# Patient Record
Sex: FEMALE | Race: BLACK OR AFRICAN AMERICAN | Employment: UNEMPLOYED | URBAN - NONMETROPOLITAN AREA
[De-identification: names, ages, dates, MRNs, and addresses within clinical notes are randomized per-mention and may not be internally consistent; named-entity substitution may affect disease eponyms.]

---

## 2017-03-23 ENCOUNTER — NURSE TRIAGE (OUTPATIENT)
Dept: ADMINISTRATIVE | Age: 32
End: 2017-03-23

## 2017-06-20 ENCOUNTER — OFFICE VISIT (OUTPATIENT)
Dept: OTOLARYNGOLOGY | Age: 32
End: 2017-06-20

## 2017-06-20 VITALS
SYSTOLIC BLOOD PRESSURE: 140 MMHG | BODY MASS INDEX: 29.64 KG/M2 | WEIGHT: 195.6 LBS | HEART RATE: 84 BPM | HEIGHT: 68 IN | RESPIRATION RATE: 12 BRPM | TEMPERATURE: 98.4 F | DIASTOLIC BLOOD PRESSURE: 100 MMHG

## 2017-06-20 DIAGNOSIS — J02.0 STREP PHARYNGITIS: Primary | ICD-10-CM

## 2017-06-20 PROCEDURE — 99202 OFFICE O/P NEW SF 15 MIN: CPT | Performed by: OTOLARYNGOLOGY

## 2017-06-20 RX ORDER — AMOXICILLIN AND CLAVULANATE POTASSIUM 875; 125 MG/1; MG/1
1 TABLET, FILM COATED ORAL 2 TIMES DAILY
Qty: 28 TABLET | Refills: 0 | Status: SHIPPED | OUTPATIENT
Start: 2017-06-20 | End: 2017-07-03 | Stop reason: ALTCHOICE

## 2017-06-20 RX ORDER — FLUCONAZOLE 100 MG/1
100 TABLET ORAL EVERY OTHER DAY
Qty: 10 TABLET | Refills: 0 | Status: SHIPPED | OUTPATIENT
Start: 2017-06-20 | End: 2017-07-03 | Stop reason: ALTCHOICE

## 2017-06-20 ASSESSMENT — ENCOUNTER SYMPTOMS
SORE THROAT: 1
RHINORRHEA: 0
ABDOMINAL PAIN: 0
VOMITING: 0
TROUBLE SWALLOWING: 1
NAUSEA: 0
CHEST TIGHTNESS: 0
STRIDOR: 0
CHOKING: 1
COLOR CHANGE: 0
APNEA: 0
COUGH: 1
DIARRHEA: 0
VOICE CHANGE: 1
SINUS PRESSURE: 1
WHEEZING: 0
SHORTNESS OF BREATH: 0
FACIAL SWELLING: 0

## 2017-07-03 ENCOUNTER — OFFICE VISIT (OUTPATIENT)
Dept: FAMILY MEDICINE CLINIC | Age: 32
End: 2017-07-03

## 2017-07-03 VITALS
SYSTOLIC BLOOD PRESSURE: 136 MMHG | HEART RATE: 80 BPM | TEMPERATURE: 98.2 F | DIASTOLIC BLOOD PRESSURE: 78 MMHG | BODY MASS INDEX: 29.5 KG/M2 | RESPIRATION RATE: 12 BRPM | WEIGHT: 194 LBS

## 2017-07-03 DIAGNOSIS — F51.04 PSYCHOPHYSIOLOGICAL INSOMNIA: ICD-10-CM

## 2017-07-03 DIAGNOSIS — Z72.0 TOBACCO ABUSE: ICD-10-CM

## 2017-07-03 DIAGNOSIS — Z09 FOLLOW-UP FOR RESOLVED CONDITION: Primary | ICD-10-CM

## 2017-07-03 DIAGNOSIS — Z23 IMMUNIZATION DUE: ICD-10-CM

## 2017-07-03 PROCEDURE — 90732 PPSV23 VACC 2 YRS+ SUBQ/IM: CPT | Performed by: NURSE PRACTITIONER

## 2017-07-03 PROCEDURE — 90471 IMMUNIZATION ADMIN: CPT | Performed by: NURSE PRACTITIONER

## 2017-07-03 PROCEDURE — 96160 PT-FOCUSED HLTH RISK ASSMT: CPT | Performed by: NURSE PRACTITIONER

## 2017-07-03 PROCEDURE — 99213 OFFICE O/P EST LOW 20 MIN: CPT | Performed by: NURSE PRACTITIONER

## 2017-07-03 RX ORDER — AMITRIPTYLINE HYDROCHLORIDE 10 MG/1
10 TABLET, FILM COATED ORAL NIGHTLY PRN
Qty: 30 TABLET | Refills: 3 | Status: SHIPPED | OUTPATIENT
Start: 2017-07-03 | End: 2017-08-15

## 2017-07-03 ASSESSMENT — ENCOUNTER SYMPTOMS
SORE THROAT: 0
RESPIRATORY NEGATIVE: 1

## 2017-07-03 ASSESSMENT — PATIENT HEALTH QUESTIONNAIRE - PHQ9
6. FEELING BAD ABOUT YOURSELF - OR THAT YOU ARE A FAILURE OR HAVE LET YOURSELF OR YOUR FAMILY DOWN: 1
SUM OF ALL RESPONSES TO PHQ9 QUESTIONS 1 & 2: 2
7. TROUBLE CONCENTRATING ON THINGS, SUCH AS READING THE NEWSPAPER OR WATCHING TELEVISION: 0
3. TROUBLE FALLING OR STAYING ASLEEP: 3
9. THOUGHTS THAT YOU WOULD BE BETTER OFF DEAD, OR OF HURTING YOURSELF: 1
SUM OF ALL RESPONSES TO PHQ QUESTIONS 1-9: 11
10. IF YOU CHECKED OFF ANY PROBLEMS, HOW DIFFICULT HAVE THESE PROBLEMS MADE IT FOR YOU TO DO YOUR WORK, TAKE CARE OF THINGS AT HOME, OR GET ALONG WITH OTHER PEOPLE: 0
4. FEELING TIRED OR HAVING LITTLE ENERGY: 2
2. FEELING DOWN, DEPRESSED OR HOPELESS: 2
5. POOR APPETITE OR OVEREATING: 2
1. LITTLE INTEREST OR PLEASURE IN DOING THINGS: 0
8. MOVING OR SPEAKING SO SLOWLY THAT OTHER PEOPLE COULD HAVE NOTICED. OR THE OPPOSITE, BEING SO FIGETY OR RESTLESS THAT YOU HAVE BEEN MOVING AROUND A LOT MORE THAN USUAL: 0

## 2017-08-15 ENCOUNTER — OFFICE VISIT (OUTPATIENT)
Dept: FAMILY MEDICINE CLINIC | Age: 32
End: 2017-08-15
Payer: COMMERCIAL

## 2017-08-15 VITALS
RESPIRATION RATE: 12 BRPM | HEIGHT: 68 IN | TEMPERATURE: 98.1 F | SYSTOLIC BLOOD PRESSURE: 128 MMHG | DIASTOLIC BLOOD PRESSURE: 68 MMHG | BODY MASS INDEX: 29.4 KG/M2 | WEIGHT: 194 LBS | HEART RATE: 62 BPM

## 2017-08-15 DIAGNOSIS — R05.9 COUGH: ICD-10-CM

## 2017-08-15 DIAGNOSIS — F51.04 PSYCHOPHYSIOLOGICAL INSOMNIA: Primary | ICD-10-CM

## 2017-08-15 DIAGNOSIS — J01.01 ACUTE RECURRENT MAXILLARY SINUSITIS: ICD-10-CM

## 2017-08-15 DIAGNOSIS — H65.92 LEFT OTITIS MEDIA WITH EFFUSION: ICD-10-CM

## 2017-08-15 PROCEDURE — 99213 OFFICE O/P EST LOW 20 MIN: CPT | Performed by: NURSE PRACTITIONER

## 2017-08-15 RX ORDER — GUAIFENESIN 600 MG/1
600 TABLET, EXTENDED RELEASE ORAL 2 TIMES DAILY
Qty: 30 TABLET | Refills: 0 | Status: ON HOLD | OUTPATIENT
Start: 2017-08-15 | End: 2017-12-02

## 2017-08-15 RX ORDER — SULFAMETHOXAZOLE AND TRIMETHOPRIM 800; 160 MG/1; MG/1
1 TABLET ORAL 2 TIMES DAILY
Qty: 20 TABLET | Refills: 0 | Status: SHIPPED | OUTPATIENT
Start: 2017-08-15 | End: 2017-08-25

## 2017-08-15 RX ORDER — LORATADINE AND PSEUDOEPHEDRINE 10; 240 MG/1; MG/1
1 TABLET, EXTENDED RELEASE ORAL DAILY
Qty: 20 TABLET | Refills: 0 | Status: ON HOLD | OUTPATIENT
Start: 2017-08-15 | End: 2017-12-02

## 2017-08-15 RX ORDER — TRAZODONE HYDROCHLORIDE 50 MG/1
50 TABLET ORAL NIGHTLY
Qty: 30 TABLET | Refills: 6 | Status: SHIPPED | OUTPATIENT
Start: 2017-08-15 | End: 2018-02-15 | Stop reason: SDUPTHER

## 2017-08-15 ASSESSMENT — ENCOUNTER SYMPTOMS
SORE THROAT: 0
SINUS PRESSURE: 1
SINUS COMPLAINT: 1
SHORTNESS OF BREATH: 0
HOARSE VOICE: 0
COUGH: 1
SWOLLEN GLANDS: 0

## 2017-12-02 ENCOUNTER — APPOINTMENT (OUTPATIENT)
Dept: GENERAL RADIOLOGY | Age: 32
End: 2017-12-02
Payer: COMMERCIAL

## 2017-12-02 ENCOUNTER — APPOINTMENT (OUTPATIENT)
Dept: CT IMAGING | Age: 32
End: 2017-12-02
Payer: COMMERCIAL

## 2017-12-02 ENCOUNTER — HOSPITAL ENCOUNTER (OUTPATIENT)
Age: 32
Setting detail: OBSERVATION
Discharge: HOME | End: 2017-12-03
Attending: FAMILY MEDICINE | Admitting: SURGERY
Payer: COMMERCIAL

## 2017-12-02 DIAGNOSIS — S09.90XA CLOSED HEAD INJURY, INITIAL ENCOUNTER: ICD-10-CM
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
## 2017-12-02 DIAGNOSIS — S01.512A LACERATION OF TONGUE, INITIAL ENCOUNTER: ICD-10-CM

## 2017-12-02 DIAGNOSIS — S16.1XXA STRAIN OF NECK MUSCLE, INITIAL ENCOUNTER: ICD-10-CM

## 2017-12-02 DIAGNOSIS — I60.9 SUBARACHNOID HEMORRHAGE (HCC): ICD-10-CM

## 2017-12-02 PROBLEM — R68.84 PAIN IN LOWER JAW: Status: ACTIVE | Noted: 2017-12-02

## 2017-12-02 PROBLEM — R10.11 RIGHT UPPER QUADRANT ABDOMINAL PAIN: Status: ACTIVE | Noted: 2017-12-02

## 2017-12-02 LAB
ALBUMIN SERPL-MCNC: 4.4 G/DL (ref 3.5–5.1)
ALP BLD-CCNC: 67 U/L (ref 38–126)
ALT SERPL-CCNC: 19 U/L (ref 11–66)
ANION GAP SERPL CALCULATED.3IONS-SCNC: 12 MEQ/L (ref 8–16)
APTT: 34.1 SECONDS (ref 22–38)
AST SERPL-CCNC: 27 U/L (ref 5–40)
BASOPHILS # BLD: 0.6 %
BASOPHILS ABSOLUTE: 0 THOU/MM3 (ref 0–0.1)
BILIRUB SERPL-MCNC: 0.7 MG/DL (ref 0.3–1.2)
BUN BLDV-MCNC: 11 MG/DL (ref 7–22)
CALCIUM SERPL-MCNC: 9 MG/DL (ref 8.5–10.5)
CHLORIDE BLD-SCNC: 105 MEQ/L (ref 98–111)
CO2: 23 MEQ/L (ref 23–33)
CREAT SERPL-MCNC: 0.5 MG/DL (ref 0.4–1.2)
EKG ATRIAL RATE: 74 BPM
EKG P AXIS: 63 DEGREES
EKG P-R INTERVAL: 144 MS
EKG Q-T INTERVAL: 384 MS
EKG QRS DURATION: 84 MS
EKG QTC CALCULATION (BAZETT): 426 MS
EKG R AXIS: 44 DEGREES
EKG T AXIS: 22 DEGREES
EKG VENTRICULAR RATE: 74 BPM
EOSINOPHIL # BLD: 1.1 %
EOSINOPHILS ABSOLUTE: 0.1 THOU/MM3 (ref 0–0.4)
GFR SERPL CREATININE-BSD FRML MDRD: > 90 ML/MIN/1.73M2
GLUCOSE BLD-MCNC: 104 MG/DL (ref 70–108)
HCT VFR BLD CALC: 44 % (ref 37–47)
HEMOGLOBIN: 14.5 GM/DL (ref 12–16)
INR BLD: 0.97 (ref 0.85–1.13)
LYMPHOCYTES # BLD: 41.3 %
LYMPHOCYTES ABSOLUTE: 2.2 THOU/MM3 (ref 1–4.8)
MCH RBC QN AUTO: 31.4 PG (ref 27–31)
MCHC RBC AUTO-ENTMCNC: 32.8 GM/DL (ref 33–37)
MCV RBC AUTO: 95.7 FL (ref 81–99)
MONOCYTES # BLD: 10.4 %
MONOCYTES ABSOLUTE: 0.6 THOU/MM3 (ref 0.4–1.3)
NUCLEATED RED BLOOD CELLS: 0 /100 WBC
OSMOLALITY CALCULATION: 279.1 MOSMOL/KG (ref 275–300)
PDW BLD-RTO: 14.1 % (ref 11.5–14.5)
PLATELET # BLD: 202 THOU/MM3 (ref 130–400)
PMV BLD AUTO: 10 MCM (ref 7.4–10.4)
POTASSIUM SERPL-SCNC: 4 MEQ/L (ref 3.5–5.2)
RBC # BLD: 4.6 MILL/MM3 (ref 4.2–5.4)
SEG NEUTROPHILS: 46.6 %
SEGMENTED NEUTROPHILS ABSOLUTE COUNT: 2.5 THOU/MM3 (ref 1.8–7.7)
SODIUM BLD-SCNC: 140 MEQ/L (ref 135–145)
TOTAL PROTEIN: 7.4 G/DL (ref 6.1–8)
WBC # BLD: 5.4 THOU/MM3 (ref 4.8–10.8)

## 2017-12-02 PROCEDURE — 74177 CT ABD & PELVIS W/CONTRAST: CPT

## 2017-12-02 PROCEDURE — 99285 EMERGENCY DEPT VISIT HI MDM: CPT

## 2017-12-02 PROCEDURE — 96374 THER/PROPH/DIAG INJ IV PUSH: CPT

## 2017-12-02 PROCEDURE — 96376 TX/PRO/DX INJ SAME DRUG ADON: CPT

## 2017-12-02 PROCEDURE — 71111 X-RAY EXAM RIBS/CHEST4/> VWS: CPT

## 2017-12-02 PROCEDURE — G0378 HOSPITAL OBSERVATION PER HR: HCPCS

## 2017-12-02 PROCEDURE — 72125 CT NECK SPINE W/O DYE: CPT

## 2017-12-02 PROCEDURE — 70355 PANORAMIC X-RAY OF JAWS: CPT

## 2017-12-02 PROCEDURE — 6370000000 HC RX 637 (ALT 250 FOR IP): Performed by: SURGERY

## 2017-12-02 PROCEDURE — 6360000002 HC RX W HCPCS: Performed by: SURGERY

## 2017-12-02 PROCEDURE — 70450 CT HEAD/BRAIN W/O DYE: CPT

## 2017-12-02 PROCEDURE — 80053 COMPREHEN METABOLIC PANEL: CPT

## 2017-12-02 PROCEDURE — 36415 COLL VENOUS BLD VENIPUNCTURE: CPT

## 2017-12-02 PROCEDURE — 6820000001 HC L2 TRAUMA SURGERY EVALUATION

## 2017-12-02 PROCEDURE — 6360000002 HC RX W HCPCS: Performed by: FAMILY MEDICINE

## 2017-12-02 PROCEDURE — 6360000004 HC RX CONTRAST MEDICATION: Performed by: SURGERY

## 2017-12-02 PROCEDURE — 73630 X-RAY EXAM OF FOOT: CPT

## 2017-12-02 PROCEDURE — 85730 THROMBOPLASTIN TIME PARTIAL: CPT

## 2017-12-02 PROCEDURE — 85610 PROTHROMBIN TIME: CPT

## 2017-12-02 PROCEDURE — 2580000003 HC RX 258: Performed by: SURGERY

## 2017-12-02 PROCEDURE — 93005 ELECTROCARDIOGRAM TRACING: CPT

## 2017-12-02 PROCEDURE — 85025 COMPLETE CBC W/AUTO DIFF WBC: CPT

## 2017-12-02 PROCEDURE — 70486 CT MAXILLOFACIAL W/O DYE: CPT

## 2017-12-02 PROCEDURE — 99220 PR INITIAL OBSERVATION CARE/DAY 70 MINUTES: CPT | Performed by: SURGERY

## 2017-12-02 RX ORDER — DOCUSATE SODIUM 100 MG/1
100 CAPSULE, LIQUID FILLED ORAL 2 TIMES DAILY
Status: DISCONTINUED | OUTPATIENT
Start: 2017-12-02 | End: 2017-12-03 | Stop reason: HOSPADM

## 2017-12-02 RX ORDER — MORPHINE SULFATE 2 MG/ML
2 INJECTION, SOLUTION INTRAMUSCULAR; INTRAVENOUS
Status: DISCONTINUED | OUTPATIENT
Start: 2017-12-02 | End: 2017-12-03 | Stop reason: HOSPADM

## 2017-12-02 RX ORDER — FAMOTIDINE 20 MG/1
20 TABLET, FILM COATED ORAL 2 TIMES DAILY
Status: DISCONTINUED | OUTPATIENT
Start: 2017-12-02 | End: 2017-12-03 | Stop reason: HOSPADM

## 2017-12-02 RX ORDER — MORPHINE SULFATE 2 MG/ML
2 INJECTION, SOLUTION INTRAMUSCULAR; INTRAVENOUS ONCE
Status: COMPLETED | OUTPATIENT
Start: 2017-12-02 | End: 2017-12-02

## 2017-12-02 RX ORDER — ACETAMINOPHEN 325 MG/1
650 TABLET ORAL EVERY 4 HOURS PRN
Status: DISCONTINUED | OUTPATIENT
Start: 2017-12-02 | End: 2017-12-03 | Stop reason: HOSPADM

## 2017-12-02 RX ORDER — MORPHINE SULFATE 2 MG/ML
4 INJECTION, SOLUTION INTRAMUSCULAR; INTRAVENOUS
Status: DISCONTINUED | OUTPATIENT
Start: 2017-12-02 | End: 2017-12-03 | Stop reason: HOSPADM

## 2017-12-02 RX ORDER — SODIUM CHLORIDE 0.9 % (FLUSH) 0.9 %
10 SYRINGE (ML) INJECTION EVERY 12 HOURS SCHEDULED
Status: DISCONTINUED | OUTPATIENT
Start: 2017-12-02 | End: 2017-12-03 | Stop reason: HOSPADM

## 2017-12-02 RX ORDER — HYDROCODONE BITARTRATE AND ACETAMINOPHEN 5; 325 MG/1; MG/1
2 TABLET ORAL EVERY 4 HOURS PRN
Status: DISCONTINUED | OUTPATIENT
Start: 2017-12-02 | End: 2017-12-03 | Stop reason: HOSPADM

## 2017-12-02 RX ORDER — SODIUM CHLORIDE 0.9 % (FLUSH) 0.9 %
10 SYRINGE (ML) INJECTION PRN
Status: DISCONTINUED | OUTPATIENT
Start: 2017-12-02 | End: 2017-12-03 | Stop reason: HOSPADM

## 2017-12-02 RX ORDER — HYDROCODONE BITARTRATE AND ACETAMINOPHEN 5; 325 MG/1; MG/1
1 TABLET ORAL EVERY 4 HOURS PRN
Status: DISCONTINUED | OUTPATIENT
Start: 2017-12-02 | End: 2017-12-03 | Stop reason: HOSPADM

## 2017-12-02 RX ORDER — CHLORHEXIDINE GLUCONATE 0.12 MG/ML
15 RINSE ORAL 2 TIMES DAILY
Status: DISCONTINUED | OUTPATIENT
Start: 2017-12-02 | End: 2017-12-03 | Stop reason: HOSPADM

## 2017-12-02 RX ORDER — ONDANSETRON 2 MG/ML
4 INJECTION INTRAMUSCULAR; INTRAVENOUS EVERY 6 HOURS PRN
Status: DISCONTINUED | OUTPATIENT
Start: 2017-12-02 | End: 2017-12-03 | Stop reason: HOSPADM

## 2017-12-02 RX ADMIN — DOCUSATE SODIUM 100 MG: 100 CAPSULE ORAL at 14:27

## 2017-12-02 RX ADMIN — Medication 10 ML: at 21:15

## 2017-12-02 RX ADMIN — FAMOTIDINE 20 MG: 20 TABLET, FILM COATED ORAL at 14:27

## 2017-12-02 RX ADMIN — HYDROCODONE BITARTRATE AND ACETAMINOPHEN 2 TABLET: 5; 325 TABLET ORAL at 14:28

## 2017-12-02 RX ADMIN — HYDROCODONE BITARTRATE AND ACETAMINOPHEN 1 TABLET: 5; 325 TABLET ORAL at 18:28

## 2017-12-02 RX ADMIN — MORPHINE SULFATE 2 MG: 2 INJECTION, SOLUTION INTRAMUSCULAR; INTRAVENOUS at 14:28

## 2017-12-02 RX ADMIN — FAMOTIDINE 20 MG: 20 TABLET, FILM COATED ORAL at 21:15

## 2017-12-02 RX ADMIN — HYDROCODONE BITARTRATE AND ACETAMINOPHEN 2 TABLET: 5; 325 TABLET ORAL at 23:09

## 2017-12-02 RX ADMIN — CHLORHEXIDINE GLUCONATE 15 ML: 1.2 RINSE ORAL at 21:21

## 2017-12-02 RX ADMIN — MORPHINE SULFATE 2 MG: 2 INJECTION, SOLUTION INTRAMUSCULAR; INTRAVENOUS at 10:53

## 2017-12-02 RX ADMIN — IOPAMIDOL 85 ML: 755 INJECTION, SOLUTION INTRAVENOUS at 14:07

## 2017-12-02 RX ADMIN — DOCUSATE SODIUM 100 MG: 100 CAPSULE ORAL at 21:15

## 2017-12-02 RX ADMIN — MORPHINE SULFATE 2 MG: 2 INJECTION, SOLUTION INTRAMUSCULAR; INTRAVENOUS at 21:14

## 2017-12-02 RX ADMIN — CHLORHEXIDINE GLUCONATE 15 ML: 1.2 RINSE ORAL at 14:27

## 2017-12-02 ASSESSMENT — PAIN SCALES - GENERAL
PAINLEVEL_OUTOF10: 5
PAINLEVEL_OUTOF10: 6
PAINLEVEL_OUTOF10: 1
PAINLEVEL_OUTOF10: 7
PAINLEVEL_OUTOF10: 1
PAINLEVEL_OUTOF10: 4
PAINLEVEL_OUTOF10: 7
PAINLEVEL_OUTOF10: 3
PAINLEVEL_OUTOF10: 3
PAINLEVEL_OUTOF10: 1

## 2017-12-02 ASSESSMENT — ENCOUNTER SYMPTOMS
RHINORRHEA: 0
EYE PAIN: 0
BACK PAIN: 0
DIARRHEA: 0
ANAL BLEEDING: 0
ABDOMINAL DISTENTION: 0
WHEEZING: 0
SHORTNESS OF BREATH: 0
VOMITING: 0
CHEST TIGHTNESS: 0
ABDOMINAL PAIN: 1
SINUS PRESSURE: 0
EYE REDNESS: 0
SINUS PAIN: 0
ABDOMINAL PAIN: 0
STRIDOR: 0
CHOKING: 0
CONSTIPATION: 0
FACIAL SWELLING: 1
COUGH: 0
VOICE CHANGE: 0
APNEA: 0
SORE THROAT: 0
TROUBLE SWALLOWING: 1
NAUSEA: 0
TROUBLE SWALLOWING: 0
BLOOD IN STOOL: 0

## 2017-12-02 ASSESSMENT — PAIN DESCRIPTION - LOCATION
LOCATION: JAW;MOUTH
LOCATION: MOUTH

## 2017-12-02 ASSESSMENT — PAIN DESCRIPTION - PAIN TYPE
TYPE: ACUTE PAIN

## 2017-12-02 NOTE — ED NOTES
Swelling starting up on her left foot, xray of foot ordered. Family remains at the bedside.       Felecia Brewer RN  12/02/17 1100

## 2017-12-02 NOTE — ED NOTES
Bed: 006A  Expected date: 12/2/17  Expected time: 9:27 AM  Means of arrival: [facility] Dept  Comments:     Brianda Schuler  12/02/17 7904
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
## 2017-12-02 NOTE — ED PROVIDER NOTES
has never used smokeless tobacco. She reports that she drinks alcohol. She reports that she does not use drugs. PHYSICAL EXAM     INITIAL VITALS:  weight is 180 lb (81.6 kg). Her oral temperature is 98 °F (36.7 °C). Her blood pressure is 144/103 (abnormal) and her pulse is 74. Her respiration is 16 and oxygen saturation is 98%. Physical Exam   Constitutional: She is oriented to person, place, and time. She appears well-developed and well-nourished. No distress. HENT:   Head: Normocephalic and atraumatic. Nose: Nose normal.   Mouth/Throat: Uvula is midline, oropharynx is clear and moist and mucous membranes are normal. No oropharyngeal exudate. 1 cm laceration on the lateral aspect of left side of the tongue, no through and through. No evidence of foreign bodies. Minimal bleeding   Eyes: Conjunctivae and EOM are normal. Pupils are equal, round, and reactive to light. Right eye exhibits no discharge. Left eye exhibits no discharge. No scleral icterus. Neck: Normal range of motion. Neck supple. No JVD present. No tracheal deviation present. No thyromegaly present. Cardiovascular: Normal rate, regular rhythm, normal heart sounds and intact distal pulses. Exam reveals no gallop and no friction rub. No murmur heard. Pulmonary/Chest: Effort normal and breath sounds normal. No stridor. No respiratory distress. She has no wheezes. She has no rales. She exhibits no tenderness. Abdominal: Soft. Bowel sounds are normal. She exhibits no distension and no mass. There is no tenderness. There is no rebound and no guarding. Musculoskeletal:        Cervical back: She exhibits tenderness. She exhibits normal range of motion, no bony tenderness, no swelling, no edema, no deformity, no laceration, no pain, no spasm and normal pulse. Right foot: There is normal range of motion, no tenderness, no bony tenderness, no swelling, normal capillary refill, no crepitus, no deformity and no laceration. Left foot: There is tenderness. There is normal range of motion, no bony tenderness, no swelling, normal capillary refill, no crepitus, no deformity and no laceration. Lymphadenopathy:     She has no cervical adenopathy. Neurological: She is alert and oriented to person, place, and time. No cranial nerve deficit. Skin: Skin is warm and dry. She is not diaphoretic. Superficial abrasion over the chin, no active bleeding. Psychiatric: She has a normal mood and affect. Her behavior is normal.       DIFFERENTIAL DIAGNOSIS:   Including but not limited to [address], Subdural hematoma, SAH, jaw fracture and/or dislocation v contusion    DIAGNOSTIC RESULTS     EKG: All EKG's are interpreted by the Emergency Department Physician who either signs or Co-signs this chart in the absence of a cardiologist.  EKG interpreted by Archana Levin MD:    N/A    RADIOLOGY: non-plain film images(s) such as CT, Ultrasound and MRI are read by the radiologist.    XR FOOT LEFT (MIN 3 VIEWS)   Final Result    No fracture. **This report has been created using voice recognition software. It may contain minor errors which are inherent in voice recognition technology. **      Final report electronically signed by Dr. Hung Ortiz on 12/2/2017 11:30 AM      CT FACIAL BONES WO CONTRAST   Final Result   No facial bone fracture. Soft tissue swelling over the chin. **This report has been created using voice recognition software. It may contain minor errors which are inherent in voice recognition technology. **      Final report electronically signed by Dr. Hung Ortiz on 12/2/2017 11:21 AM      CT CERVICAL SPINE WO CONTRAST   Final Result       1. No fracture. 2. Mild reversal of the normal cervical lordosis. **This report has been created using voice recognition software. It may contain minor errors which are inherent in voice recognition technology. **      Final report electronically signed by
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
## 2017-12-02 NOTE — ED NOTES
Pt admitted to hospital. Transported to floor by cart in stable condition. Nurse called at extension [phone] prior to transport.        Tiffany Snowden, JOSH  07/87/55 8873

## 2017-12-03 ENCOUNTER — APPOINTMENT (OUTPATIENT)
Dept: CT IMAGING | Age: 32
End: 2017-12-03
Payer: COMMERCIAL

## 2017-12-03 VITALS
WEIGHT: 180 LBS | HEART RATE: 75 BPM | TEMPERATURE: 97.6 F | OXYGEN SATURATION: 94 % | RESPIRATION RATE: 18 BRPM | SYSTOLIC BLOOD PRESSURE: 135 MMHG | BODY MASS INDEX: 27.28 KG/M2 | DIASTOLIC BLOOD PRESSURE: 91 MMHG

## 2017-12-03 LAB
ANION GAP SERPL CALCULATED.3IONS-SCNC: 13 MEQ/L (ref 8–16)
BASOPHILS # BLD: 0.5 %
BASOPHILS ABSOLUTE: 0 THOU/MM3 (ref 0–0.1)
BUN BLDV-MCNC: 11 MG/DL (ref 7–22)
CALCIUM SERPL-MCNC: 8.8 MG/DL (ref 8.5–10.5)
CHLORIDE BLD-SCNC: 103 MEQ/L (ref 98–111)
CO2: 24 MEQ/L (ref 23–33)
CREAT SERPL-MCNC: 0.6 MG/DL (ref 0.4–1.2)
EOSINOPHIL # BLD: 1.5 %
EOSINOPHILS ABSOLUTE: 0.1 THOU/MM3 (ref 0–0.4)
GFR SERPL CREATININE-BSD FRML MDRD: > 90 ML/MIN/1.73M2
GLUCOSE BLD-MCNC: 97 MG/DL (ref 70–108)
HCT VFR BLD CALC: 39.4 % (ref 37–47)
HEMOGLOBIN: 12.7 GM/DL (ref 12–16)
LYMPHOCYTES # BLD: 41.4 %
LYMPHOCYTES ABSOLUTE: 2.7 THOU/MM3 (ref 1–4.8)
MCH RBC QN AUTO: 30.4 PG (ref 27–31)
MCHC RBC AUTO-ENTMCNC: 32.3 GM/DL (ref 33–37)
MCV RBC AUTO: 94.1 FL (ref 81–99)
MONOCYTES # BLD: 11.7 %
MONOCYTES ABSOLUTE: 0.8 THOU/MM3 (ref 0.4–1.3)
NUCLEATED RED BLOOD CELLS: 0 /100 WBC
PDW BLD-RTO: 14.2 % (ref 11.5–14.5)
PLATELET # BLD: 194 THOU/MM3 (ref 130–400)
PMV BLD AUTO: 9.4 MCM (ref 7.4–10.4)
POTASSIUM SERPL-SCNC: 3.9 MEQ/L (ref 3.5–5.2)
RBC # BLD: 4.18 MILL/MM3 (ref 4.2–5.4)
SEG NEUTROPHILS: 44.9 %
SEGMENTED NEUTROPHILS ABSOLUTE COUNT: 2.9 THOU/MM3 (ref 1.8–7.7)
SODIUM BLD-SCNC: 140 MEQ/L (ref 135–145)
WBC # BLD: 6.5 THOU/MM3 (ref 4.8–10.8)

## 2017-12-03 PROCEDURE — G0378 HOSPITAL OBSERVATION PER HR: HCPCS

## 2017-12-03 PROCEDURE — 6370000000 HC RX 637 (ALT 250 FOR IP): Performed by: SURGERY

## 2017-12-03 PROCEDURE — 80048 BASIC METABOLIC PNL TOTAL CA: CPT

## 2017-12-03 PROCEDURE — G8979 MOBILITY GOAL STATUS: HCPCS

## 2017-12-03 PROCEDURE — 97162 PT EVAL MOD COMPLEX 30 MIN: CPT

## 2017-12-03 PROCEDURE — 85025 COMPLETE CBC W/AUTO DIFF WBC: CPT

## 2017-12-03 PROCEDURE — 2580000003 HC RX 258: Performed by: SURGERY

## 2017-12-03 PROCEDURE — 36415 COLL VENOUS BLD VENIPUNCTURE: CPT

## 2017-12-03 PROCEDURE — 99226 PR SBSQ OBSERVATION CARE/DAY 35 MINUTES: CPT | Performed by: SURGERY

## 2017-12-03 PROCEDURE — G8978 MOBILITY CURRENT STATUS: HCPCS

## 2017-12-03 PROCEDURE — 70450 CT HEAD/BRAIN W/O DYE: CPT

## 2017-12-03 RX ORDER — IBUPROFEN 200 MG
200 TABLET ORAL EVERY 6 HOURS PRN
Qty: 120 TABLET | Refills: 3 | Status: SHIPPED | OUTPATIENT
Start: 2017-12-03 | End: 2017-12-06 | Stop reason: SDUPTHER

## 2017-12-03 RX ORDER — CHLORHEXIDINE GLUCONATE 0.12 MG/ML
15 RINSE ORAL 2 TIMES DAILY
Qty: 420 ML | Refills: 0 | Status: SHIPPED | OUTPATIENT
Start: 2017-12-03 | End: 2017-12-17

## 2017-12-03 RX ADMIN — Medication 10 ML: at 08:31

## 2017-12-03 RX ADMIN — DOCUSATE SODIUM 100 MG: 100 CAPSULE ORAL at 08:30

## 2017-12-03 RX ADMIN — FAMOTIDINE 20 MG: 20 TABLET, FILM COATED ORAL at 08:30

## 2017-12-03 RX ADMIN — CHLORHEXIDINE GLUCONATE 15 ML: 1.2 RINSE ORAL at 08:30

## 2017-12-03 RX ADMIN — HYDROCODONE BITARTRATE AND ACETAMINOPHEN 1 TABLET: 5; 325 TABLET ORAL at 03:38

## 2017-12-03 RX ADMIN — HYDROCODONE BITARTRATE AND ACETAMINOPHEN 1 TABLET: 5; 325 TABLET ORAL at 08:31

## 2017-12-03 ASSESSMENT — PAIN DESCRIPTION - LOCATION
LOCATION: JAW;MOUTH

## 2017-12-03 ASSESSMENT — PAIN DESCRIPTION - PAIN TYPE
TYPE: ACUTE PAIN

## 2017-12-03 ASSESSMENT — PAIN SCALES - GENERAL
PAINLEVEL_OUTOF10: 4
PAINLEVEL_OUTOF10: 3
PAINLEVEL_OUTOF10: 3
PAINLEVEL_OUTOF10: 2

## 2017-12-03 ASSESSMENT — PAIN DESCRIPTION - DESCRIPTORS: DESCRIPTORS: ACHING

## 2017-12-03 ASSESSMENT — PAIN DESCRIPTION - ORIENTATION: ORIENTATION: RIGHT
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
## 2017-12-03 NOTE — DISCHARGE INSTR - DIET
 Good nutrition is important when healing from an illness, injury, or surgery. Follow any nutrition recommendations given to you during your hospital stay.  If you were given an oral nutrition supplement while in the hospital, continue to take this supplement at home. You can take it with meals, in-between meals, and/or before bedtime. These supplements can be purchased at most local grocery stores, pharmacies, and chain super-stores.  If you have any questions about your diet or nutrition, call the hospital and ask for the dietitian. What foods are good to eat? You will be eating foods that are easy to chew and swallow on this diet. These foods are naturally soft. If not, you can cook, chop, or mash them to make them soft. These include:  Cream soups  Moist, tender meats (fish or poultry)  Milk products  Yogurt  Cottage cheese  Cooked or canned fruit  Fruit juice  Cooked or canned veggies  Mashed, baked, or boiled vegetables  Cooked cereals  Plain white rice  Soft breads  Pasta  Butter  Mayonnaise  Sour cream  Vegetable oil  Smooth ice cream  Sherbet  Custards  Puddings  Cakes   What foods should be limited or avoided?    You should stay away from foods that are hard to chew or swallow, such as:  Hard meat  Raw fruits and veggies  Chewy breads  Crispy crackers or chips  Nuts and seeds  Fried foods  Sausage  Cold cuts  Strong cheese  Peanut brittle  Candy with dried fruit

## 2017-12-03 NOTE — PLAN OF CARE
Problem: Pain:  Goal: Pain level will decrease  Pain level will decrease   Outcome: Ongoing  Mouth/ jaw pain 4/10. Pain goal 3. Goal: Control of acute pain  Control of acute pain   Outcome: Ongoing  Mouth/ jaw pain 4/10. Pain goal 3. Problem: Neurological  Goal: Maximum potential motor/sensory/cognitive function  Outcome: Ongoing  Negative neuro assessment. Problem: Cardiovascular  Goal: No DVT, peripheral vascular complications  Outcome: Ongoing  No signs of DVT  Goal: Hemodynamic stability  Outcome: Ongoing  Vitals:    12/03/17 0825   BP: (!) 135/91   Pulse: 75   Resp: 18   Temp: 97.6 °F (36.4 °C)   SpO2: 94%         Problem: Respiratory  Goal: No pulmonary complications  Outcome: Ongoing  Lungs diminished in bases. Problem: Skin Integrity/Risk  Goal: No skin breakdown during hospitalization  Outcome: Ongoing  No signs of skin breakdown. Comments: Care plan reviewed with patient and significant other. Patient and significant other verbalize understanding of the plan of care and contribute to goal setting.
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
## 2017-12-04 ENCOUNTER — TELEPHONE (OUTPATIENT)
Dept: FAMILY MEDICINE CLINIC | Age: 32
End: 2017-12-04

## 2017-12-04 NOTE — TELEPHONE ENCOUNTER
[facility] Transitions Initial Follow Up Call    Call within 2 business days of discharge: Yes    Patient: Osiel Caicedo Patient : 1985   MRN: [id number]  Reason for Admission: There are no discharge diagnoses documented for the most recent discharge. Discharge Date: 12/3/17 RARS: Javier RIVERA([phone])@     Spoke with: [address]: [unfilled]    Non-face-to-face services provided:  Obtained and reviewed discharge summary and/or continuity of care documents       Have the medications prescribed at discharge been filled? yes    Has the patient experienced any new symptoms or have previous symptoms worsened? Yes, pain right side is worse    Is the patient experiencing any pain? Yes   If yes, is the pain well controlled?  Yes, went to pain clinic today      Follow Up  Future Appointments  Date Time Provider Mili Jamison   2017 2:40 PM Miracle Escobedo  [facility] [place]   2/15/2018 8:40 AM Mario Madsen 84       Tanja Rock LPN

## 2017-12-06 ENCOUNTER — OFFICE VISIT (OUTPATIENT)
Dept: FAMILY MEDICINE CLINIC | Age: 32
End: 2017-12-06
Payer: COMMERCIAL

## 2017-12-06 VITALS
BODY MASS INDEX: 28.43 KG/M2 | DIASTOLIC BLOOD PRESSURE: 86 MMHG | HEART RATE: 92 BPM | WEIGHT: 187.6 LBS | TEMPERATURE: 99 F | HEIGHT: 68 IN | RESPIRATION RATE: 12 BRPM | SYSTOLIC BLOOD PRESSURE: 122 MMHG

## 2017-12-06 DIAGNOSIS — R68.84 JAW PAIN: Primary | ICD-10-CM

## 2017-12-06 DIAGNOSIS — S01.512A GUM LACERATION: ICD-10-CM

## 2017-12-06 DIAGNOSIS — G44.209 TENSION-TYPE HEADACHE, NOT INTRACTABLE, UNSPECIFIED CHRONICITY PATTERN: ICD-10-CM

## 2017-12-06 DIAGNOSIS — J30.89 CHRONIC NONSEASONAL ALLERGIC RHINITIS DUE TO OTHER ALLERGEN: ICD-10-CM

## 2017-12-06 DIAGNOSIS — M79.10 MYALGIA: ICD-10-CM

## 2017-12-06 DIAGNOSIS — T14.8XXA HEMATOMA: ICD-10-CM

## 2017-12-06 DIAGNOSIS — R10.9 RIGHT FLANK PAIN: ICD-10-CM

## 2017-12-06 PROCEDURE — G8484 FLU IMMUNIZE NO ADMIN: HCPCS | Performed by: NURSE PRACTITIONER

## 2017-12-06 PROCEDURE — G8417 CALC BMI ABV UP PARAM F/U: HCPCS | Performed by: NURSE PRACTITIONER

## 2017-12-06 PROCEDURE — 99214 OFFICE O/P EST MOD 30 MIN: CPT | Performed by: NURSE PRACTITIONER

## 2017-12-06 PROCEDURE — 4004F PT TOBACCO SCREEN RCVD TLK: CPT | Performed by: NURSE PRACTITIONER

## 2017-12-06 PROCEDURE — G8427 DOCREV CUR MEDS BY ELIG CLIN: HCPCS | Performed by: NURSE PRACTITIONER

## 2017-12-06 RX ORDER — CETIRIZINE HYDROCHLORIDE 10 MG/1
10 TABLET ORAL DAILY
Qty: 30 TABLET | Refills: 6 | Status: SHIPPED | OUTPATIENT
Start: 2017-12-06 | End: 2018-02-15 | Stop reason: ALTCHOICE

## 2017-12-06 RX ORDER — IBUPROFEN 800 MG/1
800 TABLET ORAL EVERY 8 HOURS PRN
Qty: 90 TABLET | Refills: 0 | Status: SHIPPED | OUTPATIENT
Start: 2017-12-06 | End: 2019-08-05

## 2017-12-06 RX ORDER — PREDNISONE 20 MG/1
TABLET ORAL
Qty: 18 TABLET | Refills: 0 | Status: SHIPPED | OUTPATIENT
Start: 2017-12-06 | End: 2018-01-29 | Stop reason: ALTCHOICE

## 2017-12-06 RX ORDER — CYCLOBENZAPRINE HCL 5 MG
5 TABLET ORAL 3 TIMES DAILY PRN
Qty: 40 TABLET | Refills: 0 | Status: SHIPPED | OUTPATIENT
Start: 2017-12-06 | End: 2017-12-16

## 2017-12-06 ASSESSMENT — ENCOUNTER SYMPTOMS
SINUS PRESSURE: 0
BACK PAIN: 1
RESPIRATORY NEGATIVE: 1
SINUS PAIN: 0
RHINORRHEA: 1
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
## 2017-12-06 NOTE — PROGRESS NOTES
Visit Information    Have you changed or started any medications since your last visit including any over-the-counter medicines, vitamins, or herbal medicines? no   Are you having any side effects from any of your medications? -  no  Have you stopped taking any of your medications? Is so, why? -  no    Have you seen any other physician or provider since your last visit? Yes - Records Obtained  Have you had any other diagnostic tests since your last visit? Yes - Records Obtained  Have you been seen in the emergency room and/or had an admission to a hospital since we last saw you? Yes - Records Obtained  Have you had your routine dental cleaning in the past 6 months? no    Have you activated your [employer] account? If not, what are your barriers?  No: pt declined      Patient Care Team:  Zahira Anthony CNP as PCP - General (Family Nurse Practitioner)    Medical History Review  Past Medical, Family, and Social History reviewed and does contribute to the patient presenting condition    Health Maintenance   Topic Date Due    HIV screen  12/30/1998    Flu vaccine (1) 09/01/2017    Cervical cancer screen  05/19/2018    DTaP/Tdap/Td vaccine (2 - Td) 06/03/2025    Pneumococcal med risk  Completed
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
## 2017-12-06 NOTE — LETTER
[address]  [address] [address]  Phone: [phone]  Fax: [phone]    Pio Rivera CNP        December 6, 2017     Patient: Yazmin Gray   YOB: 1985   Date of Visit: 12/6/2017       To Whom It May Concern: It is my medical opinion that Angeles Segal patient needs to remain out of work from December 2nd until December 13th until further notice. .    If you have any questions or concerns, please don't hesitate to call.     Sincerely,        Pio Rivera CNP

## 2017-12-13 ENCOUNTER — OFFICE VISIT (OUTPATIENT)
Dept: FAMILY MEDICINE CLINIC | Age: 32
End: 2017-12-13
Payer: COMMERCIAL

## 2017-12-13 ENCOUNTER — TELEPHONE (OUTPATIENT)
Dept: FAMILY MEDICINE CLINIC | Age: 32
End: 2017-12-13

## 2017-12-13 VITALS
HEIGHT: 68 IN | RESPIRATION RATE: 12 BRPM | SYSTOLIC BLOOD PRESSURE: 120 MMHG | TEMPERATURE: 98.4 F | WEIGHT: 188 LBS | DIASTOLIC BLOOD PRESSURE: 70 MMHG | BODY MASS INDEX: 28.49 KG/M2 | HEART RATE: 84 BPM

## 2017-12-13 DIAGNOSIS — R22.9 SOFT TISSUE SWELLING: ICD-10-CM

## 2017-12-13 DIAGNOSIS — R42 VERTIGO: Primary | ICD-10-CM

## 2017-12-13 PROCEDURE — G8484 FLU IMMUNIZE NO ADMIN: HCPCS | Performed by: NURSE PRACTITIONER

## 2017-12-13 PROCEDURE — 99213 OFFICE O/P EST LOW 20 MIN: CPT | Performed by: NURSE PRACTITIONER

## 2017-12-13 PROCEDURE — G8417 CALC BMI ABV UP PARAM F/U: HCPCS | Performed by: NURSE PRACTITIONER

## 2017-12-13 PROCEDURE — G8427 DOCREV CUR MEDS BY ELIG CLIN: HCPCS | Performed by: NURSE PRACTITIONER

## 2017-12-13 PROCEDURE — 4004F PT TOBACCO SCREEN RCVD TLK: CPT | Performed by: NURSE PRACTITIONER

## 2017-12-13 RX ORDER — MECLIZINE HCL 12.5 MG/1
12.5 TABLET ORAL 3 TIMES DAILY PRN
Qty: 60 TABLET | Refills: 0 | Status: SHIPPED | OUTPATIENT
Start: 2017-12-13 | End: 2018-01-12

## 2017-12-13 ASSESSMENT — ENCOUNTER SYMPTOMS
COLOR CHANGE: 1
RESPIRATORY NEGATIVE: 1
ABDOMINAL DISTENTION: 0
ABDOMINAL PAIN: 0

## 2017-12-13 NOTE — PROGRESS NOTES
Positive for color change. Neurological: Positive for light-headedness. Negative for dizziness, weakness, numbness and headaches. Psychiatric/Behavioral: Negative for self-injury, sleep disturbance and suicidal ideas. Objective:     /82   Pulse 84   Temp 98.4 °F (36.9 °C) (Oral)   Resp 12   Ht 5' 8.11\" (1.73 m)   Wt 188 lb (85.3 kg)   LMP 11/13/2017   BMI 28.49 kg/m²     Physical Exam   Constitutional: She is oriented to person, place, and time. She appears well-developed and well-nourished. No distress. HENT:   Right Ear: Hearing, tympanic membrane, external ear and ear canal normal.   Left Ear: Hearing, tympanic membrane, external ear and ear canal normal.   Nose: Nose normal.   Mouth/Throat: Oropharynx is clear and moist.   evaluated the inside of her lower lip and gums and the skin is intact and no erythema is noted. Cardiovascular: Normal rate, regular rhythm, normal heart sounds and intact distal pulses. No murmur heard. Pulmonary/Chest: Breath sounds normal. No respiratory distress. She has no wheezes. Neurological: She is alert and oriented to person, place, and time. She displays normal reflexes. No cranial nerve deficit or sensory deficit. She exhibits normal muscle tone. Coordination and gait normal.   Negative lay hallpike test.  Epley Maneuver performed   Skin: Skin is warm and dry. No erythema. 2 cm area of soft tissue edema, soft to touch and tender to palpation. NO warmth palpated. Nursing note and vitals reviewed. Assessment/Plan:          1. Vertigo  Pt did have a bp difference from lying down to standing with a systolic difference of 12. Will advise pt to drink a lot of water. Start antivert. 48 hour holter  Wear sherry hose  2. Soft tissue swelling  Warm compresses to chin      No Follow-up on file.     Reccommended tobacco cessation options including pharmacologic methods, counseled great than 3 minutes during this visit:  Yes  []  No  []

## 2017-12-13 NOTE — TELEPHONE ENCOUNTER
48 hour  Holter monitor  @ [facility]  12/21/17 @ 10:30    Arrive @ 10:00  2nd floor heart center

## 2017-12-14 ENCOUNTER — NURSE TRIAGE (OUTPATIENT)
Dept: ADMINISTRATIVE | Age: 32
End: 2017-12-14

## 2017-12-21 ENCOUNTER — HOSPITAL ENCOUNTER (OUTPATIENT)
Dept: NON INVASIVE DIAGNOSTICS | Age: 32
Discharge: HOME | End: 2017-12-21
Payer: COMMERCIAL

## 2017-12-21 PROCEDURE — 93225 XTRNL ECG REC<48 HRS REC: CPT

## 2017-12-21 PROCEDURE — 93226 XTRNL ECG REC<48 HR SCAN A/R: CPT

## 2017-12-21 NOTE — PROCEDURES
The skin was prepped and a holter monitor was applied. The patient was instructed on the documentation of symptoms and the purpose of the holter as well as the things to avoid while wearing the holter. The patient was instructed to remove and return the holter on 12/23/17.   The serial number of the holter that was applied is [id number]

## 2017-12-27 NOTE — PROCEDURES
[address] [place] [address]                                  HOLTER MONITOR    PATIENT NAME: David Silver                 :        1985  MED REC NO:   [id number]                           ROOM:  ACCOUNT NO:   [de-identified]                           ADMIT DATE: 2017  PROVIDER:     Roxann Boyce. Heather Lopez M.D.    Jayne Jagdeep STUDY:  2017    DURATION:  48 hours. QUALITY:  Reasonable. INDICATION:  Syncope/vertigo. FINDINGS:  The patient is in normal sinus rhythm with average heart rate of  86 beats per minute ranging from 55 to 128 beats per minute. Maximum R to  R interval is 1.4 seconds at 22 hours. No ventricular ectopic beat noted,  1 supraventricular ectopic beat noted. Diary presented and the patient  _____ dizziness; however, at that time, the patient was in normal sinus  rhythm with no ectopic beats or no correlations between the Holter and  symptoms of the patient. CONCLUSION:  This is a benign Holter monitor findings with normal sinus  rhythm. Average heart rate of 86 beats per minute ranging from 55 to 128  beats per minute. No significant pause of more than 1.4 seconds noted. No  ventricular ectopic beat noted. One supraventricular ectopic beat noted. No atrial fibrillation. No other form of arrhythmia. This Holter does not  explain the syncope of the patient. Derrell Pinzon.  Nba Patricio M.D.    D: 2017 20:39:27       T: 2017 23:39:55     [email or web address]  Job#: [id number]     Doc#: [id number]    CC:

## 2018-01-10 ENCOUNTER — OFFICE VISIT (OUTPATIENT)
Dept: FAMILY MEDICINE CLINIC | Age: 33
End: 2018-01-10
Payer: COMMERCIAL

## 2018-01-10 VITALS
DIASTOLIC BLOOD PRESSURE: 70 MMHG | WEIGHT: 186.2 LBS | RESPIRATION RATE: 14 BRPM | SYSTOLIC BLOOD PRESSURE: 124 MMHG | TEMPERATURE: 97.8 F | BODY MASS INDEX: 28.22 KG/M2 | HEIGHT: 68 IN | HEART RATE: 74 BPM

## 2018-01-10 DIAGNOSIS — Z09 FOLLOW-UP FOR RESOLVED CONDITION: ICD-10-CM

## 2018-01-10 DIAGNOSIS — R68.84 PAIN IN LOWER JAW: ICD-10-CM

## 2018-01-10 DIAGNOSIS — R22.9 SOFT TISSUE SWELLING: Primary | ICD-10-CM

## 2018-01-10 PROBLEM — S01.512A TONGUE LACERATION: Status: RESOLVED | Noted: 2017-12-02 | Resolved: 2018-01-10

## 2018-01-10 PROBLEM — R10.11 RIGHT UPPER QUADRANT ABDOMINAL PAIN: Status: RESOLVED | Noted: 2017-12-02 | Resolved: 2018-01-10

## 2018-01-10 PROBLEM — S09.90XA CHI (CLOSED HEAD INJURY), INITIAL ENCOUNTER: Status: RESOLVED | Noted: 2017-12-02 | Resolved: 2018-01-10

## 2018-01-10 PROCEDURE — 4004F PT TOBACCO SCREEN RCVD TLK: CPT | Performed by: NURSE PRACTITIONER

## 2018-01-10 PROCEDURE — 99213 OFFICE O/P EST LOW 20 MIN: CPT | Performed by: NURSE PRACTITIONER

## 2018-01-10 PROCEDURE — G8484 FLU IMMUNIZE NO ADMIN: HCPCS | Performed by: NURSE PRACTITIONER

## 2018-01-10 PROCEDURE — G8417 CALC BMI ABV UP PARAM F/U: HCPCS | Performed by: NURSE PRACTITIONER

## 2018-01-10 PROCEDURE — G8427 DOCREV CUR MEDS BY ELIG CLIN: HCPCS | Performed by: NURSE PRACTITIONER

## 2018-01-10 ASSESSMENT — ENCOUNTER SYMPTOMS: RESPIRATORY NEGATIVE: 1
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
## 2018-01-16 ENCOUNTER — HOSPITAL ENCOUNTER (OUTPATIENT)
Dept: ULTRASOUND IMAGING | Age: 33
Discharge: HOME | End: 2018-01-16
Payer: COMMERCIAL

## 2018-01-16 DIAGNOSIS — R22.9 SOFT TISSUE SWELLING: ICD-10-CM

## 2018-01-16 PROCEDURE — 76536 US EXAM OF HEAD AND NECK: CPT

## 2018-01-17 ENCOUNTER — TELEPHONE (OUTPATIENT)
Dept: FAMILY MEDICINE CLINIC | Age: 33
End: 2018-01-17

## 2018-01-17 DIAGNOSIS — T79.2XXA SEROMA DUE TO TRAUMA (HCC): Primary | ICD-10-CM

## 2018-01-18 NOTE — TELEPHONE ENCOUNTER
Please let pt know I have sent a referral to Dr. Richa Rodrigues for her to evaluate the patient seroma/hamatoma. Her office should be contacting her.

## 2018-01-29 ENCOUNTER — OFFICE VISIT (OUTPATIENT)
Dept: SURGERY | Age: 33
End: 2018-01-29
Payer: COMMERCIAL

## 2018-01-29 VITALS
SYSTOLIC BLOOD PRESSURE: 122 MMHG | DIASTOLIC BLOOD PRESSURE: 82 MMHG | BODY MASS INDEX: 28.49 KG/M2 | TEMPERATURE: 97.7 F | HEART RATE: 72 BPM | WEIGHT: 188 LBS | HEIGHT: 68 IN | OXYGEN SATURATION: 98 % | RESPIRATION RATE: 16 BRPM
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
## 2018-01-29 DIAGNOSIS — R22.0 MASS OF CHIN: ICD-10-CM

## 2018-01-29 DIAGNOSIS — R68.84 PAIN IN LOWER JAW: Primary | ICD-10-CM

## 2018-01-29 PROCEDURE — 4004F PT TOBACCO SCREEN RCVD TLK: CPT | Performed by: SURGERY

## 2018-01-29 PROCEDURE — G8484 FLU IMMUNIZE NO ADMIN: HCPCS | Performed by: SURGERY

## 2018-01-29 PROCEDURE — G8417 CALC BMI ABV UP PARAM F/U: HCPCS | Performed by: SURGERY

## 2018-01-29 PROCEDURE — 99214 OFFICE O/P EST MOD 30 MIN: CPT | Performed by: SURGERY

## 2018-01-29 PROCEDURE — G8427 DOCREV CUR MEDS BY ELIG CLIN: HCPCS | Performed by: SURGERY

## 2018-01-29 NOTE — PROGRESS NOTES
Allergen Reactions    Percocet [Oxycodone-Acetaminophen] Other (See Comments)     hallucinations       Family History  Family History   Problem Relation Age of Onset    Cancer Maternal Grandmother        Social History  Social History     Social History    Marital status: Single     Spouse name: N/A    Number of children: 2    Years of education: N/A     Occupational History    Not on file. Social History Main Topics    Smoking status: Current Every Day Smoker     Packs/day: 1.00     Years: 15.00     Types: Cigarettes    Smokeless tobacco: Never Used    Alcohol use 0.0 oz/week      Comment: twice a month    Drug use: No    Sexual activity: Yes     Partners: Male     Other Topics Concern    Not on file     Social History Narrative    No narrative on file           Review of Systems  Review of Systems   Constitutional: Negative for activity change, appetite change, chills, fever and unexpected weight change. HENT: Positive for mouth sores and trouble swallowing. Negative for nosebleeds, rhinorrhea, sinus pain and sinus pressure. Eyes: Negative for pain and redness. Respiratory: Negative for cough, choking and shortness of breath. Cardiovascular: Negative for chest pain and leg swelling. Gastrointestinal: Negative for abdominal distention, abdominal pain and nausea. Endocrine: Negative for polydipsia and polyuria. Genitourinary: Negative for dysuria and enuresis. Musculoskeletal: Negative for arthralgias, back pain and joint swelling. Skin: Negative for color change, pallor, rash and wound. Neurological: Negative for dizziness, light-headedness and headaches. Hematological: Negative for adenopathy. Does not bruise/bleed easily. Psychiatric/Behavioral: Negative for agitation and confusion.        OBJECTIVE     /82 (Site: Right Arm, Position: Sitting, Cuff Size: Medium Adult)   Pulse 72   Temp 97.7 °F (36.5 °C) (Tympanic)   Resp 16   Ht 5' 8\" (1.727 m)   Wt 188 lb

## 2018-01-30 ASSESSMENT — ENCOUNTER SYMPTOMS
RHINORRHEA: 0
NAUSEA: 0
SHORTNESS OF BREATH: 0
ABDOMINAL PAIN: 0
COUGH: 0
EYE REDNESS: 0
ABDOMINAL DISTENTION: 0
SINUS PRESSURE: 0
COLOR CHANGE: 0
SINUS PAIN: 0
EYE PAIN: 0
CHOKING: 0
TROUBLE SWALLOWING: 1
BACK PAIN: 0

## 2018-02-09 ENCOUNTER — HOSPITAL ENCOUNTER (OUTPATIENT)
Dept: CT IMAGING | Age: 33
Discharge: HOME | End: 2018-02-09
Payer: COMMERCIAL

## 2018-02-09 DIAGNOSIS — R68.84 PAIN IN LOWER JAW: ICD-10-CM
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
## 2018-02-09 PROCEDURE — 70491 CT SOFT TISSUE NECK W/DYE: CPT

## 2018-02-09 PROCEDURE — 6360000004 HC RX CONTRAST MEDICATION: Performed by: SURGERY

## 2018-02-09 RX ADMIN — IOPAMIDOL 65 ML: 755 INJECTION, SOLUTION INTRAVENOUS at 15:42

## 2018-02-15 ENCOUNTER — OFFICE VISIT (OUTPATIENT)
Dept: FAMILY MEDICINE CLINIC | Age: 33
End: 2018-02-15
Payer: COMMERCIAL

## 2018-02-15 VITALS
DIASTOLIC BLOOD PRESSURE: 78 MMHG | WEIGHT: 192.8 LBS | TEMPERATURE: 97.8 F | SYSTOLIC BLOOD PRESSURE: 116 MMHG | BODY MASS INDEX: 29.22 KG/M2 | RESPIRATION RATE: 10 BRPM | HEART RATE: 79 BPM | HEIGHT: 68 IN

## 2018-02-15 DIAGNOSIS — T14.8XXA HEMATOMA: ICD-10-CM

## 2018-02-15 DIAGNOSIS — F51.04 PSYCHOPHYSIOLOGICAL INSOMNIA: Primary | ICD-10-CM

## 2018-02-15 DIAGNOSIS — M79.10 MYALGIA: ICD-10-CM

## 2018-02-15 PROCEDURE — G8484 FLU IMMUNIZE NO ADMIN: HCPCS | Performed by: NURSE PRACTITIONER

## 2018-02-15 PROCEDURE — G8427 DOCREV CUR MEDS BY ELIG CLIN: HCPCS | Performed by: NURSE PRACTITIONER

## 2018-02-15 PROCEDURE — G8417 CALC BMI ABV UP PARAM F/U: HCPCS | Performed by: NURSE PRACTITIONER

## 2018-02-15 PROCEDURE — 4004F PT TOBACCO SCREEN RCVD TLK: CPT | Performed by: NURSE PRACTITIONER

## 2018-02-15 PROCEDURE — 99213 OFFICE O/P EST LOW 20 MIN: CPT | Performed by: NURSE PRACTITIONER

## 2018-02-15 RX ORDER — TRAZODONE HYDROCHLORIDE 100 MG/1
100 TABLET ORAL NIGHTLY
Qty: 30 TABLET | Refills: 6 | Status: SHIPPED | OUTPATIENT
Start: 2018-02-15 | End: 2018-08-23 | Stop reason: SDUPTHER

## 2018-02-15 ASSESSMENT — ENCOUNTER SYMPTOMS
RESPIRATORY NEGATIVE: 1
RHINORRHEA: 0
SINUS PAIN: 0

## 2018-02-26 ENCOUNTER — OFFICE VISIT (OUTPATIENT)
Dept: SURGERY | Age: 33
End: 2018-02-26
Payer: COMMERCIAL

## 2018-02-26 VITALS
BODY MASS INDEX: 29.1 KG/M2 | HEIGHT: 68 IN | WEIGHT: 192 LBS | OXYGEN SATURATION: 97 % | SYSTOLIC BLOOD PRESSURE: 130 MMHG | RESPIRATION RATE: 18 BRPM | HEART RATE: 96 BPM | TEMPERATURE: 98 F | DIASTOLIC BLOOD PRESSURE: 84 MMHG
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
## 2018-02-26 DIAGNOSIS — R68.84 PAIN IN LOWER JAW: Primary | ICD-10-CM

## 2018-02-26 DIAGNOSIS — R22.0 MASS OF CHIN: ICD-10-CM

## 2018-02-26 PROCEDURE — G8417 CALC BMI ABV UP PARAM F/U: HCPCS | Performed by: SURGERY

## 2018-02-26 PROCEDURE — 99213 OFFICE O/P EST LOW 20 MIN: CPT | Performed by: SURGERY

## 2018-02-26 PROCEDURE — 4004F PT TOBACCO SCREEN RCVD TLK: CPT | Performed by: SURGERY

## 2018-02-26 PROCEDURE — G8484 FLU IMMUNIZE NO ADMIN: HCPCS | Performed by: SURGERY

## 2018-02-26 PROCEDURE — G8427 DOCREV CUR MEDS BY ELIG CLIN: HCPCS | Performed by: SURGERY

## 2018-02-26 ASSESSMENT — ENCOUNTER SYMPTOMS
RHINORRHEA: 0
COUGH: 0
SINUS PAIN: 0
SINUS PRESSURE: 0
SHORTNESS OF BREATH: 0
NAUSEA: 0
CHOKING: 0
ABDOMINAL PAIN: 0
BACK PAIN: 0
EYE PAIN: 0
COLOR CHANGE: 0
ABDOMINAL DISTENTION: 0
EYE REDNESS: 0
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
## 2018-03-05 ENCOUNTER — HOSPITAL ENCOUNTER (EMERGENCY)
Age: 33
Discharge: HOME | End: 2018-03-05
Payer: COMMERCIAL

## 2018-03-05 VITALS
WEIGHT: 192 LBS | OXYGEN SATURATION: 98 % | BODY MASS INDEX: 29.1 KG/M2 | DIASTOLIC BLOOD PRESSURE: 100 MMHG | HEIGHT: 68 IN | HEART RATE: 86 BPM | TEMPERATURE: 98.2 F | SYSTOLIC BLOOD PRESSURE: 157 MMHG

## 2018-03-05 DIAGNOSIS — N39.0 URINARY TRACT INFECTION WITHOUT HEMATURIA, SITE UNSPECIFIED: Primary | ICD-10-CM

## 2018-03-05 LAB
BACTERIA: ABNORMAL
BILIRUBIN URINE: NEGATIVE
BLOOD, URINE: ABNORMAL
CASTS: ABNORMAL /LPF
CASTS: ABNORMAL /LPF
CHARACTER, URINE: ABNORMAL
COLOR: ABNORMAL
CRYSTALS: ABNORMAL
EPITHELIAL CELLS, UA: ABNORMAL /HPF
GLUCOSE, URINE: NEGATIVE MG/DL
KETONES, URINE: NEGATIVE
LEUKOCYTE EST, POC: ABNORMAL
MISCELLANEOUS LAB TEST RESULT: ABNORMAL
NITRITE, URINE: POSITIVE
PH UA: 6
PROTEIN UA: 30 MG/DL
RBC URINE: ABNORMAL /HPF
RENAL EPITHELIAL, UA: ABNORMAL
SPECIFIC GRAVITY UA: 1.02 (ref 1–1.03)
UROBILINOGEN, URINE: 1 EU/DL
WBC UA: > 200 /HPF
YEAST: ABNORMAL

## 2018-03-05 PROCEDURE — 81001 URINALYSIS AUTO W/SCOPE: CPT

## 2018-03-05 PROCEDURE — 99213 OFFICE O/P EST LOW 20 MIN: CPT

## 2018-03-05 PROCEDURE — 99212 OFFICE O/P EST SF 10 MIN: CPT | Performed by: NURSE PRACTITIONER

## 2018-03-05 RX ORDER — CIPROFLOXACIN 500 MG/1
500 TABLET, FILM COATED ORAL 2 TIMES DAILY
Qty: 20 TABLET | Refills: 0 | Status: SHIPPED | OUTPATIENT
Start: 2018-03-05 | End: 2018-03-15

## 2018-03-05 RX ORDER — PHENAZOPYRIDINE HYDROCHLORIDE 100 MG/1
100 TABLET, FILM COATED ORAL 3 TIMES DAILY PRN
Qty: 6 TABLET | Refills: 0 | Status: SHIPPED | OUTPATIENT
Start: 2018-03-05 | End: 2018-03-07

## 2018-03-05 RX ORDER — NITROFURANTOIN 25; 75 MG/1; MG/1
100 CAPSULE ORAL 2 TIMES DAILY
Qty: 14 CAPSULE | Refills: 0 | Status: SHIPPED | OUTPATIENT
Start: 2018-03-05 | End: 2018-03-05 | Stop reason: ALTCHOICE

## 2018-03-05 ASSESSMENT — PAIN DESCRIPTION - LOCATION: LOCATION: ABDOMEN

## 2018-03-05 ASSESSMENT — PAIN SCALES - GENERAL: PAINLEVEL_OUTOF10: 5

## 2018-03-05 ASSESSMENT — ENCOUNTER SYMPTOMS: ABDOMINAL PAIN: 0

## 2018-03-05 ASSESSMENT — PAIN DESCRIPTION - ORIENTATION: ORIENTATION: LOWER

## 2018-03-05 ASSESSMENT — PAIN DESCRIPTION - DESCRIPTORS: DESCRIPTORS: ACHING;CRAMPING

## 2018-03-05 NOTE — ED PROVIDER NOTES
pain or any other concerns, the patient is to call 911 or go to the emergency department for further evaluation. If the patient does not experience any of the above symptoms he is to follow-up with his primary care provider in 2-3 days for reevaluation. The patient was agreeable to the treatment plan at this time. The patient left the urgent care center in stable condition.      Decision To Discharge 03/05/2018 05:13:49 PM    PATIENT REFERRED TO:  Tang Menendez, CNP  [address]  [phone]    Schedule an appointment as soon as possible for a visit in 3 days  If symptoms worsen      DISCHARGE MEDICATIONS:  New Prescriptions    CIPROFLOXACIN (CIPRO) 500 MG TABLET    Take 1 tablet by mouth 2 times daily for 10 days    PHENAZOPYRIDINE (PYRIDIUM) 100 MG TABLET    Take 1 tablet by mouth 3 times daily as needed for Pain       Discontinued Medications    No medications on file       Current Discharge Medication List          Todd Nam NP    (Please note that portions of this note were completed with a voice recognition program.  Efforts were made to edit the dictations but occasionally words are mis-transcribed.)            Tdod Nam NP  03/05/18 7719

## 2018-03-05 NOTE — ED NOTES
Patient verbalized understanding of discharge instructions and medications prescribed. Denies questions or concerns at this time.        Danice Cooks, RN  03/05/18 6830

## 2018-03-07 ENCOUNTER — TELEPHONE (OUTPATIENT)
Dept: FAMILY MEDICINE CLINIC | Age: 33
End: 2018-03-07

## 2018-03-19 ENCOUNTER — TELEPHONE (OUTPATIENT)
Dept: FAMILY MEDICINE CLINIC | Age: 33
End: 2018-03-19

## 2018-03-19 ENCOUNTER — NURSE ONLY (OUTPATIENT)
Dept: FAMILY MEDICINE CLINIC | Age: 33
End: 2018-03-19
Payer: COMMERCIAL

## 2018-03-19 DIAGNOSIS — R39.89 URINE TROUBLES: Primary | ICD-10-CM

## 2018-03-19 LAB
BILIRUBIN, POC: NEGATIVE
BLOOD URINE, POC: NORMAL
CLARITY, POC: CLEAR
COLOR, POC: YELLOW
GLUCOSE URINE, POC: NEGATIVE
KETONES, POC: NORMAL
LEUKOCYTE EST, POC: NORMAL
NITRITE, POC: NEGATIVE
PH, POC: 6.5
PROTEIN, POC: NEGATIVE
SPECIFIC GRAVITY, POC: 1.02
UROBILINOGEN, POC: 1

## 2018-03-19 PROCEDURE — 81003 URINALYSIS AUTO W/O SCOPE: CPT | Performed by: NURSE PRACTITIONER

## 2018-03-19 NOTE — PROGRESS NOTES
Pt presented to office for a follow up U/A , denies any pain or problems with urination . U/A given , results given to [facility].

## 2018-03-19 NOTE — TELEPHONE ENCOUNTER
Pt informed and verbalized understanding. Pt states she was not on her period but she was spotting today. Pt states her UTI symptoms have cleared up.

## 2018-04-23 ENCOUNTER — TELEPHONE (OUTPATIENT)
Dept: FAMILY MEDICINE CLINIC | Age: 33
End: 2018-04-23

## 2018-04-24 ENCOUNTER — OFFICE VISIT (OUTPATIENT)
Dept: FAMILY MEDICINE CLINIC | Age: 33
End: 2018-04-24
Payer: COMMERCIAL

## 2018-04-24 VITALS
HEIGHT: 69 IN | WEIGHT: 191 LBS | TEMPERATURE: 98.1 F | RESPIRATION RATE: 14 BRPM | HEART RATE: 88 BPM | DIASTOLIC BLOOD PRESSURE: 82 MMHG | BODY MASS INDEX: 28.29 KG/M2 | SYSTOLIC BLOOD PRESSURE: 138 MMHG

## 2018-04-24 DIAGNOSIS — H66.93 ACUTE OTITIS MEDIA, BILATERAL: Primary | ICD-10-CM

## 2018-04-24 PROCEDURE — 4004F PT TOBACCO SCREEN RCVD TLK: CPT | Performed by: NURSE PRACTITIONER

## 2018-04-24 PROCEDURE — G8427 DOCREV CUR MEDS BY ELIG CLIN: HCPCS | Performed by: NURSE PRACTITIONER

## 2018-04-24 PROCEDURE — 99213 OFFICE O/P EST LOW 20 MIN: CPT | Performed by: NURSE PRACTITIONER

## 2018-04-24 PROCEDURE — G8417 CALC BMI ABV UP PARAM F/U: HCPCS | Performed by: NURSE PRACTITIONER

## 2018-04-24 RX ORDER — FLUTICASONE PROPIONATE 50 MCG
2 SPRAY, SUSPENSION (ML) NASAL DAILY
Qty: 1 BOTTLE | Refills: 3 | Status: SHIPPED | OUTPATIENT
Start: 2018-04-24 | End: 2019-08-05

## 2018-04-24 RX ORDER — AMOXICILLIN AND CLAVULANATE POTASSIUM 875; 125 MG/1; MG/1
1 TABLET, FILM COATED ORAL 2 TIMES DAILY
Qty: 20 TABLET | Refills: 0 | Status: SHIPPED | OUTPATIENT
Start: 2018-04-24 | End: 2018-05-04

## 2018-04-24 RX ORDER — LORATADINE AND PSEUDOEPHEDRINE 10; 240 MG/1; MG/1
1 TABLET, EXTENDED RELEASE ORAL DAILY
Qty: 30 TABLET | Refills: 1 | Status: SHIPPED | OUTPATIENT
Start: 2018-04-24 | End: 2018-08-23 | Stop reason: SDUPTHER

## 2018-08-23 ENCOUNTER — OFFICE VISIT (OUTPATIENT)
Dept: FAMILY MEDICINE CLINIC | Age: 33
End: 2018-08-23
Payer: COMMERCIAL

## 2018-08-23 VITALS
HEIGHT: 68 IN | HEART RATE: 89 BPM | BODY MASS INDEX: 29.37 KG/M2 | WEIGHT: 193.8 LBS | SYSTOLIC BLOOD PRESSURE: 150 MMHG | RESPIRATION RATE: 14 BRPM | DIASTOLIC BLOOD PRESSURE: 84 MMHG | TEMPERATURE: 98.3 F

## 2018-08-23 DIAGNOSIS — F51.04 PSYCHOPHYSIOLOGICAL INSOMNIA: Primary | ICD-10-CM

## 2018-08-23 DIAGNOSIS — N92.1 MENORRHAGIA WITH IRREGULAR CYCLE: ICD-10-CM

## 2018-08-23 DIAGNOSIS — R23.2 HOT FLASHES: ICD-10-CM

## 2018-08-23 DIAGNOSIS — R09.81 NASAL CONGESTION: ICD-10-CM

## 2018-08-23 DIAGNOSIS — Z72.0 TOBACCO ABUSE: ICD-10-CM

## 2018-08-23 PROBLEM — R68.84 PAIN IN LOWER JAW: Status: RESOLVED | Noted: 2017-12-02 | Resolved: 2018-08-23

## 2018-08-23 PROCEDURE — G8417 CALC BMI ABV UP PARAM F/U: HCPCS | Performed by: NURSE PRACTITIONER

## 2018-08-23 PROCEDURE — G8427 DOCREV CUR MEDS BY ELIG CLIN: HCPCS | Performed by: NURSE PRACTITIONER

## 2018-08-23 PROCEDURE — 4004F PT TOBACCO SCREEN RCVD TLK: CPT | Performed by: NURSE PRACTITIONER

## 2018-08-23 PROCEDURE — 99213 OFFICE O/P EST LOW 20 MIN: CPT | Performed by: NURSE PRACTITIONER

## 2018-08-23 RX ORDER — TRAZODONE HYDROCHLORIDE 100 MG/1
100 TABLET ORAL NIGHTLY
Qty: 30 TABLET | Refills: 6 | Status: SHIPPED | OUTPATIENT
Start: 2018-08-23 | End: 2019-02-25 | Stop reason: SDUPTHER

## 2018-08-23 RX ORDER — LORATADINE AND PSEUDOEPHEDRINE 10; 240 MG/1; MG/1
1 TABLET, EXTENDED RELEASE ORAL DAILY
Qty: 30 TABLET | Refills: 1 | Status: SHIPPED | OUTPATIENT
Start: 2018-08-23 | End: 2018-11-23 | Stop reason: HOSPADM

## 2018-08-23 ASSESSMENT — PATIENT HEALTH QUESTIONNAIRE - PHQ9
SUM OF ALL RESPONSES TO PHQ QUESTIONS 1-9: 0
1. LITTLE INTEREST OR PLEASURE IN DOING THINGS: 0
SUM OF ALL RESPONSES TO PHQ9 QUESTIONS 1 & 2: 0
2. FEELING DOWN, DEPRESSED OR HOPELESS: 0
SUM OF ALL RESPONSES TO PHQ QUESTIONS 1-9: 0

## 2018-08-23 ASSESSMENT — ENCOUNTER SYMPTOMS
WHEEZING: 0
GASTROINTESTINAL NEGATIVE: 1
CHOKING: 0
COUGH: 1
CHEST TIGHTNESS: 0
RHINORRHEA: 1

## 2018-08-23 NOTE — PROGRESS NOTES
[address]  [address] [address]  Dept: [phone]  Dept Fax: [phone]  Loc: [phone]    Rosendo Elaine is a 27 y.o. female who presents today for her medical conditions/complaints as noted below. Rosendo Elaine is c/o of 6 Month Follow-Up and Medication Refill      HPI:     Pt here for med refills. Pt states her nasal congestion started yesterday it usually clears with claritin d will reorder. She did try the flonase but it did not work. Denies fever chills or sweats or sinus pain or pressure. She continues to take the trazodone at night and it help her to sleep. She states she had an abnormal pap with HPV being managed by her gynecologist. She is to have  A repeat in one year she states. Will get those records. She is also having irregular periods, there are some months she will have two per month, she will bleed for two weeks sometimes. Denies clotting she also feels like she is having hot flashes. Will check her thyroid. She smokes one pack per day but does not want to stop. Current Outpatient Prescriptions   Medication Sig Dispense Refill    traZODone (DESYREL) 100 MG tablet Take 1 tablet by mouth nightly 30 tablet 6    loratadine-pseudoephedrine (CLARITIN-D 24 HOUR)  MG per extended release tablet Take 1 tablet by mouth daily 30 tablet 1    ibuprofen (ADVIL;MOTRIN) 800 MG tablet Take 1 tablet by mouth every 8 hours as needed for Pain 90 tablet 0    fluticasone (FLONASE) 50 MCG/ACT nasal spray 2 sprays by Nasal route daily 1 Bottle 3     No current facility-administered medications for this visit.         Past Medical History:   Diagnosis Date    Cancer of cervix ([facility] [address]     Hypertension       Past Surgical History:   Procedure Laterality Date    CHOLECYSTECTOMY  over 5 years ago    ? dr Seth Robbins     Family History   Problem Relation Age of Onset

## 2018-08-31 LAB
ABSOLUTE BASO #: 0 K/UL (ref 0–0.1)
ABSOLUTE EOS #: 0.1 K/UL (ref 0.1–0.4)
ABSOLUTE LYMPH #: 2.8 K/UL (ref 0.8–5.2)
ABSOLUTE MONO #: 0.6 K/UL (ref 0.1–0.9)
ABSOLUTE NEUT #: 2.7 K/UL (ref 1.3–9.1)
BASOPHILS RELATIVE PERCENT: 0.2 %
EOSINOPHILS RELATIVE PERCENT: 1.3 %
HCT VFR BLD CALC: 38.1 % (ref 36–48)
HEMOGLOBIN: 12.8 G/DL (ref 12–16)
LYMPHOCYTE %: 45.1 %
MCH RBC QN AUTO: 31.4 PG (ref 27–34)
MCHC RBC AUTO-ENTMCNC: 33.6 G/DL (ref 31–36)
MCV RBC AUTO: 93.6 FL (ref 80–100)
MONOCYTES # BLD: 9 %
NEUTROPHILS RELATIVE PERCENT: 44.1 %
PDW BLD-RTO: 13 % (ref 10.8–14.8)
PLATELETS: 225 K/UL (ref 150–450)
RBC: 4.07 M/UL (ref 4–5.5)
T4 FREE: 1.02 NG/DL (ref 0.8–1.9)
TSH SERPL DL<=0.05 MIU/L-ACNC: 0.63 UIU/ML (ref 0.4–4.1)
WBC: 6.1 K/UL (ref 3.7–10.8)

## 2018-09-04 ENCOUNTER — TELEPHONE (OUTPATIENT)
Dept: FAMILY MEDICINE CLINIC | Age: 33
End: 2018-09-04

## 2018-11-23 ENCOUNTER — HOSPITAL ENCOUNTER (EMERGENCY)
Age: 33
Discharge: HOME | End: 2018-11-23
Payer: COMMERCIAL

## 2018-11-23 VITALS
SYSTOLIC BLOOD PRESSURE: 146 MMHG | BODY MASS INDEX: 30.01 KG/M2 | TEMPERATURE: 97.8 F | WEIGHT: 198 LBS | HEIGHT: 68 IN | OXYGEN SATURATION: 99 % | RESPIRATION RATE: 16 BRPM | HEART RATE: 78 BPM | DIASTOLIC BLOOD PRESSURE: 100 MMHG

## 2018-11-23 DIAGNOSIS — H65.93 OME (OTITIS MEDIA WITH EFFUSION), BILATERAL: ICD-10-CM

## 2018-11-23 DIAGNOSIS — R42 VERTIGO: Primary | ICD-10-CM

## 2018-11-23 DIAGNOSIS — J01.10 ACUTE NON-RECURRENT FRONTAL SINUSITIS: ICD-10-CM

## 2018-11-23 DIAGNOSIS — Z72.0 TOBACCO ABUSE: ICD-10-CM

## 2018-11-23 DIAGNOSIS — Z86.79 HISTORY OF HYPERTENSION: ICD-10-CM

## 2018-11-23 LAB
EKG ATRIAL RATE: 67 BPM
EKG P AXIS: 25 DEGREES
EKG P-R INTERVAL: 136 MS
EKG Q-T INTERVAL: 404 MS
EKG QRS DURATION: 94 MS
EKG QTC CALCULATION (BAZETT): 426 MS
EKG R AXIS: 51 DEGREES
EKG T AXIS: 8 DEGREES
EKG VENTRICULAR RATE: 67 BPM

## 2018-11-23 PROCEDURE — 99214 OFFICE O/P EST MOD 30 MIN: CPT | Performed by: NURSE PRACTITIONER

## 2018-11-23 PROCEDURE — 99214 OFFICE O/P EST MOD 30 MIN: CPT

## 2018-11-23 PROCEDURE — 6360000002 HC RX W HCPCS: Performed by: NURSE PRACTITIONER

## 2018-11-23 PROCEDURE — 93005 ELECTROCARDIOGRAM TRACING: CPT | Performed by: NURSE PRACTITIONER

## 2018-11-23 PROCEDURE — 6370000000 HC RX 637 (ALT 250 FOR IP): Performed by: NURSE PRACTITIONER

## 2018-11-23 RX ORDER — ONDANSETRON 4 MG/1
4 TABLET, ORALLY DISINTEGRATING ORAL ONCE
Status: COMPLETED | OUTPATIENT
Start: 2018-11-23 | End: 2018-11-23

## 2018-11-23 RX ORDER — MECLIZINE HYDROCHLORIDE CHEWABLE TABLETS 25 MG/1
25 TABLET, CHEWABLE ORAL ONCE
Status: COMPLETED | OUTPATIENT
Start: 2018-11-23 | End: 2018-11-23

## 2018-11-23 RX ORDER — MECLIZINE HYDROCHLORIDE 25 MG/1
25 TABLET ORAL 3 TIMES DAILY PRN
Qty: 30 TABLET | Refills: 0 | Status: SHIPPED | OUTPATIENT
Start: 2018-11-23 | End: 2018-12-03

## 2018-11-23 RX ORDER — DOXYCYCLINE HYCLATE 100 MG/1
100 CAPSULE ORAL 2 TIMES DAILY
Qty: 14 CAPSULE | Refills: 0 | Status: SHIPPED | OUTPATIENT
Start: 2018-11-23 | End: 2018-11-30

## 2018-11-23 RX ADMIN — MECLIZINE HCL 25 MG: 25 TABLET, CHEWABLE ORAL at 10:00

## 2018-11-23 RX ADMIN — ONDANSETRON 4 MG: 4 TABLET, ORALLY DISINTEGRATING ORAL at 10:02

## 2018-11-23 ASSESSMENT — ENCOUNTER SYMPTOMS
STRIDOR: 0
SHORTNESS OF BREATH: 0
COUGH: 1
WHEEZING: 0

## 2018-11-23 NOTE — ED PROVIDER NOTES
nerve deficit (ll-XII intact) or sensory deficit. She displays a negative Romberg sign. Coordination and gait normal. GCS eye subscore is 4. GCS verbal subscore is 5. GCS motor subscore is 6. Positive [place] hallpike   Skin: Skin is warm, dry and intact. No rash noted. She is not diaphoretic. No cyanosis or erythema. No pallor. Nursing note and vitals reviewed. DIAGNOSTIC RESULTS   Labs:  Results for orders placed or performed during the hospital encounter of 11/23/18   EKG 12 Lead   Result Value Ref Range    Ventricular Rate 67 BPM    Atrial Rate 67 BPM    P-R Interval 136 ms    QRS Duration 94 ms    Q-T Interval 404 ms    QTc Calculation (Bazett) 426 ms    P Axis 25 degrees    R Axis 51 degrees    T Axis 8 degrees       IMAGING:    URGENT CARE COURSE:     Vitals:    11/23/18 0907 11/23/18 0914 11/23/18 0947 11/23/18 1025   BP: (!) 171/110 (!) 164/111 (!) 146/100    Pulse: 78      Resp: 16      Temp: 97.8 °F (36.6 °C)      TempSrc: Temporal      SpO2: 98%   99%   Weight: 198 lb (89.8 kg)      Height: 5' 8\" (1.727 m)        BP Readings from Last 3 Encounters:   11/23/18 (!) 146/100   08/23/18 (!) 150/84   04/24/18 138/82     Medications   meclizine (ANTIVERT) chewable tablet 25 mg (25 mg Oral Given 11/23/18 1000)   ondansetron (ZOFRAN-ODT) disintegrating tablet 4 mg (4 mg Oral Given 11/23/18 1002)     PROCEDURES:  EKG: normal sinus rhythm,voltage criteria for left ventricular hypertrophy. Unchanged from previous tracings from December 2 2017. FINAL IMPRESSION      1. Vertigo    2. Acute non-recurrent frontal sinusitis    3. OME (otitis media with effusion), bilateral    4. Tobacco abuse    5.  History of hypertension        DISPOSITION/PLAN   DISPOSITION    History of sinusitis, sinus tenderness frontal, pressure behind left eye , recent headaches this week  OME bilateral with effusion  Current smoker vertigo today at 0450  Start on Doxycyline as prescribed  EKG normal sinus rhythm no changes from

## 2018-11-24 PROCEDURE — 93010 ELECTROCARDIOGRAM REPORT: CPT | Performed by: INTERNAL MEDICINE

## 2018-11-27 ASSESSMENT — ENCOUNTER SYMPTOMS
FACIAL SWELLING: 0
RHINORRHEA: 0
EYE PAIN: 0
CHEST TIGHTNESS: 0
EYE DISCHARGE: 0
NAUSEA: 0
EYE REDNESS: 0
ABDOMINAL PAIN: 0
TROUBLE SWALLOWING: 0
PHOTOPHOBIA: 1
VOICE CHANGE: 0
VOMITING: 0
SINUS PRESSURE: 1
SINUS PAIN: 1
SORE THROAT: 0

## 2019-02-25 ENCOUNTER — OFFICE VISIT (OUTPATIENT)
Dept: FAMILY MEDICINE CLINIC | Age: 34
End: 2019-02-25
Payer: COMMERCIAL

## 2019-02-25 VITALS
BODY MASS INDEX: 30.46 KG/M2 | DIASTOLIC BLOOD PRESSURE: 78 MMHG | HEART RATE: 81 BPM | SYSTOLIC BLOOD PRESSURE: 132 MMHG | RESPIRATION RATE: 12 BRPM | TEMPERATURE: 98.3 F | HEIGHT: 68 IN | WEIGHT: 201 LBS

## 2019-02-25 DIAGNOSIS — H81.12 BENIGN PAROXYSMAL POSITIONAL VERTIGO OF LEFT EAR: Primary | ICD-10-CM

## 2019-02-25 DIAGNOSIS — F51.04 PSYCHOPHYSIOLOGICAL INSOMNIA: ICD-10-CM

## 2019-02-25 PROCEDURE — G8417 CALC BMI ABV UP PARAM F/U: HCPCS | Performed by: NURSE PRACTITIONER

## 2019-02-25 PROCEDURE — G8484 FLU IMMUNIZE NO ADMIN: HCPCS | Performed by: NURSE PRACTITIONER

## 2019-02-25 PROCEDURE — G8427 DOCREV CUR MEDS BY ELIG CLIN: HCPCS | Performed by: NURSE PRACTITIONER

## 2019-02-25 PROCEDURE — 4004F PT TOBACCO SCREEN RCVD TLK: CPT | Performed by: NURSE PRACTITIONER

## 2019-02-25 PROCEDURE — 99213 OFFICE O/P EST LOW 20 MIN: CPT | Performed by: NURSE PRACTITIONER

## 2019-02-25 RX ORDER — MECLIZINE HYDROCHLORIDE 25 MG/1
25 TABLET ORAL 3 TIMES DAILY PRN
COMMUNITY
End: 2019-02-25 | Stop reason: SDUPTHER

## 2019-02-25 RX ORDER — TRAZODONE HYDROCHLORIDE 100 MG/1
100 TABLET ORAL NIGHTLY
Qty: 30 TABLET | Refills: 6 | Status: SHIPPED | OUTPATIENT
Start: 2019-02-25 | End: 2019-08-21 | Stop reason: SDUPTHER

## 2019-02-25 RX ORDER — MECLIZINE HYDROCHLORIDE 25 MG/1
25 TABLET ORAL 3 TIMES DAILY PRN
Qty: 40 TABLET | Refills: 1 | Status: SHIPPED | OUTPATIENT
Start: 2019-02-25 | End: 2020-12-03

## 2019-02-25 ASSESSMENT — PATIENT HEALTH QUESTIONNAIRE - PHQ9
SUM OF ALL RESPONSES TO PHQ QUESTIONS 1-9: 0
2. FEELING DOWN, DEPRESSED OR HOPELESS: 0
SUM OF ALL RESPONSES TO PHQ9 QUESTIONS 1 & 2: 0
SUM OF ALL RESPONSES TO PHQ QUESTIONS 1-9: 0
1. LITTLE INTEREST OR PLEASURE IN DOING THINGS: 0

## 2019-02-26 ENCOUNTER — TELEPHONE (OUTPATIENT)
Dept: FAMILY MEDICINE CLINIC | Age: 34
End: 2019-02-26

## 2019-02-26 DIAGNOSIS — H81.10 BENIGN PAROXYSMAL POSITIONAL VERTIGO, UNSPECIFIED LATERALITY: Primary | ICD-10-CM

## 2019-03-05 ENCOUNTER — HOSPITAL ENCOUNTER (OUTPATIENT)
Dept: PHYSICAL THERAPY | Age: 34
Setting detail: THERAPIES SERIES
Discharge: HOME | End: 2019-03-05
Payer: COMMERCIAL

## 2019-03-05 PROCEDURE — 97530 THERAPEUTIC ACTIVITIES: CPT

## 2019-03-05 PROCEDURE — 97161 PT EVAL LOW COMPLEX 20 MIN: CPT

## 2019-03-13 ENCOUNTER — HOSPITAL ENCOUNTER (OUTPATIENT)
Dept: PHYSICAL THERAPY | Age: 34
Setting detail: THERAPIES SERIES
Discharge: HOME | End: 2019-03-13
Payer: COMMERCIAL

## 2019-03-19 ENCOUNTER — HOSPITAL ENCOUNTER (OUTPATIENT)
Dept: PHYSICAL THERAPY | Age: 34
Setting detail: THERAPIES SERIES
Discharge: HOME | End: 2019-03-19
Payer: COMMERCIAL

## 2019-03-19 PROCEDURE — 95992 CANALITH REPOSITIONING PROC: CPT

## 2019-03-19 PROCEDURE — 97530 THERAPEUTIC ACTIVITIES: CPT

## 2019-03-26 ENCOUNTER — HOSPITAL ENCOUNTER (OUTPATIENT)
Dept: PHYSICAL THERAPY | Age: 34
Setting detail: THERAPIES SERIES
Discharge: HOME | End: 2019-03-26
Payer: COMMERCIAL

## 2019-03-26 PROCEDURE — 95992 CANALITH REPOSITIONING PROC: CPT

## 2019-04-02 ENCOUNTER — APPOINTMENT (OUTPATIENT)
Dept: PHYSICAL THERAPY | Age: 34
End: 2019-04-02
Payer: COMMERCIAL

## 2019-04-16 ENCOUNTER — HOSPITAL ENCOUNTER (OUTPATIENT)
Dept: PHYSICAL THERAPY | Age: 34
Setting detail: THERAPIES SERIES
Discharge: HOME | End: 2019-04-16
Payer: COMMERCIAL

## 2019-04-16 PROCEDURE — 97112 NEUROMUSCULAR REEDUCATION: CPT

## 2019-04-16 NOTE — PROGRESS NOTES
[place]     Time In: 4131  Time Out: 229 [facility]  Minutes: 10  Timed Code Treatment Minutes: 10 Minutes             Date: 2019  Patient Name: Yecenia Mcelroy,  Gender:  female        CSN: [id number]   : 1985  (26 y.o.)  Referral Date : 19    Referring Practitioner: SATHISH Saldivar      Diagnosis: Benign paroxysmal positional vertigo  Treatment Diagnosis: vestibular hypofunction; balance dysfunction, vertigo                      General:  PT Visit Information  Onset Date: 19  PT Insurance Information: [employer] [place] Medicaid; PT/OT/ST 30 visits visits per year; aquatic therapy covered; Modalities covered; NOT ionto or hot/cold packs  Total # of Visits Approved: 30  Total # of Visits to Date: 4  Plan of Care/Certification Expiration Date: 19  Progress Note Counter: 4               Subjective:  Chart Reviewed: Yes  Patient assessed for rehabilitation services?: Yes  Response To Previous Treatment: Not applicable  Family / Caregiver Present: No     Subjective: Missed her appt 2 weeks ago due to a court appointment; needs to leave a little before 2 today to get to her gynecologist appt on time; reports having had only one spinning episode since she was last here 3 weeks ago. Pain:            Objective       Balance  Posture: Good  Sitting - Static: Good, +  Sitting - Dynamic: Good, +  Standing - Static: Good, +  Standing - Dynamic: Good                    Exercises  Exercise 2: Oculomotor exam; Palmer-Hallpike: (-) bilaterally; Roll test: (-) bilaterally   Exercise 5: Nose to knee: (+) symptom provocation to the left; (-) to the right  Exercise 6: Habituation exercises: review of nose to knee repeating it 5x 4 times daily to habituate her vestibular system.           Activity Tolerance:  Activity Tolerance: Patient Tolerated treatment well  Activity Tolerance: Initiated habituation program;

## 2019-04-30 ENCOUNTER — HOSPITAL ENCOUNTER (OUTPATIENT)
Dept: PHYSICAL THERAPY | Age: 34
Setting detail: THERAPIES SERIES
Discharge: HOME | End: 2019-04-30
Payer: COMMERCIAL

## 2019-04-30 PROCEDURE — 95992 CANALITH REPOSITIONING PROC: CPT

## 2019-04-30 PROCEDURE — 97530 THERAPEUTIC ACTIVITIES: CPT

## 2019-04-30 NOTE — PROGRESS NOTES
[place]   ** PLEASE SIGN, DATE AND TIME CERTIFICATION BELOW AND RETURN TO [facility] OUTPATIENT REHABILITATION (FAX #: [phone]). ATTEST/CO-SIGN IF ACCESSING VIA [employer]. THANK YOU.**    I certify that I have examined the patient below and determined that Physical Medicine and Rehabilitation service is necessary and that I approve the established plan of care for up to 90 days or as specifically noted. Attestation, signature or co-signature of physician indicates approval of certification requirements.    ________________________ ____________ __________  Physician Signature   Date   Time         Time In: 1130  Time Out: 8478  Minutes: 35  Timed Code Treatment Minutes: 20 Minutes                Date: 2019  Patient Name: Angela Phelps,  Gender:  female        CSN: [id number]   : 1985  (26 y.o.)  Referral Date : 19    Referring Practitioner: SATHISH Christie      Diagnosis: Benign paroxysmal positional vertigo  Treatment Diagnosis: vestibular hypofunction; balance dysfunction, vertigo                      General:  PT Visit Information  Onset Date: 19  PT Insurance Information: [address] Medicaid; PT/OT/ST 30 visits visits per year; aquatic therapy covered; Modalities covered; NOT ionto or hot/cold packs  Total # of Visits Approved: 30  Total # of Visits to Date: 5  Plan of Care/Certification Expiration Date: 19  Progress Note Counter: 4               Subjective:  Chart Reviewed: Yes  Patient assessed for rehabilitation services?: Yes  Response To Previous Treatment: Not applicable  Family / Caregiver Present: No     Subjective: Have not had any episodes during the day since I last saw you. I've had 2 nights last week when I rolled over onto my right side to watch TV and the room was spinning. No incidents at work.       Pain: not an issue         Objective Drew Sneed will improve her DHI score of 10/100 indicating little to no handicap from dizziness   Long term goal 2: Discharge with independent [facility] ONGOING   Long term goal 3: Sherry Nichols will be able to ride up/down elevator without triggering a vertigo episode MET: first 2 rides on elevator trigger a light-headed sensation, however with repeated rides her symptoms subside    Nikita Gusman, PT

## 2019-05-07 ENCOUNTER — HOSPITAL ENCOUNTER (OUTPATIENT)
Dept: PHYSICAL THERAPY | Age: 34
Setting detail: THERAPIES SERIES
Discharge: HOME | End: 2019-05-07
Payer: COMMERCIAL

## 2019-05-07 NOTE — PROGRESS NOTES
[facility] [address]  PHYSICAL THERAPY MISSED TREATMENT NOTE  OUTPATIENT REHABILITATION    Date: 2019  Patient Name: Atiya Olson        MRN: [id number]   : 1985  (26 y.o.)  Gender: female           PT Visit Information  No Show: 2    REASON FOR MISSED TREATMENT:  Patient did not show for PT appointment today. Voice message left regarding next scheduled appointment.  [place], [address]

## 2019-05-14 ENCOUNTER — HOSPITAL ENCOUNTER (OUTPATIENT)
Dept: PHYSICAL THERAPY | Age: 34
Setting detail: THERAPIES SERIES
Discharge: HOME | End: 2019-05-14
Payer: COMMERCIAL

## 2019-05-14 PROCEDURE — 95992 CANALITH REPOSITIONING PROC: CPT

## 2019-05-14 PROCEDURE — 97530 THERAPEUTIC ACTIVITIES: CPT

## 2019-05-14 NOTE — PROGRESS NOTES
[place]     Time In: 930  Time Out: 8541  Minutes: 20  Timed Code Treatment Minutes: 20 Minutes                Date: 2019  Patient Name: Christianne Muñoz,  Gender:  female        CSN: [id number]   : 1985  (26 y.o.)              Treatment Diagnosis: vestibular hypofunction; balance dysfunction, vertigo                      General:  PT Visit Information  Total # of Visits to Date: 6               Subjective:        Subjective: Been fine until last Tuesday when I had to take my stepdad into the hospital; the elevator got me pretty good. Pain:            Objective      Exercises  Exercise 2: Oculomotor exam; See-Hallpike: (+) bilaterally with greater symptom provocation to the left. Roll test: (-) bilaterally   Exercise 3: Epley maneuver to the left-repeated 3x  Exercise 4: Review of post maneuver precautions          Activity Tolerance:  Activity Tolerance: Patient Tolerated treatment well  Activity Tolerance: Some vertigo/spinning sensation reproduced-resolved after 3rd maneuver     Assessment: Body structures, Functions, Activity limitations: Vestibular Impairment, Decreased vision/visual deficit, Decreased balance  Assessment: Aliyah's underlying \"dizziness\" is related to ongoing BPPV. After repeating maneuver 3 times her dizziness stopped and she felt more stable.    Prognosis: Good  Discharge Recommendations: Continue to assess pending progress    Patient Education:  Patient Education: Epley maneuver precautions                       Plan:  Times per week: 1x per week   Plan weeks: 8 weeks   Specific instructions for Next Treatment: Re-evaluate in 2 weeks   Current Treatment Recommendations: Vestibular Rehab, Home Exercise Program, Positioning, Patient/Caregiver Education & Training, Balance Training, Neuromuscular Re-education    Goals:  Patient goals : Resovle vertigo/spinning     Short term

## 2019-05-21 ENCOUNTER — APPOINTMENT (OUTPATIENT)
Dept: PHYSICAL THERAPY | Age: 34
End: 2019-05-21
Payer: COMMERCIAL

## 2019-05-30 ENCOUNTER — HOSPITAL ENCOUNTER (OUTPATIENT)
Age: 34
Discharge: HOME | End: 2019-05-30
Payer: COMMERCIAL

## 2019-05-30 LAB
BASOPHILS # BLD: 0.6 %
BASOPHILS ABSOLUTE: 0 THOU/MM3 (ref 0–0.1)
EKG ATRIAL RATE: 67 BPM
EKG P AXIS: 59 DEGREES
EKG P-R INTERVAL: 140 MS
EKG Q-T INTERVAL: 406 MS
EKG QRS DURATION: 96 MS
EKG QTC CALCULATION (BAZETT): 429 MS
EKG R AXIS: 72 DEGREES
EKG T AXIS: 47 DEGREES
EKG VENTRICULAR RATE: 67 BPM
EOSINOPHIL # BLD: 1.6 %
EOSINOPHILS ABSOLUTE: 0.1 THOU/MM3 (ref 0–0.4)
ERYTHROCYTE [DISTWIDTH] IN BLOOD BY AUTOMATED COUNT: 12.9 % (ref 11.5–14.5)
ERYTHROCYTE [DISTWIDTH] IN BLOOD BY AUTOMATED COUNT: 46.3 FL (ref 35–45)
HCT VFR BLD CALC: 38.9 % (ref 37–47)
HEMOGLOBIN: 12.9 GM/DL (ref 12–16)
IMMATURE GRANS (ABS): 0.02 THOU/MM3 (ref 0–0.07)
IMMATURE GRANULOCYTES: 0.3 %
LYMPHOCYTES # BLD: 50.3 %
LYMPHOCYTES ABSOLUTE: 3.4 THOU/MM3 (ref 1–4.8)
MCH RBC QN AUTO: 32.4 PG (ref 26–33)
MCHC RBC AUTO-ENTMCNC: 33.2 GM/DL (ref 32.2–35.5)
MCV RBC AUTO: 97.7 FL (ref 81–99)
MONOCYTES # BLD: 7.9 %
MONOCYTES ABSOLUTE: 0.5 THOU/MM3 (ref 0.4–1.3)
NUCLEATED RED BLOOD CELLS: 0 /100 WBC
PLATELET # BLD: 233 THOU/MM3 (ref 130–400)
PMV BLD AUTO: 10.8 FL (ref 9.4–12.4)
RBC # BLD: 3.98 MILL/MM3 (ref 4.2–5.4)
SEG NEUTROPHILS: 39.3 %
SEGMENTED NEUTROPHILS ABSOLUTE COUNT: 2.7 THOU/MM3 (ref 1.8–7.7)
WBC # BLD: 6.8 THOU/MM3 (ref 4.8–10.8)

## 2019-05-30 PROCEDURE — 85025 COMPLETE CBC W/AUTO DIFF WBC: CPT

## 2019-05-30 PROCEDURE — 36415 COLL VENOUS BLD VENIPUNCTURE: CPT

## 2019-05-30 PROCEDURE — 93005 ELECTROCARDIOGRAM TRACING: CPT | Performed by: OBSTETRICS & GYNECOLOGY

## 2019-05-31 NOTE — PROGRESS NOTES
LEFT MSG WITH INSTRUCTIONS    NPO after midnight  [employer] and drivers license  Wear comfortable clean clothing  Do not bring jewelry  Shower night before and morning of surgery with a liquid antibacterial soap  Bring  medications   Follow all instructions given by your physician   needed at discharge  Call [phone] for any questions

## 2019-06-01 PROCEDURE — 93010 ELECTROCARDIOGRAM REPORT: CPT | Performed by: INTERNAL MEDICINE

## 2019-06-07 ENCOUNTER — ANESTHESIA (OUTPATIENT)
Dept: OPERATING ROOM | Age: 34
End: 2019-06-07
Payer: COMMERCIAL

## 2019-06-07 ENCOUNTER — ANESTHESIA EVENT (OUTPATIENT)
Dept: OPERATING ROOM | Age: 34
End: 2019-06-07
Payer: COMMERCIAL

## 2019-06-07 ENCOUNTER — HOSPITAL ENCOUNTER (OUTPATIENT)
Age: 34
Setting detail: OUTPATIENT SURGERY
Discharge: HOME | End: 2019-06-07
Attending: OBSTETRICS & GYNECOLOGY | Admitting: OBSTETRICS & GYNECOLOGY
Payer: COMMERCIAL

## 2019-06-07 VITALS
BODY MASS INDEX: 30.34 KG/M2 | TEMPERATURE: 97.3 F | DIASTOLIC BLOOD PRESSURE: 79 MMHG | RESPIRATION RATE: 18 BRPM | HEIGHT: 68 IN | HEART RATE: 89 BPM | SYSTOLIC BLOOD PRESSURE: 166 MMHG | WEIGHT: 200.2 LBS | OXYGEN SATURATION: 98 %

## 2019-06-07 VITALS
RESPIRATION RATE: 12 BRPM | OXYGEN SATURATION: 88 % | DIASTOLIC BLOOD PRESSURE: 69 MMHG | SYSTOLIC BLOOD PRESSURE: 121 MMHG

## 2019-06-07 PROCEDURE — 3600000002 HC SURGERY LEVEL 2 BASE: Performed by: OBSTETRICS & GYNECOLOGY

## 2019-06-07 PROCEDURE — 3700000001 HC ADD 15 MINUTES (ANESTHESIA): Performed by: OBSTETRICS & GYNECOLOGY

## 2019-06-07 PROCEDURE — 2580000003 HC RX 258: Performed by: OBSTETRICS & GYNECOLOGY

## 2019-06-07 PROCEDURE — 3700000000 HC ANESTHESIA ATTENDED CARE: Performed by: OBSTETRICS & GYNECOLOGY

## 2019-06-07 PROCEDURE — 6370000000 HC RX 637 (ALT 250 FOR IP): Performed by: OBSTETRICS & GYNECOLOGY

## 2019-06-07 PROCEDURE — 7100000001 HC PACU RECOVERY - ADDTL 15 MIN: Performed by: OBSTETRICS & GYNECOLOGY

## 2019-06-07 PROCEDURE — 2580000003 HC RX 258

## 2019-06-07 PROCEDURE — 88305 TISSUE EXAM BY PATHOLOGIST: CPT

## 2019-06-07 PROCEDURE — 6360000002 HC RX W HCPCS

## 2019-06-07 PROCEDURE — 6360000002 HC RX W HCPCS: Performed by: ANESTHESIOLOGY

## 2019-06-07 PROCEDURE — 7100000010 HC PHASE II RECOVERY - FIRST 15 MIN: Performed by: OBSTETRICS & GYNECOLOGY

## 2019-06-07 PROCEDURE — 3600000012 HC SURGERY LEVEL 2 ADDTL 15MIN: Performed by: OBSTETRICS & GYNECOLOGY

## 2019-06-07 PROCEDURE — 7100000000 HC PACU RECOVERY - FIRST 15 MIN: Performed by: OBSTETRICS & GYNECOLOGY

## 2019-06-07 PROCEDURE — 7100000011 HC PHASE II RECOVERY - ADDTL 15 MIN: Performed by: OBSTETRICS & GYNECOLOGY

## 2019-06-07 PROCEDURE — 2709999900 HC NON-CHARGEABLE SUPPLY: Performed by: OBSTETRICS & GYNECOLOGY

## 2019-06-07 RX ORDER — MEPERIDINE HYDROCHLORIDE 25 MG/ML
25 INJECTION INTRAMUSCULAR; INTRAVENOUS; SUBCUTANEOUS EVERY 5 MIN PRN
Status: DISCONTINUED | OUTPATIENT
Start: 2019-06-07 | End: 2019-06-07 | Stop reason: HOSPADM

## 2019-06-07 RX ORDER — SODIUM CHLORIDE 9 MG/ML
INJECTION, SOLUTION INTRAVENOUS CONTINUOUS PRN
Status: DISCONTINUED | OUTPATIENT
Start: 2019-06-07 | End: 2019-06-07 | Stop reason: SDUPTHER

## 2019-06-07 RX ORDER — PROPOFOL 10 MG/ML
INJECTION, EMULSION INTRAVENOUS PRN
Status: DISCONTINUED | OUTPATIENT
Start: 2019-06-07 | End: 2019-06-07 | Stop reason: SDUPTHER

## 2019-06-07 RX ORDER — OXYCODONE HYDROCHLORIDE AND ACETAMINOPHEN 5; 325 MG/1; MG/1
2 TABLET ORAL EVERY 4 HOURS PRN
Status: DISCONTINUED | OUTPATIENT
Start: 2019-06-07 | End: 2019-06-07 | Stop reason: HOSPADM

## 2019-06-07 RX ORDER — SODIUM CHLORIDE 0.9 % (FLUSH) 0.9 %
10 SYRINGE (ML) INJECTION EVERY 12 HOURS SCHEDULED
Status: DISCONTINUED | OUTPATIENT
Start: 2019-06-07 | End: 2019-06-07 | Stop reason: HOSPADM

## 2019-06-07 RX ORDER — KETOROLAC TROMETHAMINE 30 MG/ML
INJECTION, SOLUTION INTRAMUSCULAR; INTRAVENOUS
Status: COMPLETED
Start: 2019-06-07 | End: 2019-06-07

## 2019-06-07 RX ORDER — ONDANSETRON 2 MG/ML
4 INJECTION INTRAMUSCULAR; INTRAVENOUS EVERY 6 HOURS PRN
Status: DISCONTINUED | OUTPATIENT
Start: 2019-06-07 | End: 2019-06-07 | Stop reason: HOSPADM

## 2019-06-07 RX ORDER — FENTANYL CITRATE 50 UG/ML
50 INJECTION, SOLUTION INTRAMUSCULAR; INTRAVENOUS EVERY 5 MIN PRN
Status: DISCONTINUED | OUTPATIENT
Start: 2019-06-07 | End: 2019-06-07 | Stop reason: HOSPADM

## 2019-06-07 RX ORDER — IBUPROFEN 400 MG/1
800 TABLET ORAL EVERY 8 HOURS PRN
Status: DISCONTINUED | OUTPATIENT
Start: 2019-06-07 | End: 2019-06-07 | Stop reason: HOSPADM

## 2019-06-07 RX ORDER — HYDRALAZINE HYDROCHLORIDE 20 MG/ML
10 INJECTION INTRAMUSCULAR; INTRAVENOUS EVERY 10 MIN PRN
Status: DISCONTINUED | OUTPATIENT
Start: 2019-06-07 | End: 2019-06-07 | Stop reason: HOSPADM

## 2019-06-07 RX ORDER — FERRIC SUBSULFATE 20-22G/100
SOLUTION, NON-ORAL MISCELLANEOUS PRN
Status: DISCONTINUED | OUTPATIENT
Start: 2019-06-07 | End: 2019-06-07 | Stop reason: ALTCHOICE

## 2019-06-07 RX ORDER — MIDAZOLAM HYDROCHLORIDE 1 MG/ML
INJECTION INTRAMUSCULAR; INTRAVENOUS PRN
Status: DISCONTINUED | OUTPATIENT
Start: 2019-06-07 | End: 2019-06-07 | Stop reason: SDUPTHER

## 2019-06-07 RX ORDER — ONDANSETRON 2 MG/ML
4 INJECTION INTRAMUSCULAR; INTRAVENOUS
Status: DISCONTINUED | OUTPATIENT
Start: 2019-06-07 | End: 2019-06-07 | Stop reason: HOSPADM

## 2019-06-07 RX ORDER — SODIUM CHLORIDE 0.9 % (FLUSH) 0.9 %
10 SYRINGE (ML) INJECTION PRN
Status: DISCONTINUED | OUTPATIENT
Start: 2019-06-07 | End: 2019-06-07 | Stop reason: HOSPADM

## 2019-06-07 RX ORDER — DEXAMETHASONE SODIUM PHOSPHATE 4 MG/ML
INJECTION, SOLUTION INTRA-ARTICULAR; INTRALESIONAL; INTRAMUSCULAR; INTRAVENOUS; SOFT TISSUE PRN
Status: DISCONTINUED | OUTPATIENT
Start: 2019-06-07 | End: 2019-06-07 | Stop reason: SDUPTHER

## 2019-06-07 RX ORDER — MORPHINE SULFATE 2 MG/ML
2 INJECTION, SOLUTION INTRAMUSCULAR; INTRAVENOUS
Status: DISCONTINUED | OUTPATIENT
Start: 2019-06-07 | End: 2019-06-07 | Stop reason: HOSPADM

## 2019-06-07 RX ORDER — OXYCODONE HYDROCHLORIDE AND ACETAMINOPHEN 5; 325 MG/1; MG/1
1 TABLET ORAL EVERY 4 HOURS PRN
Status: DISCONTINUED | OUTPATIENT
Start: 2019-06-07 | End: 2019-06-07 | Stop reason: HOSPADM

## 2019-06-07 RX ORDER — DIPHENHYDRAMINE HYDROCHLORIDE 50 MG/ML
12.5 INJECTION INTRAMUSCULAR; INTRAVENOUS
Status: DISCONTINUED | OUTPATIENT
Start: 2019-06-07 | End: 2019-06-07 | Stop reason: HOSPADM

## 2019-06-07 RX ORDER — LABETALOL HYDROCHLORIDE 5 MG/ML
5 INJECTION, SOLUTION INTRAVENOUS EVERY 5 MIN PRN
Status: DISCONTINUED | OUTPATIENT
Start: 2019-06-07 | End: 2019-06-07 | Stop reason: HOSPADM

## 2019-06-07 RX ORDER — MORPHINE SULFATE 2 MG/ML
4 INJECTION, SOLUTION INTRAMUSCULAR; INTRAVENOUS
Status: DISCONTINUED | OUTPATIENT
Start: 2019-06-07 | End: 2019-06-07 | Stop reason: HOSPADM

## 2019-06-07 RX ORDER — KETOROLAC TROMETHAMINE 30 MG/ML
INJECTION, SOLUTION INTRAMUSCULAR; INTRAVENOUS PRN
Status: DISCONTINUED | OUTPATIENT
Start: 2019-06-07 | End: 2019-06-07 | Stop reason: SDUPTHER

## 2019-06-07 RX ORDER — KETOROLAC TROMETHAMINE 30 MG/ML
30 INJECTION, SOLUTION INTRAMUSCULAR; INTRAVENOUS EVERY 6 HOURS
Status: DISCONTINUED | OUTPATIENT
Start: 2019-06-07 | End: 2019-06-07 | Stop reason: HOSPADM

## 2019-06-07 RX ORDER — ONDANSETRON 2 MG/ML
INJECTION INTRAMUSCULAR; INTRAVENOUS PRN
Status: DISCONTINUED | OUTPATIENT
Start: 2019-06-07 | End: 2019-06-07 | Stop reason: SDUPTHER

## 2019-06-07 RX ORDER — MEPERIDINE HYDROCHLORIDE 25 MG/ML
12.5 INJECTION INTRAMUSCULAR; INTRAVENOUS; SUBCUTANEOUS EVERY 5 MIN PRN
Status: DISCONTINUED | OUTPATIENT
Start: 2019-06-07 | End: 2019-06-07

## 2019-06-07 RX ORDER — SODIUM CHLORIDE 9 MG/ML
INJECTION, SOLUTION INTRAVENOUS CONTINUOUS
Status: DISCONTINUED | OUTPATIENT
Start: 2019-06-07 | End: 2019-06-07 | Stop reason: HOSPADM

## 2019-06-07 RX ORDER — FENTANYL CITRATE 50 UG/ML
INJECTION, SOLUTION INTRAMUSCULAR; INTRAVENOUS PRN
Status: DISCONTINUED | OUTPATIENT
Start: 2019-06-07 | End: 2019-06-07 | Stop reason: SDUPTHER

## 2019-06-07 RX ORDER — ONDANSETRON 2 MG/ML
8 INJECTION INTRAMUSCULAR; INTRAVENOUS EVERY 8 HOURS PRN
Status: DISCONTINUED | OUTPATIENT
Start: 2019-06-07 | End: 2019-06-07 | Stop reason: HOSPADM

## 2019-06-07 RX ORDER — HYDRALAZINE HYDROCHLORIDE 20 MG/ML
5 INJECTION INTRAMUSCULAR; INTRAVENOUS EVERY 10 MIN PRN
Status: DISCONTINUED | OUTPATIENT
Start: 2019-06-07 | End: 2019-06-07

## 2019-06-07 RX ORDER — OXYCODONE HYDROCHLORIDE AND ACETAMINOPHEN 5; 325 MG/1; MG/1
TABLET ORAL
Status: DISCONTINUED
Start: 2019-06-07 | End: 2019-06-07 | Stop reason: HOSPADM

## 2019-06-07 RX ORDER — DOCUSATE SODIUM 100 MG/1
100 CAPSULE, LIQUID FILLED ORAL 2 TIMES DAILY
Status: DISCONTINUED | OUTPATIENT
Start: 2019-06-07 | End: 2019-06-07 | Stop reason: HOSPADM

## 2019-06-07 RX ORDER — KETOROLAC TROMETHAMINE 30 MG/ML
30 INJECTION, SOLUTION INTRAMUSCULAR; INTRAVENOUS ONCE
Status: COMPLETED | OUTPATIENT
Start: 2019-06-07 | End: 2019-06-07

## 2019-06-07 RX ORDER — LIDOCAINE HYDROCHLORIDE 20 MG/ML
INJECTION, SOLUTION INTRAVENOUS PRN
Status: DISCONTINUED | OUTPATIENT
Start: 2019-06-07 | End: 2019-06-07 | Stop reason: SDUPTHER

## 2019-06-07 RX ADMIN — SODIUM CHLORIDE: 9 INJECTION, SOLUTION INTRAVENOUS at 09:36

## 2019-06-07 RX ADMIN — KETOROLAC TROMETHAMINE 30 MG: 30 INJECTION, SOLUTION INTRAMUSCULAR; INTRAVENOUS at 11:35

## 2019-06-07 RX ADMIN — HYDRALAZINE HYDROCHLORIDE 10 MG: 20 INJECTION INTRAMUSCULAR; INTRAVENOUS at 11:50

## 2019-06-07 RX ADMIN — ONDANSETRON HYDROCHLORIDE 4 MG: 4 INJECTION, SOLUTION INTRAMUSCULAR; INTRAVENOUS at 11:12

## 2019-06-07 RX ADMIN — FENTANYL CITRATE 50 MCG: 50 INJECTION, SOLUTION INTRAMUSCULAR; INTRAVENOUS at 11:50

## 2019-06-07 RX ADMIN — MEPERIDINE HYDROCHLORIDE 25 MG: 25 INJECTION INTRAMUSCULAR; INTRAVENOUS; SUBCUTANEOUS at 12:00

## 2019-06-07 RX ADMIN — SODIUM CHLORIDE: 9 INJECTION, SOLUTION INTRAVENOUS at 10:58

## 2019-06-07 RX ADMIN — DEXAMETHASONE SODIUM PHOSPHATE 8 MG: 4 INJECTION, SOLUTION INTRAMUSCULAR; INTRAVENOUS at 11:01

## 2019-06-07 RX ADMIN — MIDAZOLAM HYDROCHLORIDE 2 MG: 1 INJECTION, SOLUTION INTRAMUSCULAR; INTRAVENOUS at 10:58

## 2019-06-07 RX ADMIN — FENTANYL CITRATE 50 MCG: 50 INJECTION INTRAMUSCULAR; INTRAVENOUS at 11:11

## 2019-06-07 RX ADMIN — LIDOCAINE HYDROCHLORIDE 50 MG: 20 INJECTION, SOLUTION INTRAVENOUS at 11:01

## 2019-06-07 RX ADMIN — FENTANYL CITRATE 50 MCG: 50 INJECTION INTRAMUSCULAR; INTRAVENOUS at 10:59

## 2019-06-07 RX ADMIN — KETOROLAC TROMETHAMINE 30 MG: 30 INJECTION, SOLUTION INTRAMUSCULAR; INTRAVENOUS at 11:12

## 2019-06-07 RX ADMIN — FENTANYL CITRATE 50 MCG: 50 INJECTION, SOLUTION INTRAMUSCULAR; INTRAVENOUS at 11:55

## 2019-06-07 RX ADMIN — KETOROLAC TROMETHAMINE 30 MG: 30 INJECTION, SOLUTION INTRAMUSCULAR at 11:35

## 2019-06-07 RX ADMIN — PROPOFOL 200 MG: 10 INJECTION, EMULSION INTRAVENOUS at 11:02

## 2019-06-07 ASSESSMENT — PULMONARY FUNCTION TESTS
PIF_VALUE: 0
PIF_VALUE: 18
PIF_VALUE: 16
PIF_VALUE: 3
PIF_VALUE: 16
PIF_VALUE: 16
PIF_VALUE: 18
PIF_VALUE: 1
PIF_VALUE: 1
PIF_VALUE: 5
PIF_VALUE: 0
PIF_VALUE: 11
PIF_VALUE: 16
PIF_VALUE: 18
PIF_VALUE: 20
PIF_VALUE: 0
PIF_VALUE: 16
PIF_VALUE: 16
PIF_VALUE: 18
PIF_VALUE: 16
PIF_VALUE: 22
PIF_VALUE: 24
PIF_VALUE: 10
PIF_VALUE: 18
PIF_VALUE: 32
PIF_VALUE: 18
PIF_VALUE: 16
PIF_VALUE: 16
PIF_VALUE: 3
PIF_VALUE: 17

## 2019-06-07 ASSESSMENT — PAIN SCALES - GENERAL
PAINLEVEL_OUTOF10: 3
PAINLEVEL_OUTOF10: 8
PAINLEVEL_OUTOF10: 2
PAINLEVEL_OUTOF10: 0
PAINLEVEL_OUTOF10: 0
PAINLEVEL_OUTOF10: 7
PAINLEVEL_OUTOF10: 3
PAINLEVEL_OUTOF10: 8

## 2019-06-07 ASSESSMENT — PAIN - FUNCTIONAL ASSESSMENT: PAIN_FUNCTIONAL_ASSESSMENT: 0-10

## 2019-06-07 NOTE — ANESTHESIA POSTPROCEDURE EVALUATION
Department of Anesthesiology  Postprocedure Note    Patient: Winnie Valdovinos  MRN: [id number]  YOB: 1985  Date of evaluation: 6/7/2019  Time:  12:50 PM     Procedure Summary     Date:  06/07/19 Room / Location:  [address]    Anesthesia Start:  8426 Anesthesia Stop:  1132    Procedure:  COLD KNIFE CONE BX CERVIX (N/A ) Diagnosis:  (ascus-h)    Surgeon:  Alfred Euceda MD Responsible Provider:  Crystal Acevedo MD    Anesthesia Type:  general ASA Status:  2          Anesthesia Type: general    Gregorio Phase I: Gregorio Score: 10    Gregorio Phase II:      Last vitals: Reviewed and per EMR flowsheets.        Anesthesia Post Evaluation    Patient location during evaluation: PACU  Patient participation: complete - patient participated  Level of consciousness: awake and alert  Airway patency: patent  Nausea & Vomiting: no nausea  Complications: no  Cardiovascular status: blood pressure returned to baseline and hemodynamically stable  Respiratory status: acceptable and spontaneous ventilation  Hydration status: euvolemic

## 2019-06-07 NOTE — H&P
H&P Update    Patient's History and Physical from my office was reviewed. Patient examined. There has been no change.     Prieto Aviles MD

## 2019-06-07 NOTE — ANESTHESIA PRE PROCEDURE
Department of Anesthesiology  Preprocedure Note       Name:  Christiano Anne   Age:  26 y.o.  :  1985                                          MRN:  [id number]         Date:  2019      Surgeon: Placido Cunha):  Atiya Hines MD    Procedure: COLD KNIFE CONE BX CERVIX (N/A )    Medications prior to admission:   Prior to Admission medications    Medication Sig Start Date End Date Taking? Authorizing Provider   meclizine (ANTIVERT) 25 MG tablet Take 1 tablet by mouth 3 times daily as needed for Dizziness 19  Yes CHAPO Elliott CNP   traZODone (DESYREL) 100 MG tablet Take 1 tablet by mouth nightly 19  Yes CHAPO Elliott CNP   ibuprofen (ADVIL;MOTRIN) 800 MG tablet Take 1 tablet by mouth every 8 hours as needed for Pain 17  Yes CHAPO Elliott CNP   Loratadine-Pseudoephedrine (CLARITIN-D 24 HOUR PO) Take by mouth    Historical Provider, MD   fluticasone [facility]) 50 MCG/ACT nasal spray 2 sprays by Nasal route daily 18   CHAPO Wilkinson CNP       Current medications:    Current Facility-Administered Medications   Medication Dose Route Frequency Provider Last Rate Last Dose    0.9 % sodium chloride infusion   Intravenous Continuous Atiya Hines  mL/hr at 19 0936      sodium chloride flush 0.9 % injection 10 mL  10 mL Intravenous 2 times per day Atiya Hines MD        sodium chloride flush 0.9 % injection 10 mL  10 mL Intravenous PRN Atiya Hines MD        ondansetron (ZOFRAN) injection 4 mg  4 mg Intravenous Q6H PRN Atiya Hines MD           Allergies:     Allergies   Allergen Reactions    Percocet [Oxycodone-Acetaminophen] Other (See Comments)     hallucinations       Problem List:    Patient Active Problem List   Diagnosis Code    Psychophysiological insomnia F51.04       Past Medical History:        Diagnosis Date    Cancer of cervix ([facility] [address]     Hypertension        Past Surgical History:        Procedure Laterality

## 2019-06-08 NOTE — OP NOTE
[address] [place] [address]                                OPERATIVE REPORT    PATIENT NAME: Liza Mcdonald                 :        1985  MED REC NO:   [id number]                           ROOM:  ACCOUNT NO:   [de-identified]                           ADMIT DATE: 2019  PROVIDER:     LIZ [place]:  2019    PREOPERATIVE DIAGNOSIS:  High-grade KACIE, on cervical biopsy, on  colposcopy in the office, but pathology could not rule out microinvasive  or invasive squamous cell cancer. POSTOPERATIVE DIAGNOSIS:  High-grade KACIE, on cervical biopsy, on  colposcopy in the office, but pathology could not rule out microinvasive  or invasive squamous cell cancer. PROCEDURE:  LEEP conization of the cervix with ECC after cone. SURGEON:  Alton Floyd MD    ANESTHESIA:  General.    ESTIMATED BLOOD LOSS:  Approximately 30 mL. OPERATIVE PROCEDURE:  The patient was taken to the operating room at  which time she underwent general anesthetic. She was placed in the  dorsal lithotomy position, sterilely prepped and draped in the usual  manner. A weighted speculum was placed in the vagina. The anterior lip  of the cervix was grasped with a single-tooth tenaculum. Due to the  narrowness of the vaginal apex and position of the surgery, it was felt  that a cold knife cone biopsy would not be prudent and a large loop was  used to make three passes, one on the anterior lip, one on the posterior  lip, and then a top hat. These were then sent for pathologic diagnosis. The area of the cervix appeared to be warty in nature. There were no  cristin blood vessels or necrotic areas. A Herman curette was then placed  into the endocervix and the endocervix was curetted, and this was then  sent for post cone evaluation. This was then all sent for pathologic  diagnosis.   The base of the LEEP was then

## 2019-06-10 ENCOUNTER — TELEPHONE (OUTPATIENT)
Dept: FAMILY MEDICINE CLINIC | Age: 34
End: 2019-06-10

## 2019-06-10 NOTE — TELEPHONE ENCOUNTER
[facility] Trans[facility] Initial Follow Up Call    Outreach made within 2 business days of discharge: Yes    Patient: Sinan Palmer Patient : 1985   MRN: [id number]  Reason for Admission: There are no discharge diagnoses documented for the most recent discharge. Discharge Date: 19       Spoke with: Pt states he is driving and will call me back. Discharge department/facility: [address] [place] [address] Interactive Patient Contact:  }    Scheduled appointment with PCP within 7-14 days    Follow Up  No future appointments.     Mortimer Maul, LPN

## 2019-06-10 NOTE — TELEPHONE ENCOUNTER
[facility] Trans[facility] Initial Follow Up Call    Outreach made within 2 business days of discharge: Yes    Patient: Lavella Collet Patient : 1985   MRN: [id number]  Reason for Admission: There are no discharge diagnoses documented for the most recent discharge. Discharge Date: 19       Spoke with: Genoveva Magallanes    Discharge department/facility: [facility]    TCM Interactive Patient Contact:  Was patient able to fill all prescriptions: No  Was patient instructed to bring all medications to the follow-up visit: Yes  Is patient taking all medications as directed in the discharge summary? Yes  Does patient understand their discharge instructions: Yes  Does patient have questions or concerns that need addressed prior to 7-14 day follow up office visit: no    Scheduled appointment with PCP within 7-14 days  appt scheduled.   Follow Up  Future Appointments   Date Time Provider Mili Jamison   2019  3:00 PM Kim Quinones 19     Angelica Valerio LPN

## 2019-06-13 ENCOUNTER — OFFICE VISIT (OUTPATIENT)
Dept: FAMILY MEDICINE CLINIC | Age: 34
End: 2019-06-13
Payer: COMMERCIAL

## 2019-06-13 VITALS
DIASTOLIC BLOOD PRESSURE: 92 MMHG | BODY MASS INDEX: 30.62 KG/M2 | HEIGHT: 68 IN | WEIGHT: 202 LBS | RESPIRATION RATE: 10 BRPM | TEMPERATURE: 98.3 F | HEART RATE: 74 BPM | SYSTOLIC BLOOD PRESSURE: 150 MMHG

## 2019-06-13 DIAGNOSIS — Z98.890 S/P CONE BIOPSY OF CERVIX: ICD-10-CM

## 2019-06-13 DIAGNOSIS — R03.0 ELEVATED BLOOD PRESSURE READING: Primary | ICD-10-CM

## 2019-06-13 DIAGNOSIS — M25.562 POSTERIOR LEFT KNEE PAIN: ICD-10-CM

## 2019-06-13 PROCEDURE — G8417 CALC BMI ABV UP PARAM F/U: HCPCS | Performed by: NURSE PRACTITIONER

## 2019-06-13 PROCEDURE — 99213 OFFICE O/P EST LOW 20 MIN: CPT | Performed by: NURSE PRACTITIONER

## 2019-06-13 PROCEDURE — G8427 DOCREV CUR MEDS BY ELIG CLIN: HCPCS | Performed by: NURSE PRACTITIONER

## 2019-06-13 PROCEDURE — 4004F PT TOBACCO SCREEN RCVD TLK: CPT | Performed by: NURSE PRACTITIONER

## 2019-06-13 RX ORDER — PREDNISONE 20 MG/1
TABLET ORAL
Qty: 10 TABLET | Refills: 0 | Status: SHIPPED | OUTPATIENT
Start: 2019-06-13 | End: 2019-06-23

## 2019-06-13 ASSESSMENT — ENCOUNTER SYMPTOMS
ABDOMINAL DISTENTION: 0
ABDOMINAL PAIN: 0
RESPIRATORY NEGATIVE: 1

## 2019-06-13 NOTE — PROGRESS NOTES
[address]  [address] [address]  Dept: [phone]  Dept Fax: [phone]  Loc: [phone]    Winnie Valdovinos is a 26 y.o. Jaden Bassettich presents today for her medical conditions/complaints as noted below. Severo Lanza Navneetis c/o of Cervical Cancer (follow up colposcopy showing cervical cancer ) and Leg Pain (left leg pain for past 2 months, no mech of injury )      HPI:      Pt here to follow up on a cone biopsy procedure she had June7th, she had a pap smear with ASCUS high grade with a positive HPV. She is awaiting for the path report. She states she is feeling good, she did have some bleeding and cramping yesterday but the bleeding is less today. She denies a fever. 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 This also makes her left thigh hurt too. She denies any erythema or edema of her left thigh or knee. The pain is sharp and shooting and occurs randomly and can be 5/10. She has not taken anything for the pain. States whenever she has surgery her bp goes up. Denies chest pain or sob or pedal edema. Current Outpatient Medications   Medication Sig Dispense Refill    predniSONE (DELTASONE) 20 MG tablet 1 tablet po bod for 5 days 10 tablet 0    Loratadine-Pseudoephedrine (CLARITIN-D 24 HOUR PO) Take by mouth      meclizine (ANTIVERT) 25 MG tablet Take 1 tablet by mouth 3 times daily as needed for Dizziness 40 tablet 1    traZODone (DESYREL) 100 MG tablet Take 1 tablet by mouth nightly 30 tablet 6    fluticasone (FLONASE) 50 MCG/ACT nasal spray 2 sprays by Nasal route daily 1 Bottle 3    ibuprofen (ADVIL;MOTRIN) 800 MG tablet Take 1 tablet by mouth every 8 hours as needed for Pain 90 tablet 0     No current facility-administered medications for this visit. normal heart sounds and intact distal pulses. No murmur heard. Pulmonary/Chest: Effort normal and breath sounds normal. No respiratory distress. Musculoskeletal:   Left Knee Exam    5/5 strength in the LEs bilaterally  No gross deformity, edema or discoloration of the knees bilaterally  There is not medial joint line tenderness  There is not lateral joint line tenderness  Valgus/Varus testing is negative  Lachman's test is negative  Jeny test is negative    Pt odes exhibit tenderness with palpation of leftknee cap, no pain with palpation or deep stimulation of left thigh. NO erythema or edema of the left thigh, left quad strength 5/5     Psychiatric: She has a normal mood and affect. Her behavior is normal. Judgment and thought content normal.   Nursing note and vitals reviewed. Assessment/Plan:           1. Elevated blood pressure reading  Return in 2 weeks for  nurse visit for bp recheck. 2. Posterior left knee pain    - predniSONE (DELTASONE) 20 MG tablet; 1 tablet po bod for 5 days  Dispense: 10 tablet; Refill: 0    3. S/P cone biopsy of cervix  F/u with Dr. Tamia Donovan on 6/18/19      Return in about 2 weeks (around 6/27/2019) for nurse visit for bp check. Reccommended tobaccocessation options including pharmacologic methods, counseled great than 3 minutesduring this visit:  Yes[]  No  []       Patient given educational materials -see patient instructions. Discussed use, benefit, and side effects of prescribedmedications. All patient questions answered. Pt voiced understanding. Reviewedhealth maintenance. Instructed to continue current medications, diet and exercise. Patient agreed with treatment plan. Follow up as directed.        Electronicallysigned by CHAPO Hernandez CNP on 6/13/2019 at 3:25 PM

## 2019-07-01 ENCOUNTER — TELEPHONE (OUTPATIENT)
Dept: FAMILY MEDICINE CLINIC | Age: 34
End: 2019-07-01

## 2019-07-01 ENCOUNTER — NURSE ONLY (OUTPATIENT)
Dept: FAMILY MEDICINE CLINIC | Age: 34
End: 2019-07-01

## 2019-07-01 VITALS — SYSTOLIC BLOOD PRESSURE: 138 MMHG | DIASTOLIC BLOOD PRESSURE: 72 MMHG

## 2019-07-01 RX ORDER — LISINOPRIL 10 MG/1
10 TABLET ORAL DAILY
Qty: 90 TABLET | Refills: 1 | Status: SHIPPED | OUTPATIENT
Start: 2019-07-01 | End: 2019-08-21 | Stop reason: SDUPTHER

## 2019-07-16 ENCOUNTER — NURSE ONLY (OUTPATIENT)
Dept: FAMILY MEDICINE CLINIC | Age: 34
End: 2019-07-16

## 2019-07-16 ENCOUNTER — TELEPHONE (OUTPATIENT)
Dept: FAMILY MEDICINE CLINIC | Age: 34
End: 2019-07-16

## 2019-07-16 ENCOUNTER — HOSPITAL ENCOUNTER (OUTPATIENT)
Dept: MRI IMAGING | Age: 34
Discharge: HOME | End: 2019-07-16
Payer: COMMERCIAL

## 2019-07-16 ENCOUNTER — HOSPITAL ENCOUNTER (OUTPATIENT)
Dept: RADIATION ONCOLOGY | Age: 34
Discharge: HOME | End: 2019-07-16
Payer: COMMERCIAL

## 2019-07-16 VITALS
TEMPERATURE: 98.4 F | HEIGHT: 68 IN | DIASTOLIC BLOOD PRESSURE: 95 MMHG | SYSTOLIC BLOOD PRESSURE: 145 MMHG | OXYGEN SATURATION: 99 % | HEART RATE: 74 BPM | RESPIRATION RATE: 18 BRPM | WEIGHT: 194.89 LBS | BODY MASS INDEX: 29.54 KG/M2

## 2019-07-16 VITALS
HEART RATE: 84 BPM | BODY MASS INDEX: 30.76 KG/M2 | HEIGHT: 67 IN | DIASTOLIC BLOOD PRESSURE: 82 MMHG | WEIGHT: 196 LBS | SYSTOLIC BLOOD PRESSURE: 144 MMHG

## 2019-07-16 DIAGNOSIS — Z00.6 EXAMINATION FOR NORMAL COMPARISON FOR CLINICAL RESEARCH: ICD-10-CM

## 2019-07-16 DIAGNOSIS — Z01.30 BP CHECK: Primary | ICD-10-CM

## 2019-07-16 PROCEDURE — 3209999900 MRI COMPARISON OF OUTSIDE FILMS

## 2019-07-16 PROCEDURE — 99202 OFFICE O/P NEW SF 15 MIN: CPT | Performed by: RADIOLOGY

## 2019-07-16 RX ORDER — TRAZODONE HYDROCHLORIDE 100 MG/1
100 TABLET ORAL
COMMUNITY
Start: 2019-02-25 | End: 2019-07-16

## 2019-07-16 RX ORDER — MECLIZINE HYDROCHLORIDE 25 MG/1
25 TABLET ORAL
COMMUNITY
Start: 2019-02-25 | End: 2019-07-16

## 2019-07-16 RX ORDER — LISINOPRIL 10 MG/1
10 TABLET ORAL
COMMUNITY
Start: 2019-07-01 | End: 2019-07-16

## 2019-07-16 RX ORDER — IBUPROFEN 800 MG/1
800 TABLET ORAL
COMMUNITY
Start: 2017-12-06 | End: 2019-07-16

## 2019-07-16 RX ORDER — IBUPROFEN 200 MG
200 TABLET ORAL EVERY 8 HOURS PRN
COMMUNITY
End: 2021-06-11 | Stop reason: ALTCHOICE

## 2019-07-16 NOTE — PROGRESS NOTES
palpitations or syncope. GI: No complaints. Denies hematemesis or rectal bleeding. Denies nausea or vomiting. Denies change in bowel habits. : Occasional stress urinary urgency. Denies hematuria or dysuria. Musculoskeletal: No specific complaints. Metabolic/endocrine: Occasional night sweats (not drenching). Denies other complaints. Integument: Skin with some dryness and occasional pruritus. Occasional rash. Neurological: Denies seizures, fainting or tremors. GYN: [id number]. Previous tubal ligation (does not plan on additional childbearing). Some increased vaginal discharge since LEEP/biopsy. Dyspareunia. Some occasional spotting after intercourse. PHYSICAL EXAMINATION:   VITAL SIGNS: Height: 1.726 m. Weight: 88.4 kg. Temperature: 36.9 Celsius. Pulse: 74.  Respirations: 18.  Blood pressure: 145/95. Pulse oximetry: O2 saturation 99% on room air. ECOG PERFORMANCE STATUS: 0  PAIN RATING:  3-5/10 (lower abdominal cramping, since LEEP/biopsy). GENERAL: Pleasant well-developed adult female. In no acute distress. Alert and oriented ×3; clear mentation with appropriate affect. SKIN: Warm and dry, without jaundice, ecchymoses, or petechiae. HEENT: Normocephalic, atraumatic. PERRL, EOMI, ears, nose and lips unremarkable on external examination; oral cavity and oropharynx within normal limits without visible lesions or exudates. NECK:  No JVD; no cervical lymphadenopathy. THORAX: No palpable supraclavicular or axillary adenopathy;  LUNGS: Clear to auscultation. CARDIAC: Non-tachycardic, regular. ABDOMEN: Soft,  bowel sounds present; no palpable abdominal masses. PELVIS: Normal appearing external genitalia. on manual examination, the cervix expanded and rounded, and is eccentric,  more full on the left with blunting of the vaginal sulcus on the left. There is a suggestion of tethering to the parametrial tissue on the left, but I do not appreciate any defined parametrial masses.   [facility]      This document was created using a voice recognition program.  Computer-generated transcription errors may be present.

## 2019-07-16 NOTE — TELEPHONE ENCOUNTER
I guess I need a bp when she has taken her meds, would she like to schedule another nurse visit and take her bp meds 2 hours before?  thanks

## 2019-07-16 NOTE — TELEPHONE ENCOUNTER
Pt presented to office  for B/P check . Pt denies taking any medication today prior to visit . after allowing to sit for  5 minutes . B/p  144/82,84 right arm medium adult cuff sitting .     Pharmacy  [place]

## 2019-07-17 NOTE — TELEPHONE ENCOUNTER
Pt informed and verbalized understanding.   Pt states she will call tomorrow to schedule a nurse visit

## 2019-07-23 ENCOUNTER — HOSPITAL ENCOUNTER (OUTPATIENT)
Dept: CT IMAGING | Age: 34
Discharge: HOME | End: 2019-07-23
Payer: COMMERCIAL

## 2019-07-23 ENCOUNTER — TELEPHONE (OUTPATIENT)
Dept: ONCOLOGY | Age: 34
End: 2019-07-23

## 2019-07-23 ENCOUNTER — HOSPITAL ENCOUNTER (OUTPATIENT)
Dept: RADIATION ONCOLOGY | Age: 34
Discharge: HOME | End: 2019-07-23
Attending: RADIOLOGY
Payer: COMMERCIAL

## 2019-07-23 DIAGNOSIS — C53.8 MALIGNANT NEOPLASM OF OVERLAPPING SITES OF CERVIX UTERI (HCC): ICD-10-CM

## 2019-07-23 PROCEDURE — 3209999900 CT GUIDE RADIATION THERAPY NO CHARGE

## 2019-07-23 PROCEDURE — 77290 THER RAD SIMULAJ FIELD CPLX: CPT | Performed by: RADIOLOGY

## 2019-07-23 PROCEDURE — 77334 RADIATION TREATMENT AID(S): CPT | Performed by: RADIOLOGY

## 2019-07-31 ENCOUNTER — OFFICE VISIT (OUTPATIENT)
Dept: ONCOLOGY | Age: 34
End: 2019-07-31
Payer: COMMERCIAL

## 2019-07-31 ENCOUNTER — HOSPITAL ENCOUNTER (OUTPATIENT)
Dept: INFUSION THERAPY | Age: 34
Discharge: HOME | End: 2019-07-31
Payer: COMMERCIAL

## 2019-07-31 VITALS
HEART RATE: 72 BPM | OXYGEN SATURATION: 98 % | RESPIRATION RATE: 16 BRPM | WEIGHT: 200.6 LBS | BODY MASS INDEX: 30.4 KG/M2 | SYSTOLIC BLOOD PRESSURE: 149 MMHG | HEIGHT: 68 IN | TEMPERATURE: 97.3 F | DIASTOLIC BLOOD PRESSURE: 96 MMHG

## 2019-07-31 DIAGNOSIS — C53.8 MALIGNANT NEOPLASM OF OVERLAPPING SITES OF CERVIX (HCC): ICD-10-CM

## 2019-07-31 PROCEDURE — G8427 DOCREV CUR MEDS BY ELIG CLIN: HCPCS | Performed by: INTERNAL MEDICINE

## 2019-07-31 PROCEDURE — G8417 CALC BMI ABV UP PARAM F/U: HCPCS | Performed by: INTERNAL MEDICINE

## 2019-07-31 PROCEDURE — 99205 OFFICE O/P NEW HI 60 MIN: CPT | Performed by: INTERNAL MEDICINE

## 2019-07-31 PROCEDURE — 99211 OFF/OP EST MAY X REQ PHY/QHP: CPT

## 2019-07-31 PROCEDURE — 4004F PT TOBACCO SCREEN RCVD TLK: CPT | Performed by: INTERNAL MEDICINE

## 2019-07-31 RX ORDER — PROCHLORPERAZINE MALEATE 5 MG/1
5 TABLET ORAL EVERY 6 HOURS PRN
Qty: 30 TABLET | Refills: 3 | Status: SHIPPED | OUTPATIENT
Start: 2019-07-31 | End: 2021-07-01 | Stop reason: ALTCHOICE

## 2019-07-31 RX ORDER — DEXAMETHASONE 4 MG/1
8 TABLET ORAL
Qty: 4 TABLET | Refills: 3 | Status: SHIPPED | OUTPATIENT
Start: 2019-08-07 | End: 2019-09-04 | Stop reason: SDUPTHER

## 2019-08-01 ENCOUNTER — HOSPITAL ENCOUNTER (OUTPATIENT)
Dept: PET IMAGING | Age: 34
Discharge: HOME | End: 2019-08-01
Payer: COMMERCIAL

## 2019-08-01 DIAGNOSIS — C53.8 MALIGNANT NEOPLASM OF OVERLAPPING SITES OF CERVIX (HCC): Primary | ICD-10-CM

## 2019-08-01 DIAGNOSIS — C53.9 MALIGNANT NEOPLASM OF CERVIX, UNSPECIFIED SITE (HCC): ICD-10-CM

## 2019-08-01 PROCEDURE — 78815 PET IMAGE W/CT SKULL-THIGH: CPT

## 2019-08-01 PROCEDURE — A9552 F18 FDG: HCPCS | Performed by: RADIOLOGY

## 2019-08-01 PROCEDURE — 3430000000 HC RX DIAGNOSTIC RADIOPHARMACEUTICAL: Performed by: RADIOLOGY

## 2019-08-01 RX ORDER — SODIUM CHLORIDE 0.9 % (FLUSH) 0.9 %
20 SYRINGE (ML) INJECTION PRN
Status: CANCELLED | OUTPATIENT
Start: 2019-08-01

## 2019-08-01 RX ORDER — SODIUM CHLORIDE 0.9 % (FLUSH) 0.9 %
10 SYRINGE (ML) INJECTION PRN
Status: CANCELLED | OUTPATIENT
Start: 2019-08-01

## 2019-08-01 RX ORDER — LIDOCAINE AND PRILOCAINE 25; 25 MG/G; MG/G
CREAM TOPICAL
Qty: 30 G | Refills: 2 | Status: SHIPPED | OUTPATIENT
Start: 2019-08-01 | End: 2021-07-01

## 2019-08-01 RX ORDER — HEPARIN SODIUM (PORCINE) LOCK FLUSH IV SOLN 100 UNIT/ML 100 UNIT/ML
500 SOLUTION INTRAVENOUS PRN
Status: CANCELLED | OUTPATIENT
Start: 2019-08-01

## 2019-08-01 RX ORDER — FLUDEOXYGLUCOSE F 18 200 MCI/ML
11.1 INJECTION, SOLUTION INTRAVENOUS
Status: COMPLETED | OUTPATIENT
Start: 2019-08-01 | End: 2019-08-01

## 2019-08-01 RX ADMIN — FLUDEOXYGLUCOSE F 18 11.1 MILLICURIE: 200 INJECTION, SOLUTION INTRAVENOUS at 09:02

## 2019-08-05 ENCOUNTER — HOSPITAL ENCOUNTER (OUTPATIENT)
Dept: RADIATION ONCOLOGY | Age: 34
End: 2019-08-05
Attending: RADIOLOGY
Payer: COMMERCIAL

## 2019-08-05 ENCOUNTER — OFFICE VISIT (OUTPATIENT)
Dept: SURGERY | Age: 34
End: 2019-08-05
Payer: COMMERCIAL

## 2019-08-05 ENCOUNTER — HOSPITAL ENCOUNTER (OUTPATIENT)
Dept: INFUSION THERAPY | Age: 34
Discharge: HOME | End: 2019-08-05
Payer: COMMERCIAL

## 2019-08-05 VITALS
HEART RATE: 88 BPM | RESPIRATION RATE: 18 BRPM | DIASTOLIC BLOOD PRESSURE: 80 MMHG | TEMPERATURE: 98.7 F | SYSTOLIC BLOOD PRESSURE: 148 MMHG | OXYGEN SATURATION: 98 % | HEIGHT: 68 IN | WEIGHT: 201 LBS | BODY MASS INDEX: 30.46 KG/M2

## 2019-08-05 VITALS
WEIGHT: 200.6 LBS | BODY MASS INDEX: 30.4 KG/M2 | TEMPERATURE: 98.9 F | SYSTOLIC BLOOD PRESSURE: 149 MMHG | OXYGEN SATURATION: 97 % | HEIGHT: 68 IN | RESPIRATION RATE: 18 BRPM | HEART RATE: 81 BPM | DIASTOLIC BLOOD PRESSURE: 83 MMHG

## 2019-08-05 DIAGNOSIS — I10 ESSENTIAL HYPERTENSION: ICD-10-CM

## 2019-08-05 DIAGNOSIS — C53.8 MALIGNANT NEOPLASM OF OVERLAPPING SITES OF CERVIX (HCC): Primary | ICD-10-CM

## 2019-08-05 PROCEDURE — 99242 OFF/OP CONSLTJ NEW/EST SF 20: CPT | Performed by: SURGERY

## 2019-08-05 PROCEDURE — 77300 RADIATION THERAPY DOSE PLAN: CPT | Performed by: RADIOLOGY

## 2019-08-05 PROCEDURE — 77295 3-D RADIOTHERAPY PLAN: CPT | Performed by: RADIOLOGY

## 2019-08-05 PROCEDURE — 77470 SPECIAL RADIATION TREATMENT: CPT | Performed by: RADIOLOGY

## 2019-08-05 PROCEDURE — G8417 CALC BMI ABV UP PARAM F/U: HCPCS | Performed by: SURGERY

## 2019-08-05 PROCEDURE — 99212 OFFICE O/P EST SF 10 MIN: CPT

## 2019-08-05 PROCEDURE — G8427 DOCREV CUR MEDS BY ELIG CLIN: HCPCS | Performed by: SURGERY

## 2019-08-05 NOTE — PLAN OF CARE
Problem: Intellectual/Education/Knowledge Deficit  Goal: Teaching initiated upon admission  Outcome: Met This Shift  Note:   Patient verbalizes understanding to verbal information given on chemotherapy education regarding ,action and possible side effects. Aware to call MD if develop complications. Intervention: Verbal/written education provided  Note:   Patient and family instructed on chemotherapy specifically the drugs Cisplatin and  chemotherapy regimen. The [employer] folder along with the following - chemotherapy drug information sheets,chemotherapy side effects handouts,Understanding your blood counts, [place] diet,Importance to hydration,when to call physician sheet,reduce risk infection sheet,bleeding precaution,neutropenia precaution,Chemotherapy and You and Eating Hints books was included and reviewed. All questions answered satisfactory and support given. Distress Screening  Survey completed. Patient navigator explained to patient and informed that she may be calling him/her if needs assistance. Patient given a tour of facility. Approximately 6 minutes spent with patient and family. Problem: Discharge Planning:  Goal: Discharged to appropriate level of care  Description  Discharged to appropriate level of care  Outcome: Met This Shift  Note:   Verbalized understanding of discharge instructions, follow-up appointments, and when to call the physician. Intervention: Assess readiness for discharge  Description  Assess readiness for discharge  Note:   Discuss understanding of discharge instructions,follow-up appointments, and when to call the physician. Care plan reviewed with patient. Patient verbalize understanding of the plan of care and contribute to goal setting.

## 2019-08-06 ENCOUNTER — HOSPITAL ENCOUNTER (OUTPATIENT)
Dept: RADIATION ONCOLOGY | Age: 34
Discharge: HOME | End: 2019-08-06
Attending: RADIOLOGY
Payer: COMMERCIAL

## 2019-08-06 ENCOUNTER — HOSPITAL ENCOUNTER (OUTPATIENT)
Dept: INFUSION THERAPY | Age: 34
Discharge: HOME | End: 2019-08-06
Payer: COMMERCIAL

## 2019-08-06 VITALS
TEMPERATURE: 98.7 F | SYSTOLIC BLOOD PRESSURE: 136 MMHG | HEART RATE: 86 BPM | HEIGHT: 68 IN | BODY MASS INDEX: 30.46 KG/M2 | DIASTOLIC BLOOD PRESSURE: 85 MMHG | WEIGHT: 201 LBS | OXYGEN SATURATION: 98 % | RESPIRATION RATE: 18 BRPM

## 2019-08-06 DIAGNOSIS — C53.8 MALIGNANT NEOPLASM OF OVERLAPPING SITES OF CERVIX (HCC): Primary | ICD-10-CM

## 2019-08-06 LAB
ALBUMIN SERPL-MCNC: 4.1 G/DL (ref 3.5–5.1)
ALP BLD-CCNC: 70 U/L (ref 38–126)
ALT SERPL-CCNC: 19 U/L (ref 11–66)
AST SERPL-CCNC: 24 U/L (ref 5–40)
BILIRUB SERPL-MCNC: 0.3 MG/DL (ref 0.3–1.2)
BILIRUBIN DIRECT: < 0.2 MG/DL (ref 0–0.3)
BUN, WHOLE BLOOD: 17 MG/DL (ref 8–26)
CHLORIDE, WHOLE BLOOD: 107 MEQ/L (ref 98–109)
CREATININE, WHOLE BLOOD: 0.8 MG/DL (ref 0.5–1.2)
GLUCOSE, WHOLE BLOOD: 96 MG/DL (ref 70–108)
HCT VFR BLD CALC: 39.8 % (ref 37–47)
HEMOGLOBIN: 13.6 GM/DL (ref 12–16)
IONIZED CALCIUM, WHOLE BLOOD: 1.13 MMOL/L (ref 1.12–1.32)
MCH RBC QN AUTO: 33.1 PG (ref 27–31)
MCHC RBC AUTO-ENTMCNC: 34.3 GM/DL (ref 33–37)
MCV RBC AUTO: 96 FL (ref 81–99)
PDW BLD-RTO: 11 % (ref 11.5–14.5)
PLATELET # BLD: 230 THOU/MM3 (ref 130–400)
PMV BLD AUTO: 7.9 FL (ref 7.4–10.4)
POTASSIUM, WHOLE BLOOD: 4 MEQ/L (ref 3.5–4.9)
RBC # BLD: 4.12 MILL/MM3 (ref 4.2–5.4)
SEG NEUTROPHILS: 50 % (ref 43–75)
SEGMENTED NEUTROPHILS ABSOLUTE COUNT: 3.2 THOU/MM3 (ref 1.8–7.7)
SODIUM, WHOLE BLOOD: 140 MEQ/L (ref 138–146)
TOTAL CO2, WHOLE BLOOD: 23 MEQ/L (ref 23–33)
TOTAL PROTEIN: 7.1 G/DL (ref 6.1–8)
WBC # BLD: 6.4 THOU/MM3 (ref 4.8–10.8)

## 2019-08-06 PROCEDURE — 77412 RADIATION TX DELIVERY LVL 3: CPT | Performed by: RADIOLOGY

## 2019-08-06 PROCEDURE — 96375 TX/PRO/DX INJ NEW DRUG ADDON: CPT

## 2019-08-06 PROCEDURE — 80076 HEPATIC FUNCTION PANEL: CPT

## 2019-08-06 PROCEDURE — 96366 THER/PROPH/DIAG IV INF ADDON: CPT

## 2019-08-06 PROCEDURE — 6360000002 HC RX W HCPCS: Performed by: INTERNAL MEDICINE

## 2019-08-06 PROCEDURE — 96413 CHEMO IV INFUSION 1 HR: CPT

## 2019-08-06 PROCEDURE — 77334 RADIATION TREATMENT AID(S): CPT | Performed by: RADIOLOGY

## 2019-08-06 PROCEDURE — 96415 CHEMO IV INFUSION ADDL HR: CPT

## 2019-08-06 PROCEDURE — 96367 TX/PROPH/DG ADDL SEQ IV INF: CPT

## 2019-08-06 PROCEDURE — 80047 BASIC METABLC PNL IONIZED CA: CPT

## 2019-08-06 PROCEDURE — 36415 COLL VENOUS BLD VENIPUNCTURE: CPT

## 2019-08-06 PROCEDURE — 85027 COMPLETE CBC AUTOMATED: CPT

## 2019-08-06 PROCEDURE — 2580000003 HC RX 258: Performed by: INTERNAL MEDICINE

## 2019-08-06 PROCEDURE — 77280 THER RAD SIMULAJ FIELD SMPL: CPT | Performed by: RADIOLOGY

## 2019-08-06 RX ORDER — SODIUM CHLORIDE 9 MG/ML
20 INJECTION, SOLUTION INTRAVENOUS ONCE
Status: COMPLETED | OUTPATIENT
Start: 2019-08-06 | End: 2019-08-06

## 2019-08-06 RX ORDER — SODIUM CHLORIDE 0.9 % (FLUSH) 0.9 %
10 SYRINGE (ML) INJECTION PRN
Status: CANCELLED | OUTPATIENT
Start: 2019-08-06

## 2019-08-06 RX ORDER — METHYLPREDNISOLONE SODIUM SUCCINATE 125 MG/2ML
125 INJECTION, POWDER, LYOPHILIZED, FOR SOLUTION INTRAMUSCULAR; INTRAVENOUS ONCE
Status: CANCELLED | OUTPATIENT
Start: 2019-08-06

## 2019-08-06 RX ORDER — PALONOSETRON 0.05 MG/ML
0.25 INJECTION, SOLUTION INTRAVENOUS ONCE
Status: COMPLETED | OUTPATIENT
Start: 2019-08-06 | End: 2019-08-06

## 2019-08-06 RX ORDER — SODIUM CHLORIDE 0.9 % (FLUSH) 0.9 %
5 SYRINGE (ML) INJECTION PRN
Status: CANCELLED | OUTPATIENT
Start: 2019-08-06

## 2019-08-06 RX ORDER — HEPARIN SODIUM (PORCINE) LOCK FLUSH IV SOLN 100 UNIT/ML 100 UNIT/ML
500 SOLUTION INTRAVENOUS PRN
Status: CANCELLED | OUTPATIENT
Start: 2019-08-06

## 2019-08-06 RX ORDER — SODIUM CHLORIDE 9 MG/ML
INJECTION, SOLUTION INTRAVENOUS CONTINUOUS
Status: CANCELLED | OUTPATIENT
Start: 2019-08-06

## 2019-08-06 RX ORDER — DIPHENHYDRAMINE HYDROCHLORIDE 50 MG/ML
50 INJECTION INTRAMUSCULAR; INTRAVENOUS ONCE
Status: CANCELLED | OUTPATIENT
Start: 2019-08-06

## 2019-08-06 RX ADMIN — DEXAMETHASONE SODIUM PHOSPHATE 12 MG: 4 INJECTION, SOLUTION INTRAMUSCULAR; INTRAVENOUS at 13:10

## 2019-08-06 RX ADMIN — PALONOSETRON 0.25 MG: 0.05 INJECTION, SOLUTION INTRAVENOUS at 13:08

## 2019-08-06 RX ADMIN — CISPLATIN: 1 INJECTION, SOLUTION INTRAVENOUS at 14:15

## 2019-08-06 RX ADMIN — POTASSIUM CHLORIDE: 2 INJECTION, SOLUTION, CONCENTRATE INTRAVENOUS at 16:27

## 2019-08-06 RX ADMIN — SODIUM CHLORIDE 150 MG: 9 INJECTION, SOLUTION INTRAVENOUS at 13:37

## 2019-08-06 RX ADMIN — POTASSIUM CHLORIDE: 2 INJECTION, SOLUTION, CONCENTRATE INTRAVENOUS at 11:59

## 2019-08-06 RX ADMIN — SODIUM CHLORIDE 20 ML/HR: 9 INJECTION, SOLUTION INTRAVENOUS at 11:58

## 2019-08-06 ASSESSMENT — ENCOUNTER SYMPTOMS
TROUBLE SWALLOWING: 0
VOICE CHANGE: 0
SORE THROAT: 0
WHEEZING: 0
COUGH: 0
NAUSEA: 0
VOMITING: 0
SHORTNESS OF BREATH: 0
ABDOMINAL PAIN: 0
COLOR CHANGE: 0
BLOOD IN STOOL: 0

## 2019-08-06 NOTE — ONCOLOGY
Chemotherapy Administration    Pre-assessment Data: Antineoplastic Agents  Other:   See toxicity flow sheet for assessment [x]     Physician Notification of Concerns Related to Chemotherapy Administration:   Physician Notified Yanet [place] / Time of Notification      Interventions:   Lab work assessed  [x]   Height / Weight verified for dose [x]   Current MAR reviewed [x]   Emergency drugs available as appropriate [x]   Anaphylaxis assessment completed [x]   Pre-medications administered as ordered [x]   Blood return noted upon initiation of chemotherapy [x]   Blood return noted each 1-2ml of a vesicant medication if given IV push []   Blood return noted each 2-3ml of a non-vesicant medication if given IV push []   Monitor for signs / symptoms of hypersensitivity reaction [x]   Chemotherapy orders (drug/dose/rate) verified by 2 Chemo certified RNs [x]   Monitor IV site and blood return throughout the infusion of the medication [x]   Document IV site checks on the IV assessment form [x]   Document chemotherapy teaching on the Patient Education tab [x]   Document patient verbalizes understanding of medications being administered [x]   If IV infiltration, see ONS Guidelines []   Other:     Cisplatin []

## 2019-08-06 NOTE — PROGRESS NOTES
extension into the upper left vaginal wall and some parametrial fat stranding. She was given a recommendation for concurrent chemo radiotherapy. She wishes to receive her therapy here locally. She has seen Dr. Arline Grady and request was made for port placement for chemotherapy. She has very small peripheral veins. He denies having had a history of DVT. She has had no previous upper central venous access procedures. Past Medical History  Past Medical History:   Diagnosis Date    Anxiety     Cancer of cervix ([address]     Depression     Hypertension        Past Surgical History  Past Surgical History:   Procedure Laterality Date    CERVIX BIOPSY N/A 6/7/2019    COLD KNIFE CONE BX CERVIX performed by Willian Dickey MD at [address]  over 5 years ago    ? dr Casandra Laughlin       Medications  Current Outpatient Medications   Medication Sig Dispense Refill    lidocaine-prilocaine (EMLA) 2.5-2.5 % cream Apply topically as needed. 30 g 2    [START ON 8/7/2019] dexamethasone (DECADRON) 4 MG tablet Take 2 tablets by mouth daily (with breakfast) for 2 doses 4 tablet 3    prochlorperazine (COMPAZINE) 5 MG tablet Take 1 tablet by mouth every 6 hours as needed for Nausea 30 tablet 3    ibuprofen (ADVIL;MOTRIN) 200 MG tablet Take 200 mg by mouth every 8 hours as needed for Pain      lisinopril (PRINIVIL;ZESTRIL) 10 MG tablet Take 1 tablet by mouth daily 90 tablet 1    Loratadine-Pseudoephedrine (CLARITIN-D 24 HOUR PO) Take by mouth      meclizine (ANTIVERT) 25 MG tablet Take 1 tablet by mouth 3 times daily as needed for Dizziness 40 tablet 1    traZODone (DESYREL) 100 MG tablet Take 1 tablet by mouth nightly 30 tablet 6     No current facility-administered medications for this visit.       Allergies     Allergies   Allergen Reactions    Percocet [Oxycodone-Acetaminophen] Other (See Comments)     hallucinations       Family History  Family History   Problem Relation is alert and oriented to person, place, and time. No cranial nerve deficit. Skin: Skin is warm and dry. No rash noted. She is not diaphoretic. Psychiatric: She has a normal mood and affect. Her behavior is normal. Thought content normal.   Vitals reviewed. Lab Results   Component Value Date    WBC 6.8 05/30/2019    HGB 12.9 05/30/2019    HCT 38.9 05/30/2019     05/30/2019    ALT 19 12/02/2017    AST 27 12/02/2017     12/03/2017    K 3.9 12/03/2017     12/03/2017    CREATININE 0.6 12/03/2017    BUN 11 12/03/2017    CO2 24 12/03/2017    TSH 0.63 08/30/2018    INR 0.97 12/02/2017            PROCEDURE: PET CT SKULL BASE TO MID THIGH       CLINICAL INFORMATION: 68-year-old woman with recent diagnosis of squamous cell carcinoma of the cervix, status post LEEP; initial treatment strategy.       Radiopharmaceutical: 11.0 mCi F-18 FDG, intravenously.       TECHNIQUE: PET/CT imaging was performed skull base to the midthigh levels using routine PET acquisition.       Injection site: Right hand.       Time of FDG injection: 9:02 AM.       Serum glucose: 102 mg/dL at 8:54 AM.       Time of imaging: 10:02 AM.       COMPARISON: None       FINDINGS:        Neck: No FDG avid cervical lymphadenopathy.       Chest: Cardiac size is normal. There is no pleural or pericardial effusion. No FDG avid pulmonary nodules are identified. There is no hypermetabolic mediastinal, hilar or axillary lymphadenopathy. Mild degenerative changes are present in the thoracic    spine without evidence of hypermetabolic osseous metastatic disease.       Abdomen/pelvis: Physiologic activity is present in the liver, spleen, urinary collecting system and gastrointestinal tract. The gallbladder is surgically absent. Phleboliths are present in the pelvis. Activity in the lower uterus without a distinct CT    correlate has a maximum SUV of 9.2 (image 239).  Indistinct indistinct exophytic structure at the posterior uterus has a maximum

## 2019-08-06 NOTE — PLAN OF CARE
Problem: Intellectual/Education/Knowledge Deficit  Goal: Teaching initiated upon admission  Outcome: Met This Shift  Note:   Patient verbalizes understanding to verbal information given on Cisplatin ,action and possible side effects. Aware to call MD if develop complications. Intervention: Verbal/written education provided  Note:   Chemotherapy Teaching     What is Chemotherapy   Drug action ? Method of Administration ? Handouts given ? Side Effects  Nausea/vomiting ? Diarrhea ? Fatigue ? Signs / Symptoms of infection ? Neutropenia ? Thrombocytopenia ? Alopecia ? neuropathy ? [place] diet &  the importance of fluids ? Micellaneous  Importance of nutrition ? Importance of oral hygiene ? When to call the MD ?   Monitoring labs ? Use of supportive services ? Explanation of Drug Regimen / Frequency  Cisplatin:  D1C1     Comments  Verbalized understanding to drug,action,side effects and when to call MD         Problem: Discharge Planning:  Goal: Discharged to appropriate level of care  Description  Discharged to appropriate level of care  Outcome: Met This Shift  Note:   Verbalized understanding of discharge instructions, follow-up appointments, and when to call the physician. Intervention: Assess readiness for discharge  Description  Assess readiness for discharge  Note:   Discuss understanding of discharge instructions,follow-up appointments, and when to call the physician. Problem: Falls - Risk of:  Goal: Will remain free from falls  Description  Will remain free from falls  Outcome: Met This Shift  Note:   Free from falls while in O.P. Oncology. Intervention: Assess risk factors for falls  Description  Assess risk factors for falls  Note:   Discussed the need to use the call light for assistance when getting up to ambulate. Care plan reviewed with patient. Patient verbalize understanding of the plan of care and contribute to goal setting.

## 2019-08-06 NOTE — PROGRESS NOTES
Patient assessed for the following post chemotherapy:    Dizziness   No  Lightheadedness  No      Acute nausea/vomiting No  Headache   No  Chest pain/pressure  No  Rash/itching   No  Shortness of breath  No    Patient kept for 20 minutes observation post infusion chemotherapy. Patient tolerated chemotherapy treatment Cisplatin without any complications. Last vital signs:   BP (!) 157/97   Pulse 85   Temp 97.4 °F (36.3 °C) (Oral)   Resp 18   Ht 5' 8\" (1.727 m)   Wt 201 lb (91.2 kg)   LMP 07/29/2019   SpO2 98%   BMI 30.56 kg/m²     Patient instructed if experience any of the above symptoms following today's infusion, she is to notify MD immediately or go to the emergency department. Discharge instructions given to patient. Verbalizes understanding. Ambulated off unit per self, accompanied by family, with belongings.

## 2019-08-07 ENCOUNTER — CLINICAL DOCUMENTATION (OUTPATIENT)
Dept: ONCOLOGY | Age: 34
End: 2019-08-07

## 2019-08-07 ENCOUNTER — HOSPITAL ENCOUNTER (OUTPATIENT)
Dept: RADIATION ONCOLOGY | Age: 34
Discharge: HOME | End: 2019-08-07
Attending: RADIOLOGY
Payer: COMMERCIAL

## 2019-08-07 ENCOUNTER — CLINICAL DOCUMENTATION (OUTPATIENT)
Dept: NUTRITION | Age: 34
End: 2019-08-07

## 2019-08-07 DIAGNOSIS — C53.8 MALIGNANT NEOPLASM OF OVERLAPPING SITES OF CERVIX (HCC): Primary | ICD-10-CM

## 2019-08-07 PROCEDURE — 77412 RADIATION TX DELIVERY LVL 3: CPT | Performed by: RADIOLOGY

## 2019-08-07 PROCEDURE — 77387 GUIDANCE FOR RADJ TX DLVR: CPT | Performed by: RADIOLOGY

## 2019-08-07 NOTE — PROGRESS NOTES
Name: Nathalie Hoffman  : 1985  MRN: [id number]    Oncology Navigation- Initial Note:    Intake-  Contact Type: Radiation Oncology    Diagnosis: GYN    Home Disposition: Lives with other who is able to assist    Patient needs and barriers to care: Symptom Management     Referral Source: Outpatient    Receptive to Advanced Care Planning/ Palliative Care:  deferred    Interventions-   General Interventions: Navigation Welcome packet given and program explained. Referrals: Nutrition       Continuum of Care: Diagnosis/Active Treatment    Notes: Met with Shilpa Gamble after Radiation teaching and before first radiation treatment. Yesterday she received her first chemotherapy treatment-Cisplatin for her Stage II cervical cancer. She lives with sig other and lives within [facility]. She plans of having external beam and her HDR treatments done here. At this time no needs had to be addressed. She knows she can call with any questions or concerns.     Electronically signed by Tawanna Mccabe RN on 2019 at 3:21 PM

## 2019-08-08 ENCOUNTER — HOSPITAL ENCOUNTER (OUTPATIENT)
Dept: RADIATION ONCOLOGY | Age: 34
Discharge: HOME | End: 2019-08-08
Attending: RADIOLOGY
Payer: COMMERCIAL

## 2019-08-08 PROCEDURE — 77387 GUIDANCE FOR RADJ TX DLVR: CPT | Performed by: RADIOLOGY

## 2019-08-08 PROCEDURE — 77412 RADIATION TX DELIVERY LVL 3: CPT | Performed by: RADIOLOGY

## 2019-08-09 ENCOUNTER — HOSPITAL ENCOUNTER (OUTPATIENT)
Dept: GENERAL RADIOLOGY | Age: 34
Setting detail: OUTPATIENT SURGERY
Discharge: HOME | End: 2019-08-09
Attending: SURGERY
Payer: COMMERCIAL

## 2019-08-09 ENCOUNTER — APPOINTMENT (OUTPATIENT)
Dept: GENERAL RADIOLOGY | Age: 34
End: 2019-08-09
Attending: SURGERY
Payer: COMMERCIAL

## 2019-08-09 ENCOUNTER — HOSPITAL ENCOUNTER (OUTPATIENT)
Dept: RADIATION ONCOLOGY | Age: 34
Discharge: HOME | End: 2019-08-09
Attending: RADIOLOGY
Payer: COMMERCIAL

## 2019-08-09 ENCOUNTER — ANESTHESIA (OUTPATIENT)
Dept: OPERATING ROOM | Age: 34
End: 2019-08-09
Payer: COMMERCIAL

## 2019-08-09 ENCOUNTER — ANESTHESIA EVENT (OUTPATIENT)
Dept: OPERATING ROOM | Age: 34
End: 2019-08-09
Payer: COMMERCIAL

## 2019-08-09 ENCOUNTER — HOSPITAL ENCOUNTER (OUTPATIENT)
Age: 34
Setting detail: OUTPATIENT SURGERY
Discharge: HOME | End: 2019-08-09
Attending: SURGERY | Admitting: SURGERY
Payer: COMMERCIAL

## 2019-08-09 VITALS
HEIGHT: 68 IN | OXYGEN SATURATION: 99 % | HEART RATE: 74 BPM | DIASTOLIC BLOOD PRESSURE: 89 MMHG | SYSTOLIC BLOOD PRESSURE: 140 MMHG | WEIGHT: 201.2 LBS | TEMPERATURE: 97.9 F | RESPIRATION RATE: 18 BRPM | BODY MASS INDEX: 30.49 KG/M2

## 2019-08-09 VITALS — OXYGEN SATURATION: 99 % | SYSTOLIC BLOOD PRESSURE: 128 MMHG | DIASTOLIC BLOOD PRESSURE: 81 MMHG

## 2019-08-09 DIAGNOSIS — C53.9 MALIGNANT NEOPLASM OF CERVIX, UNSPECIFIED SITE (HCC): Primary | ICD-10-CM

## 2019-08-09 DIAGNOSIS — C53.9 MALIGNANT NEOPLASM OF CERVIX, UNSPECIFIED SITE (HCC): ICD-10-CM

## 2019-08-09 PROCEDURE — 2580000003 HC RX 258: Performed by: SURGERY

## 2019-08-09 PROCEDURE — 2500000003 HC RX 250 WO HCPCS: Performed by: SURGERY

## 2019-08-09 PROCEDURE — 77001 FLUOROGUIDE FOR VEIN DEVICE: CPT | Performed by: SURGERY

## 2019-08-09 PROCEDURE — 6360000002 HC RX W HCPCS: Performed by: SURGERY

## 2019-08-09 PROCEDURE — 6360000002 HC RX W HCPCS: Performed by: NURSE ANESTHETIST, CERTIFIED REGISTERED

## 2019-08-09 PROCEDURE — 77387 GUIDANCE FOR RADJ TX DLVR: CPT | Performed by: RADIOLOGY

## 2019-08-09 PROCEDURE — 2709999900 HC NON-CHARGEABLE SUPPLY: Performed by: SURGERY

## 2019-08-09 PROCEDURE — 71045 X-RAY EXAM CHEST 1 VIEW: CPT

## 2019-08-09 PROCEDURE — 2500000003 HC RX 250 WO HCPCS: Performed by: NURSE ANESTHETIST, CERTIFIED REGISTERED

## 2019-08-09 PROCEDURE — 3600000002 HC SURGERY LEVEL 2 BASE: Performed by: SURGERY

## 2019-08-09 PROCEDURE — 7100000010 HC PHASE II RECOVERY - FIRST 15 MIN: Performed by: SURGERY

## 2019-08-09 PROCEDURE — 3700000001 HC ADD 15 MINUTES (ANESTHESIA): Performed by: SURGERY

## 2019-08-09 PROCEDURE — 7100000011 HC PHASE II RECOVERY - ADDTL 15 MIN: Performed by: SURGERY

## 2019-08-09 PROCEDURE — 3600000012 HC SURGERY LEVEL 2 ADDTL 15MIN: Performed by: SURGERY

## 2019-08-09 PROCEDURE — 6370000000 HC RX 637 (ALT 250 FOR IP): Performed by: SURGERY

## 2019-08-09 PROCEDURE — 77412 RADIATION TX DELIVERY LVL 3: CPT | Performed by: RADIOLOGY

## 2019-08-09 PROCEDURE — 77001 FLUOROGUIDE FOR VEIN DEVICE: CPT

## 2019-08-09 PROCEDURE — 3700000000 HC ANESTHESIA ATTENDED CARE: Performed by: SURGERY

## 2019-08-09 PROCEDURE — C1788 PORT, INDWELLING, IMP: HCPCS | Performed by: SURGERY

## 2019-08-09 PROCEDURE — 36561 INSERT TUNNELED CV CATH: CPT | Performed by: SURGERY

## 2019-08-09 DEVICE — PORT INFUS OD2.7MM ID1.5MM INTRO 8FR TI POLYUR CATH DETACH CT80STPD] ANGIODYNAMICS INC]: Type: IMPLANTABLE DEVICE | Site: CHEST | Status: FUNCTIONAL

## 2019-08-09 RX ORDER — MIDAZOLAM HYDROCHLORIDE 1 MG/ML
INJECTION INTRAMUSCULAR; INTRAVENOUS PRN
Status: DISCONTINUED | OUTPATIENT
Start: 2019-08-09 | End: 2019-08-09 | Stop reason: SDUPTHER

## 2019-08-09 RX ORDER — TRAMADOL HYDROCHLORIDE 50 MG/1
50 TABLET ORAL EVERY 6 HOURS PRN
Qty: 12 TABLET | Refills: 0 | Status: ON HOLD | OUTPATIENT
Start: 2019-08-09 | End: 2019-08-13 | Stop reason: HOSPADM

## 2019-08-09 RX ORDER — SODIUM CHLORIDE 0.9 % (FLUSH) 0.9 %
10 SYRINGE (ML) INJECTION PRN
Status: DISCONTINUED | OUTPATIENT
Start: 2019-08-09 | End: 2019-08-09 | Stop reason: HOSPADM

## 2019-08-09 RX ORDER — GLYCOPYRROLATE 1 MG/5 ML
SYRINGE (ML) INTRAVENOUS PRN
Status: DISCONTINUED | OUTPATIENT
Start: 2019-08-09 | End: 2019-08-09 | Stop reason: SDUPTHER

## 2019-08-09 RX ORDER — SODIUM CHLORIDE 0.9 % (FLUSH) 0.9 %
10 SYRINGE (ML) INJECTION EVERY 12 HOURS SCHEDULED
Status: DISCONTINUED | OUTPATIENT
Start: 2019-08-09 | End: 2019-08-09 | Stop reason: HOSPADM

## 2019-08-09 RX ORDER — HEPARIN SODIUM (PORCINE) LOCK FLUSH IV SOLN 100 UNIT/ML 100 UNIT/ML
SOLUTION INTRAVENOUS PRN
Status: DISCONTINUED | OUTPATIENT
Start: 2019-08-09 | End: 2019-08-09 | Stop reason: ALTCHOICE

## 2019-08-09 RX ORDER — FENTANYL CITRATE 50 UG/ML
INJECTION, SOLUTION INTRAMUSCULAR; INTRAVENOUS PRN
Status: DISCONTINUED | OUTPATIENT
Start: 2019-08-09 | End: 2019-08-09 | Stop reason: SDUPTHER

## 2019-08-09 RX ORDER — TRAMADOL HYDROCHLORIDE 50 MG/1
100 TABLET ORAL EVERY 6 HOURS PRN
Status: DISCONTINUED | OUTPATIENT
Start: 2019-08-09 | End: 2019-08-09 | Stop reason: HOSPADM

## 2019-08-09 RX ORDER — SODIUM CHLORIDE 9 MG/ML
INJECTION, SOLUTION INTRAVENOUS CONTINUOUS
Status: DISCONTINUED | OUTPATIENT
Start: 2019-08-09 | End: 2019-08-09 | Stop reason: HOSPADM

## 2019-08-09 RX ORDER — PROPOFOL 10 MG/ML
INJECTION, EMULSION INTRAVENOUS PRN
Status: DISCONTINUED | OUTPATIENT
Start: 2019-08-09 | End: 2019-08-09 | Stop reason: SDUPTHER

## 2019-08-09 RX ORDER — TRAMADOL HYDROCHLORIDE 50 MG/1
50 TABLET ORAL EVERY 6 HOURS PRN
Status: DISCONTINUED | OUTPATIENT
Start: 2019-08-09 | End: 2019-08-09 | Stop reason: HOSPADM

## 2019-08-09 RX ORDER — LIDOCAINE HYDROCHLORIDE 10 MG/ML
INJECTION, SOLUTION EPIDURAL; INFILTRATION; INTRACAUDAL; PERINEURAL PRN
Status: DISCONTINUED | OUTPATIENT
Start: 2019-08-09 | End: 2019-08-09 | Stop reason: ALTCHOICE

## 2019-08-09 RX ORDER — ONDANSETRON 2 MG/ML
4 INJECTION INTRAMUSCULAR; INTRAVENOUS EVERY 6 HOURS PRN
Status: DISCONTINUED | OUTPATIENT
Start: 2019-08-09 | End: 2019-08-09 | Stop reason: HOSPADM

## 2019-08-09 RX ORDER — LIDOCAINE HYDROCHLORIDE 10 MG/ML
INJECTION, SOLUTION INFILTRATION; PERINEURAL PRN
Status: DISCONTINUED | OUTPATIENT
Start: 2019-08-09 | End: 2019-08-09 | Stop reason: SDUPTHER

## 2019-08-09 RX ADMIN — PROPOFOL 50 MG: 10 INJECTION, EMULSION INTRAVENOUS at 08:20

## 2019-08-09 RX ADMIN — FENTANYL CITRATE 50 MCG: 50 INJECTION INTRAMUSCULAR; INTRAVENOUS at 07:52

## 2019-08-09 RX ADMIN — SODIUM CHLORIDE: 9 INJECTION, SOLUTION INTRAVENOUS at 07:40

## 2019-08-09 RX ADMIN — PROPOFOL 50 MG: 10 INJECTION, EMULSION INTRAVENOUS at 07:58

## 2019-08-09 RX ADMIN — TRAMADOL HYDROCHLORIDE 100 MG: 50 TABLET, FILM COATED ORAL at 08:45

## 2019-08-09 RX ADMIN — Medication 0.2 MG: at 07:52

## 2019-08-09 RX ADMIN — PROPOFOL 50 MG: 10 INJECTION, EMULSION INTRAVENOUS at 07:54

## 2019-08-09 RX ADMIN — MIDAZOLAM HYDROCHLORIDE 2 MG: 1 INJECTION, SOLUTION INTRAMUSCULAR; INTRAVENOUS at 07:47

## 2019-08-09 RX ADMIN — PROPOFOL 50 MG: 10 INJECTION, EMULSION INTRAVENOUS at 07:52

## 2019-08-09 RX ADMIN — FENTANYL CITRATE 50 MCG: 50 INJECTION INTRAMUSCULAR; INTRAVENOUS at 08:04

## 2019-08-09 RX ADMIN — PROPOFOL 50 MG: 10 INJECTION, EMULSION INTRAVENOUS at 08:08

## 2019-08-09 RX ADMIN — PROPOFOL 50 MG: 10 INJECTION, EMULSION INTRAVENOUS at 08:03

## 2019-08-09 RX ADMIN — LIDOCAINE HYDROCHLORIDE 20 MG: 10 INJECTION, SOLUTION INFILTRATION; PERINEURAL at 07:52

## 2019-08-09 RX ADMIN — PROPOFOL 50 MG: 10 INJECTION, EMULSION INTRAVENOUS at 08:13

## 2019-08-09 ASSESSMENT — PULMONARY FUNCTION TESTS
PIF_VALUE: 0
PIF_VALUE: 1
PIF_VALUE: 0
PIF_VALUE: 1
PIF_VALUE: 0

## 2019-08-09 ASSESSMENT — PAIN DESCRIPTION - ORIENTATION
ORIENTATION: LEFT

## 2019-08-09 ASSESSMENT — PAIN DESCRIPTION - LOCATION
LOCATION: CHEST

## 2019-08-09 ASSESSMENT — PAIN SCALES - GENERAL
PAINLEVEL_OUTOF10: 3
PAINLEVEL_OUTOF10: 8
PAINLEVEL_OUTOF10: 4

## 2019-08-09 ASSESSMENT — PAIN - FUNCTIONAL ASSESSMENT: PAIN_FUNCTIONAL_ASSESSMENT: 0-10

## 2019-08-09 ASSESSMENT — PAIN DESCRIPTION - DESCRIPTORS: DESCRIPTORS: ACHING

## 2019-08-09 ASSESSMENT — PAIN DESCRIPTION - PAIN TYPE
TYPE: SURGICAL PAIN

## 2019-08-09 NOTE — OP NOTE
[address]  RECORD OF OPERATION     PATIENT NAME: Cheko Mendoza RECORD NO. [id number]                DATE OF PROCEDURE: 8/9/2019  SURGEON: Dayanna Moralez MD  PRIMARY CARE PHYSICIAN: Cherelle Ruth, APRN - [facility]        PREOPERATIVE DIAGNOSIS:  Need for IV access for chemotherapy      POSTOPERATIVE DIAGNOSIS:  Same    PROCEDURE PERFORMED:  Left subclavian purple smart port with  fluoroscopic assistance. SURGEON:  Dr. Driscoll Gant:  IV sedation with local.     BLOOD LOSS:  10   ml. SPECIMENS:  None. COMPLICATIONS:  None immediately appreciated. DISCUSSION:  Celine Godoy is a 26y.o.-year-old female who has a diagnosis of cervical cancer and is to undergo chemotherapy and required semipermanent IV  access for therapy. After a history and physical examination was  performed, potential diagnostic and therapeutic modalities were discussed  with the patient. Variations of IV access techniques were discussed. The  risks, complications and benefits were reviewed. she was given the  opportunity to ask questions. Once answered, informed consent was obtained. The patient was brought to the operating on 8/9/2019  procedure. PROCEDURE:  The patient was brought to the operating room, placed in the  supine position, placed under continuous cardiac telemetry, blood pressure,  pulse oximetry monitoring. Placed under IV sedation with the Anesthesia  department. The left subclavian region was prepped and draped in the  sterile fashion. The infraclavicular region was infiltrated with local  anesthetic and through the anesthetized region, the needle introduced and the  needle inserted into the subclavian vein returning dark red, non-pulsatile  blood. The guidewire was placed with use of the needle and directed into the  superior vena cava via fluoroscopic guidance.   A small incision was made in  the skin using a number 11 scalpel blade and tissue dilator and

## 2019-08-09 NOTE — H&P
[address] [place] [address]  History and Physical Update    Pt Name: Sona Sneed  MRN: [id number]  YOB: 1985  Date of evaluation: 8/9/2019    [x] I have examined the patient and reviewed the H&P/Consult and there are no changes to the patient or plans. [] I have examined the patient and reviewed the H&P/Consult and have noted the following changes:        Demarcus Noe MD  Electronically signed 8/9/2019 at 7:41 Myrna Ward MD   General Surgery  New Patient Evaluation in Office  Pt Name: Sona Sneed  Date of Birth 1985   Today's Date: 8/5/2019  Medical Record Number: [id number]  Referring Provider: Marlis Hammans, MD  Primary Care Provider: CHAPO Smith - CNP  Chief Complaint:       Chief Complaint   Patient presents with   Bo Surgical Consult       established pt--ref by Dr. Jimmy Guzman for port placement-cervical cancer         ASSESSMENT   1. Malignant neoplasm of overlapping sites of cervix ([facility] [address]    2. Essential hypertension       PLANS   1. Schedule patient for smart port insertion for IV access for chemotherapy. 2.Techniques for long term IV access were discussed. Risks, complications and benefits of each were reviewed including bleeding, infection, thrombosis and lung injury were reviewed. The patient was given the opportunity to ask questions. Once answered, informed consent was obtained and the patient scheduled for the procedure. 3.  MAC anesthesia  4. Preoperative testing per anesthesia guidelines. 5.  All patient questions answered.         ADILIA Fernandez is a 26y.o. year old female who is presenting today in the office for evaluation for port placement for planned combined chemoradiation therapy for squamous cell carcinoma of the cervix with extension local.  In April 2019, Warren Fernandez had a Pap smear performed with testing for high risk HPV. She was positive for HPV and there was concern for a high-grade squamous intraepithelial lesion.   She underwent a organization: Not on file       Attends meetings of clubs or organizations: Not on file       Relationship status: Not on file    Intimate partner violence:       Fear of current or ex partner: Not on file       Emotionally abused: Not on file       Physically abused: Not on file       Forced sexual activity: Not on file   Other Topics Concern    Not on file   Social History Narrative    Not on file              Review of Systems  Review of Systems   Constitutional: Negative for chills, fatigue, fever and unexpected weight change. HENT: Positive for congestion. Negative for sore throat, trouble swallowing and voice change. Eyes: Negative for visual disturbance. Respiratory: Negative for cough, shortness of breath and wheezing. Cardiovascular: Negative for chest pain and palpitations. Gastrointestinal: Negative for abdominal pain, blood in stool, nausea and vomiting. Endocrine: Negative for cold intolerance, heat intolerance and polydipsia. Genitourinary: Positive for vaginal discharge. Negative for dysuria, flank pain and hematuria. Musculoskeletal: Negative for gait problem, joint swelling and myalgias. Skin: Negative for color change and rash. Allergic/Immunologic: Negative for immunocompromised state. Neurological: Positive for dizziness. Negative for tremors, seizures and speech difficulty. Hematological: Does not bruise/bleed easily. Psychiatric/Behavioral: Negative for behavioral problems, confusion and suicidal ideas.         OBJECTIVE      BP (!) 148/80 (Site: Right Upper Arm, Position: Sitting, Cuff Size: Medium Adult)   Pulse 88   Temp 98.7 °F (37.1 °C) (Tympanic)   Resp 18   Ht 5' 8\" (1.727 m)   Wt 201 lb (91.2 kg)   LMP 07/29/2019   SpO2 98%   Breastfeeding? No   BMI 30.56 kg/m²       Physical Exam   Constitutional: She is oriented to person, place, and time. She appears well-developed and well-nourished. No distress. HENT:   Head: Normocephalic and atraumatic. Mouth/Throat: Oropharynx is clear and moist. No oropharyngeal exudate. Eyes: Pupils are equal, round, and reactive to light. EOM are normal. No scleral icterus. Neck: Normal range of motion. Neck supple. No JVD present. No tracheal deviation present. Cardiovascular: Normal rate, regular rhythm and normal heart sounds. Pulmonary/Chest: Effort normal and breath sounds normal. No respiratory distress. She has no wheezes. Abdominal: Soft. Bowel sounds are normal. She exhibits no distension. There is no tenderness. Musculoskeletal: Normal range of motion. She exhibits no edema or deformity. Lymphadenopathy:     She has no cervical adenopathy. Neurological: She is alert and oriented to person, place, and time. No cranial nerve deficit. Skin: Skin is warm and dry. No rash noted. She is not diaphoretic. Psychiatric: She has a normal mood and affect.  Her behavior is normal. Thought content normal.   Vitals reviewed.              Lab Results   Component Value Date     WBC 6.8 05/30/2019     HGB 12.9 05/30/2019     HCT 38.9 05/30/2019      05/30/2019     ALT 19 12/02/2017     AST 27 12/02/2017      12/03/2017     K 3.9 12/03/2017      12/03/2017     CREATININE 0.6 12/03/2017     BUN 11 12/03/2017     CO2 24 12/03/2017     TSH 0.63 08/30/2018     INR 0.97 12/02/2017                PROCEDURE: PET CT SKULL BASE TO MID THIGH       CLINICAL INFORMATION: 78-year-old woman with recent diagnosis of squamous cell carcinoma of the cervix, status post LEEP; initial treatment strategy.       Radiopharmaceutical: 11.0 mCi F-18 FDG, intravenously.       TECHNIQUE: PET/CT imaging was performed skull base to the midthigh levels using routine PET acquisition.       Injection site: Right hand.       Time of FDG injection: 9:02 AM.       Serum glucose: 102 mg/dL at 8:54 AM.       Time of imaging: 10:02 AM.       COMPARISON: None       FINDINGS:        Neck: No FDG avid cervical lymphadenopathy.     Chest: Cardiac size is normal. There is no pleural or pericardial effusion. No FDG avid pulmonary nodules are identified. There is no hypermetabolic mediastinal, hilar or axillary lymphadenopathy. Mild degenerative changes are present in the thoracic    spine without evidence of hypermetabolic osseous metastatic disease.       Abdomen/pelvis: Physiologic activity is present in the liver, spleen, urinary collecting system and gastrointestinal tract. The gallbladder is surgically absent. Phleboliths are present in the pelvis. Activity in the lower uterus without a distinct CT    correlate has a maximum SUV of 9.2 (image 239). Indistinct indistinct exophytic structure at the posterior uterus has a maximum SUV of 4.2 (image 230). There is no FDG avid mesenteric, retroperitoneal, pelvic or inguinal lymphadenopathy. There is no    hypermetabolic osseous metastatic disease in the lumbar spine or pelvis.           Impression       Foci of elevated metabolic activity in the uterus highly suspicious for malignancy.  Pelvic MRI may be helpful for further evaluation.       Final report electronically signed by Dr. Gaby Foote on 8/1/2019 11:16 AM

## 2019-08-12 ENCOUNTER — APPOINTMENT (OUTPATIENT)
Dept: CT IMAGING | Age: 34
End: 2019-08-12
Payer: COMMERCIAL

## 2019-08-12 ENCOUNTER — HOSPITAL ENCOUNTER (OUTPATIENT)
Dept: RADIATION ONCOLOGY | Age: 34
End: 2019-08-12
Attending: RADIOLOGY
Payer: COMMERCIAL

## 2019-08-12 ENCOUNTER — HOSPITAL ENCOUNTER (OUTPATIENT)
Age: 34
Setting detail: OBSERVATION
Discharge: HOME | End: 2019-08-13
Attending: FAMILY MEDICINE | Admitting: INTERNAL MEDICINE
Payer: COMMERCIAL

## 2019-08-12 ENCOUNTER — APPOINTMENT (OUTPATIENT)
Dept: GENERAL RADIOLOGY | Age: 34
End: 2019-08-12
Payer: COMMERCIAL

## 2019-08-12 VITALS
RESPIRATION RATE: 24 BRPM | HEART RATE: 89 BPM | SYSTOLIC BLOOD PRESSURE: 147 MMHG | DIASTOLIC BLOOD PRESSURE: 99 MMHG | OXYGEN SATURATION: 99 % | TEMPERATURE: 97.3 F

## 2019-08-12 DIAGNOSIS — R07.89 OTHER CHEST PAIN: Primary | ICD-10-CM

## 2019-08-12 DIAGNOSIS — I16.0 HYPERTENSIVE URGENCY: ICD-10-CM

## 2019-08-12 DIAGNOSIS — C53.9 MALIGNANT NEOPLASM OF CERVIX, UNSPECIFIED SITE (HCC): ICD-10-CM

## 2019-08-12 DIAGNOSIS — C53.8 MALIGNANT NEOPLASM OF OVERLAPPING SITES OF CERVIX (HCC): Primary | ICD-10-CM

## 2019-08-12 PROBLEM — R07.9 CHEST PAIN: Status: ACTIVE | Noted: 2019-08-12

## 2019-08-12 LAB
ANION GAP SERPL CALCULATED.3IONS-SCNC: 16 MEQ/L (ref 8–16)
APTT: 35.4 SECONDS (ref 22–38)
BASOPHILS # BLD: 0.2 %
BASOPHILS ABSOLUTE: 0 THOU/MM3 (ref 0–0.1)
BUN BLDV-MCNC: 14 MG/DL (ref 7–22)
CALCIUM SERPL-MCNC: 9.5 MG/DL (ref 8.5–10.5)
CHLORIDE BLD-SCNC: 102 MEQ/L (ref 98–111)
CO2: 21 MEQ/L (ref 23–33)
CREAT SERPL-MCNC: 0.7 MG/DL (ref 0.4–1.2)
D-DIMER QUANTITATIVE: 747 NG/ML FEU (ref 0–500)
EKG ATRIAL RATE: 88 BPM
EKG P AXIS: 70 DEGREES
EKG P-R INTERVAL: 132 MS
EKG Q-T INTERVAL: 360 MS
EKG QRS DURATION: 88 MS
EKG QTC CALCULATION (BAZETT): 435 MS
EKG R AXIS: 38 DEGREES
EKG T AXIS: -5 DEGREES
EKG VENTRICULAR RATE: 88 BPM
EOSINOPHIL # BLD: 2.6 %
EOSINOPHILS ABSOLUTE: 0.1 THOU/MM3 (ref 0–0.4)
ERYTHROCYTE [DISTWIDTH] IN BLOOD BY AUTOMATED COUNT: 12.9 % (ref 11.5–14.5)
ERYTHROCYTE [DISTWIDTH] IN BLOOD BY AUTOMATED COUNT: 45.9 FL (ref 35–45)
GFR SERPL CREATININE-BSD FRML MDRD: > 90 ML/MIN/1.73M2
GLUCOSE BLD-MCNC: 105 MG/DL (ref 70–108)
HCT VFR BLD CALC: 39.6 % (ref 37–47)
HEMOGLOBIN: 13 GM/DL (ref 12–16)
IMMATURE GRANS (ABS): 0.05 THOU/MM3 (ref 0–0.07)
IMMATURE GRANULOCYTES: 1 %
LYMPHOCYTES # BLD: 21.7 %
LYMPHOCYTES ABSOLUTE: 1.2 THOU/MM3 (ref 1–4.8)
MAGNESIUM: 2 MG/DL (ref 1.6–2.4)
MCH RBC QN AUTO: 32.3 PG (ref 26–33)
MCHC RBC AUTO-ENTMCNC: 32.8 GM/DL (ref 32.2–35.5)
MCV RBC AUTO: 98.3 FL (ref 81–99)
MONOCYTES # BLD: 10.2 %
MONOCYTES ABSOLUTE: 0.6 THOU/MM3 (ref 0.4–1.3)
NUCLEATED RED BLOOD CELLS: 0 /100 WBC
OSMOLALITY CALCULATION: 278.4 MOSMOL/KG (ref 275–300)
PLATELET # BLD: 222 THOU/MM3 (ref 130–400)
PMV BLD AUTO: 11.3 FL (ref 9.4–12.4)
POTASSIUM SERPL-SCNC: 4.1 MEQ/L (ref 3.5–5.2)
PRO-BNP: 36.9 PG/ML (ref 0–450)
RBC # BLD: 4.03 MILL/MM3 (ref 4.2–5.4)
REASON FOR REJECTION: NORMAL
REJECTED TEST: NORMAL
SEG NEUTROPHILS: 64.4 %
SEGMENTED NEUTROPHILS ABSOLUTE COUNT: 3.7 THOU/MM3 (ref 1.8–7.7)
SODIUM BLD-SCNC: 139 MEQ/L (ref 135–145)
TROPONIN T: < 0.01 NG/ML
TROPONIN T: < 0.01 NG/ML
WBC # BLD: 5.7 THOU/MM3 (ref 4.8–10.8)

## 2019-08-12 PROCEDURE — 77336 RADIATION PHYSICS CONSULT: CPT | Performed by: RADIOLOGY

## 2019-08-12 PROCEDURE — 85025 COMPLETE CBC W/AUTO DIFF WBC: CPT

## 2019-08-12 PROCEDURE — 2709999900 HC NON-CHARGEABLE SUPPLY

## 2019-08-12 PROCEDURE — 85730 THROMBOPLASTIN TIME PARTIAL: CPT

## 2019-08-12 PROCEDURE — 6360000004 HC RX CONTRAST MEDICATION: Performed by: FAMILY MEDICINE

## 2019-08-12 PROCEDURE — 6360000002 HC RX W HCPCS: Performed by: INTERNAL MEDICINE

## 2019-08-12 PROCEDURE — 6370000000 HC RX 637 (ALT 250 FOR IP): Performed by: INTERNAL MEDICINE

## 2019-08-12 PROCEDURE — 96372 THER/PROPH/DIAG INJ SC/IM: CPT

## 2019-08-12 PROCEDURE — 93010 ELECTROCARDIOGRAM REPORT: CPT | Performed by: INTERNAL MEDICINE

## 2019-08-12 PROCEDURE — 80048 BASIC METABOLIC PNL TOTAL CA: CPT

## 2019-08-12 PROCEDURE — 71275 CT ANGIOGRAPHY CHEST: CPT

## 2019-08-12 PROCEDURE — 99220 PR INITIAL OBSERVATION CARE/DAY 70 MINUTES: CPT | Performed by: INTERNAL MEDICINE

## 2019-08-12 PROCEDURE — 84484 ASSAY OF TROPONIN QUANT: CPT

## 2019-08-12 PROCEDURE — 85379 FIBRIN DEGRADATION QUANT: CPT

## 2019-08-12 PROCEDURE — 93005 ELECTROCARDIOGRAM TRACING: CPT | Performed by: FAMILY MEDICINE

## 2019-08-12 PROCEDURE — 6370000000 HC RX 637 (ALT 250 FOR IP): Performed by: FAMILY MEDICINE

## 2019-08-12 PROCEDURE — 71046 X-RAY EXAM CHEST 2 VIEWS: CPT

## 2019-08-12 PROCEDURE — 99285 EMERGENCY DEPT VISIT HI MDM: CPT

## 2019-08-12 PROCEDURE — 2580000003 HC RX 258: Performed by: INTERNAL MEDICINE

## 2019-08-12 PROCEDURE — 83880 ASSAY OF NATRIURETIC PEPTIDE: CPT

## 2019-08-12 PROCEDURE — G0378 HOSPITAL OBSERVATION PER HR: HCPCS

## 2019-08-12 PROCEDURE — 36415 COLL VENOUS BLD VENIPUNCTURE: CPT

## 2019-08-12 PROCEDURE — 83735 ASSAY OF MAGNESIUM: CPT

## 2019-08-12 RX ORDER — NITROGLYCERIN 0.4 MG/1
0.4 TABLET SUBLINGUAL ONCE
Status: DISCONTINUED | OUTPATIENT
Start: 2019-08-12 | End: 2019-08-12

## 2019-08-12 RX ORDER — ONDANSETRON 2 MG/ML
4 INJECTION INTRAMUSCULAR; INTRAVENOUS EVERY 6 HOURS PRN
Status: DISCONTINUED | OUTPATIENT
Start: 2019-08-12 | End: 2019-08-13 | Stop reason: HOSPADM

## 2019-08-12 RX ORDER — SODIUM CHLORIDE 9 MG/ML
20 INJECTION, SOLUTION INTRAVENOUS ONCE
Status: CANCELLED | OUTPATIENT
Start: 2019-08-19

## 2019-08-12 RX ORDER — SODIUM CHLORIDE 0.9 % (FLUSH) 0.9 %
10 SYRINGE (ML) INJECTION EVERY 12 HOURS SCHEDULED
Status: DISCONTINUED | OUTPATIENT
Start: 2019-08-12 | End: 2019-08-13 | Stop reason: HOSPADM

## 2019-08-12 RX ORDER — METHYLPREDNISOLONE SODIUM SUCCINATE 125 MG/2ML
125 INJECTION, POWDER, LYOPHILIZED, FOR SOLUTION INTRAMUSCULAR; INTRAVENOUS ONCE
Status: CANCELLED | OUTPATIENT
Start: 2019-08-19

## 2019-08-12 RX ORDER — SODIUM CHLORIDE 0.9 % (FLUSH) 0.9 %
10 SYRINGE (ML) INJECTION PRN
Status: CANCELLED | OUTPATIENT
Start: 2019-08-19

## 2019-08-12 RX ORDER — ASPIRIN 81 MG/1
81 TABLET, CHEWABLE ORAL DAILY
Status: DISCONTINUED | OUTPATIENT
Start: 2019-08-13 | End: 2019-08-13 | Stop reason: HOSPADM

## 2019-08-12 RX ORDER — HEPARIN SODIUM (PORCINE) LOCK FLUSH IV SOLN 100 UNIT/ML 100 UNIT/ML
500 SOLUTION INTRAVENOUS PRN
Status: CANCELLED | OUTPATIENT
Start: 2019-08-19

## 2019-08-12 RX ORDER — PALONOSETRON 0.05 MG/ML
0.25 INJECTION, SOLUTION INTRAVENOUS ONCE
Status: CANCELLED | OUTPATIENT
Start: 2019-08-19

## 2019-08-12 RX ORDER — ASPIRIN 81 MG/1
324 TABLET, CHEWABLE ORAL ONCE
Status: COMPLETED | OUTPATIENT
Start: 2019-08-12 | End: 2019-08-12

## 2019-08-12 RX ORDER — NICOTINE 21 MG/24HR
1 PATCH, TRANSDERMAL 24 HOURS TRANSDERMAL DAILY
Status: DISCONTINUED | OUTPATIENT
Start: 2019-08-12 | End: 2019-08-13 | Stop reason: HOSPADM

## 2019-08-12 RX ORDER — LISINOPRIL 10 MG/1
10 TABLET ORAL DAILY
Status: DISCONTINUED | OUTPATIENT
Start: 2019-08-12 | End: 2019-08-13 | Stop reason: HOSPADM

## 2019-08-12 RX ORDER — SODIUM CHLORIDE 9 MG/ML
INJECTION, SOLUTION INTRAVENOUS CONTINUOUS
Status: CANCELLED | OUTPATIENT
Start: 2019-08-19

## 2019-08-12 RX ORDER — TRAZODONE HYDROCHLORIDE 100 MG/1
100 TABLET ORAL NIGHTLY
Status: DISCONTINUED | OUTPATIENT
Start: 2019-08-12 | End: 2019-08-13 | Stop reason: HOSPADM

## 2019-08-12 RX ORDER — SODIUM CHLORIDE 0.9 % (FLUSH) 0.9 %
5 SYRINGE (ML) INJECTION PRN
Status: CANCELLED | OUTPATIENT
Start: 2019-08-19

## 2019-08-12 RX ORDER — TRAMADOL HYDROCHLORIDE 50 MG/1
50 TABLET ORAL EVERY 6 HOURS PRN
Status: DISCONTINUED | OUTPATIENT
Start: 2019-08-12 | End: 2019-08-13 | Stop reason: HOSPADM

## 2019-08-12 RX ORDER — SODIUM CHLORIDE 0.9 % (FLUSH) 0.9 %
10 SYRINGE (ML) INJECTION PRN
Status: DISCONTINUED | OUTPATIENT
Start: 2019-08-12 | End: 2019-08-13 | Stop reason: HOSPADM

## 2019-08-12 RX ORDER — DIPHENHYDRAMINE HYDROCHLORIDE 50 MG/ML
50 INJECTION INTRAMUSCULAR; INTRAVENOUS ONCE
Status: CANCELLED | OUTPATIENT
Start: 2019-08-19

## 2019-08-12 RX ORDER — ATORVASTATIN CALCIUM 40 MG/1
40 TABLET, FILM COATED ORAL NIGHTLY
Status: DISCONTINUED | OUTPATIENT
Start: 2019-08-12 | End: 2019-08-13 | Stop reason: HOSPADM

## 2019-08-12 RX ADMIN — Medication 10 ML: at 20:49

## 2019-08-12 RX ADMIN — IOPAMIDOL 80 ML: 755 INJECTION, SOLUTION INTRAVENOUS at 12:58

## 2019-08-12 RX ADMIN — ATORVASTATIN CALCIUM 40 MG: 40 TABLET, FILM COATED ORAL at 20:46

## 2019-08-12 RX ADMIN — TRAMADOL HYDROCHLORIDE 50 MG: 50 TABLET, FILM COATED ORAL at 20:43

## 2019-08-12 RX ADMIN — LISINOPRIL 10 MG: 10 TABLET ORAL at 18:33

## 2019-08-12 RX ADMIN — ENOXAPARIN SODIUM 40 MG: 40 INJECTION SUBCUTANEOUS at 20:44

## 2019-08-12 RX ADMIN — ASPIRIN 81 MG 324 MG: 81 TABLET ORAL at 10:38

## 2019-08-12 ASSESSMENT — PAIN DESCRIPTION - PAIN TYPE
TYPE: ACUTE PAIN

## 2019-08-12 ASSESSMENT — ENCOUNTER SYMPTOMS
EYE PAIN: 0
DIARRHEA: 0
RHINORRHEA: 0
COUGH: 0
WHEEZING: 0
BACK PAIN: 0
EYE DISCHARGE: 0
SHORTNESS OF BREATH: 0
SORE THROAT: 0
ABDOMINAL PAIN: 0
VOMITING: 0
NAUSEA: 0

## 2019-08-12 ASSESSMENT — PAIN SCALES - GENERAL
PAINLEVEL_OUTOF10: 2
PAINLEVEL_OUTOF10: 0
PAINLEVEL_OUTOF10: 0
PAINLEVEL_OUTOF10: 4
PAINLEVEL_OUTOF10: 5

## 2019-08-12 ASSESSMENT — PAIN DESCRIPTION - ORIENTATION
ORIENTATION: MID
ORIENTATION: MID

## 2019-08-12 ASSESSMENT — PAIN - FUNCTIONAL ASSESSMENT
PAIN_FUNCTIONAL_ASSESSMENT: PREVENTS OR INTERFERES SOME ACTIVE ACTIVITIES AND ADLS
PAIN_FUNCTIONAL_ASSESSMENT: PREVENTS OR INTERFERES SOME ACTIVE ACTIVITIES AND ADLS

## 2019-08-12 ASSESSMENT — PAIN DESCRIPTION - FREQUENCY
FREQUENCY: CONTINUOUS
FREQUENCY: CONTINUOUS

## 2019-08-12 ASSESSMENT — PAIN DESCRIPTION - LOCATION
LOCATION: CHEST;SHOULDER
LOCATION: CHEST
LOCATION: CHEST

## 2019-08-12 ASSESSMENT — PAIN DESCRIPTION - DESCRIPTORS: DESCRIPTORS: HEAVINESS

## 2019-08-12 ASSESSMENT — PAIN DESCRIPTION - ONSET
ONSET: ON-GOING
ONSET: ON-GOING

## 2019-08-12 NOTE — ED NOTES
Patient to [id number], spoke to Amadeo Rodgers and let her know that the patient is on the way to the unit.       Juan Josue LPN  12/92/99 [id number]

## 2019-08-12 NOTE — H&P
for Nausea 7/31/19  Yes Javid Mann MD   ibuprofen (ADVIL;MOTRIN) 200 MG tablet Take 200 mg by mouth every 8 hours as needed for Pain   Yes Historical Provider, MD   Loratadine-Pseudoephedrine (CLARITIN-D 24 HOUR PO) Take by mouth   Yes Historical Provider, MD   meclizine (ANTIVERT) 25 MG tablet Take 1 tablet by mouth 3 times daily as needed for Dizziness 2/25/19  Yes CHAPO Johnson CNP   traZODone (DESYREL) 100 MG tablet Take 1 tablet by mouth nightly 2/25/19  Yes CHAPO Johnson CNP   lisinopril (PRINIVIL;ZESTRIL) 10 MG tablet Take 1 tablet by mouth daily 7/1/19   CHAPO Johnson CNP       Allergies:  Percocet [oxycodone-acetaminophen]    Social History:      The patient currently lives at home    TOBACCO:   reports that she has been smoking cigarettes. She has a 20.00 pack-year smoking history. She has never used smokeless tobacco.  ETOH:   reports that she drinks about 16.0 standard drinks of alcohol per week. Family History:      Reviewed in detail and negative for DM, CAD, Cancer, CVA. Positive as follows:        Problem Relation Age of Onset    Cancer Maternal Grandmother         ? kind    Cancer Paternal Grandmother         ? kind       Diet:  No diet orders on file    REVIEW OF SYSTEMS:   Pertinent positives as noted in the HPI. All other systems reviewed and negative. PHYSICAL EXAM:    BP (!) 130/90   Pulse 70   Temp 98.6 °F (37 °C) (Oral)   Resp 16   Ht 5' 8\" (1.727 m)   Wt 200 lb (90.7 kg)   LMP 07/29/2019   SpO2 100%   BMI 30.41 kg/m²     General appearance:  No apparent distress, appears stated age and cooperative. HEENT:  Normal cephalic, atraumatic without obvious deformity. Pupils equal, round, and reactive to light. Extra ocular muscles intact. Conjunctivae/corneas clear. Neck: Supple, with full range of motion. No jugular venous distention. Trachea midline. Respiratory:  Normal respiratory effort.  Clear to auscultation, bilaterally without

## 2019-08-12 NOTE — ED PROVIDER NOTES
has a past medical history of Anxiety, Cancer of cervix ([facility] [address] Depression, and Hypertension. SURGICAL HISTORY      has a past surgical history that includes Cholecystectomy (over 5 years ago); Tubal ligation (2006); Cervix biopsy (N/A, 6/7/2019); and INSERTION / REMOVAL / REPLACEMENT VENOUS ACCESS CATHETER (N/A, 8/9/2019). CURRENT MEDICATIONS       Current Discharge Medication List      CONTINUE these medications which have NOT CHANGED    Details   traMADol (ULTRAM) 50 MG tablet Take 1 tablet by mouth every 6 hours as needed for Pain for up to 3 days. Intended supply: 3 days. Take lowest dose possible to manage pain  Qty: 12 tablet, Refills: 0    Comments: Reduce doses taken as pain becomes manageable  Associated Diagnoses: Malignant neoplasm of cervix, unspecified site (HCC)      ibuprofen (ADVIL;MOTRIN) 200 MG tablet Take 200 mg by mouth every 8 hours as needed for Pain      meclizine (ANTIVERT) 25 MG tablet Take 1 tablet by mouth 3 times daily as needed for Dizziness  Qty: 40 tablet, Refills: 1    Associated Diagnoses: Benign paroxysmal positional vertigo of left ear      traZODone (DESYREL) 100 MG tablet Take 1 tablet by mouth nightly  Qty: 30 tablet, Refills: 6    Associated Diagnoses: Psychophysiological insomnia      lidocaine-prilocaine (EMLA) 2.5-2.5 % cream Apply topically as needed. Qty: 30 g, Refills: 2    Comments: Apply small amount to mediport area prior to any thirty minutes prior to any treatment or lab draw      prochlorperazine (COMPAZINE) 5 MG tablet Take 1 tablet by mouth every 6 hours as needed for Nausea  Qty: 30 tablet, Refills: 3      lisinopril (PRINIVIL;ZESTRIL) 10 MG tablet Take 1 tablet by mouth daily  Qty: 90 tablet, Refills: 1      Loratadine-Pseudoephedrine (CLARITIN-D 24 HOUR PO) Take by mouth                  is allergic to percocet [oxycodone-acetaminophen]. FAMILY HISTORY     She indicated that her mother is alive. She indicated that her father is alive.  She indicated

## 2019-08-13 ENCOUNTER — TELEPHONE (OUTPATIENT)
Dept: ONCOLOGY | Age: 34
End: 2019-08-13

## 2019-08-13 ENCOUNTER — HOSPITAL ENCOUNTER (OUTPATIENT)
Dept: INFUSION THERAPY | Age: 34
Discharge: HOME | End: 2019-08-13
Payer: COMMERCIAL

## 2019-08-13 ENCOUNTER — HOSPITAL ENCOUNTER (OUTPATIENT)
Dept: RADIATION ONCOLOGY | Age: 34
Discharge: HOME | End: 2019-08-13
Attending: RADIOLOGY
Payer: COMMERCIAL

## 2019-08-13 ENCOUNTER — HOSPITAL ENCOUNTER (OUTPATIENT)
Dept: INFUSION THERAPY | Age: 34
End: 2019-08-13

## 2019-08-13 VITALS
OXYGEN SATURATION: 98 % | SYSTOLIC BLOOD PRESSURE: 121 MMHG | WEIGHT: 199.25 LBS | BODY MASS INDEX: 30.2 KG/M2 | HEART RATE: 84 BPM | TEMPERATURE: 98.2 F | HEIGHT: 68 IN | RESPIRATION RATE: 16 BRPM | DIASTOLIC BLOOD PRESSURE: 72 MMHG

## 2019-08-13 PROBLEM — R07.9 CHEST PAIN: Status: RESOLVED | Noted: 2019-08-12 | Resolved: 2019-08-13

## 2019-08-13 PROBLEM — R07.89 OTHER CHEST PAIN: Status: ACTIVE | Noted: 2019-08-12

## 2019-08-13 LAB
ANION GAP SERPL CALCULATED.3IONS-SCNC: 13 MEQ/L (ref 8–16)
BUN BLDV-MCNC: 15 MG/DL (ref 7–22)
CALCIUM SERPL-MCNC: 9.3 MG/DL (ref 8.5–10.5)
CHLORIDE BLD-SCNC: 99 MEQ/L (ref 98–111)
CO2: 23 MEQ/L (ref 23–33)
CREAT SERPL-MCNC: 0.7 MG/DL (ref 0.4–1.2)
ERYTHROCYTE [DISTWIDTH] IN BLOOD BY AUTOMATED COUNT: 12.9 % (ref 11.5–14.5)
ERYTHROCYTE [DISTWIDTH] IN BLOOD BY AUTOMATED COUNT: 43.8 FL (ref 35–45)
GFR SERPL CREATININE-BSD FRML MDRD: > 90 ML/MIN/1.73M2
GLUCOSE BLD-MCNC: 104 MG/DL (ref 70–108)
HCT VFR BLD CALC: 36.4 % (ref 37–47)
HEMOGLOBIN: 12.5 GM/DL (ref 12–16)
MCH RBC QN AUTO: 32 PG (ref 26–33)
MCHC RBC AUTO-ENTMCNC: 34.3 GM/DL (ref 32.2–35.5)
MCV RBC AUTO: 93.1 FL (ref 81–99)
PLATELET # BLD: 196 THOU/MM3 (ref 130–400)
PMV BLD AUTO: 10.5 FL (ref 9.4–12.4)
POTASSIUM REFLEX MAGNESIUM: 3.7 MEQ/L (ref 3.5–5.2)
RBC # BLD: 3.91 MILL/MM3 (ref 4.2–5.4)
SODIUM BLD-SCNC: 135 MEQ/L (ref 135–145)
TROPONIN T: < 0.01 NG/ML
WBC # BLD: 5.1 THOU/MM3 (ref 4.8–10.8)

## 2019-08-13 PROCEDURE — 6370000000 HC RX 637 (ALT 250 FOR IP): Performed by: INTERNAL MEDICINE

## 2019-08-13 PROCEDURE — 2580000003 HC RX 258: Performed by: INTERNAL MEDICINE

## 2019-08-13 PROCEDURE — 85027 COMPLETE CBC AUTOMATED: CPT

## 2019-08-13 PROCEDURE — 99024 POSTOP FOLLOW-UP VISIT: CPT | Performed by: SURGERY

## 2019-08-13 PROCEDURE — 80048 BASIC METABOLIC PNL TOTAL CA: CPT

## 2019-08-13 PROCEDURE — 77412 RADIATION TX DELIVERY LVL 3: CPT | Performed by: RADIOLOGY

## 2019-08-13 PROCEDURE — 77387 GUIDANCE FOR RADJ TX DLVR: CPT | Performed by: RADIOLOGY

## 2019-08-13 PROCEDURE — 99217 PR OBSERVATION CARE DISCHARGE MANAGEMENT: CPT | Performed by: INTERNAL MEDICINE

## 2019-08-13 PROCEDURE — 36415 COLL VENOUS BLD VENIPUNCTURE: CPT

## 2019-08-13 PROCEDURE — G0378 HOSPITAL OBSERVATION PER HR: HCPCS

## 2019-08-13 RX ORDER — ASPIRIN 81 MG/1
81 TABLET, CHEWABLE ORAL DAILY
Qty: 30 TABLET | Refills: 3 | Status: SHIPPED | OUTPATIENT
Start: 2019-08-14 | End: 2021-07-01 | Stop reason: ALTCHOICE

## 2019-08-13 RX ORDER — NICOTINE 21 MG/24HR
1 PATCH, TRANSDERMAL 24 HOURS TRANSDERMAL DAILY
Qty: 30 PATCH | Refills: 3 | Status: SHIPPED | OUTPATIENT
Start: 2019-08-14 | End: 2019-08-21 | Stop reason: ALTCHOICE

## 2019-08-13 RX ORDER — ATORVASTATIN CALCIUM 40 MG/1
40 TABLET, FILM COATED ORAL NIGHTLY
Qty: 30 TABLET | Refills: 3 | Status: SHIPPED | OUTPATIENT
Start: 2019-08-13 | End: 2019-09-17 | Stop reason: ALTCHOICE

## 2019-08-13 RX ORDER — TRAMADOL HYDROCHLORIDE 50 MG/1
50 TABLET ORAL EVERY 6 HOURS PRN
Qty: 20 TABLET | Refills: 0 | Status: SHIPPED | OUTPATIENT
Start: 2019-08-13 | End: 2019-08-18

## 2019-08-13 RX ADMIN — LISINOPRIL 10 MG: 10 TABLET ORAL at 10:02

## 2019-08-13 RX ADMIN — ASPIRIN 81 MG 81 MG: 81 TABLET ORAL at 10:02

## 2019-08-13 RX ADMIN — Medication 10 ML: at 10:03

## 2019-08-13 RX ADMIN — TRAZODONE HYDROCHLORIDE 100 MG: 100 TABLET ORAL at 01:13

## 2019-08-13 ASSESSMENT — ENCOUNTER SYMPTOMS
DIARRHEA: 0
ABDOMINAL DISTENTION: 0
SHORTNESS OF BREATH: 0
CHEST TIGHTNESS: 0
CHOKING: 0
WHEEZING: 0
BACK PAIN: 1
NAUSEA: 0
SINUS PRESSURE: 0

## 2019-08-13 ASSESSMENT — PAIN SCALES - GENERAL: PAINLEVEL_OUTOF10: 0

## 2019-08-13 NOTE — TELEPHONE ENCOUNTER
Patient missed her chemotherapy and office visit today due to being in the hospital.  The only day you had openings this week was tomorrow and she can't come in then. When do you want her to come in for chemo and to see you? Please advise.

## 2019-08-13 NOTE — CONSULTS
Laterality Date    CERVIX BIOPSY N/A 6/7/2019    COLD KNIFE CONE BX CERVIX performed by Elsa Mcdonald MD at [address] [place] [address]  over 5 years ago    ? dr Brannon Pall / REMOVAL / [address] Said N/A 8/9/2019    SINGLE LUMEN SMARTPORT INSERTION performed by Cl Torres MD at [facility]  2006    [facility]     Medications:  Prior to Admission medications    Medication Sig Start Date End Date Taking? Authorizing Provider   ibuprofen (ADVIL;MOTRIN) 200 MG tablet Take 200 mg by mouth every 8 hours as needed for Pain   Yes Historical Provider, MD   meclizine (ANTIVERT) 25 MG tablet Take 1 tablet by mouth 3 times daily as needed for Dizziness 2/25/19  Yes Sunday CHAPO Do CNP   traZODone (DESYREL) 100 MG tablet Take 1 tablet by mouth nightly 2/25/19  Yes Sunday CHAPO Do CNP   lidocaine-prilocaine (EMLA) 2.5-2.5 % cream Apply topically as needed. 8/1/19   Selwyn Campbell MD   prochlorperazine (COMPAZINE) 5 MG tablet Take 1 tablet by mouth every 6 hours as needed for Nausea 7/31/19   Selwyn Campbell MD   lisinopril (PRINIVIL;ZESTRIL) 10 MG tablet Take 1 tablet by mouth daily 7/1/19 Sunday CHAPO Do CNP   Loratadine-Pseudoephedrine (CLARITIN-D 24 HOUR PO) Take by mouth    Historical Provider, MD    Scheduled Meds:   lisinopril  10 mg Oral Daily    traZODone  100 mg Oral Nightly    sodium chloride flush  10 mL Intravenous 2 times per day    atorvastatin  40 mg Oral Nightly    aspirin  81 mg Oral Daily    nicotine  1 patch Transdermal Daily    enoxaparin  40 mg Subcutaneous Daily     Continuous Infusions:  PRN Meds:.traMADol, sodium chloride flush, magnesium hydroxide, ondansetron  Allergies:  is allergic to percocet [oxycodone-acetaminophen]. Family History:  family history includes Cancer in her maternal grandmother and paternal grandmother. Social History:   reports that she has been smoking cigarettes. She has a 20.00 pack-year smoking history.  She has never used smokeless tobacco. She reports that she drinks about 16.0 standard drinks of alcohol per week. She reports that she does not use drugs. Review of Systems:  Review of Systems   Constitutional:        Intermittent hot flashes which is usual   HENT: Negative for sinus pressure and sneezing. Respiratory: Negative for choking, chest tightness, shortness of breath and wheezing. Cardiovascular: Positive for chest pain. Negative for leg swelling. Gastrointestinal: Negative for abdominal distention, diarrhea and nausea. Musculoskeletal: Positive for back pain. Negative for gait problem, joint swelling, neck pain and neck stiffness. Neurological: Negative for dizziness and light-headedness. Hematological: Negative for adenopathy. Does not bruise/bleed easily. Psychiatric/Behavioral: Negative for agitation and confusion. OBJECTIVE:   CURRENT VITALS:  height is 5' 8\" (1.727 m) and weight is 199 lb 4 oz (90.4 kg). Her oral temperature is 98.4 °F (36.9 °C). Her blood pressure is 136/86 and her pulse is 77. Her respiration is 16 and oxygen saturation is 96%. Temperature Range (24h):Temp: 98.4 °F (36.9 °C) Temp  Av.2 °F (36.8 °C)  Min: 97.3 °F (36.3 °C)  Max: 98.6 °F (37 °C)  BP TQVXX(53C): Systolic (98LQQ), BBQ:433 , Min:125 , SPR:984     Diastolic (95ZYF), PFW:22, Min:86, Max:121    Pulse Range (24h):Pulse  Av.4  Min: 70  Max: 96  Respiration Range (24h): Resp  Av.1  Min: 12  Max: 24  Current Pulse Ox (24h):  SpO2: 96 %  Pulse Ox Range (24h):  SpO2  Av.5 %  Min: 96 %  Max: 100 %  Oxygen Amount andDelivery: O2 Flow Rate (L/min): 2 L/min  Physical Exam   Constitutional: She is oriented to person, place, and time. She appears well-developed and well-nourished. HENT:   Head: Normocephalic and atraumatic. Eyes: EOM are normal. No scleral icterus. Neck: No JVD present. Cardiovascular: Normal rate, regular rhythm and normal heart sounds.    Pulmonary/Chest: Effort technology. **       Final report electronically signed by Dr. Sneha Fowler on 8/12/2019 11:49 AM     Images reviewed      Electronically signed by Natalia Palma MD on 8/13/2019 at 7:14 AM

## 2019-08-13 NOTE — PROGRESS NOTES
Hospitalist Progress Note  [facility]       Patient: Pardeep Purcell  Unit/Bed: 6N-65/157-S  YOB: 1985  MRN: [id number]  Acct: [de-identified]   AdmittingDiagnosis: Chest pain [R07.9]  Chest pain [R07.9]  Admit Date: 8/12/2019  Hospital Day: 0    Subjective:    Chest pain has improved only mild discomfort worse with activity and deep breathing denies any palpitations    Objective:   /72   Pulse 84   Temp 98.2 °F (36.8 °C) (Oral)   Resp 16   Ht 5' 8\" (1.727 m)   Wt 199 lb 4 oz (90.4 kg)   LMP 07/29/2019   SpO2 98%   BMI 30.30 kg/m²       Intake/Output Summary (Last 24 hours) at 8/13/2019 1105  Last data filed at 8/13/2019 0357  Gross per 24 hour   Intake 760 ml   Output --   Net 760 ml     Physical Exam  General appearance:  No apparent distress, appears stated age and cooperative. HEENT:  Normal cephalic, atraumatic without obvious deformity. Pupils equal, round, and reactive to light. Extra ocular muscles intact. Conjunctivae/corneas clear. Neck: Supple, with full range of motion. No jugular venous distention. Trachea midline. Respiratory:  Normal respiratory effort. Clear to auscultation, bilaterally without Rales/Wheezes/Rhonchi. Cardiovascular:  Regular rate and rhythm with normal S1/S2 without murmurs, rubs or gallops. Abdomen: Soft, non-tender, non-distended with normal bowel sounds. Musculoskeletal:  No clubbing, cyanosis or edema bilaterally. Full range of motion without deformity. Skin: Skin color, texture, turgor normal.  No rashes or lesions. Neurologic:  Neurovascularly intact without any focal sensory/motor deficits.  Cranial nerves: II-XII intact, grossly non-focal.  Psychiatric:  Alert and oriented, thought content appropriate, normal insight  Capillary Refill: Brisk,< 3 seconds   Peripheral Pulses: +2 palpable, equal bilaterally     Data:  CBC:   Lab Results   Component Value Date    WBC 5.1 08/13/2019    HGB 12.5 08/13/2019    HCT 36.4 08/13/2019 FINDINGS: No lobar consolidation. Atelectasis or scarring in the lung bases left greater than right. No pleural or pericardial effusion. No cardiomegaly. No lymphadenopathy. Thyroid gland is unremarkable. The thoracic aorta is normal caliber. Pulmonary arterial vessels are adequately opacified. No acute pulmonary arterial embolism No acute findings in the upper abdomen. No acute osseous findings. No acute pulmonary embolism. No discrete parenchymal infiltrate. **This report has been created using voice recognition software. It may contain minor errors which are inherent in voice recognition technology. ** Final report electronically signed by Dr. Stan Acosta on 8/12/2019 1:16 PM    Xr Chest Portable    Result Date: 8/9/2019  PROCEDURE: XR CHEST PORTABLE CLINICAL INFORMATION: 64-year-old female who underwent Mediport insertion. COMPARISON: Chest x-ray 12/02/2017 TECHNIQUE: AP semiupright view of the chest was obtained. FINDINGS: There has been interval placement of a left subclavian vein access chest wall infusion port. The tip of the catheter is in the right atrium. The lungs are clear. The cardiac silhouette and pulmonary vasculature are within normal limits. There is no significant pleural effusion or pneumothorax. Visualized portions of the upper abdomen are within normal limits. The osseous structures are intact. No acute fractures or suspicious osseous lesions. Placement of a left-sided chest wall infusion port which appears appropriately positioned. There is no evidence of pneumothorax. **This report has been created using voice recognition software. It may contain minor errors which are inherent in voice recognition technology. ** Final report electronically signed by Dr Martha Valenzuela on 8/9/2019 9:09 AM    Pet Ct Skull Base To Mid Thigh    Result Date: 8/1/2019  PROCEDURE: PET CT SKULL BASE TO MID THIGH CLINICAL INFORMATION: 64-year-old woman with recent diagnosis of squamous cell carcinoma of the

## 2019-08-14 ENCOUNTER — HOSPITAL ENCOUNTER (OUTPATIENT)
Dept: RADIATION ONCOLOGY | Age: 34
Discharge: HOME | End: 2019-08-14
Attending: RADIOLOGY
Payer: COMMERCIAL

## 2019-08-14 ENCOUNTER — CLINICAL DOCUMENTATION (OUTPATIENT)
Dept: NUTRITION | Age: 34
End: 2019-08-14

## 2019-08-14 ENCOUNTER — TELEPHONE (OUTPATIENT)
Dept: FAMILY MEDICINE CLINIC | Age: 34
End: 2019-08-14

## 2019-08-14 PROCEDURE — 77412 RADIATION TX DELIVERY LVL 3: CPT | Performed by: RADIOLOGY

## 2019-08-14 PROCEDURE — 77387 GUIDANCE FOR RADJ TX DLVR: CPT | Performed by: RADIOLOGY

## 2019-08-14 NOTE — TELEPHONE ENCOUNTER
[facility] Transitions Initial Follow Up Call    Outreach made within 2 business days of discharge: Yes    Patient: Netta Morales Patient : 1985   MRN: [id number]  Reason for Admission: There are no discharge diagnoses documented for the most recent discharge. Discharge Date: 19       Spoke with: No answer.  LMOM to return call    Discharge department/facility: [facility]    TCM Interactive Patient Contact:      Scheduled appointment with PCP within 7-14 days    Follow Up  Future Appointments   Date Time Provider Mili Jamison   2019  9:15 AM SCHEDULE, STR[facility] LISSY LINAC 2 STR[facility] LISSY None   8/15/2019  9:15 AM SCHEDULE, STR[facility] LISSY LINAC 2 STRZ LISSY None   2019  9:15 AM SCHEDULE, STRZ LISSY LINAC 2 STRZ LISSY None   2019  9:15 AM SCHEDULE, STRZ LISSY LINAC 2 STRZ LISSY None   2019 10:15 AM STR OUT PT ONC INJ RM 05 STRZ OP ONC None   2019 10:45 AM Malka Nelson MD Oncology [facility]   2019  9:15 AM SCHEDULE, STRZ LISSY LINAC 2 STRZ LISSY None   2019  9:15 AM SCHEDULE, STRZ LISSY LINAC 2 STRZ LISSY None   2019  9:15 AM SCHEDULE, STRZ LISSY LINAC 2 STRZ LISSY None   2019  9:15 AM SCHEDULE, STRZ LISSY LINAC 2 STRZ LISSY None   2019  9:15 AM SCHEDULE, STRZ LISSY LINAC 2 STRZ LISSY None   2019  9:15 AM SCHEDULE, STRZ LISSY LINAC 2 STRZ LISSY None   2019  9:15 AM SCHEDULE, STRZ LISSY LINAC 2 STRZ LISSY None   2019  9:15 AM SCHEDULE, STRZ LISSY LINAC 2 STRZ LISSY None   2019  9:15 AM SCHEDULE, STRZ LISSY LINAC 2 STRZ LISSY None   2019 10:00 AM STR NUC MED HC  INJECT  RM2 STRZ STRESS None   2019 10:30 AM STR NUCLEAR MEDICINE [facility]ART C[facility] ROOM 1 STRZ STRESS None   2019 11:00 AM STR NM STRESS RM1 STRZ STRESS None   2019 11:30 AM STR [facility] ROOM 1 STR[facility] STRESS None   9/3/2019  9:15 AM SCHEDULE, EHSAN YUAN LINAC 2 STRASHANTI YUAN None   2019

## 2019-08-15 ENCOUNTER — HOSPITAL ENCOUNTER (OUTPATIENT)
Dept: RADIATION ONCOLOGY | Age: 34
Discharge: HOME | End: 2019-08-15
Attending: RADIOLOGY
Payer: COMMERCIAL

## 2019-08-15 PROCEDURE — 77412 RADIATION TX DELIVERY LVL 3: CPT | Performed by: RADIOLOGY

## 2019-08-15 PROCEDURE — 77387 GUIDANCE FOR RADJ TX DLVR: CPT | Performed by: RADIOLOGY

## 2019-08-15 NOTE — TELEPHONE ENCOUNTER
[facility] 45 Transitions Initial Follow Up Call    Outreach made within 2 business days of discharge: Yes    Patient: Lois Roldan Patient : 1985   MRN: [id number]  Reason for Admission: There are no discharge diagnoses documented for the most recent discharge. Discharge Date: 19       Spoke with: Unknown [place]    Discharge department/facility: [facility] Interactive Patient Contact:  Was patient able to fill all prescriptions: Yes  Was patient instructed to bring all medications to the follow-up visit: Yes  Is patient taking all medications as directed in the discharge summary?  Yes  Does patient understand their discharge instructions: Yes  Does patient have questions or concerns that need addressed prior to 7-14 day follow up office visit: no    Scheduled appointment with PCP within 7-14 days    Follow Up  Future Appointments   Date Time Provider Mili Jamison   8/15/2019  9:15 AM SCHEDULE, STRZ LISSY LINAC 2 STRZ LISSY None   2019  9:15 AM SCHEDULE, STRZ LISSY LINAC 2 STRZ LISSY None   2019  9:15 AM SCHEDULE, STRZ LISSY LINAC 2 STRZ LISSY None   2019 10:15 AM STR OUT PT ONC INJ RM 05 STRZ OP ONC None   2019 10:45 AM Stan Salvador MD Oncology [facility]   2019  9:15 AM SCHEDULE, STRZ LISSY LINAC 2 STRZ LISSY None   2019  9:15 AM SCHEDULE, STRZ LISSY LINAC 2 STRZ LISSY None   2019  9:15 AM SCHEDULE, STRZ LISSY LINAC 2 STRZ LISSY None   2019  9:15 AM SCHEDULE, STRZ LISSY LINAC 2 STRZ LISSY None   2019  9:15 AM SCHEDULE, STRZ LISSY LINAC 2 STRZ LISSY None   2019  9:15 AM SCHEDULE, STRZ LISSY LINAC 2 STRZ LISSY None   2019  9:15 AM SCHEDULE, STRZ LISSY LINAC 2 STRZ LISSY None   2019  9:15 AM SCHEDULE, STRZ LISSY LINAC 2 STRZ LISSY None   2019  9:15 AM SCHEDULE, STRZ LISSY LINAC 2 STRZ LISSY None   2019 10:00 AM STR NUC MED HC  INJECT  RM2 STRZ STRESS None   2019 10:30 AM STR [facility] ROOM 1 STRZ STRESS None   8/30/2019 11:00 AM STR NM STRESS RM1 STRZ STRESS None   8/30/2019 11:30 AM STR [facility] ROOM 1 STRZ STRESS None   9/3/2019  9:15 AM SCHEDULE, STRZ LISSY LINAC 2 STRZ LISSY None   9/4/2019  9:15 AM SCHEDULE, STRZ LISSY LINAC 2 STRZ LISSY None   9/5/2019  9:15 AM SCHEDULE, STRZ LISSY LINAC 2 STRZ LISSY None   9/6/2019  9:15 AM SCHEDULE, STRZ LISSY LINAC 2 STRZ LISSY None   9/9/2019  9:15 AM SCHEDULE, STRZ LISSY LINAC 2 STRZ LISSY None   9/10/2019  9:15 AM SCHEDULE, STRZ LISSY LINAC 2 STRZ LISSY None   9/11/2019  9:15 AM SCHEDULE, STRZ LISSY LINAC 2 STRZ LISSY None       Adalgisa Jackson CMA (AAMA)

## 2019-08-16 ENCOUNTER — HOSPITAL ENCOUNTER (OUTPATIENT)
Dept: RADIATION ONCOLOGY | Age: 34
Discharge: HOME | End: 2019-08-16
Attending: RADIOLOGY
Payer: COMMERCIAL

## 2019-08-16 DIAGNOSIS — C53.8 MALIGNANT NEOPLASM OF OVERLAPPING SITES OF CERVIX (HCC): Primary | ICD-10-CM

## 2019-08-16 PROCEDURE — 77387 GUIDANCE FOR RADJ TX DLVR: CPT | Performed by: RADIOLOGY

## 2019-08-16 PROCEDURE — 77412 RADIATION TX DELIVERY LVL 3: CPT | Performed by: RADIOLOGY

## 2019-08-19 ENCOUNTER — HOSPITAL ENCOUNTER (OUTPATIENT)
Dept: INFUSION THERAPY | Age: 34
Discharge: HOME | End: 2019-08-19
Payer: COMMERCIAL

## 2019-08-19 ENCOUNTER — HOSPITAL ENCOUNTER (OUTPATIENT)
Dept: RADIATION ONCOLOGY | Age: 34
Discharge: HOME | End: 2019-08-19
Attending: RADIOLOGY
Payer: COMMERCIAL

## 2019-08-19 ENCOUNTER — OFFICE VISIT (OUTPATIENT)
Dept: ONCOLOGY | Age: 34
End: 2019-08-19
Payer: COMMERCIAL

## 2019-08-19 VITALS
RESPIRATION RATE: 18 BRPM | TEMPERATURE: 97.5 F | HEART RATE: 18 BPM | BODY MASS INDEX: 30.4 KG/M2 | OXYGEN SATURATION: 98 % | HEIGHT: 68 IN | SYSTOLIC BLOOD PRESSURE: 138 MMHG | DIASTOLIC BLOOD PRESSURE: 84 MMHG | WEIGHT: 200.6 LBS

## 2019-08-19 VITALS
BODY MASS INDEX: 31.48 KG/M2 | OXYGEN SATURATION: 98 % | TEMPERATURE: 98.3 F | HEART RATE: 76 BPM | SYSTOLIC BLOOD PRESSURE: 140 MMHG | RESPIRATION RATE: 18 BRPM | HEIGHT: 67 IN | WEIGHT: 200.6 LBS | DIASTOLIC BLOOD PRESSURE: 94 MMHG

## 2019-08-19 DIAGNOSIS — C53.8 MALIGNANT NEOPLASM OF OVERLAPPING SITES OF CERVIX (HCC): Primary | ICD-10-CM

## 2019-08-19 DIAGNOSIS — F51.04 PSYCHOPHYSIOLOGICAL INSOMNIA: ICD-10-CM

## 2019-08-19 DIAGNOSIS — R30.0 DYSURIA: ICD-10-CM

## 2019-08-19 DIAGNOSIS — Z51.11 ENCOUNTER FOR CHEMOTHERAPY MANAGEMENT: ICD-10-CM

## 2019-08-19 LAB
ALBUMIN SERPL-MCNC: 3.9 G/DL (ref 3.5–5.1)
ALP BLD-CCNC: 72 U/L (ref 38–126)
ALT SERPL-CCNC: 9 U/L (ref 11–66)
AST SERPL-CCNC: 10 U/L (ref 5–40)
BACTERIA: ABNORMAL
BILIRUB SERPL-MCNC: 0.3 MG/DL (ref 0.3–1.2)
BILIRUBIN DIRECT: < 0.2 MG/DL (ref 0–0.3)
BILIRUBIN URINE: NEGATIVE
BLOOD, URINE: NEGATIVE
BUN, WHOLE BLOOD: 11 MG/DL (ref 8–26)
CASTS: ABNORMAL /LPF
CASTS: ABNORMAL /LPF
CHARACTER, URINE: CLEAR
CHLORIDE, WHOLE BLOOD: 106 MEQ/L (ref 98–109)
COLOR: YELLOW
CREATININE, WHOLE BLOOD: 0.7 MG/DL (ref 0.5–1.2)
CRYSTALS: ABNORMAL
EPITHELIAL CELLS, UA: ABNORMAL /HPF
GFR, ESTIMATED: > 90 ML/MIN/1.73M2
GLUCOSE, URINE: NEGATIVE MG/DL
GLUCOSE, WHOLE BLOOD: 116 MG/DL (ref 70–108)
HCT VFR BLD CALC: 33.4 % (ref 37–47)
HEMOGLOBIN: 11.7 GM/DL (ref 12–16)
IONIZED CALCIUM, WHOLE BLOOD: 1.19 MMOL/L (ref 1.12–1.32)
KETONES, URINE: NEGATIVE
LEUKOCYTE EST, POC: ABNORMAL
MCH RBC QN AUTO: 33.5 PG (ref 27–31)
MCHC RBC AUTO-ENTMCNC: 35 GM/DL (ref 33–37)
MCV RBC AUTO: 96 FL (ref 81–99)
MISCELLANEOUS LAB TEST RESULT: ABNORMAL
NITRITE, URINE: NEGATIVE
PDW BLD-RTO: 11.1 % (ref 11.5–14.5)
PH UA: 6 (ref 5–9)
PLATELET # BLD: 192 THOU/MM3 (ref 130–400)
PMV BLD AUTO: 8.1 FL (ref 7.4–10.4)
POTASSIUM, WHOLE BLOOD: 3.7 MEQ/L (ref 3.5–4.9)
PROTEIN UA: NEGATIVE MG/DL
RBC # BLD: 3.49 MILL/MM3 (ref 4.2–5.4)
RBC URINE: ABNORMAL /HPF
RENAL EPITHELIAL, UA: ABNORMAL
SEG NEUTROPHILS: 68 % (ref 43–75)
SEGMENTED NEUTROPHILS ABSOLUTE COUNT: 2.9 THOU/MM3 (ref 1.8–7.7)
SODIUM, WHOLE BLOOD: 142 MEQ/L (ref 138–146)
SPECIFIC GRAVITY UA: 1.02 (ref 1–1.03)
TOTAL CO2, WHOLE BLOOD: 26 MEQ/L (ref 23–33)
TOTAL PROTEIN: 6.6 G/DL (ref 6.1–8)
UROBILINOGEN, URINE: 0.2 EU/DL (ref 0–1)
WBC # BLD: 4.2 THOU/MM3 (ref 4.8–10.8)
WBC UA: ABNORMAL /HPF
YEAST: ABNORMAL

## 2019-08-19 PROCEDURE — 77412 RADIATION TX DELIVERY LVL 3: CPT | Performed by: RADIOLOGY

## 2019-08-19 PROCEDURE — 96415 CHEMO IV INFUSION ADDL HR: CPT

## 2019-08-19 PROCEDURE — 2580000003 HC RX 258: Performed by: INTERNAL MEDICINE

## 2019-08-19 PROCEDURE — 96367 TX/PROPH/DG ADDL SEQ IV INF: CPT

## 2019-08-19 PROCEDURE — 96413 CHEMO IV INFUSION 1 HR: CPT

## 2019-08-19 PROCEDURE — 80047 BASIC METABLC PNL IONIZED CA: CPT

## 2019-08-19 PROCEDURE — 96375 TX/PRO/DX INJ NEW DRUG ADDON: CPT

## 2019-08-19 PROCEDURE — 99214 OFFICE O/P EST MOD 30 MIN: CPT | Performed by: INTERNAL MEDICINE

## 2019-08-19 PROCEDURE — 6360000002 HC RX W HCPCS: Performed by: INTERNAL MEDICINE

## 2019-08-19 PROCEDURE — G8417 CALC BMI ABV UP PARAM F/U: HCPCS | Performed by: INTERNAL MEDICINE

## 2019-08-19 PROCEDURE — 80076 HEPATIC FUNCTION PANEL: CPT

## 2019-08-19 PROCEDURE — G8427 DOCREV CUR MEDS BY ELIG CLIN: HCPCS | Performed by: INTERNAL MEDICINE

## 2019-08-19 PROCEDURE — 36591 DRAW BLOOD OFF VENOUS DEVICE: CPT

## 2019-08-19 PROCEDURE — 96366 THER/PROPH/DIAG IV INF ADDON: CPT

## 2019-08-19 PROCEDURE — 99212 OFFICE O/P EST SF 10 MIN: CPT

## 2019-08-19 PROCEDURE — 4004F PT TOBACCO SCREEN RCVD TLK: CPT | Performed by: INTERNAL MEDICINE

## 2019-08-19 PROCEDURE — 77336 RADIATION PHYSICS CONSULT: CPT | Performed by: RADIOLOGY

## 2019-08-19 PROCEDURE — 77387 GUIDANCE FOR RADJ TX DLVR: CPT | Performed by: RADIOLOGY

## 2019-08-19 PROCEDURE — 81001 URINALYSIS AUTO W/SCOPE: CPT

## 2019-08-19 PROCEDURE — 85027 COMPLETE CBC AUTOMATED: CPT

## 2019-08-19 RX ORDER — SODIUM CHLORIDE 0.9 % (FLUSH) 0.9 %
10 SYRINGE (ML) INJECTION PRN
Status: DISCONTINUED | OUTPATIENT
Start: 2019-08-19 | End: 2019-08-20 | Stop reason: HOSPADM

## 2019-08-19 RX ORDER — HEPARIN SODIUM (PORCINE) LOCK FLUSH IV SOLN 100 UNIT/ML 100 UNIT/ML
500 SOLUTION INTRAVENOUS PRN
Status: CANCELLED | OUTPATIENT
Start: 2019-08-19

## 2019-08-19 RX ORDER — SODIUM CHLORIDE 9 MG/ML
20 INJECTION, SOLUTION INTRAVENOUS ONCE
Status: COMPLETED | OUTPATIENT
Start: 2019-08-19 | End: 2019-08-19

## 2019-08-19 RX ORDER — SODIUM CHLORIDE 0.9 % (FLUSH) 0.9 %
10 SYRINGE (ML) INJECTION PRN
Status: CANCELLED | OUTPATIENT
Start: 2019-08-19

## 2019-08-19 RX ORDER — HEPARIN SODIUM (PORCINE) LOCK FLUSH IV SOLN 100 UNIT/ML 100 UNIT/ML
500 SOLUTION INTRAVENOUS PRN
Status: DISCONTINUED | OUTPATIENT
Start: 2019-08-19 | End: 2019-08-20 | Stop reason: HOSPADM

## 2019-08-19 RX ORDER — PALONOSETRON 0.05 MG/ML
0.25 INJECTION, SOLUTION INTRAVENOUS ONCE
Status: COMPLETED | OUTPATIENT
Start: 2019-08-19 | End: 2019-08-19

## 2019-08-19 RX ORDER — SODIUM CHLORIDE 0.9 % (FLUSH) 0.9 %
20 SYRINGE (ML) INJECTION PRN
Status: DISCONTINUED | OUTPATIENT
Start: 2019-08-19 | End: 2019-08-20 | Stop reason: HOSPADM

## 2019-08-19 RX ORDER — SODIUM CHLORIDE 0.9 % (FLUSH) 0.9 %
20 SYRINGE (ML) INJECTION PRN
Status: CANCELLED | OUTPATIENT
Start: 2019-08-19

## 2019-08-19 RX ADMIN — CISPLATIN: 100 INJECTION, SOLUTION INTRAVENOUS at 14:30

## 2019-08-19 RX ADMIN — PALONOSETRON 0.25 MG: 0.05 INJECTION, SOLUTION INTRAVENOUS at 12:17

## 2019-08-19 RX ADMIN — DEXAMETHASONE SODIUM PHOSPHATE 12 MG: 4 INJECTION, SOLUTION INTRAMUSCULAR; INTRAVENOUS at 14:05

## 2019-08-19 RX ADMIN — Medication 10 ML: at 11:00

## 2019-08-19 RX ADMIN — POTASSIUM CHLORIDE: 2 INJECTION, SOLUTION, CONCENTRATE INTRAVENOUS at 16:35

## 2019-08-19 RX ADMIN — Medication 10 ML: at 17:37

## 2019-08-19 RX ADMIN — Medication 10 ML: at 12:14

## 2019-08-19 RX ADMIN — POTASSIUM CHLORIDE: 2 INJECTION, SOLUTION, CONCENTRATE INTRAVENOUS at 12:25

## 2019-08-19 RX ADMIN — SODIUM CHLORIDE 20 ML/HR: 9 INJECTION, SOLUTION INTRAVENOUS at 12:10

## 2019-08-19 RX ADMIN — Medication 500 UNITS: at 17:37

## 2019-08-19 RX ADMIN — Medication 20 ML: at 11:01

## 2019-08-19 RX ADMIN — SODIUM CHLORIDE 150 MG: 9 INJECTION, SOLUTION INTRAVENOUS at 13:30

## 2019-08-19 ASSESSMENT — ENCOUNTER SYMPTOMS
COLOR CHANGE: 0
NAUSEA: 0
VOMITING: 0
EYE DISCHARGE: 0
WHEEZING: 0
CONSTIPATION: 0
COUGH: 0
ABDOMINAL DISTENTION: 0
RECTAL PAIN: 0
SHORTNESS OF BREATH: 0
ABDOMINAL PAIN: 0
FACIAL SWELLING: 0
BLOOD IN STOOL: 0
TROUBLE SWALLOWING: 0
BACK PAIN: 0
DIARRHEA: 0
SORE THROAT: 0
CHEST TIGHTNESS: 0

## 2019-08-19 NOTE — PROGRESS NOTES
[facility] She started concurrent chemoradiation with cisplatin on August 6, 2019. Interim history on August 19, 2019; She tolerated first infusion of cisplatin well, minimal nausea. She was admitted for 1 day to the hospital on August 12, 2019 with acute chest pain most likely musculoskeletal in origin, troponin was negative x3. The patient denies having any chest pain. No nausea no vomiting she denies having any hematuria no dysuria. She denies having any fever       HPI   Past Medical History:   Diagnosis Date    Anxiety     Cancer of cervix ([address]     Depression     Hypertension       Past Surgical History:   Procedure Laterality Date    CERVIX BIOPSY N/A 6/7/2019    COLD KNIFE CONE BX CERVIX performed by Ricci Chavez MD at [address] [place] [address]  over 5 years ago    ? dr Ordoñez [place] / REMOVAL / [address] Gutierrez Said N/A 8/9/2019    SINGLE LUMEN SMARTPORT INSERTION performed by Chaz Domingo MD at [address] [place] [address]      Family History   Problem Relation Age of Onset    Cancer Maternal Grandmother         ? kind    Cancer Paternal Grandmother         ? kind      Social History     Tobacco Use    Smoking status: Current Every Day Smoker     Packs/day: 1.00     Years: 20.00     Pack years: 20.00     Types: Cigarettes    Smokeless tobacco: Never Used   Substance Use Topics    Alcohol use: Yes     Alcohol/week: 16.0 standard drinks     Types: 16 Shots of liquor per week     Comment: 4 shots daily 4 times a week      Current Outpatient Medications   Medication Sig Dispense Refill    aspirin 81 MG chewable tablet Take 1 tablet by mouth daily 30 tablet 3    atorvastatin (LIPITOR) 40 MG tablet Take 1 tablet by mouth nightly 30 tablet 3    lidocaine-prilocaine (EMLA) 2.5-2.5 % cream Apply topically as needed.  30 g 2    prochlorperazine (COMPAZINE) 5 MG tablet Take 1 tablet by mouth every 6 hours as needed for

## 2019-08-20 ENCOUNTER — HOSPITAL ENCOUNTER (OUTPATIENT)
Dept: RADIATION ONCOLOGY | Age: 34
Discharge: HOME | End: 2019-08-20
Attending: RADIOLOGY
Payer: COMMERCIAL

## 2019-08-20 PROCEDURE — 77412 RADIATION TX DELIVERY LVL 3: CPT | Performed by: RADIOLOGY

## 2019-08-20 PROCEDURE — 77387 GUIDANCE FOR RADJ TX DLVR: CPT | Performed by: RADIOLOGY

## 2019-08-20 ASSESSMENT — ENCOUNTER SYMPTOMS
VOMITING: 0
CONSTIPATION: 0
RECTAL PAIN: 0
COUGH: 0
SHORTNESS OF BREATH: 0
DIARRHEA: 0
ABDOMINAL DISTENTION: 0
EYE DISCHARGE: 0
TROUBLE SWALLOWING: 0
ABDOMINAL PAIN: 0
COLOR CHANGE: 0
CHEST TIGHTNESS: 0
FACIAL SWELLING: 0
WHEEZING: 0
SORE THROAT: 0
NAUSEA: 0
BLOOD IN STOOL: 0
BACK PAIN: 0

## 2019-08-21 ENCOUNTER — OFFICE VISIT (OUTPATIENT)
Dept: FAMILY MEDICINE CLINIC | Age: 34
End: 2019-08-21
Payer: COMMERCIAL

## 2019-08-21 ENCOUNTER — CLINICAL DOCUMENTATION (OUTPATIENT)
Dept: NUTRITION | Age: 34
End: 2019-08-21

## 2019-08-21 ENCOUNTER — HOSPITAL ENCOUNTER (OUTPATIENT)
Dept: RADIATION ONCOLOGY | Age: 34
Discharge: HOME | End: 2019-08-21
Attending: RADIOLOGY
Payer: COMMERCIAL

## 2019-08-21 VITALS
HEIGHT: 68 IN | TEMPERATURE: 98.6 F | RESPIRATION RATE: 14 BRPM | HEART RATE: 76 BPM | SYSTOLIC BLOOD PRESSURE: 143 MMHG | BODY MASS INDEX: 31.07 KG/M2 | WEIGHT: 205 LBS | DIASTOLIC BLOOD PRESSURE: 83 MMHG

## 2019-08-21 DIAGNOSIS — C53.9 MALIGNANT NEOPLASM OF CERVIX, UNSPECIFIED SITE (HCC): ICD-10-CM

## 2019-08-21 DIAGNOSIS — Z11.4 SCREENING FOR HIV (HUMAN IMMUNODEFICIENCY VIRUS): ICD-10-CM

## 2019-08-21 DIAGNOSIS — I10 ESSENTIAL HYPERTENSION: Primary | ICD-10-CM

## 2019-08-21 DIAGNOSIS — F51.04 PSYCHOPHYSIOLOGICAL INSOMNIA: ICD-10-CM

## 2019-08-21 PROCEDURE — 99213 OFFICE O/P EST LOW 20 MIN: CPT | Performed by: NURSE PRACTITIONER

## 2019-08-21 PROCEDURE — 4004F PT TOBACCO SCREEN RCVD TLK: CPT | Performed by: NURSE PRACTITIONER

## 2019-08-21 PROCEDURE — 77412 RADIATION TX DELIVERY LVL 3: CPT | Performed by: RADIOLOGY

## 2019-08-21 PROCEDURE — G8417 CALC BMI ABV UP PARAM F/U: HCPCS | Performed by: NURSE PRACTITIONER

## 2019-08-21 PROCEDURE — 77387 GUIDANCE FOR RADJ TX DLVR: CPT | Performed by: RADIOLOGY

## 2019-08-21 PROCEDURE — G8427 DOCREV CUR MEDS BY ELIG CLIN: HCPCS | Performed by: NURSE PRACTITIONER

## 2019-08-21 RX ORDER — TRAZODONE HYDROCHLORIDE 100 MG/1
100 TABLET ORAL NIGHTLY
Qty: 30 TABLET | Refills: 6 | Status: SHIPPED | OUTPATIENT
Start: 2019-08-21 | End: 2020-04-27 | Stop reason: SDUPTHER

## 2019-08-21 RX ORDER — LISINOPRIL 10 MG/1
10 TABLET ORAL DAILY
Qty: 90 TABLET | Refills: 1 | Status: SHIPPED | OUTPATIENT
Start: 2019-08-21 | End: 2020-12-14

## 2019-08-21 ASSESSMENT — ENCOUNTER SYMPTOMS
GASTROINTESTINAL NEGATIVE: 1
RESPIRATORY NEGATIVE: 1

## 2019-08-21 NOTE — PROGRESS NOTES
times daily as needed for Dizziness 40 tablet 1    aspirin 81 MG chewable tablet Take 1 tablet by mouth daily (Patient not taking: Reported on 8/21/2019) 30 tablet 3    atorvastatin (LIPITOR) 40 MG tablet Take 1 tablet by mouth nightly (Patient not taking: Reported on 8/21/2019) 30 tablet 3     No current facility-administered medications for this visit. Past Medical History:   Diagnosis Date    Anxiety     [facility] [address] [address]     Depression     Hypertension       Past Surgical History:   Procedure Laterality Date    CERVIX BIOPSY N/A 6/7/2019    COLD KNIFE CONE BX CERVIX performed by Argelia Baum MD at [address] [place] [address]  over 5 years ago    ? dr Ramón Henley / REMOVAL / [address] Gutier[address] Said N/A 8/9/2019    SINGLE LUMEN SMARTPORT INSERTION performed by Bill Hawthorne MD at [address] [place] [address]  2006    [facility]     Family History   Problem Relation Age of Onset    Cancer Maternal Grandmother         ? kind    Cancer Paternal Grandmother         ? kind     Social History     Tobacco Use    Smoking status: Current Every Day Smoker     Packs/day: 1.00     Years: 20.00     Pack years: 20.00     Types: Cigarettes    Smokeless tobacco: Never Used   Substance Use Topics    Alcohol use: Yes     Alcohol/week: 16.0 standard drinks     Types: 16 Shots of liquor per week     Comment: 4 shots daily 4 times a week        Allergies   Allergen Reactions    Percocet [Oxycodone-Acetaminophen] Other (See Comments)     hallucinations       Health Maintenance   Topic Date Due    HIV screen  12/30/1998    Flu vaccine (1) 09/01/2019    Potassium monitoring  08/13/2020    Creatinine monitoring  08/13/2020    Cervical cancer screen  08/19/2022    DTaP/Tdap/Td vaccine (2 - Td) 06/03/2025    Pneumococcal 0-64 years Vaccine  Completed    Varicella Vaccine  Discontinued       Subjective:      Review of Systems   Constitutional: Positive for fatigue.  Negative for chills and fever. Eyes: Negative for visual disturbance. Respiratory: Negative. Cardiovascular: Negative. Gastrointestinal: Negative. Skin: Negative. Neurological: Positive for headaches. Negative for dizziness. Psychiatric/Behavioral: Negative for self-injury, sleep disturbance and suicidal ideas. Objective:      BP (!) 143/83   Pulse 76   Temp 98.6 °F (37 °C) (Oral)   Resp 14   Ht 5' 8\" (1.727 m)   Wt 205 lb (93 kg)   LMP 08/14/2019   BMI 31.17 kg/m²      Physical Exam   Constitutional: She is oriented to person, place, and time. She appears well-developed and well-nourished. No distress. HENT:   Right Ear: Hearing, tympanic membrane, external ear and ear canal normal.   Left Ear: Hearing, tympanic membrane, external ear and ear canal normal.   Nose: Nose normal.   Mouth/Throat: Oropharynx is clear and moist and mucous membranes are normal.   Cardiovascular: Normal rate, regular rhythm, normal heart sounds and intact distal pulses. No murmur heard. Pulmonary/Chest: Effort normal and breath sounds normal. No stridor. No respiratory distress. Neurological: She is alert and oriented to person, place, and time. Psychiatric: She has a normal mood and affect. Her behavior is normal. Judgment and thought content normal.   Nursing note and vitals reviewed. Assessment/Plan:           1. Essential hypertension  Start 1/2 tablet lisinopril for the first 5 days and then advance to a full tablet. - Lipid Panel; Future    2. Screening for HIV (human immunodeficiency virus)    - HIV-1 and HIV-2 Antibodies; Future    3. Malignant neoplasm of cervix, unspecified site ([facility] [address]      4. Psychophysiological insomnia    - traZODone (DESYREL) 100 MG tablet; Take 1 tablet by mouth nightly  Dispense: 30 tablet; Refill: 6      Return in about 2 weeks (around 9/4/2019) for nurse visit for bp check.     Reccommended tobaccocessation options including pharmacologic methods, counseled great than 3

## 2019-08-22 ENCOUNTER — HOSPITAL ENCOUNTER (OUTPATIENT)
Dept: RADIATION ONCOLOGY | Age: 34
Discharge: HOME | End: 2019-08-22
Attending: RADIOLOGY
Payer: COMMERCIAL

## 2019-08-22 PROCEDURE — 77412 RADIATION TX DELIVERY LVL 3: CPT | Performed by: RADIOLOGY

## 2019-08-22 PROCEDURE — 77387 GUIDANCE FOR RADJ TX DLVR: CPT | Performed by: RADIOLOGY

## 2019-08-23 ENCOUNTER — HOSPITAL ENCOUNTER (OUTPATIENT)
Dept: RADIATION ONCOLOGY | Age: 34
Discharge: HOME | End: 2019-08-23
Attending: RADIOLOGY
Payer: COMMERCIAL

## 2019-08-23 PROCEDURE — 77387 GUIDANCE FOR RADJ TX DLVR: CPT | Performed by: RADIOLOGY

## 2019-08-23 PROCEDURE — 77412 RADIATION TX DELIVERY LVL 3: CPT | Performed by: RADIOLOGY

## 2019-08-25 RX ORDER — SODIUM CHLORIDE 9 MG/ML
INJECTION, SOLUTION INTRAVENOUS CONTINUOUS
Status: CANCELLED | OUTPATIENT
Start: 2019-08-26

## 2019-08-25 RX ORDER — SODIUM CHLORIDE 0.9 % (FLUSH) 0.9 %
5 SYRINGE (ML) INJECTION PRN
Status: CANCELLED | OUTPATIENT
Start: 2019-08-26

## 2019-08-25 RX ORDER — METHYLPREDNISOLONE SODIUM SUCCINATE 125 MG/2ML
125 INJECTION, POWDER, LYOPHILIZED, FOR SOLUTION INTRAMUSCULAR; INTRAVENOUS ONCE
Status: CANCELLED | OUTPATIENT
Start: 2019-08-26

## 2019-08-25 RX ORDER — PALONOSETRON 0.05 MG/ML
0.25 INJECTION, SOLUTION INTRAVENOUS ONCE
Status: CANCELLED | OUTPATIENT
Start: 2019-08-26

## 2019-08-25 RX ORDER — SODIUM CHLORIDE 9 MG/ML
20 INJECTION, SOLUTION INTRAVENOUS ONCE
Status: CANCELLED | OUTPATIENT
Start: 2019-08-26

## 2019-08-25 RX ORDER — DIPHENHYDRAMINE HYDROCHLORIDE 50 MG/ML
50 INJECTION INTRAMUSCULAR; INTRAVENOUS ONCE
Status: CANCELLED | OUTPATIENT
Start: 2019-08-26

## 2019-08-25 RX ORDER — EPINEPHRINE 1 MG/ML
0.3 INJECTION, SOLUTION, CONCENTRATE INTRAVENOUS PRN
Status: CANCELLED | OUTPATIENT
Start: 2019-08-26

## 2019-08-25 RX ORDER — HEPARIN SODIUM (PORCINE) LOCK FLUSH IV SOLN 100 UNIT/ML 100 UNIT/ML
500 SOLUTION INTRAVENOUS PRN
Status: CANCELLED | OUTPATIENT
Start: 2019-08-26

## 2019-08-25 RX ORDER — SODIUM CHLORIDE 0.9 % (FLUSH) 0.9 %
10 SYRINGE (ML) INJECTION PRN
Status: CANCELLED | OUTPATIENT
Start: 2019-08-26

## 2019-08-25 ASSESSMENT — ENCOUNTER SYMPTOMS
CHEST TIGHTNESS: 0
BACK PAIN: 0
COUGH: 0
SORE THROAT: 0
FACIAL SWELLING: 0
EYE DISCHARGE: 0
COLOR CHANGE: 0
ABDOMINAL DISTENTION: 0
DIARRHEA: 0
VOMITING: 0
BLOOD IN STOOL: 0
RECTAL PAIN: 0
NAUSEA: 0
TROUBLE SWALLOWING: 0
ABDOMINAL PAIN: 0
SHORTNESS OF BREATH: 0
WHEEZING: 0
CONSTIPATION: 0

## 2019-08-26 ENCOUNTER — OFFICE VISIT (OUTPATIENT)
Dept: ONCOLOGY | Age: 34
End: 2019-08-26
Payer: COMMERCIAL

## 2019-08-26 ENCOUNTER — HOSPITAL ENCOUNTER (OUTPATIENT)
Dept: INFUSION THERAPY | Age: 34
Discharge: HOME | End: 2019-08-26
Payer: COMMERCIAL

## 2019-08-26 ENCOUNTER — HOSPITAL ENCOUNTER (OUTPATIENT)
Dept: RADIATION ONCOLOGY | Age: 34
Discharge: HOME | End: 2019-08-26
Attending: RADIOLOGY
Payer: COMMERCIAL

## 2019-08-26 VITALS
HEIGHT: 68 IN | HEART RATE: 84 BPM | BODY MASS INDEX: 31.18 KG/M2 | SYSTOLIC BLOOD PRESSURE: 144 MMHG | DIASTOLIC BLOOD PRESSURE: 88 MMHG | OXYGEN SATURATION: 97 % | RESPIRATION RATE: 16 BRPM | TEMPERATURE: 98.2 F

## 2019-08-26 VITALS
HEIGHT: 68 IN | SYSTOLIC BLOOD PRESSURE: 140 MMHG | RESPIRATION RATE: 16 BRPM | HEART RATE: 82 BPM | DIASTOLIC BLOOD PRESSURE: 80 MMHG | WEIGHT: 203 LBS | BODY MASS INDEX: 30.77 KG/M2 | TEMPERATURE: 98.6 F | OXYGEN SATURATION: 98 %

## 2019-08-26 DIAGNOSIS — C53.8 MALIGNANT NEOPLASM OF OVERLAPPING SITES OF CERVIX (HCC): Primary | ICD-10-CM

## 2019-08-26 DIAGNOSIS — Z51.11 ENCOUNTER FOR CHEMOTHERAPY MANAGEMENT: ICD-10-CM

## 2019-08-26 DIAGNOSIS — R30.0 DYSURIA: ICD-10-CM

## 2019-08-26 DIAGNOSIS — F51.04 PSYCHOPHYSIOLOGICAL INSOMNIA: ICD-10-CM

## 2019-08-26 LAB
ALBUMIN SERPL-MCNC: 4.2 G/DL (ref 3.5–5.1)
ALP BLD-CCNC: 84 U/L (ref 38–126)
ALT SERPL-CCNC: 30 U/L (ref 11–66)
AST SERPL-CCNC: 16 U/L (ref 5–40)
BILIRUB SERPL-MCNC: 0.2 MG/DL (ref 0.3–1.2)
BILIRUBIN DIRECT: < 0.2 MG/DL (ref 0–0.3)
BUN, WHOLE BLOOD: 17 MG/DL (ref 8–26)
CHLORIDE, WHOLE BLOOD: 106 MEQ/L (ref 98–109)
CREATININE, WHOLE BLOOD: 0.8 MG/DL (ref 0.5–1.2)
GLUCOSE, WHOLE BLOOD: 105 MG/DL (ref 70–108)
HCT VFR BLD CALC: 34.9 % (ref 37–47)
HEMOGLOBIN: 12.1 GM/DL (ref 12–16)
IONIZED CALCIUM, WHOLE BLOOD: 1.19 MMOL/L (ref 1.12–1.32)
MCH RBC QN AUTO: 33.1 PG (ref 27–31)
MCHC RBC AUTO-ENTMCNC: 34.5 GM/DL (ref 33–37)
MCV RBC AUTO: 96 FL (ref 81–99)
PDW BLD-RTO: 10.7 % (ref 11.5–14.5)
PLATELET # BLD: 205 THOU/MM3 (ref 130–400)
PMV BLD AUTO: 7.7 FL (ref 7.4–10.4)
POTASSIUM, WHOLE BLOOD: 4 MEQ/L (ref 3.5–4.9)
RBC # BLD: 3.64 MILL/MM3 (ref 4.2–5.4)
SEG NEUTROPHILS: 65 % (ref 43–75)
SEGMENTED NEUTROPHILS ABSOLUTE COUNT: 2.7 THOU/MM3 (ref 1.8–7.7)
SODIUM, WHOLE BLOOD: 141 MEQ/L (ref 138–146)
TOTAL CO2, WHOLE BLOOD: 24 MEQ/L (ref 23–33)
TOTAL PROTEIN: 7.1 G/DL (ref 6.1–8)
WBC # BLD: 4.1 THOU/MM3 (ref 4.8–10.8)

## 2019-08-26 PROCEDURE — G8427 DOCREV CUR MEDS BY ELIG CLIN: HCPCS | Performed by: INTERNAL MEDICINE

## 2019-08-26 PROCEDURE — 80047 BASIC METABLC PNL IONIZED CA: CPT

## 2019-08-26 PROCEDURE — 36591 DRAW BLOOD OFF VENOUS DEVICE: CPT

## 2019-08-26 PROCEDURE — 77336 RADIATION PHYSICS CONSULT: CPT | Performed by: RADIOLOGY

## 2019-08-26 PROCEDURE — 80076 HEPATIC FUNCTION PANEL: CPT

## 2019-08-26 PROCEDURE — 99211 OFF/OP EST MAY X REQ PHY/QHP: CPT

## 2019-08-26 PROCEDURE — 99214 OFFICE O/P EST MOD 30 MIN: CPT | Performed by: INTERNAL MEDICINE

## 2019-08-26 PROCEDURE — 77412 RADIATION TX DELIVERY LVL 3: CPT | Performed by: RADIOLOGY

## 2019-08-26 PROCEDURE — 77387 GUIDANCE FOR RADJ TX DLVR: CPT | Performed by: RADIOLOGY

## 2019-08-26 PROCEDURE — 96415 CHEMO IV INFUSION ADDL HR: CPT

## 2019-08-26 PROCEDURE — 6360000002 HC RX W HCPCS: Performed by: INTERNAL MEDICINE

## 2019-08-26 PROCEDURE — 4004F PT TOBACCO SCREEN RCVD TLK: CPT | Performed by: INTERNAL MEDICINE

## 2019-08-26 PROCEDURE — 85027 COMPLETE CBC AUTOMATED: CPT

## 2019-08-26 PROCEDURE — 2580000003 HC RX 258: Performed by: INTERNAL MEDICINE

## 2019-08-26 PROCEDURE — 96366 THER/PROPH/DIAG IV INF ADDON: CPT

## 2019-08-26 PROCEDURE — G8417 CALC BMI ABV UP PARAM F/U: HCPCS | Performed by: INTERNAL MEDICINE

## 2019-08-26 PROCEDURE — 96375 TX/PRO/DX INJ NEW DRUG ADDON: CPT

## 2019-08-26 PROCEDURE — 96367 TX/PROPH/DG ADDL SEQ IV INF: CPT

## 2019-08-26 PROCEDURE — 96413 CHEMO IV INFUSION 1 HR: CPT

## 2019-08-26 RX ORDER — SODIUM CHLORIDE 0.9 % (FLUSH) 0.9 %
20 SYRINGE (ML) INJECTION PRN
Status: CANCELLED | OUTPATIENT
Start: 2019-08-26

## 2019-08-26 RX ORDER — SODIUM CHLORIDE 0.9 % (FLUSH) 0.9 %
20 SYRINGE (ML) INJECTION PRN
Status: DISCONTINUED | OUTPATIENT
Start: 2019-08-26 | End: 2019-08-27 | Stop reason: HOSPADM

## 2019-08-26 RX ORDER — PALONOSETRON 0.05 MG/ML
0.25 INJECTION, SOLUTION INTRAVENOUS ONCE
Status: COMPLETED | OUTPATIENT
Start: 2019-08-26 | End: 2019-08-26

## 2019-08-26 RX ORDER — HEPARIN SODIUM (PORCINE) LOCK FLUSH IV SOLN 100 UNIT/ML 100 UNIT/ML
500 SOLUTION INTRAVENOUS PRN
Status: CANCELLED | OUTPATIENT
Start: 2019-08-26

## 2019-08-26 RX ORDER — SODIUM CHLORIDE 0.9 % (FLUSH) 0.9 %
10 SYRINGE (ML) INJECTION PRN
Status: CANCELLED | OUTPATIENT
Start: 2019-08-26

## 2019-08-26 RX ORDER — SODIUM CHLORIDE 9 MG/ML
20 INJECTION, SOLUTION INTRAVENOUS ONCE
Status: COMPLETED | OUTPATIENT
Start: 2019-08-26 | End: 2019-08-26

## 2019-08-26 RX ORDER — SODIUM CHLORIDE 0.9 % (FLUSH) 0.9 %
10 SYRINGE (ML) INJECTION PRN
Status: DISCONTINUED | OUTPATIENT
Start: 2019-08-26 | End: 2019-08-27 | Stop reason: HOSPADM

## 2019-08-26 RX ORDER — HEPARIN SODIUM (PORCINE) LOCK FLUSH IV SOLN 100 UNIT/ML 100 UNIT/ML
500 SOLUTION INTRAVENOUS PRN
Status: DISCONTINUED | OUTPATIENT
Start: 2019-08-26 | End: 2019-08-27 | Stop reason: HOSPADM

## 2019-08-26 RX ADMIN — Medication 10 ML: at 19:06

## 2019-08-26 RX ADMIN — PALONOSETRON 0.25 MG: 0.05 INJECTION, SOLUTION INTRAVENOUS at 15:30

## 2019-08-26 RX ADMIN — Medication 20 ML: at 11:36

## 2019-08-26 RX ADMIN — POTASSIUM CHLORIDE: 2 INJECTION, SOLUTION, CONCENTRATE INTRAVENOUS at 18:05

## 2019-08-26 RX ADMIN — Medication 10 ML: at 11:35

## 2019-08-26 RX ADMIN — MANNITOL: 250 INJECTION, SOLUTION INTRAVENOUS at 16:00

## 2019-08-26 RX ADMIN — HEPARIN SODIUM (PORCINE) LOCK FLUSH IV SOLN 100 UNIT/ML 500 UNITS: 100 SOLUTION at 19:06

## 2019-08-26 RX ADMIN — SODIUM CHLORIDE 20 ML/HR: 0.9 INJECTION, SOLUTION INTRAVENOUS at 13:46

## 2019-08-26 RX ADMIN — DEXAMETHASONE SODIUM PHOSPHATE 12 MG: 4 INJECTION, SOLUTION INTRAMUSCULAR; INTRAVENOUS at 15:35

## 2019-08-26 RX ADMIN — POTASSIUM CHLORIDE: 2 INJECTION, SOLUTION, CONCENTRATE INTRAVENOUS at 13:50

## 2019-08-26 ASSESSMENT — PAIN DESCRIPTION - DESCRIPTORS: DESCRIPTORS: CRAMPING;SHARP

## 2019-08-26 ASSESSMENT — PAIN SCALES - GENERAL
PAINLEVEL_OUTOF10: 5
PAINLEVEL_OUTOF10: 5

## 2019-08-26 ASSESSMENT — PAIN DESCRIPTION - PAIN TYPE: TYPE: CHRONIC PAIN

## 2019-08-26 ASSESSMENT — PAIN DESCRIPTION - ORIENTATION: ORIENTATION: LOWER

## 2019-08-26 ASSESSMENT — PAIN DESCRIPTION - FREQUENCY: FREQUENCY: CONTINUOUS

## 2019-08-26 ASSESSMENT — PAIN DESCRIPTION - PROGRESSION: CLINICAL_PROGRESSION: NOT CHANGED

## 2019-08-26 ASSESSMENT — PAIN DESCRIPTION - LOCATION: LOCATION: ABDOMEN

## 2019-08-26 ASSESSMENT — PAIN DESCRIPTION - ONSET: ONSET: ON-GOING

## 2019-08-26 NOTE — PROGRESS NOTES
Patient assessed for the following post chemotherapy:    Dizziness   No  Lightheadedness  No     Acute nausea/vomiting No  Headache   No  Chest pain/pressure  No  Rash/itching   No  Shortness of breath  No    Patient kept for 20 minutes observation post infusion chemotherapy. Patient tolerated chemotherapy treatment cisplatin without any complications. Lab draw per mediport. Last vital signs:   BP (!) 140/80   Pulse 82   Temp 98.6 °F (37 °C) (Oral)   Resp 16   Ht 5' 8\" (1.727 m)   Wt 203 lb (92.1 kg)   LMP 08/14/2019   SpO2 98%   BMI 30.87 kg/m²     Patient instructed if experience any of the above symptoms following today's infusion,he/she is to notify MD immediately or go to the emergency department. Discharge instructions given to patient. Verbalizes understanding. Ambulated off unit per self with belongings.

## 2019-08-26 NOTE — ONCOLOGY
Chemotherapy Administration    Pre-assessment Data: Antineoplastic Agents  Other:   See toxicity flow sheet for assessment [x]     Physician Notification of Concerns Related to Chemotherapy Administration:   Physician Notified Becky Johnston / Time of Notification Dr Max Nath seen today.      Interventions:   Lab work assessed  [x]   Height / Weight verified for dose [x]   Current MAR reviewed [x]   Emergency drugs available as appropriate [x]   Anaphylaxis assessment completed [x]   Pre-medications administered as ordered [x]   Blood return noted upon initiation of chemotherapy [x]   Blood return noted each 1-2ml of a vesicant medication if given IV push []   Blood return noted each 2-3ml of a non-vesicant medication if given IV push []   Monitor for signs / symptoms of hypersensitivity reaction [x]   Chemotherapy orders (drug/dose/rate) verified by 2 Chemo certified RNs [x]   Monitor IV site and blood return throughout the infusion of the medication [x]   Document IV site checks on the IV assessment form [x]   Document chemotherapy teaching on the Patient Education tab [x]   Document patient verbalizes understanding of medications being administered [x]   If IV infiltration, see ONS Guidelines []   Other:   cisplatin   [x]

## 2019-08-26 NOTE — PLAN OF CARE
Problem: Pain:  Goal: Control of acute pain  Description  Control of acute pain  Outcome: Met This Shift  Note:   Patient rating pain a 5 out of 10 using LINDA scale, Pain located in lower abdomen. Intervention: Promote participation in pain management plan  Note:   Patient encouraged to take prescribed pain medications and call Physician if pain is not controlled. Problem: Intellectual/Education/Knowledge Deficit  Goal: Teaching initiated upon admission  Outcome: Met This Shift  Note:   Patient verbalizes understanding to verbal information given on cisplatin,action and possible side effects. Aware to call MD if develop complications. Intervention: Verbal/written education provided  Note:   Chemotherapy Teaching     What is Chemotherapy   Drug action ? Method of Administration ? Handouts given ? Side Effects  Nausea/vomiting ? Diarrhea ? Fatigue ? Signs / Symptoms of infection ? Neutropenia ? Thrombocytopenia ? Alopecia ? neuropathy ? [place] diet &  the importance of fluids ? Micellaneous  Importance of nutrition ? Importance of oral hygiene ? When to call the MD ?   Monitoring labs ? Use of supportive services ? Explanation of Drug Regimen / Frequency  Cisplatin  D1 C3     Comments  Verbalized understanding to drug,action,side effects and when to call MD         Problem: Discharge Planning:  Goal: Discharged to appropriate level of care  Description  Discharged to appropriate level of care  Outcome: Met This Shift  Note:   Verbalize understanding of discharge instructions, follow up appointments, and when to call Physician. Intervention: Assess readiness for discharge  Note:   Provide discharge instructions. Problem: Falls - Risk of:  Goal: Will remain free from falls  Description  Will remain free from falls  Outcome: Met This Shift  Note:   Free from falls while in O.P. Oncology.    Intervention: Assess risk factors for falls  Note:   Discussed the need to use the call light for assistance when getting up to ambulate. Call light within reach. Problem: Infection - Central Venous Catheter-Associated Bloodstream Infection:  Goal: Will show no infection signs and symptoms  Description  Will show no infection signs and symptoms  Outcome: Met This Shift  Note:   Mediport site with no redness or warmth. Skin over port intact with no signs of breakdown noted. Patient verbalizes signs/symptoms of port infection and when to notify the physician. Intervention: Central line needs assessment  Note:   Discussed mediport maintenance, infection prevention, and when to call the physician. Labs drawn. Care plan reviewed with patient. Patient verbalize understanding of the plan of care and contribute to goal setting.

## 2019-08-26 NOTE — PROGRESS NOTES
Labs drawn via central venous catheter, then patient escorted to exam room accompanied by Valentino Fernandez

## 2019-08-27 ENCOUNTER — HOSPITAL ENCOUNTER (OUTPATIENT)
Dept: RADIATION ONCOLOGY | Age: 34
Discharge: HOME | End: 2019-08-27
Attending: RADIOLOGY
Payer: COMMERCIAL

## 2019-08-27 PROCEDURE — 77412 RADIATION TX DELIVERY LVL 3: CPT | Performed by: RADIOLOGY

## 2019-08-27 PROCEDURE — 77387 GUIDANCE FOR RADJ TX DLVR: CPT | Performed by: RADIOLOGY

## 2019-08-28 ENCOUNTER — CLINICAL DOCUMENTATION (OUTPATIENT)
Dept: NUTRITION | Age: 34
End: 2019-08-28

## 2019-08-28 ENCOUNTER — HOSPITAL ENCOUNTER (OUTPATIENT)
Dept: RADIATION ONCOLOGY | Age: 34
Discharge: HOME | End: 2019-08-28
Attending: RADIOLOGY
Payer: COMMERCIAL

## 2019-08-28 PROCEDURE — 77412 RADIATION TX DELIVERY LVL 3: CPT | Performed by: RADIOLOGY

## 2019-08-28 PROCEDURE — 77387 GUIDANCE FOR RADJ TX DLVR: CPT | Performed by: RADIOLOGY

## 2019-08-29 ENCOUNTER — HOSPITAL ENCOUNTER (OUTPATIENT)
Dept: RADIATION ONCOLOGY | Age: 34
Discharge: HOME | End: 2019-08-29
Attending: RADIOLOGY
Payer: COMMERCIAL

## 2019-08-29 ENCOUNTER — TELEPHONE (OUTPATIENT)
Dept: ONCOLOGY | Age: 34
End: 2019-08-29

## 2019-08-29 ENCOUNTER — TELEPHONE (OUTPATIENT)
Dept: FAMILY MEDICINE CLINIC | Age: 34
End: 2019-08-29

## 2019-08-29 ENCOUNTER — NURSE ONLY (OUTPATIENT)
Dept: LAB | Age: 34
End: 2019-08-29

## 2019-08-29 DIAGNOSIS — Z11.4 SCREENING FOR HIV (HUMAN IMMUNODEFICIENCY VIRUS): ICD-10-CM

## 2019-08-29 DIAGNOSIS — I10 ESSENTIAL HYPERTENSION: ICD-10-CM

## 2019-08-29 LAB
CHOLESTEROL, TOTAL: 192 MG/DL (ref 100–199)
GFR, ESTIMATED: > 90 ML/MIN/1.73M2
HDLC SERPL-MCNC: 72 MG/DL
LDL CHOLESTEROL CALCULATED: 78 MG/DL
TRIGL SERPL-MCNC: 211 MG/DL (ref 0–199)

## 2019-08-29 PROCEDURE — 77387 GUIDANCE FOR RADJ TX DLVR: CPT | Performed by: RADIOLOGY

## 2019-08-29 PROCEDURE — 77412 RADIATION TX DELIVERY LVL 3: CPT | Performed by: RADIOLOGY

## 2019-08-29 NOTE — TELEPHONE ENCOUNTER
Patient was getting labs on 08/29/2019 and stated that she will be in the hospital when she is to have her BP checked around 09/04/2019. She is going to call in her BP reading so we can let Hero know. Thanks!

## 2019-08-30 ENCOUNTER — HOSPITAL ENCOUNTER (OUTPATIENT)
Dept: NON INVASIVE DIAGNOSTICS | Age: 34
Discharge: HOME | End: 2019-08-30
Payer: COMMERCIAL

## 2019-08-30 ENCOUNTER — HOSPITAL ENCOUNTER (OUTPATIENT)
Dept: RADIATION ONCOLOGY | Age: 34
Discharge: HOME | End: 2019-08-30
Attending: RADIOLOGY
Payer: COMMERCIAL

## 2019-08-30 ENCOUNTER — TELEPHONE (OUTPATIENT)
Dept: FAMILY MEDICINE CLINIC | Age: 34
End: 2019-08-30

## 2019-08-30 VITALS — BODY MASS INDEX: 30.31 KG/M2 | WEIGHT: 200 LBS | HEIGHT: 68 IN

## 2019-08-30 DIAGNOSIS — R07.89 OTHER CHEST PAIN: ICD-10-CM

## 2019-08-30 LAB
LV EF: 52 %
LVEF MODALITY: NORMAL

## 2019-08-30 PROCEDURE — A9500 TC99M SESTAMIBI: HCPCS | Performed by: INTERNAL MEDICINE

## 2019-08-30 PROCEDURE — 77412 RADIATION TX DELIVERY LVL 3: CPT | Performed by: RADIOLOGY

## 2019-08-30 PROCEDURE — 2709999900 HC NON-CHARGEABLE SUPPLY

## 2019-08-30 PROCEDURE — 78452 HT MUSCLE IMAGE SPECT MULT: CPT

## 2019-08-30 PROCEDURE — 93017 CV STRESS TEST TRACING ONLY: CPT | Performed by: NUCLEAR MEDICINE

## 2019-08-30 PROCEDURE — 3430000000 HC RX DIAGNOSTIC RADIOPHARMACEUTICAL: Performed by: INTERNAL MEDICINE

## 2019-08-30 PROCEDURE — 77387 GUIDANCE FOR RADJ TX DLVR: CPT | Performed by: RADIOLOGY

## 2019-08-30 RX ADMIN — Medication 8.8 MILLICURIE: at 10:35

## 2019-08-30 RX ADMIN — Medication 29.8 MILLICURIE: at 11:40

## 2019-09-01 LAB — HIV-2 AB: NEGATIVE

## 2019-09-03 ENCOUNTER — APPOINTMENT (OUTPATIENT)
Dept: RADIATION ONCOLOGY | Age: 34
End: 2019-09-03
Attending: RADIOLOGY
Payer: COMMERCIAL

## 2019-09-03 ENCOUNTER — HOSPITAL ENCOUNTER (OUTPATIENT)
Dept: RADIATION ONCOLOGY | Age: 34
End: 2019-09-03
Attending: RADIOLOGY
Payer: COMMERCIAL

## 2019-09-03 ENCOUNTER — TELEPHONE (OUTPATIENT)
Dept: FAMILY MEDICINE CLINIC | Age: 34
End: 2019-09-03

## 2019-09-03 DIAGNOSIS — C53.8 MALIGNANT NEOPLASM OF OVERLAPPING SITES OF CERVIX (HCC): Primary | ICD-10-CM

## 2019-09-03 PROCEDURE — 77336 RADIATION PHYSICS CONSULT: CPT | Performed by: RADIOLOGY

## 2019-09-03 RX ORDER — METHYLPREDNISOLONE SODIUM SUCCINATE 125 MG/2ML
125 INJECTION, POWDER, LYOPHILIZED, FOR SOLUTION INTRAMUSCULAR; INTRAVENOUS ONCE
Status: CANCELLED | OUTPATIENT
Start: 2019-09-04

## 2019-09-03 RX ORDER — SODIUM CHLORIDE 0.9 % (FLUSH) 0.9 %
10 SYRINGE (ML) INJECTION PRN
Status: CANCELLED | OUTPATIENT
Start: 2019-09-04

## 2019-09-03 RX ORDER — HEPARIN SODIUM (PORCINE) LOCK FLUSH IV SOLN 100 UNIT/ML 100 UNIT/ML
500 SOLUTION INTRAVENOUS PRN
Status: CANCELLED | OUTPATIENT
Start: 2019-09-04

## 2019-09-03 RX ORDER — DIPHENHYDRAMINE HYDROCHLORIDE 50 MG/ML
50 INJECTION INTRAMUSCULAR; INTRAVENOUS ONCE
Status: CANCELLED | OUTPATIENT
Start: 2019-09-04

## 2019-09-03 RX ORDER — SODIUM CHLORIDE 0.9 % (FLUSH) 0.9 %
5 SYRINGE (ML) INJECTION PRN
Status: CANCELLED | OUTPATIENT
Start: 2019-09-04

## 2019-09-03 RX ORDER — SODIUM CHLORIDE 9 MG/ML
INJECTION, SOLUTION INTRAVENOUS CONTINUOUS
Status: CANCELLED | OUTPATIENT
Start: 2019-09-04

## 2019-09-04 ENCOUNTER — CLINICAL DOCUMENTATION (OUTPATIENT)
Dept: NUTRITION | Age: 34
End: 2019-09-04

## 2019-09-04 ENCOUNTER — HOSPITAL ENCOUNTER (OUTPATIENT)
Dept: RADIATION ONCOLOGY | Age: 34
Discharge: HOME | End: 2019-09-04
Attending: RADIOLOGY
Payer: COMMERCIAL

## 2019-09-04 ENCOUNTER — HOSPITAL ENCOUNTER (OUTPATIENT)
Dept: INFUSION THERAPY | Age: 34
Discharge: HOME | End: 2019-09-04
Payer: COMMERCIAL

## 2019-09-04 VITALS
HEART RATE: 72 BPM | RESPIRATION RATE: 16 BRPM | WEIGHT: 200.8 LBS | DIASTOLIC BLOOD PRESSURE: 87 MMHG | OXYGEN SATURATION: 97 % | SYSTOLIC BLOOD PRESSURE: 135 MMHG | HEIGHT: 68 IN | BODY MASS INDEX: 30.43 KG/M2 | TEMPERATURE: 98.3 F

## 2019-09-04 DIAGNOSIS — C53.8 MALIGNANT NEOPLASM OF OVERLAPPING SITES OF CERVIX (HCC): Primary | ICD-10-CM

## 2019-09-04 DIAGNOSIS — F51.04 PSYCHOPHYSIOLOGICAL INSOMNIA: ICD-10-CM

## 2019-09-04 LAB
ALBUMIN SERPL-MCNC: 4 G/DL (ref 3.5–5.1)
ALP BLD-CCNC: 67 U/L (ref 38–126)
ALT SERPL-CCNC: 12 U/L (ref 11–66)
AST SERPL-CCNC: 12 U/L (ref 5–40)
BILIRUB SERPL-MCNC: 0.4 MG/DL (ref 0.3–1.2)
BILIRUBIN DIRECT: < 0.2 MG/DL (ref 0–0.3)
BUN, WHOLE BLOOD: 10 MG/DL (ref 8–26)
CHLORIDE, WHOLE BLOOD: 104 MEQ/L (ref 98–109)
CREATININE, WHOLE BLOOD: 0.7 MG/DL (ref 0.5–1.2)
GFR, ESTIMATED: > 90 ML/MIN/1.73M2
GLUCOSE, WHOLE BLOOD: 119 MG/DL (ref 70–108)
HCT VFR BLD CALC: 32.7 % (ref 37–47)
HEMOGLOBIN: 11.3 GM/DL (ref 12–16)
IONIZED CALCIUM, WHOLE BLOOD: 1.18 MMOL/L (ref 1.12–1.32)
MCH RBC QN AUTO: 33.5 PG (ref 27–31)
MCHC RBC AUTO-ENTMCNC: 34.7 GM/DL (ref 33–37)
MCV RBC AUTO: 97 FL (ref 81–99)
PDW BLD-RTO: 10.9 % (ref 11.5–14.5)
PLATELET # BLD: 162 THOU/MM3 (ref 130–400)
PMV BLD AUTO: 7.2 FL (ref 7.4–10.4)
POTASSIUM, WHOLE BLOOD: 3.3 MEQ/L (ref 3.5–4.9)
RBC # BLD: 3.39 MILL/MM3 (ref 4.2–5.4)
SEG NEUTROPHILS: 70 % (ref 43–75)
SEGMENTED NEUTROPHILS ABSOLUTE COUNT: 3.3 THOU/MM3 (ref 1.8–7.7)
SODIUM, WHOLE BLOOD: 142 MEQ/L (ref 138–146)
TOTAL CO2, WHOLE BLOOD: 26 MEQ/L (ref 23–33)
TOTAL PROTEIN: 6.9 G/DL (ref 6.1–8)
WBC # BLD: 4.7 THOU/MM3 (ref 4.8–10.8)

## 2019-09-04 PROCEDURE — 77387 GUIDANCE FOR RADJ TX DLVR: CPT | Performed by: RADIOLOGY

## 2019-09-04 PROCEDURE — 96366 THER/PROPH/DIAG IV INF ADDON: CPT

## 2019-09-04 PROCEDURE — 80047 BASIC METABLC PNL IONIZED CA: CPT

## 2019-09-04 PROCEDURE — 2580000003 HC RX 258: Performed by: INTERNAL MEDICINE

## 2019-09-04 PROCEDURE — 85027 COMPLETE CBC AUTOMATED: CPT

## 2019-09-04 PROCEDURE — 96375 TX/PRO/DX INJ NEW DRUG ADDON: CPT

## 2019-09-04 PROCEDURE — 96415 CHEMO IV INFUSION ADDL HR: CPT

## 2019-09-04 PROCEDURE — 77412 RADIATION TX DELIVERY LVL 3: CPT | Performed by: RADIOLOGY

## 2019-09-04 PROCEDURE — 96367 TX/PROPH/DG ADDL SEQ IV INF: CPT

## 2019-09-04 PROCEDURE — 36591 DRAW BLOOD OFF VENOUS DEVICE: CPT

## 2019-09-04 PROCEDURE — 96413 CHEMO IV INFUSION 1 HR: CPT

## 2019-09-04 PROCEDURE — 80076 HEPATIC FUNCTION PANEL: CPT

## 2019-09-04 PROCEDURE — 6360000002 HC RX W HCPCS: Performed by: INTERNAL MEDICINE

## 2019-09-04 RX ORDER — SODIUM CHLORIDE 0.9 % (FLUSH) 0.9 %
10 SYRINGE (ML) INJECTION PRN
Status: DISCONTINUED | OUTPATIENT
Start: 2019-09-04 | End: 2019-09-05 | Stop reason: HOSPADM

## 2019-09-04 RX ORDER — HEPARIN SODIUM (PORCINE) LOCK FLUSH IV SOLN 100 UNIT/ML 100 UNIT/ML
500 SOLUTION INTRAVENOUS PRN
Status: CANCELLED | OUTPATIENT
Start: 2019-09-04

## 2019-09-04 RX ORDER — SODIUM CHLORIDE 0.9 % (FLUSH) 0.9 %
10 SYRINGE (ML) INJECTION PRN
Status: CANCELLED | OUTPATIENT
Start: 2019-09-04

## 2019-09-04 RX ORDER — PALONOSETRON 0.05 MG/ML
0.25 INJECTION, SOLUTION INTRAVENOUS ONCE
Status: COMPLETED | OUTPATIENT
Start: 2019-09-04 | End: 2019-09-04

## 2019-09-04 RX ORDER — DEXAMETHASONE 4 MG/1
8 TABLET ORAL
Qty: 4 TABLET | Refills: 3 | Status: SHIPPED | OUTPATIENT
Start: 2019-09-04 | End: 2019-09-06

## 2019-09-04 RX ORDER — SODIUM CHLORIDE 0.9 % (FLUSH) 0.9 %
20 SYRINGE (ML) INJECTION PRN
Status: DISCONTINUED | OUTPATIENT
Start: 2019-09-04 | End: 2019-09-05 | Stop reason: HOSPADM

## 2019-09-04 RX ORDER — HEPARIN SODIUM (PORCINE) LOCK FLUSH IV SOLN 100 UNIT/ML 100 UNIT/ML
500 SOLUTION INTRAVENOUS PRN
Status: DISCONTINUED | OUTPATIENT
Start: 2019-09-04 | End: 2019-09-05 | Stop reason: HOSPADM

## 2019-09-04 RX ORDER — SODIUM CHLORIDE 0.9 % (FLUSH) 0.9 %
20 SYRINGE (ML) INJECTION PRN
Status: CANCELLED | OUTPATIENT
Start: 2019-09-04

## 2019-09-04 RX ORDER — SODIUM CHLORIDE 9 MG/ML
20 INJECTION, SOLUTION INTRAVENOUS ONCE
Status: COMPLETED | OUTPATIENT
Start: 2019-09-04 | End: 2019-09-04

## 2019-09-04 RX ADMIN — POTASSIUM CHLORIDE: 2 INJECTION, SOLUTION, CONCENTRATE INTRAVENOUS at 11:00

## 2019-09-04 RX ADMIN — DEXAMETHASONE SODIUM PHOSPHATE 12 MG: 4 INJECTION, SOLUTION INTRAMUSCULAR; INTRAVENOUS at 12:43

## 2019-09-04 RX ADMIN — Medication 10 ML: at 10:59

## 2019-09-04 RX ADMIN — CISPLATIN: 1 INJECTION, SOLUTION INTRAVENOUS at 13:02

## 2019-09-04 RX ADMIN — SODIUM CHLORIDE 150 MG: 9 INJECTION, SOLUTION INTRAVENOUS at 12:10

## 2019-09-04 RX ADMIN — HEPARIN SODIUM (PORCINE) LOCK FLUSH IV SOLN 100 UNIT/ML 500 UNITS: 100 SOLUTION at 16:16

## 2019-09-04 RX ADMIN — Medication 20 ML: at 10:31

## 2019-09-04 RX ADMIN — POTASSIUM CHLORIDE: 2 INJECTION, SOLUTION, CONCENTRATE INTRAVENOUS at 15:08

## 2019-09-04 RX ADMIN — PALONOSETRON 0.25 MG: 0.05 INJECTION, SOLUTION INTRAVENOUS at 12:09

## 2019-09-04 RX ADMIN — Medication 10 ML: at 16:16

## 2019-09-04 RX ADMIN — SODIUM CHLORIDE 20 ML/HR: 9 INJECTION, SOLUTION INTRAVENOUS at 10:59

## 2019-09-04 RX ADMIN — Medication 10 ML: at 10:30

## 2019-09-04 NOTE — ONCOLOGY
Chemotherapy Administration    Pre-assessment Data: Antineoplastic Agents  Other:   See toxicity flow sheet for assessment [x]     Physician Notification of Concerns Related to Chemotherapy Administration:   Physician Notified Devante Inch / Time of Notification      Interventions:   Lab work assessed  [x]   Height / Weight verified for dose [x]   Current MAR reviewed [x]   Emergency drugs available as appropriate [x]   Anaphylaxis assessment completed [x]   Pre-medications administered as ordered [x]   Blood return noted upon initiation of chemotherapy [x]   Blood return noted each 1-2ml of a vesicant medication if given IV push []   Blood return noted each 2-3ml of a non-vesicant medication if given IV push []   Monitor for signs / symptoms of hypersensitivity reaction [x]   Chemotherapy orders (drug/dose/rate) verified by 2 Chemo certified RNs [x]   Monitor IV site and blood return throughout the infusion of the medication [x]   Document IV site checks on the IV assessment form [x]   Document chemotherapy teaching on the Patient Education tab [x]   Document patient verbalizes understanding of medications being administered [x]   If IV infiltration, see ONS Guidelines []   Other:      []

## 2019-09-04 NOTE — PLAN OF CARE
Problem: Intellectual/Education/Knowledge Deficit  Goal: Teaching initiated upon admission  Outcome: Met This Shift  Note:   Patient verbalizes understanding to verbal information given on Cisplatin,action and possible side effects. Aware to call MD if develop complications. Intervention: Verbal/written education provided  Note:   Chemotherapy Teaching     What is Chemotherapy   Drug action ? Method of Administration ? Handouts given ? Side Effects  Nausea/vomiting ? Diarrhea ? Fatigue ? Signs / Symptoms of infection ? Neutropenia ? Thrombocytopenia ? Alopecia ? neuropathy ? [place] diet &  the importance of fluids ? Micellaneous  Importance of nutrition ? Importance of oral hygiene ? When to call the MD ?   Monitoring labs ? Use of supportive services ? Explanation of Drug Regimen / Frequency  Cisplatin weekly- C4d1     Comments  Verbalized understanding to drug,action,side effects and when to call MD         Problem: Discharge Planning:  Goal: Discharged to appropriate level of care  Description  Discharged to appropriate level of care  Outcome: Met This Shift  Note:   Verbalized understanding of discharge instructions, follow-up appointments, and when to call the physician. Intervention: Assess readiness for discharge  Description  Assess readiness for discharge  Note:   Discuss understanding of discharge instructions,follow-up appointments, and when to call the physician. Problem: Falls - Risk of:  Goal: Will remain free from falls  Description  Will remain free from falls  Outcome: Met This Shift  Note:   No falls occurred with visit today. Intervention: Assess risk factors for falls  Description  Assess risk factors for falls  Note:   Verbalized understanding of fall prevention to ask for assistance with ambulation. Call light within reach.       Problem: Infection - Central Venous Catheter-Associated Bloodstream Infection:  Goal: Will show no infection signs and symptoms  Description  Will show no infection signs and symptoms  Outcome: Met This Shift  Note:   Mediport site with no redness or warmth. Skin over port site intact with no signs of breakdown noted. Patient verbalizes signs/symptoms of port infection and when to notify the physician. Intervention: Infection risk assessment  Description  Infection risk assessment  Note:   Instructed to monitor for signs/symptoms of infection at [address] [facility] [facility] and call MD if problems develop. Care plan reviewed with patient . Patient  verbalize understanding of the plan of care and contribute to goal setting.

## 2019-09-04 NOTE — PROGRESS NOTES
Patient assessed for the following post chemotherapy:    Dizziness   No  Lightheadedness  No      Acute nausea/vomiting No  Headache   No  Chest pain/pressure  No  Rash/itching   No  Shortness of breath  No    Patient kept for 20 minutes observation post infusion chemotherapy. Patient tolerated chemotherapy treatment Cisplatin without any complications. Last vital signs:   /87   Pulse 72   Temp 98.3 °F (36.8 °C) (Oral)   Resp 16   Ht 5' 8\" (1.727 m)   Wt 200 lb 12.8 oz (91.1 kg)   LMP 08/14/2019   SpO2 97%   BMI 30.53 kg/m²     Diet sheet on foods high potassium given to patient    Patient instructed if experience any of the above symptoms following today's infusion,he/she is to notify MD immediately or go to the emergency department. Discharge instructions given to patient. Verbalizes understanding. Ambulated off unit per self with belongings.

## 2019-09-05 ENCOUNTER — HOSPITAL ENCOUNTER (OUTPATIENT)
Dept: RADIATION ONCOLOGY | Age: 34
Discharge: HOME | End: 2019-09-05
Attending: RADIOLOGY
Payer: COMMERCIAL

## 2019-09-05 PROCEDURE — 77412 RADIATION TX DELIVERY LVL 3: CPT | Performed by: RADIOLOGY

## 2019-09-05 PROCEDURE — 77387 GUIDANCE FOR RADJ TX DLVR: CPT | Performed by: RADIOLOGY

## 2019-09-06 ENCOUNTER — APPOINTMENT (OUTPATIENT)
Dept: RADIATION ONCOLOGY | Age: 34
End: 2019-09-06
Attending: RADIOLOGY
Payer: COMMERCIAL

## 2019-09-09 ENCOUNTER — TELEPHONE (OUTPATIENT)
Dept: FAMILY MEDICINE CLINIC | Age: 34
End: 2019-09-09

## 2019-09-09 ENCOUNTER — HOSPITAL ENCOUNTER (OUTPATIENT)
Dept: RADIATION ONCOLOGY | Age: 34
Discharge: HOME | End: 2019-09-09
Attending: RADIOLOGY
Payer: COMMERCIAL

## 2019-09-09 ENCOUNTER — TELEPHONE (OUTPATIENT)
Dept: ONCOLOGY | Age: 34
End: 2019-09-09

## 2019-09-09 PROCEDURE — 77412 RADIATION TX DELIVERY LVL 3: CPT | Performed by: RADIOLOGY

## 2019-09-09 PROCEDURE — 77336 RADIATION PHYSICS CONSULT: CPT | Performed by: RADIOLOGY

## 2019-09-09 PROCEDURE — 77387 GUIDANCE FOR RADJ TX DLVR: CPT | Performed by: RADIOLOGY

## 2019-09-09 NOTE — TELEPHONE ENCOUNTER
Pt called in with 2 BP readings:  9/4/19 123/45 morning with BP medication  9/6/19 128/73 morning w/o medication      Please advise.

## 2019-09-10 ENCOUNTER — APPOINTMENT (OUTPATIENT)
Dept: RADIATION ONCOLOGY | Age: 34
End: 2019-09-10
Attending: RADIOLOGY
Payer: COMMERCIAL

## 2019-09-10 ENCOUNTER — HOSPITAL ENCOUNTER (OUTPATIENT)
Dept: INFUSION THERAPY | Age: 34
Discharge: HOME | End: 2019-09-10
Payer: COMMERCIAL

## 2019-09-11 ENCOUNTER — HOSPITAL ENCOUNTER (OUTPATIENT)
Dept: RADIATION ONCOLOGY | Age: 34
Discharge: HOME | End: 2019-09-11
Attending: RADIOLOGY
Payer: COMMERCIAL

## 2019-09-11 ENCOUNTER — APPOINTMENT (OUTPATIENT)
Dept: RADIATION ONCOLOGY | Age: 34
End: 2019-09-11
Attending: RADIOLOGY
Payer: COMMERCIAL

## 2019-09-11 ENCOUNTER — TELEPHONE (OUTPATIENT)
Dept: INFUSION THERAPY | Age: 34
End: 2019-09-11

## 2019-09-11 PROCEDURE — 77387 GUIDANCE FOR RADJ TX DLVR: CPT | Performed by: RADIOLOGY

## 2019-09-11 PROCEDURE — 77412 RADIATION TX DELIVERY LVL 3: CPT | Performed by: RADIOLOGY

## 2019-09-12 ENCOUNTER — HOSPITAL ENCOUNTER (OUTPATIENT)
Age: 34
Discharge: HOME | End: 2019-09-12
Payer: COMMERCIAL

## 2019-09-12 ENCOUNTER — HOSPITAL ENCOUNTER (OUTPATIENT)
Dept: RADIATION ONCOLOGY | Age: 34
Discharge: HOME | End: 2019-09-12
Attending: RADIOLOGY
Payer: COMMERCIAL

## 2019-09-12 DIAGNOSIS — C53.8 MALIGNANT NEOPLASM OF OVERLAPPING SITES OF CERVIX (HCC): Primary | ICD-10-CM

## 2019-09-12 DIAGNOSIS — C53.8 MALIGNANT NEOPLASM OF OVERLAPPING SITES OF CERVIX UTERI (HCC): ICD-10-CM

## 2019-09-12 DIAGNOSIS — C53.8 MALIGNANT NEOPLASM OF OVERLAPPING SITES OF CERVIX UTERI (HCC): Primary | ICD-10-CM

## 2019-09-12 LAB
BASINOPHIL, AUTOMATED: 0 % (ref 0–3)
EOSINOPHILS RELATIVE PERCENT: 4 % (ref 0–4)
HCT VFR BLD CALC: 35.1 % (ref 37–47)
HEMOGLOBIN: 12.1 GM/DL (ref 12–16)
LYMPHOCYTES # BLD: 10 % (ref 15–47)
MCH RBC QN AUTO: 33.2 PG (ref 27–31)
MCHC RBC AUTO-ENTMCNC: 34.4 GM/DL (ref 33–37)
MCV RBC AUTO: 97 FL (ref 81–99)
MONOCYTES: 11 % (ref 0–12)
PDW BLD-RTO: 11.4 % (ref 11.5–14.5)
PLATELET # BLD: 222 THOU/MM3 (ref 130–400)
PMV BLD AUTO: 7.7 FL (ref 7.4–10.4)
RBC # BLD: 3.63 MILL/MM3 (ref 4.2–5.4)
SEG NEUTROPHILS: 75 % (ref 43–75)
WBC # BLD: 5.3 THOU/MM3 (ref 4.8–10.8)

## 2019-09-12 PROCEDURE — 77387 GUIDANCE FOR RADJ TX DLVR: CPT | Performed by: RADIOLOGY

## 2019-09-12 PROCEDURE — 85025 COMPLETE CBC W/AUTO DIFF WBC: CPT

## 2019-09-12 PROCEDURE — 36415 COLL VENOUS BLD VENIPUNCTURE: CPT

## 2019-09-12 PROCEDURE — 77412 RADIATION TX DELIVERY LVL 3: CPT | Performed by: RADIOLOGY

## 2019-09-12 NOTE — PROGRESS NOTES
[facility]  [facility], [address]  Phone: [phone]   Toll Free: [phone]   Fax: [phone]    RADIATION ONCOLOGY  WEEKLY ON-TREATMENT ASSESSMENT NOTE    PATIENT: Kenneth Tai   YOB: 1985  CSN: [id number]    DATE: 9/12/2019    CONSULTANT:  Ezequiel hSin MD, [facility]      FOLLOWING Dx:  Cancer Staging  Malignant neoplasm of overlapping sites of cervix [facility])  Staging form: Cervix Uteri, AJCC 8th Edition  - Clinical stage from 8/13/2019: FIGO Stage IIA1 (cT2a1, cN0, cM0) - Signed by Weston Moore MD on 8/13/2019       Site. Summary:   cGy of a planned   cGy    MEDICATIONS:     Current Outpatient Medications   Medication Sig Dispense Refill    oxybutynin (DITROPAN-XL) 5 MG extended release tablet Take 5 mg by mouth daily      lisinopril (PRINIVIL;ZESTRIL) 10 MG tablet Take 1 tablet by mouth daily 90 tablet 1    traZODone (DESYREL) 100 MG tablet Take 1 tablet by mouth nightly 30 tablet 6    aspirin 81 MG chewable tablet Take 1 tablet by mouth daily 30 tablet 3    atorvastatin (LIPITOR) 40 MG tablet Take 1 tablet by mouth nightly 30 tablet 3    lidocaine-prilocaine (EMLA) 2.5-2.5 % cream Apply topically as needed. 30 g 2    prochlorperazine (COMPAZINE) 5 MG tablet Take 1 tablet by mouth every 6 hours as needed for Nausea 30 tablet 3    ibuprofen (ADVIL;MOTRIN) 200 MG tablet Take 200 mg by mouth every 8 hours as needed for Pain      Loratadine-Pseudoephedrine (CLARITIN-D 24 HOUR PO) Take by mouth      meclizine (ANTIVERT) 25 MG tablet Take 1 tablet by mouth 3 times daily as needed for Dizziness 40 tablet 1     No current facility-administered medications for this encounter. * New    PHYSICAL EXAM:       ECOG performance:  0   Pain Score(base 10):   0     S: Complaints relating to current radiotherapy: low pelvic pressure. Continues normal range ADL. See nursing assessment for this date.

## 2019-09-13 ENCOUNTER — APPOINTMENT (OUTPATIENT)
Dept: RADIATION ONCOLOGY | Age: 34
End: 2019-09-13
Attending: RADIOLOGY
Payer: COMMERCIAL

## 2019-09-16 ENCOUNTER — APPOINTMENT (OUTPATIENT)
Dept: RADIATION ONCOLOGY | Age: 34
End: 2019-09-16
Attending: RADIOLOGY
Payer: COMMERCIAL

## 2019-09-16 ENCOUNTER — HOSPITAL ENCOUNTER (OUTPATIENT)
Dept: RADIATION ONCOLOGY | Age: 34
End: 2019-09-16
Attending: RADIOLOGY
Payer: COMMERCIAL

## 2019-09-16 PROCEDURE — 77336 RADIATION PHYSICS CONSULT: CPT | Performed by: RADIOLOGY

## 2019-09-16 RX ORDER — SODIUM CHLORIDE 9 MG/ML
INJECTION, SOLUTION INTRAVENOUS CONTINUOUS
Status: CANCELLED | OUTPATIENT
Start: 2019-09-17

## 2019-09-16 RX ORDER — SODIUM CHLORIDE 0.9 % (FLUSH) 0.9 %
5 SYRINGE (ML) INJECTION PRN
Status: CANCELLED | OUTPATIENT
Start: 2019-09-17

## 2019-09-16 RX ORDER — HEPARIN SODIUM (PORCINE) LOCK FLUSH IV SOLN 100 UNIT/ML 100 UNIT/ML
500 SOLUTION INTRAVENOUS PRN
Status: CANCELLED | OUTPATIENT
Start: 2019-09-17

## 2019-09-16 RX ORDER — EPINEPHRINE 1 MG/ML
0.3 INJECTION, SOLUTION, CONCENTRATE INTRAVENOUS PRN
Status: CANCELLED | OUTPATIENT
Start: 2019-09-17

## 2019-09-16 RX ORDER — SODIUM CHLORIDE 9 MG/ML
20 INJECTION, SOLUTION INTRAVENOUS ONCE
Status: CANCELLED | OUTPATIENT
Start: 2019-09-17

## 2019-09-16 RX ORDER — SODIUM CHLORIDE 0.9 % (FLUSH) 0.9 %
10 SYRINGE (ML) INJECTION PRN
Status: CANCELLED | OUTPATIENT
Start: 2019-09-17

## 2019-09-16 RX ORDER — METHYLPREDNISOLONE SODIUM SUCCINATE 125 MG/2ML
125 INJECTION, POWDER, LYOPHILIZED, FOR SOLUTION INTRAMUSCULAR; INTRAVENOUS ONCE
Status: CANCELLED | OUTPATIENT
Start: 2019-09-17

## 2019-09-16 RX ORDER — DIPHENHYDRAMINE HYDROCHLORIDE 50 MG/ML
50 INJECTION INTRAMUSCULAR; INTRAVENOUS ONCE
Status: CANCELLED | OUTPATIENT
Start: 2019-09-17

## 2019-09-16 RX ORDER — PALONOSETRON 0.05 MG/ML
0.25 INJECTION, SOLUTION INTRAVENOUS ONCE
Status: CANCELLED | OUTPATIENT
Start: 2019-09-17

## 2019-09-16 ASSESSMENT — ENCOUNTER SYMPTOMS
BACK PAIN: 0
DIARRHEA: 0
COUGH: 0
ABDOMINAL PAIN: 0
COLOR CHANGE: 0
BLOOD IN STOOL: 0
FACIAL SWELLING: 0
CONSTIPATION: 0
CHEST TIGHTNESS: 0
ABDOMINAL DISTENTION: 0
EYE DISCHARGE: 0
NAUSEA: 0
WHEEZING: 0
TROUBLE SWALLOWING: 0
SORE THROAT: 0
VOMITING: 0
SHORTNESS OF BREATH: 0
RECTAL PAIN: 0

## 2019-09-17 ENCOUNTER — HOSPITAL ENCOUNTER (OUTPATIENT)
Dept: INFUSION THERAPY | Age: 34
Discharge: HOME | End: 2019-09-17
Payer: COMMERCIAL

## 2019-09-17 ENCOUNTER — OFFICE VISIT (OUTPATIENT)
Dept: ONCOLOGY | Age: 34
End: 2019-09-17
Payer: COMMERCIAL

## 2019-09-17 ENCOUNTER — HOSPITAL ENCOUNTER (OUTPATIENT)
Dept: RADIATION ONCOLOGY | Age: 34
Discharge: HOME | End: 2019-09-17
Attending: RADIOLOGY
Payer: COMMERCIAL

## 2019-09-17 VITALS
OXYGEN SATURATION: 98 % | WEIGHT: 194.8 LBS | DIASTOLIC BLOOD PRESSURE: 68 MMHG | HEART RATE: 83 BPM | SYSTOLIC BLOOD PRESSURE: 120 MMHG | RESPIRATION RATE: 16 BRPM | TEMPERATURE: 97 F | HEIGHT: 67 IN | BODY MASS INDEX: 30.57 KG/M2

## 2019-09-17 VITALS
DIASTOLIC BLOOD PRESSURE: 61 MMHG | BODY MASS INDEX: 29.52 KG/M2 | TEMPERATURE: 96.8 F | WEIGHT: 194.8 LBS | HEIGHT: 68 IN | RESPIRATION RATE: 16 BRPM | SYSTOLIC BLOOD PRESSURE: 104 MMHG | OXYGEN SATURATION: 98 % | HEART RATE: 80 BPM

## 2019-09-17 DIAGNOSIS — C53.8 MALIGNANT NEOPLASM OF OVERLAPPING SITES OF CERVIX (HCC): ICD-10-CM

## 2019-09-17 DIAGNOSIS — C53.8 MALIGNANT NEOPLASM OF OVERLAPPING SITES OF CERVIX (HCC): Primary | ICD-10-CM

## 2019-09-17 DIAGNOSIS — F51.04 PSYCHOPHYSIOLOGICAL INSOMNIA: ICD-10-CM

## 2019-09-17 DIAGNOSIS — Z51.11 ENCOUNTER FOR CHEMOTHERAPY MANAGEMENT: Primary | ICD-10-CM

## 2019-09-17 LAB
ALBUMIN SERPL-MCNC: 4.6 G/DL (ref 3.5–5.1)
ALP BLD-CCNC: 82 U/L (ref 38–126)
ALT SERPL-CCNC: 46 U/L (ref 11–66)
AST SERPL-CCNC: 32 U/L (ref 5–40)
BILIRUB SERPL-MCNC: 0.5 MG/DL (ref 0.3–1.2)
BILIRUBIN DIRECT: < 0.2 MG/DL (ref 0–0.3)
BUN, WHOLE BLOOD: 12 MG/DL (ref 8–26)
CHLORIDE, WHOLE BLOOD: 107 MEQ/L (ref 98–109)
CREATININE, WHOLE BLOOD: 0.7 MG/DL (ref 0.5–1.2)
GFR, ESTIMATED: > 90 ML/MIN/1.73M2
GFR, ESTIMATED: > 90 ML/MIN/1.73M2
GLUCOSE, WHOLE BLOOD: 96 MG/DL (ref 70–108)
HCT VFR BLD CALC: 33.7 % (ref 37–47)
HEMOGLOBIN: 11.5 GM/DL (ref 12–16)
IONIZED CALCIUM, WHOLE BLOOD: 1.2 MMOL/L (ref 1.12–1.32)
MCH RBC QN AUTO: 33.1 PG (ref 27–31)
MCHC RBC AUTO-ENTMCNC: 34 GM/DL (ref 33–37)
MCV RBC AUTO: 97 FL (ref 81–99)
PDW BLD-RTO: 11.4 % (ref 11.5–14.5)
PLATELET # BLD: 211 THOU/MM3 (ref 130–400)
PMV BLD AUTO: 7.6 FL (ref 7.4–10.4)
POTASSIUM, WHOLE BLOOD: 3.6 MEQ/L (ref 3.5–4.9)
RBC # BLD: 3.47 MILL/MM3 (ref 4.2–5.4)
SEG NEUTROPHILS: 75 % (ref 43–75)
SEGMENTED NEUTROPHILS ABSOLUTE COUNT: 4.1 THOU/MM3 (ref 1.8–7.7)
SODIUM, WHOLE BLOOD: 140 MEQ/L (ref 138–146)
TOTAL CO2, WHOLE BLOOD: 26 MEQ/L (ref 23–33)
TOTAL PROTEIN: 7.2 G/DL (ref 6.1–8)
WBC # BLD: 5.4 THOU/MM3 (ref 4.8–10.8)

## 2019-09-17 PROCEDURE — 36591 DRAW BLOOD OFF VENOUS DEVICE: CPT

## 2019-09-17 PROCEDURE — 77387 GUIDANCE FOR RADJ TX DLVR: CPT | Performed by: RADIOLOGY

## 2019-09-17 PROCEDURE — 77412 RADIATION TX DELIVERY LVL 3: CPT | Performed by: RADIOLOGY

## 2019-09-17 PROCEDURE — 96367 TX/PROPH/DG ADDL SEQ IV INF: CPT

## 2019-09-17 PROCEDURE — 85027 COMPLETE CBC AUTOMATED: CPT

## 2019-09-17 PROCEDURE — G8417 CALC BMI ABV UP PARAM F/U: HCPCS | Performed by: INTERNAL MEDICINE

## 2019-09-17 PROCEDURE — 99214 OFFICE O/P EST MOD 30 MIN: CPT | Performed by: INTERNAL MEDICINE

## 2019-09-17 PROCEDURE — 2580000003 HC RX 258: Performed by: INTERNAL MEDICINE

## 2019-09-17 PROCEDURE — 80076 HEPATIC FUNCTION PANEL: CPT

## 2019-09-17 PROCEDURE — 96415 CHEMO IV INFUSION ADDL HR: CPT

## 2019-09-17 PROCEDURE — 4004F PT TOBACCO SCREEN RCVD TLK: CPT | Performed by: INTERNAL MEDICINE

## 2019-09-17 PROCEDURE — 96366 THER/PROPH/DIAG IV INF ADDON: CPT

## 2019-09-17 PROCEDURE — 6360000002 HC RX W HCPCS: Performed by: INTERNAL MEDICINE

## 2019-09-17 PROCEDURE — G8427 DOCREV CUR MEDS BY ELIG CLIN: HCPCS | Performed by: INTERNAL MEDICINE

## 2019-09-17 PROCEDURE — 96375 TX/PRO/DX INJ NEW DRUG ADDON: CPT

## 2019-09-17 PROCEDURE — 96413 CHEMO IV INFUSION 1 HR: CPT

## 2019-09-17 PROCEDURE — 80047 BASIC METABLC PNL IONIZED CA: CPT

## 2019-09-17 PROCEDURE — 99211 OFF/OP EST MAY X REQ PHY/QHP: CPT

## 2019-09-17 RX ORDER — HEPARIN SODIUM (PORCINE) LOCK FLUSH IV SOLN 100 UNIT/ML 100 UNIT/ML
500 SOLUTION INTRAVENOUS PRN
Status: CANCELLED | OUTPATIENT
Start: 2019-09-17

## 2019-09-17 RX ORDER — DEXAMETHASONE 4 MG/1
8 TABLET ORAL DAILY
Qty: 4 TABLET | Refills: 0 | Status: SHIPPED | OUTPATIENT
Start: 2019-09-18 | End: 2019-09-20

## 2019-09-17 RX ORDER — HEPARIN SODIUM (PORCINE) LOCK FLUSH IV SOLN 100 UNIT/ML 100 UNIT/ML
500 SOLUTION INTRAVENOUS PRN
Status: DISCONTINUED | OUTPATIENT
Start: 2019-09-17 | End: 2019-09-18 | Stop reason: HOSPADM

## 2019-09-17 RX ORDER — SODIUM CHLORIDE 0.9 % (FLUSH) 0.9 %
10 SYRINGE (ML) INJECTION PRN
Status: CANCELLED | OUTPATIENT
Start: 2019-09-17

## 2019-09-17 RX ORDER — PHENAZOPYRIDINE HYDROCHLORIDE 100 MG/1
100 TABLET, FILM COATED ORAL 2 TIMES DAILY
COMMUNITY
Start: 2019-08-21 | End: 2021-07-01 | Stop reason: ALTCHOICE

## 2019-09-17 RX ORDER — PALONOSETRON 0.05 MG/ML
0.25 INJECTION, SOLUTION INTRAVENOUS ONCE
Status: COMPLETED | OUTPATIENT
Start: 2019-09-17 | End: 2019-09-17

## 2019-09-17 RX ORDER — SODIUM CHLORIDE 9 MG/ML
20 INJECTION, SOLUTION INTRAVENOUS ONCE
Status: COMPLETED | OUTPATIENT
Start: 2019-09-17 | End: 2019-09-17

## 2019-09-17 RX ORDER — SODIUM CHLORIDE 0.9 % (FLUSH) 0.9 %
20 SYRINGE (ML) INJECTION PRN
Status: CANCELLED | OUTPATIENT
Start: 2019-09-17

## 2019-09-17 RX ORDER — DEXAMETHASONE 4 MG/1
4 TABLET ORAL DAILY
COMMUNITY
Start: 2019-07-31 | End: 2019-09-17 | Stop reason: SDUPTHER

## 2019-09-17 RX ORDER — SODIUM CHLORIDE 0.9 % (FLUSH) 0.9 %
10 SYRINGE (ML) INJECTION PRN
Status: DISCONTINUED | OUTPATIENT
Start: 2019-09-17 | End: 2019-09-18 | Stop reason: HOSPADM

## 2019-09-17 RX ORDER — SODIUM CHLORIDE 0.9 % (FLUSH) 0.9 %
20 SYRINGE (ML) INJECTION PRN
Status: DISCONTINUED | OUTPATIENT
Start: 2019-09-17 | End: 2019-09-18 | Stop reason: HOSPADM

## 2019-09-17 RX ADMIN — PALONOSETRON HYDROCHLORIDE 0.25 MG: 0.05 INJECTION, SOLUTION INTRAVENOUS at 12:18

## 2019-09-17 RX ADMIN — POTASSIUM CHLORIDE: 2 INJECTION, SOLUTION, CONCENTRATE INTRAVENOUS at 11:15

## 2019-09-17 RX ADMIN — Medication 10 ML: at 09:15

## 2019-09-17 RX ADMIN — POTASSIUM CHLORIDE: 2 INJECTION, SOLUTION, CONCENTRATE INTRAVENOUS at 15:35

## 2019-09-17 RX ADMIN — Medication 20 ML: at 09:16

## 2019-09-17 RX ADMIN — HEPARIN SODIUM (PORCINE) LOCK FLUSH IV SOLN 100 UNIT/ML 500 UNITS: 100 SOLUTION at 16:42

## 2019-09-17 RX ADMIN — SODIUM CHLORIDE 20 ML/HR: 9 INJECTION, SOLUTION INTRAVENOUS at 11:11

## 2019-09-17 RX ADMIN — DEXAMETHASONE SODIUM PHOSPHATE 12 MG: 4 INJECTION, SOLUTION INTRAMUSCULAR; INTRAVENOUS at 12:20

## 2019-09-17 RX ADMIN — SODIUM CHLORIDE 150 MG: 9 INJECTION, SOLUTION INTRAVENOUS at 12:40

## 2019-09-17 RX ADMIN — CISPLATIN: 1 INJECTION, SOLUTION INTRAVENOUS at 13:30

## 2019-09-17 RX ADMIN — Medication 10 ML: at 16:42

## 2019-09-17 NOTE — PROGRESS NOTES
Patient assessed for the following post chemotherapy:    Dizziness   No  Lightheadedness  No     Acute nausea/vomiting No  Headache   No  Chest pain/pressure             No  Rash/itching   No  Shortness of breath  No    Patient kept for 20 minutes observation post infusion chemotherapy. Patient tolerated chemotherapy treatment cisplatin without any complications. Labs drawn per mediport. Last vital signs:   /68   Pulse 83   Temp 97 °F (36.1 °C) (Oral)   Resp 16   Ht 5' 7\" (1.702 m)   Wt 194 lb 12.8 oz (88.4 kg)   LMP 08/14/2019   SpO2 98%   BMI 30.51 kg/m²     Patient instructed if experience any of the above symptoms following today's infusion,he/she is to notify MD immediately or go to the emergency department. Discharge instructions given to patient. Verbalizes understanding. Ambulated off unit per self with family with belongings.

## 2019-09-17 NOTE — PROGRESS NOTES
Labs drawn via central venous catheter, then patient escorted to exam room accompanied by Poly Rodgers Parent

## 2019-09-17 NOTE — PROGRESS NOTES
fosaprepitant (EMEND) 150 mg in sodium chloride 0.9 % 250 mL IVPB    DISCONTINUED: palonosetron (ALOXI) injection 0.25 mg    DISCONTINUED: dexamethasone (DECADRON) 12 mg in sodium chloride 0.9 % IVPB    DISCONTINUED: CISplatin (PLATINOL) 70 mg, mannitol 12.5 g in sodium chloride 0.9 % 500 mL chemo IVPB    DISCONTINUED: potassium chloride 10 mEq, magnesium sulfate 1 g in sodium chloride 0.9 % 500 mL IVPB        FIGO Stage IIA1 (cT2a1, cN0, cM0) cervical cancer. Due to her advanced stage of cervical cancer, she will require definitive chemoradiation consisting of external beam radiation with concurrent chemotherapy followed by intracavitary brachytherapy. 2. Concurrent chemoradiation. She tolerated first infusion of cisplatin quite well. No electrolyte problems, no impairment of kidney function, mild fatigue. Denies having any peripheral neuropathy with burning, numbness, or tingling. Today, her vital signs are stable her labs are within normal limits. We will proceed with 5th, last dose of weekly infusion of cisplatin. The patient was instructed to continue Decadron 4 mg po daily on days 2-4 for acute CIV. She was also instructed to use compazine PRN. Return in about 2 weeks (around 10/1/2019). Orders Placed:   Orders Placed This Encounter   Procedures    POC PANEL BMP W/IOCA     Standing Status:   Future     Standing Expiration Date:   9/16/2020    Hepatic Function Panel     Standing Status:   Future     Standing Expiration Date:   9/16/2020    CBC     Standing Status:   Future     Standing Expiration Date:   9/17/2020    Absolute Neutrophil     Standing Status:   Future     Standing Expiration Date:   9/17/2020        Medications Prescribed:   Orders Placed This Encounter   Medications    dexamethasone (DECADRON) 4 MG tablet     Sig: Take 2 tablets by mouth daily for 2 days     Dispense:  4 tablet     Refill:  0            Discussed use, benefit, and side effectsof prescribed medications.

## 2019-09-17 NOTE — ONCOLOGY
Chemotherapy Administration    Pre-assessment Data: Antineoplastic Agents  Other:   See toxicity flow sheet for assessment [x]     Physician Notification of Concerns Related to Chemotherapy Administration:   Physician Notified Gwendel Lines / Time of Notification Dr Roma Goodman seen today.      Interventions:   Lab work assessed  [x]   Height / Weight verified for dose [x]   Current MAR reviewed [x]   Emergency drugs available as appropriate [x]   Anaphylaxis assessment completed [x]   Pre-medications administered as ordered [x]   Blood return noted upon initiation of chemotherapy [x]   Blood return noted each 1-2ml of a vesicant medication if given IV push []   Blood return noted each 2-3ml of a non-vesicant medication if given IV push []   Monitor for signs / symptoms of hypersensitivity reaction [x]   Chemotherapy orders (drug/dose/rate) verified by 2 Chemo certified RNs [x]   Monitor IV site and blood return throughout the infusion of the medication [x]   Document IV site checks on the IV assessment form [x]   Document chemotherapy teaching on the Patient Education tab [x]   Document patient verbalizes understanding of medications being administered [x]   If IV infiltration, see ONS Guidelines []   Other:      []

## 2019-09-17 NOTE — PLAN OF CARE
Problem: Intellectual/Education/Knowledge Deficit  Goal: Teaching initiated upon admission  Outcome: Met This Shift  Note:   Patient verbalizes understanding to verbal information given on cisplatin,action and possible side effects. Aware to call MD if develop complications. Intervention: Verbal/written education provided  Note:   Chemotherapy Teaching     What is Chemotherapy   Drug action ? Method of Administration ? Handouts given ? Side Effects  Nausea/vomiting ? Diarrhea ? Fatigue ? Signs / Symptoms of infection ? Neutropenia ? Thrombocytopenia ? Alopecia ? neuropathy ? [place] diet &  the importance of fluids ? Micellaneous  Importance of nutrition ? Importance of oral hygiene ? When to call the MD ?   Monitoring labs ? Use of supportive services ? Explanation of Drug Regimen / Frequency  Cisplatin  D1 C5     Comments  Verbalized understanding to drug,action,side effects and when to call MD         Problem: Discharge Planning:  Goal: Discharged to appropriate level of care  Description  Discharged to appropriate level of care  Outcome: Met This Shift  Note:   Verbalize understanding of discharge instructions, follow up appointments, and when to call Physician. Intervention: Assess readiness for discharge  Note:   Provide discharge instructions. Problem: Falls - Risk of:  Goal: Will remain free from falls  Description  Will remain free from falls  Outcome: Met This Shift  Note:   Free from falls while in O.P. Oncology. Intervention: Assess risk factors for falls  Note:   Discussed the need to use the call light for assistance when getting up to ambulate. Call light within reach. Problem: Infection - Central Venous Catheter-Associated Bloodstream Infection:  Goal: Will show no infection signs and symptoms  Description  Will show no infection signs and symptoms  Outcome: Met This Shift  Note:   Mediport site with no redness or warmth.  Skin over port intact with no signs of breakdown noted. Patient verbalizes signs/symptoms of port infection and when to notify the physician. Intervention: Central line needs assessment  Note:   Discussed mediport maintenance, infection prevention, and when to call the physician. Labs drawn. Care plan reviewed with patient and family. Patient and family verbalize understanding of the plan of care and contribute to goal setting.

## 2019-09-18 ENCOUNTER — HOSPITAL ENCOUNTER (OUTPATIENT)
Dept: RADIATION ONCOLOGY | Age: 34
Discharge: HOME | End: 2019-09-18
Attending: RADIOLOGY
Payer: COMMERCIAL

## 2019-09-18 ENCOUNTER — CLINICAL DOCUMENTATION (OUTPATIENT)
Dept: NUTRITION | Age: 34
End: 2019-09-18

## 2019-09-18 PROCEDURE — 77412 RADIATION TX DELIVERY LVL 3: CPT | Performed by: RADIOLOGY

## 2019-09-18 PROCEDURE — 77387 GUIDANCE FOR RADJ TX DLVR: CPT | Performed by: RADIOLOGY

## 2019-09-19 ENCOUNTER — HOSPITAL ENCOUNTER (OUTPATIENT)
Dept: RADIATION ONCOLOGY | Age: 34
End: 2019-09-19
Attending: RADIOLOGY
Payer: COMMERCIAL

## 2019-09-25 ENCOUNTER — TELEPHONE (OUTPATIENT)
Dept: SPIRITUAL SERVICES | Facility: CLINIC | Age: 34
End: 2019-09-25

## 2019-09-27 ENCOUNTER — CLINICAL DOCUMENTATION (OUTPATIENT)
Dept: SPIRITUAL SERVICES | Facility: CLINIC | Age: 34
End: 2019-09-27

## 2019-09-27 NOTE — PROGRESS NOTES
children. They provide needed assistance for her. She added that she will be going to secure an update on her situation at [facility]  IAIN mei. She expressed her fear and anxiety by saying, \"I don't think I want to know. \"    Plan:       Gee Ordaz provided an empathic listening presence for Randlett to share her story and her fears. Hope was nurtured during the encounter through a sustaining presence and words shared as appropriate. [facility] was encouraged to practice mindfulness and use guided imagery to assist with her fears and anxiety, as well as times when the negative side-effects return. She was grateful for the encounter and ministry provided.  had prayer with her prior to departing. Information was provided to her for further contact, if desired.

## 2019-10-01 ENCOUNTER — OFFICE VISIT (OUTPATIENT)
Dept: ONCOLOGY | Age: 34
End: 2019-10-01
Payer: COMMERCIAL

## 2019-10-01 ENCOUNTER — HOSPITAL ENCOUNTER (OUTPATIENT)
Dept: INFUSION THERAPY | Age: 34
Discharge: HOME | End: 2019-10-01
Payer: COMMERCIAL

## 2019-10-01 VITALS
OXYGEN SATURATION: 97 % | BODY MASS INDEX: 30.26 KG/M2 | SYSTOLIC BLOOD PRESSURE: 142 MMHG | WEIGHT: 192.8 LBS | HEIGHT: 67 IN | TEMPERATURE: 98.1 F | RESPIRATION RATE: 16 BRPM | DIASTOLIC BLOOD PRESSURE: 82 MMHG | HEART RATE: 98 BPM

## 2019-10-01 VITALS
SYSTOLIC BLOOD PRESSURE: 142 MMHG | HEART RATE: 98 BPM | BODY MASS INDEX: 30.26 KG/M2 | WEIGHT: 192.8 LBS | HEIGHT: 67 IN | RESPIRATION RATE: 16 BRPM | OXYGEN SATURATION: 97 % | DIASTOLIC BLOOD PRESSURE: 82 MMHG | TEMPERATURE: 98.1 F

## 2019-10-01 DIAGNOSIS — Z92.21 STATUS POST CHEMOTHERAPY: ICD-10-CM

## 2019-10-01 DIAGNOSIS — Z51.11 ENCOUNTER FOR CHEMOTHERAPY MANAGEMENT: ICD-10-CM

## 2019-10-01 DIAGNOSIS — F51.04 PSYCHOPHYSIOLOGICAL INSOMNIA: ICD-10-CM

## 2019-10-01 DIAGNOSIS — C53.8 MALIGNANT NEOPLASM OF OVERLAPPING SITES OF CERVIX (HCC): Primary | ICD-10-CM

## 2019-10-01 LAB
ALBUMIN SERPL-MCNC: 4.5 G/DL (ref 3.5–5.1)
ALP BLD-CCNC: 72 U/L (ref 38–126)
ALT SERPL-CCNC: 11 U/L (ref 11–66)
AST SERPL-CCNC: 11 U/L (ref 5–40)
BILIRUB SERPL-MCNC: 0.4 MG/DL (ref 0.3–1.2)
BILIRUBIN DIRECT: < 0.2 MG/DL (ref 0–0.3)
BUN, WHOLE BLOOD: 14 MG/DL (ref 8–26)
CHLORIDE, WHOLE BLOOD: 107 MEQ/L (ref 98–109)
CREATININE, WHOLE BLOOD: 0.8 MG/DL (ref 0.5–1.2)
GLUCOSE, WHOLE BLOOD: 93 MG/DL (ref 70–108)
HCT VFR BLD CALC: 35.6 % (ref 37–47)
HEMOGLOBIN: 12 GM/DL (ref 12–16)
IONIZED CALCIUM, WHOLE BLOOD: 1.17 MMOL/L (ref 1.12–1.32)
MCH RBC QN AUTO: 32.7 PG (ref 27–31)
MCHC RBC AUTO-ENTMCNC: 33.5 GM/DL (ref 33–37)
MCV RBC AUTO: 98 FL (ref 81–99)
PDW BLD-RTO: 12 % (ref 11.5–14.5)
PLATELET # BLD: 261 THOU/MM3 (ref 130–400)
PMV BLD AUTO: 7.2 FL (ref 7.4–10.4)
POTASSIUM, WHOLE BLOOD: 4 MEQ/L (ref 3.5–4.9)
RBC # BLD: 3.65 MILL/MM3 (ref 4.2–5.4)
SEG NEUTROPHILS: 55 % (ref 43–75)
SEGMENTED NEUTROPHILS ABSOLUTE COUNT: 1.3 THOU/MM3 (ref 1.8–7.7)
SODIUM, WHOLE BLOOD: 140 MEQ/L (ref 138–146)
TOTAL CO2, WHOLE BLOOD: 25 MEQ/L (ref 23–33)
TOTAL PROTEIN: 7.4 G/DL (ref 6.1–8)
WBC # BLD: 2.4 THOU/MM3 (ref 4.8–10.8)

## 2019-10-01 PROCEDURE — 6360000002 HC RX W HCPCS: Performed by: INTERNAL MEDICINE

## 2019-10-01 PROCEDURE — 99213 OFFICE O/P EST LOW 20 MIN: CPT | Performed by: INTERNAL MEDICINE

## 2019-10-01 PROCEDURE — G8417 CALC BMI ABV UP PARAM F/U: HCPCS | Performed by: INTERNAL MEDICINE

## 2019-10-01 PROCEDURE — 80047 BASIC METABLC PNL IONIZED CA: CPT

## 2019-10-01 PROCEDURE — 99211 OFF/OP EST MAY X REQ PHY/QHP: CPT

## 2019-10-01 PROCEDURE — 85027 COMPLETE CBC AUTOMATED: CPT

## 2019-10-01 PROCEDURE — 2580000003 HC RX 258: Performed by: INTERNAL MEDICINE

## 2019-10-01 PROCEDURE — G8484 FLU IMMUNIZE NO ADMIN: HCPCS | Performed by: INTERNAL MEDICINE

## 2019-10-01 PROCEDURE — G8427 DOCREV CUR MEDS BY ELIG CLIN: HCPCS | Performed by: INTERNAL MEDICINE

## 2019-10-01 PROCEDURE — 80076 HEPATIC FUNCTION PANEL: CPT

## 2019-10-01 PROCEDURE — 4004F PT TOBACCO SCREEN RCVD TLK: CPT | Performed by: INTERNAL MEDICINE

## 2019-10-01 PROCEDURE — 36591 DRAW BLOOD OFF VENOUS DEVICE: CPT

## 2019-10-01 RX ORDER — HEPARIN SODIUM (PORCINE) LOCK FLUSH IV SOLN 100 UNIT/ML 100 UNIT/ML
500 SOLUTION INTRAVENOUS PRN
Status: DISCONTINUED | OUTPATIENT
Start: 2019-10-01 | End: 2019-10-02 | Stop reason: HOSPADM

## 2019-10-01 RX ORDER — SODIUM CHLORIDE 0.9 % (FLUSH) 0.9 %
20 SYRINGE (ML) INJECTION PRN
Status: DISCONTINUED | OUTPATIENT
Start: 2019-10-01 | End: 2019-10-02 | Stop reason: HOSPADM

## 2019-10-01 RX ORDER — SODIUM CHLORIDE 0.9 % (FLUSH) 0.9 %
10 SYRINGE (ML) INJECTION PRN
Status: CANCELLED | OUTPATIENT
Start: 2019-10-01

## 2019-10-01 RX ORDER — HEPARIN SODIUM (PORCINE) LOCK FLUSH IV SOLN 100 UNIT/ML 100 UNIT/ML
500 SOLUTION INTRAVENOUS PRN
Status: CANCELLED | OUTPATIENT
Start: 2019-10-01

## 2019-10-01 RX ORDER — SODIUM CHLORIDE 0.9 % (FLUSH) 0.9 %
10 SYRINGE (ML) INJECTION PRN
Status: DISCONTINUED | OUTPATIENT
Start: 2019-10-01 | End: 2019-10-02 | Stop reason: HOSPADM

## 2019-10-01 RX ORDER — SODIUM CHLORIDE 0.9 % (FLUSH) 0.9 %
20 SYRINGE (ML) INJECTION PRN
Status: CANCELLED | OUTPATIENT
Start: 2019-10-01

## 2019-10-01 RX ADMIN — Medication 20 ML: at 13:11

## 2019-10-01 RX ADMIN — Medication 10 ML: at 13:10

## 2019-10-01 RX ADMIN — HEPARIN SODIUM (PORCINE) LOCK FLUSH IV SOLN 100 UNIT/ML 500 UNITS: 100 SOLUTION at 14:32

## 2019-10-01 RX ADMIN — Medication 10 ML: at 14:32

## 2019-10-01 ASSESSMENT — ENCOUNTER SYMPTOMS
BACK PAIN: 0
FACIAL SWELLING: 0
COLOR CHANGE: 0
VOMITING: 0
CHEST TIGHTNESS: 0
WHEEZING: 0
DIARRHEA: 0
SHORTNESS OF BREATH: 0
COUGH: 0
EYE DISCHARGE: 0
TROUBLE SWALLOWING: 0
NAUSEA: 0
RECTAL PAIN: 0
SORE THROAT: 0
BLOOD IN STOOL: 0
ABDOMINAL PAIN: 0
ABDOMINAL DISTENTION: 0
CONSTIPATION: 0

## 2019-10-01 NOTE — PROGRESS NOTES
Patient assessed for the following post lab draw:    Dizziness   No  Lightheadedness  No     Acute nausea/vomiting           No  Headache              No  Chest pain/pressure  No  Rash/itching   No  Shortness of breath  No    Patient tolerated lab draw per mediport without any complications. Last vital signs:   BP (!) 142/82   Pulse 98   Temp 98.1 °F (36.7 °C) (Oral)   Resp 16   Ht 5' 7\" (1.702 m)   Wt 192 lb 12.8 oz (87.5 kg)   SpO2 97%   BMI 30.20 kg/m²     Patient instructed if experience any of the above symptoms following today's lab draw, he/she is to notify MD immediately or go to the emergency department. Discharge instructions given to patient. Verbalizes understanding. Ambulated off unit per self with belongings.

## 2019-10-01 NOTE — PLAN OF CARE
Problem: Discharge Planning:  Goal: Discharged to appropriate level of care  Description  Discharged to appropriate level of care  Outcome: Met This Shift  Note:   Verbalize understanding of discharge instructions, follow up appointments, and when to call Physician. Intervention: Assess readiness for discharge  Note:   Provide discharge instructions. Problem: Infection - Central Venous Catheter-Associated Bloodstream Infection:  Goal: Will show no infection signs and symptoms  Description  Will show no infection signs and symptoms  Outcome: Met This Shift  Note:   Mediport site with no redness or warmth. Skin over port intact with no signs of breakdown noted. Patient verbalizes signs/symptoms of port infection and when to notify the physician. Intervention: Central line needs assessment  Note:   Discussed mediport maintenance, infection prevention, and when to call the physician. Lab drawn. Care plan reviewed with patient. Patient verbalize understanding of the plan of care and contribute to goal setting.

## 2019-10-01 NOTE — PROGRESS NOTES
Labs drawn via central venous catheter, then patient escorted to exam room accompanied by Sally Krishna

## 2019-10-01 NOTE — PROGRESS NOTES
was reviewed at [facility], and they were in agreement with the diagnosis, though they did place the cancer as \"moderate to poorly differentiated\". On clinical examination, the cancer was felt to be at least stage IB2 with concern for extension to the upper vagina on the left. MRI scanning confrmed extension into the upper aspect of the left vagina; the scan is also concerning for some fat stranding in the left parametrium which could represent parametrial infiltration. Based on the disease extent, and in accordance with clinical practice guidelines, Naeem Cannon received a recommendation for definitive concurrent chemoradiotherapy. She lives less than a block away from [facility], and wishes to undergo her treatment at home in [facility]. 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 Naeem Cannon elected to receive her external beam treatment at [place] [address], in conjunction with intracavitary brachytherapy performed at [facility]. Pain Ratin-4/10 (intermittent crampy low abdominal pain, at start of treatment course and after final brachytherapy implant)   ECOG Performance Status: 0-1      Treatment Tolerance/Response:   Naeem Cannon tolerated her external beam radiation therapy well in general.  At the start of treatment she had some low abdominal/pelvic discomfort related to her primary tumor, which improved during her treatment course. Right at the end of treatment, Naeem Cannon had some postoperative discomfort related to her final intracavitary brachytherapy treatment which was already beginning to resolve a day after the implant. Clinical examination prior to initiation of brachytherapy indicated favorable response to treatment.       Systemic Therapy: Concurrent Cisplatin      Follow Up Plan:   Della Garcia is scheduled to return to [facility] for a checkup in October 2019. As usual, she was instructed to call and arrange for an earlier appointment if she has any problems, questions or concerns. She will continue care in Radiation Oncology at [facility] (Dr. Brea Miller), Medical Oncology, Gynecologic Oncology, and with her other physicians/providers. Electronically signed by Eulalio Cox MD   Radiation Oncology      CC: Antonino Sandoval; Demi Muñoz; uFnmilayo Kelly. Carolyn Longo, APRN-CNP. ACC: Tumor Registry: [facility], and [address] [place] [address]        This document was created using a voice recognition program.  Computer-generated transcription errors may be present.

## 2019-10-10 ENCOUNTER — PROCEDURE VISIT (OUTPATIENT)
Dept: SURGERY | Age: 34
End: 2019-10-10
Payer: COMMERCIAL

## 2019-10-10 VITALS
SYSTOLIC BLOOD PRESSURE: 120 MMHG | BODY MASS INDEX: 29.25 KG/M2 | HEART RATE: 84 BPM | HEIGHT: 68 IN | RESPIRATION RATE: 20 BRPM | DIASTOLIC BLOOD PRESSURE: 80 MMHG | TEMPERATURE: 98.4 F | OXYGEN SATURATION: 97 % | WEIGHT: 193 LBS

## 2019-10-10 DIAGNOSIS — C53.8 MALIGNANT NEOPLASM OF OVERLAPPING SITES OF CERVIX (HCC): ICD-10-CM

## 2019-10-10 PROCEDURE — 36590 REMOVAL TUNNELED CV CATH: CPT | Performed by: SURGERY

## 2019-10-25 ENCOUNTER — HOSPITAL ENCOUNTER (OUTPATIENT)
Dept: RADIATION ONCOLOGY | Age: 34
Discharge: HOME | End: 2019-10-25
Attending: RADIOLOGY
Payer: COMMERCIAL

## 2019-10-25 VITALS
HEART RATE: 93 BPM | OXYGEN SATURATION: 98 % | DIASTOLIC BLOOD PRESSURE: 83 MMHG | TEMPERATURE: 98.1 F | BODY MASS INDEX: 29.67 KG/M2 | RESPIRATION RATE: 16 BRPM | WEIGHT: 195.11 LBS | SYSTOLIC BLOOD PRESSURE: 132 MMHG

## 2019-10-25 LAB — GFR, ESTIMATED: > 90 ML/MIN/1.73M2

## 2019-10-25 PROCEDURE — 99212 OFFICE O/P EST SF 10 MIN: CPT | Performed by: NURSE PRACTITIONER

## 2020-01-27 ENCOUNTER — HOSPITAL ENCOUNTER (OUTPATIENT)
Dept: RADIATION ONCOLOGY | Age: 35
Discharge: HOME | End: 2020-01-27
Attending: RADIOLOGY
Payer: COMMERCIAL

## 2020-01-27 VITALS
DIASTOLIC BLOOD PRESSURE: 91 MMHG | BODY MASS INDEX: 29.19 KG/M2 | RESPIRATION RATE: 18 BRPM | HEART RATE: 90 BPM | SYSTOLIC BLOOD PRESSURE: 127 MMHG | TEMPERATURE: 97.2 F | OXYGEN SATURATION: 98 % | WEIGHT: 192 LBS

## 2020-01-27 PROCEDURE — 99212 OFFICE O/P EST SF 10 MIN: CPT | Performed by: NURSE PRACTITIONER

## 2020-01-27 NOTE — PROGRESS NOTES
[facility], [address]  Phone: [phone]   Toll Free: [phone]   Fax: [phone]    RADIATION ONCOLOGY FOLLOW UP REPORT    PATIENT NAME:  Paramjit Munoz     : 1985  MEDICAL RECORD NO: [id number]    LOCATION: [facility] NO: [id number]      PROVIDER: CHAPO Rodriguez CNP        DATE OF SERVICE: 2020    FOLLOW UP PHYSICIANS: Dr. Misha Salcido, Dr. Jessica BOWER, Dr. Salcido Median     DIAGNOSIS: C53.8 -- Malignant neoplasm of overalpping sites of the cervix uteri; Squamous cell carcinoma, Moderately to poorly differentiated; FIGO Stage IIA1 (cannot exclude Stage IIB - see MRI report).       DATE OF DIAGNOSIS: 2019     END OF TREATMENT DATE:   2019 (External beam)  Brachytherapy completed at [facility]     ECOG PERFORMANCE STATUS: 1     PAIN: Denies     CHAPERONE: Declined        HPI: Matthias Gonzales had a Pap smear performed 2019, with testing for high risk HPV.  The testing did return positive for HPV 18/45.  The PAP ThinPrep showed atypical squamous cells concerning for the possibility of a high-grade squamous intraepithelial lesion.  In May, Aliyah underwent cervix biopsy and endocervical curettage.  Both of these returned favoring invasive squamous cell carcinoma.  In accordance with clinical practice guidelines, she then received a recommendation for further evaluation.  In early , she underwent LEEP biopsy and repeat endocervix curettage.  She was diagnosed with invasive moderately differentiated squamous cell carcinoma, seen in both the LEEP biopsy and in the endocervix curettings.   Ene Menchaca then evaluated at [facility] gynecologic oncology. Giuliano Silva pathology material was reviewed at [facility], and they were in agreement with the diagnosis, though they did place the cancer as \"moderate to poorly differentiated\".  On clinical examination, the cancer was felt to be at least stage IB2 with concern for extension to the upper vagina on the left.  MRI scanning confrmed extension into the upper aspect of the left vagina; the scan is also concerning for some fat stranding in the left parametrium which could represent parametrial infiltration.    Based on the disease extent, and in accordance with clinical practice guidelines, Katharina Harman received a recommendation for definitive concurrent chemoradiotherapy.  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  Aliyah elected to receive her external beam treatment at [facility], in conjunction with intracavitary brachytherapy performed at [address] [place] [address] her external beam radiation therapy well in general.  At the start of treatment she had some low abdominal/pelvic discomfort related to her primary tumor, which improved during her treatment course. Right at the end of treatment, Katharina Harman had some postoperative discomfort related to her final intracavitary brachytherapy treatment which was already beginning to resolve a day after the implant. Clinical examination prior to initiation of brachytherapy indicated favorable response to treatment.     INTERVAL HISTORY: Katharina Harman returns to [address] [place] [address] for a post treatment follow up after undergoing external beam radiation and brachytherapy for cervical cancer. Karly Esteban states she has been doing fairly well since her last visit. She did start back to work on a light duty basis and was working 4 hours per day 4 days per week. She states she notices after working about 2 hours she has an increase in the clear discharge and some discomfort in the pelvic area.  She admits to having to do quite a bit of lifting in her job and feels that is causing her to feel this way. She is in the process of obtaining a new job that does not require such physical labor. She denies any bloody discharge, abdominal pain or bloating, constipation or diarrhea. She has not used her vaginal dilator but has been sexually active but admits to some discomfort with this. LAB RESULTS:   None completed since last visit. RADIOLOGY RESULTS:   12/18/19: PET:   IMPRESSION: Heterogeneous hypermetabolic activity in the cervix, indeterminate  and posttreatment change versus residual disease cannot be entirely excluded. Close attention on follow-up. No definite other suspicious hypermetabolic foci  or lymphadenopathy identified to suggest metastatic disease. MEDICATIONS:   Current Outpatient Medications   Medication Sig Dispense Refill    traZODone (DESYREL) 100 MG tablet Take 1 tablet by mouth nightly 30 tablet 6    phenazopyridine (PYRIDIUM) 100 MG tablet Take 100 mg by mouth 2 times daily      oxybutynin (DITROPAN-XL) 5 MG extended release tablet Take 5 mg by mouth daily      lisinopril (PRINIVIL;ZESTRIL) 10 MG tablet Take 1 tablet by mouth daily 90 tablet 1    aspirin 81 MG chewable tablet Take 1 tablet by mouth daily 30 tablet 3    lidocaine-prilocaine (EMLA) 2.5-2.5 % cream Apply topically as needed. 30 g 2    prochlorperazine (COMPAZINE) 5 MG tablet Take 1 tablet by mouth every 6 hours as needed for Nausea 30 tablet 3    ibuprofen (ADVIL;MOTRIN) 200 MG tablet Take 200 mg by mouth every 8 hours as needed for Pain      meclizine (ANTIVERT) 25 MG tablet Take 1 tablet by mouth 3 times daily as needed for Dizziness 40 tablet 1     No current facility-administered medications for this encounter. ROS: As noted in the HPI and Interval history, otherwise negative. EXAMINATION:   GENERAL: Matthias Gonzales is a pleasant well developed adult female. She is alert and oriented x3 in no acute distress.    VITAL SIGNS: Weight 87.1 kg, temperature 36.2°C, pulse 90, blood pressure 127/91, respirations 18, pulse ox 98% on room air. HEENT: Normocephalic, atraumatic. PERRL. EOMI. Ears, nose and lips are within normal limits on external examination. Oropharynx unremarkable. NECK: Without palpable cervical adenopathy. LYMPH: Without palpable supraclavicular, axillary or inguinal adenopathy. LUNGS: Clear without adventitious sounds. Respirations easy and unlabored. HEART: Regular rate and rhythm. Without murmur. ABDOMEN: Soft, nontender, nondistended. Active bowel sounds x4. EXTREMITIES: Without clubbing or cyanosis. No edema noted. NEUROLOGIC EXAMINATION: Cranial nerves grossly intact. PELVIC: External genitalia without lesion, swelling or exudate. Vaginal vault smooth on palpation without palpable mass. Deferred speculum exam today due to recent exam in December at [address] [place] [address] N: Dominique Mancilla was diagnosed with Stage IIA HPV positive cervical cancer in June 2019. She underwent treatment with chemotherapy, external beam radiation and brachytherapy. She does not have concerning findings on today's exam. Speculum exam was deferred today since this was just completed at [facility] a few weeks ago. Encouraged use of vaginal dilator to help decrease discomfort felt during intercourse. PET scan in December showed indeterminate activity in the cervix which is likely related to post treatment changes but residual disease could not be excluded. She is scheduled to see Dr. Finesse Mon in April and Dr. Dave Lantigua in June. Plan to see her here in August to offset appointments. PLAN:  1. PET scan 12/2019 showed indeterminate activity in the cervix which is likely related to post treatment changes but residual disease could not be excluded. 2. Dyspareunia: encouraged use of vaginal dilator  3. Pelvic exam no concerning palpable areas on today's exam. Speculum exam deferred as it was just completed at [facility] and did not show any concerning findings. 4. Continue follow up with [facility] as scheduled. 5. Follow up here in August to offset appointments with [facility]. 10. Suly Knight is aware she can call for any questions, concerns or changes in condition.      Electronically signed by CHAPO Bermeo CNP on 1/27/20 at 11:50 AM    CC: Dr. Dallas Gutierrez, Dr. Patel BOWER, Dr. Lisbeth Dickens

## 2020-03-18 ENCOUNTER — APPOINTMENT (OUTPATIENT)
Dept: CT IMAGING | Age: 35
End: 2020-03-18
Payer: COMMERCIAL

## 2020-03-18 ENCOUNTER — HOSPITAL ENCOUNTER (EMERGENCY)
Age: 35
Discharge: HOME | End: 2020-03-18
Attending: EMERGENCY MEDICINE
Payer: COMMERCIAL

## 2020-03-18 ENCOUNTER — OFFICE VISIT (OUTPATIENT)
Dept: PRIMARY CARE CLINIC | Age: 35
End: 2020-03-18
Payer: COMMERCIAL

## 2020-03-18 VITALS
SYSTOLIC BLOOD PRESSURE: 160 MMHG | DIASTOLIC BLOOD PRESSURE: 109 MMHG | OXYGEN SATURATION: 96 % | RESPIRATION RATE: 20 BRPM | HEART RATE: 120 BPM | TEMPERATURE: 102.4 F

## 2020-03-18 VITALS
DIASTOLIC BLOOD PRESSURE: 93 MMHG | RESPIRATION RATE: 18 BRPM | OXYGEN SATURATION: 98 % | HEART RATE: 94 BPM | TEMPERATURE: 99.8 F | HEIGHT: 68 IN | SYSTOLIC BLOOD PRESSURE: 113 MMHG | BODY MASS INDEX: 29.55 KG/M2 | WEIGHT: 195 LBS

## 2020-03-18 LAB
ALBUMIN SERPL-MCNC: 4.4 G/DL (ref 3.5–5.1)
ALP BLD-CCNC: 83 U/L (ref 38–126)
ALT SERPL-CCNC: 17 U/L (ref 11–66)
AMPHETAMINE+METHAMPHETAMINE URINE SCREEN: NEGATIVE
ANION GAP SERPL CALCULATED.3IONS-SCNC: 13 MEQ/L (ref 8–16)
AST SERPL-CCNC: 22 U/L (ref 5–40)
BACTERIA: ABNORMAL /HPF
BARBITURATE QUANTITATIVE URINE: NEGATIVE
BASOPHILS # BLD: 0.3 %
BASOPHILS ABSOLUTE: 0 THOU/MM3 (ref 0–0.1)
BENZODIAZEPINE QUANTITATIVE URINE: NEGATIVE
BILIRUB SERPL-MCNC: 0.2 MG/DL (ref 0.3–1.2)
BILIRUBIN DIRECT: < 0.2 MG/DL (ref 0–0.3)
BILIRUBIN URINE: NEGATIVE
BLOOD, URINE: NEGATIVE
BUN BLDV-MCNC: 15 MG/DL (ref 7–22)
CALCIUM SERPL-MCNC: 9.2 MG/DL (ref 8.5–10.5)
CANNABINOID QUANTITATIVE URINE: POSITIVE
CASTS 2: ABNORMAL /LPF
CASTS UA: ABNORMAL /LPF
CHARACTER, URINE: CLEAR
CHLORIDE BLD-SCNC: 105 MEQ/L (ref 98–111)
CO2: 22 MEQ/L (ref 23–33)
COCAINE METABOLITE QUANTITATIVE URINE: NEGATIVE
COLOR: YELLOW
CREAT SERPL-MCNC: 0.7 MG/DL (ref 0.4–1.2)
CRYSTALS, UA: ABNORMAL
EOSINOPHIL # BLD: 0.3 %
EOSINOPHILS ABSOLUTE: 0 THOU/MM3 (ref 0–0.4)
EPITHELIAL CELLS, UA: ABNORMAL /HPF
ERYTHROCYTE [DISTWIDTH] IN BLOOD BY AUTOMATED COUNT: 13.1 % (ref 11.5–14.5)
ERYTHROCYTE [DISTWIDTH] IN BLOOD BY AUTOMATED COUNT: 48.4 FL (ref 35–45)
GFR SERPL CREATININE-BSD FRML MDRD: > 90 ML/MIN/1.73M2
GLUCOSE BLD-MCNC: 100 MG/DL (ref 70–108)
GLUCOSE URINE: NEGATIVE MG/DL
HCT VFR BLD CALC: 38.4 % (ref 37–47)
HEMOGLOBIN: 12.3 GM/DL (ref 12–16)
IMMATURE GRANS (ABS): 0.01 THOU/MM3 (ref 0–0.07)
IMMATURE GRANULOCYTES: 0.3 %
INFLUENZA VIRUS A RNA: NORMAL
INFLUENZA VIRUS B RNA: NORMAL
KETONES, URINE: ABNORMAL
LEUKOCYTE ESTERASE, URINE: NEGATIVE
LYMPHOCYTES # BLD: 11.7 %
LYMPHOCYTES ABSOLUTE: 0.4 THOU/MM3 (ref 1–4.8)
MCH RBC QN AUTO: 32.1 PG (ref 26–33)
MCHC RBC AUTO-ENTMCNC: 32 GM/DL (ref 32.2–35.5)
MCV RBC AUTO: 100.3 FL (ref 81–99)
MISCELLANEOUS 2: ABNORMAL
MONOCYTES # BLD: 14.9 %
MONOCYTES ABSOLUTE: 0.6 THOU/MM3 (ref 0.4–1.3)
NITRITE, URINE: NEGATIVE
NUCLEATED RED BLOOD CELLS: 0 /100 WBC
OPIATES, URINE: NEGATIVE
OSMOLALITY CALCULATION: 280.3 MOSMOL/KG (ref 275–300)
OXYCODONE: NEGATIVE
PH UA: 5.5 (ref 5–9)
PHENCYCLIDINE QUANTITATIVE URINE: NEGATIVE
PLATELET # BLD: 192 THOU/MM3 (ref 130–400)
PMV BLD AUTO: 10.5 FL (ref 9.4–12.4)
POTASSIUM SERPL-SCNC: 3.8 MEQ/L (ref 3.5–5.2)
PREGNANCY, SERUM: NEGATIVE
PROTEIN UA: ABNORMAL
RBC # BLD: 3.83 MILL/MM3 (ref 4.2–5.4)
RBC URINE: ABNORMAL /HPF
RENAL EPITHELIAL, UA: ABNORMAL
SEG NEUTROPHILS: 72.5 %
SEGMENTED NEUTROPHILS ABSOLUTE COUNT: 2.8 THOU/MM3 (ref 1.8–7.7)
SODIUM BLD-SCNC: 140 MEQ/L (ref 135–145)
SPECIFIC GRAVITY, URINE: 1.03 (ref 1–1.03)
TOTAL PROTEIN: 7.8 G/DL (ref 6.1–8)
UROBILINOGEN, URINE: 1 EU/DL (ref 0–1)
WBC # BLD: 3.8 THOU/MM3 (ref 4.8–10.8)
WBC UA: ABNORMAL /HPF
YEAST: ABNORMAL

## 2020-03-18 PROCEDURE — G8428 CUR MEDS NOT DOCUMENT: HCPCS | Performed by: NURSE PRACTITIONER

## 2020-03-18 PROCEDURE — 82248 BILIRUBIN DIRECT: CPT

## 2020-03-18 PROCEDURE — 2709999900 HC NON-CHARGEABLE SUPPLY

## 2020-03-18 PROCEDURE — 36415 COLL VENOUS BLD VENIPUNCTURE: CPT

## 2020-03-18 PROCEDURE — 74177 CT ABD & PELVIS W/CONTRAST: CPT

## 2020-03-18 PROCEDURE — 99282 EMERGENCY DEPT VISIT SF MDM: CPT

## 2020-03-18 PROCEDURE — 80053 COMPREHEN METABOLIC PANEL: CPT

## 2020-03-18 PROCEDURE — 81001 URINALYSIS AUTO W/SCOPE: CPT

## 2020-03-18 PROCEDURE — 87502 INFLUENZA DNA AMP PROBE: CPT | Performed by: NURSE PRACTITIONER

## 2020-03-18 PROCEDURE — 6360000002 HC RX W HCPCS: Performed by: EMERGENCY MEDICINE

## 2020-03-18 PROCEDURE — 85025 COMPLETE CBC W/AUTO DIFF WBC: CPT

## 2020-03-18 PROCEDURE — 6360000004 HC RX CONTRAST MEDICATION: Performed by: EMERGENCY MEDICINE

## 2020-03-18 PROCEDURE — 6370000000 HC RX 637 (ALT 250 FOR IP): Performed by: EMERGENCY MEDICINE

## 2020-03-18 PROCEDURE — 84703 CHORIONIC GONADOTROPIN ASSAY: CPT

## 2020-03-18 PROCEDURE — 99214 OFFICE O/P EST MOD 30 MIN: CPT | Performed by: NURSE PRACTITIONER

## 2020-03-18 PROCEDURE — 4004F PT TOBACCO SCREEN RCVD TLK: CPT | Performed by: NURSE PRACTITIONER

## 2020-03-18 PROCEDURE — 96374 THER/PROPH/DIAG INJ IV PUSH: CPT

## 2020-03-18 PROCEDURE — 80307 DRUG TEST PRSMV CHEM ANLYZR: CPT

## 2020-03-18 PROCEDURE — G8484 FLU IMMUNIZE NO ADMIN: HCPCS | Performed by: NURSE PRACTITIONER

## 2020-03-18 PROCEDURE — 2580000003 HC RX 258: Performed by: EMERGENCY MEDICINE

## 2020-03-18 PROCEDURE — G8417 CALC BMI ABV UP PARAM F/U: HCPCS | Performed by: NURSE PRACTITIONER

## 2020-03-18 RX ORDER — LEVOFLOXACIN 500 MG/1
500 TABLET, FILM COATED ORAL DAILY
Qty: 7 TABLET | Refills: 0 | Status: SHIPPED | OUTPATIENT
Start: 2020-03-18 | End: 2020-03-25

## 2020-03-18 RX ORDER — KETOROLAC TROMETHAMINE 30 MG/ML
30 INJECTION, SOLUTION INTRAMUSCULAR; INTRAVENOUS ONCE
Status: COMPLETED | OUTPATIENT
Start: 2020-03-18 | End: 2020-03-18

## 2020-03-18 RX ORDER — KETOROLAC TROMETHAMINE 10 MG/1
10 TABLET, FILM COATED ORAL 3 TIMES DAILY
Qty: 15 TABLET | Refills: 0 | Status: SHIPPED | OUTPATIENT
Start: 2020-03-18 | End: 2020-10-28 | Stop reason: ALTCHOICE

## 2020-03-18 RX ORDER — HYDROCODONE BITARTRATE AND ACETAMINOPHEN 5; 325 MG/1; MG/1
1 TABLET ORAL ONCE
Status: COMPLETED | OUTPATIENT
Start: 2020-03-18 | End: 2020-03-18

## 2020-03-18 RX ORDER — SODIUM CHLORIDE 9 MG/ML
INJECTION, SOLUTION INTRAVENOUS CONTINUOUS
Status: DISCONTINUED | OUTPATIENT
Start: 2020-03-18 | End: 2020-03-18 | Stop reason: HOSPADM

## 2020-03-18 RX ORDER — AZITHROMYCIN 250 MG/1
TABLET, FILM COATED ORAL
Qty: 1 PACKET | Refills: 0 | Status: SHIPPED | OUTPATIENT
Start: 2020-03-18 | End: 2020-03-18 | Stop reason: ALTCHOICE

## 2020-03-18 RX ADMIN — IOPAMIDOL 80 ML: 755 INJECTION, SOLUTION INTRAVENOUS at 18:38

## 2020-03-18 RX ADMIN — KETOROLAC TROMETHAMINE 30 MG: 30 INJECTION, SOLUTION INTRAMUSCULAR at 17:32

## 2020-03-18 RX ADMIN — SODIUM CHLORIDE: 9 INJECTION, SOLUTION INTRAVENOUS at 16:53

## 2020-03-18 RX ADMIN — HYDROCODONE BITARTRATE AND ACETAMINOPHEN 1 TABLET: 5; 325 TABLET ORAL at 18:10

## 2020-03-18 ASSESSMENT — ENCOUNTER SYMPTOMS
ABDOMINAL PAIN: 0
ALLERGIC/IMMUNOLOGIC NEGATIVE: 1
WHEEZING: 0
VOMITING: 0
DIARRHEA: 0
CHEST TIGHTNESS: 0
RHINORRHEA: 0
TROUBLE SWALLOWING: 0
WHEEZING: 0
COUGH: 1
SINUS PRESSURE: 0
RHINORRHEA: 1
SHORTNESS OF BREATH: 0
SORE THROAT: 1
SHORTNESS OF BREATH: 0
COLOR CHANGE: 0
SORE THROAT: 0
EYES NEGATIVE: 1
ABDOMINAL PAIN: 1
VOICE CHANGE: 0
CONSTIPATION: 0
COUGH: 1
EYE REDNESS: 0
COLOR CHANGE: 0
BACK PAIN: 0
VOMITING: 0
TROUBLE SWALLOWING: 0
NAUSEA: 0
EYE PAIN: 0
NAUSEA: 0
DIARRHEA: 0

## 2020-03-18 ASSESSMENT — PAIN DESCRIPTION - ORIENTATION: ORIENTATION: MID;LOWER

## 2020-03-18 ASSESSMENT — PAIN SCALES - GENERAL: PAINLEVEL_OUTOF10: 5

## 2020-03-18 ASSESSMENT — PAIN DESCRIPTION - PAIN TYPE: TYPE: ACUTE PAIN

## 2020-03-18 ASSESSMENT — PAIN DESCRIPTION - LOCATION: LOCATION: ABDOMEN;PELVIS

## 2020-03-18 NOTE — ED PROVIDER NOTES
[address] [facility] 1   eMERGENCY dEPARTMENT eNCOUnter        CHIEF COMPLAINT    Chief Complaint   Patient presents with    Pelvic Pain       Nurses Notes reviewed and I agree except as noted in the HPI. HPI    Mike Tian is a 37 y.o. female who presents for evaluation of pelvic pain. Patient reports on 03/15/2020, evening, she started to have pain that felt like a \"contraction. \" Patient reports that the pain radiates from her lower abdomen to tail bone and rates the pain a 5/10 in severity. Patient reports when experiencing the pain she feels like she has a bowel/urine movement but when attempting, the feeling goes away. Patient reports that she has a history of cervical cancer and has had 25 external treatments of radiation and 5 internal treatments of radiation. Patient states that when she would be receiving treatments of radiation, she would feel a similar pain that she is experiencing now. Patient states that her last treatment of radiation was 09/19/2019. Patient reports that she has chills, a low grade fever, and a cough with green phlegm. Patient reports that she is a smoker. Patient denies history of blood clots. Patient denies having a hysterectomy. Patient denies traveling or being near anyone ill. Patient has a medical history of cervical cancer, depression and hypertension. Patient has a surgical history of cholecystectomy and cervix biopsy. Patient has no further complaints at this time. REVIEW OF SYSTEMS    Review of Systems   Constitutional: Positive for chills and fever. Negative for appetite change, diaphoresis and fatigue. HENT: Negative for congestion, ear pain, postnasal drip, rhinorrhea, sinus pressure, sneezing, sore throat, trouble swallowing and voice change. Eyes: Negative for visual disturbance. Respiratory: Positive for cough (Green phlegm present. ). Negative for chest tightness, shortness of breath and wheezing. Cardiovascular: Negative for chest pain, palpitations and leg swelling. Gastrointestinal: Negative for abdominal pain, constipation, diarrhea, nausea and vomiting. Difficulty having a bowel movement. Endocrine: Negative for polyuria. Genitourinary: Positive for difficulty urinating and pelvic pain. Musculoskeletal: Negative for arthralgias, back pain, joint swelling, myalgias, neck pain and neck stiffness. Skin: Negative for color change. Neurological: Negative for dizziness, syncope, weakness, light-headedness, numbness and headaches. PAST MEDICAL HISTORY     has a past medical history of Anxiety, Cancer of cervix ([facility] [address] Depression, and Hypertension. SURGICAL HISTORY     has a past surgical history that includes Cholecystectomy (over 5 years ago); Tubal ligation (2006); Cervix biopsy (N/A, 6/7/2019); INSERTION / REMOVAL / REPLACEMENT VENOUS ACCESS CATHETER (N/A, 8/9/2019); and other surgical history (10/10/2019). CURRENT MEDICATIONS    Previous Medications    ASPIRIN 81 MG CHEWABLE TABLET    Take 1 tablet by mouth daily    IBUPROFEN (ADVIL;MOTRIN) 200 MG TABLET    Take 200 mg by mouth every 8 hours as needed for Pain    LIDOCAINE-PRILOCAINE (EMLA) 2.5-2.5 % CREAM    Apply topically as needed. LISINOPRIL (PRINIVIL;ZESTRIL) 10 MG TABLET    Take 1 tablet by mouth daily    MECLIZINE (ANTIVERT) 25 MG TABLET    Take 1 tablet by mouth 3 times daily as needed for Dizziness    OXYBUTYNIN (DITROPAN-XL) 5 MG EXTENDED RELEASE TABLET    Take 5 mg by mouth daily    PHENAZOPYRIDINE (PYRIDIUM) 100 MG TABLET    Take 100 mg by mouth 2 times daily    PROCHLORPERAZINE (COMPAZINE) 5 MG TABLET    Take 1 tablet by mouth every 6 hours as needed for Nausea    TRAZODONE (DESYREL) 100 MG TABLET    Take 1 tablet by mouth nightly       ALLERGIES    is allergic to percocet [oxycodone-acetaminophen]. FAMILY HISTORY    She indicated that her mother is alive. She indicated that her father is alive.  She personally performed the services described in the documentation, reviewed and edited the documentation which was dictated to the scribe in mypresence, and it accurately records my words and actions.      03/18/20 7:38 PM      Daysi Martines MD      Emergency room physician              Daysi Martines MD  03/28/20 0932

## 2020-03-18 NOTE — LETTER
[facility] [address] [address] EMERGENCY DEPT  [address] [place] [address]  Phone: [phone]               March 18, 2020    Patient: Nic Albright   YOB: 1985   Date of Visit: 3/18/2020       To Whom It May Concern:    Millicent Ibarra was seen and treated in our emergency department on 3/18/2020. She may return to work on 3/23/20.       Sincerely,       Lucas Townsend MD         Signature:__________________________________

## 2020-04-27 RX ORDER — TRAZODONE HYDROCHLORIDE 100 MG/1
100 TABLET ORAL NIGHTLY
Qty: 30 TABLET | Refills: 6 | Status: SHIPPED | OUTPATIENT
Start: 2020-04-27 | End: 2020-12-21 | Stop reason: SDUPTHER

## 2020-08-31 ENCOUNTER — HOSPITAL ENCOUNTER (OUTPATIENT)
Dept: RADIATION ONCOLOGY | Age: 35
Discharge: HOME | End: 2020-08-31
Attending: RADIOLOGY
Payer: COMMERCIAL

## 2020-08-31 VITALS
WEIGHT: 205.6 LBS | OXYGEN SATURATION: 97 % | TEMPERATURE: 98 F | DIASTOLIC BLOOD PRESSURE: 77 MMHG | RESPIRATION RATE: 16 BRPM | BODY MASS INDEX: 31.26 KG/M2 | SYSTOLIC BLOOD PRESSURE: 123 MMHG

## 2020-08-31 PROCEDURE — 99212 OFFICE O/P EST SF 10 MIN: CPT | Performed by: NURSE PRACTITIONER

## 2020-08-31 PROCEDURE — 99213 OFFICE O/P EST LOW 20 MIN: CPT | Performed by: NURSE PRACTITIONER

## 2020-08-31 NOTE — PROGRESS NOTES
[facility], [address]  Phone: [phone]   Toll Free: [phone]   Fax: [phone]    RADIATION ONCOLOGY FOLLOW UP REPORT    PATIENT NAME:  Lilliam Sheehan     : 1985  MEDICAL RECORD NO: [id number]    LOCATION: [facility] NO: [id number]      PROVIDER: Cecille Lefort, APRN - CNP        DATE OF SERVICE: 2020    FOLLOW UP PHYSICIANS: Dr. Evelyn Simpson, Dr. Johnny BOWER, Dr. Coy Alonso     DIAGNOSIS: C53.8 -- Malignant neoplasm of overalpping sites of the cervix uteri; Squamous cell carcinoma, Moderately to poorly differentiated; FIGO Stage IIA1 (cannot exclude Stage IIB - see MRI report).       DATE OF DIAGNOSIS: 2019     END OF TREATMENT DATE:   2019 (External beam)  Brachytherapy completed at [facility]     ECOG PERFORMANCE STATUS: 1     PAIN: Denies     CHAPERONE: Declined        HPI: Kayden Samuel had a Pap smear performed 2019, with testing for high risk HPV.  The testing did return positive for HPV 18/45.  The PAP ThinPrep showed atypical squamous cells concerning for the possibility of a high-grade squamous intraepithelial lesion.  In May, Aliyah underwent cervix biopsy and endocervical curettage.  Both of these returned favoring invasive squamous cell carcinoma.  In accordance with clinical practice guidelines, she then received a recommendation for further evaluation.  In early , she underwent LEEP biopsy and repeat endocervix curettage.  She was diagnosed with invasive moderately differentiated squamous cell carcinoma, seen in both the LEEP biopsy and in the endocervix curettings.   Dyan So then evaluated at [facility] gynecologic oncology. [facility] pathology material was reviewed at [facility], and they were in agreement with the diagnosis, though they did place the cancer as \"moderate to poorly differentiated\".  On clinical examination, the cancer was felt to be at least stage IB2 with concern for extension to the upper vagina on the left.  MRI scanning confrmed extension into the upper aspect of the left vagina; the scan is also concerning for some fat stranding in the left parametrium which could represent parametrial infiltration.    Based on the disease extent, and in accordance with clinical practice guidelines, Eze García received a recommendation for definitive concurrent chemoradiotherapy.  She lives less than a block away from 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  Aliyah elected to receive her external beam treatment at [facility], in conjunction with intracavitary brachytherapy performed at [address] [place] [address] her external beam radiation therapy well in general.  At the start of treatment she had some low abdominal/pelvic discomfort related to her primary tumor, which improved during her treatment course. Right at the end of treatment, Eze García had some postoperative discomfort related to her final intracavitary brachytherapy treatment which was already beginning to resolve a day after the implant. Clinical examination prior to initiation of brachytherapy indicated favorable response to treatment.     INTERVAL HISTORY: Eze García returns to [address] [place] [address] for a post treatment follow up after undergoing external beam radiation and brachytherapy for cervical cancer. Eze García states she was having severe pelvic pain, abdominal bloating in March. During this time she was also experiencing 5-6 softer stools per day which was accompanied with urgency and occasional incontinence of stool. She was evaluated with CT a/p which showed small amount fluid in the endometrium. MRI pelvis was recommended on a non emergent basis, this was completed on 7/1/2020.  Since March she has noticed an improvement in the severe pelvic pain/pressure. She will still have discomfort if she is on her feet to long and has to take rest breaks. She states the stools have slightly improved to where she is now having 2-3 softer stools per day with occasional urgency. She admits to continued abdominal bloating. She has not taken anything for this since it started. She has never been evaluated by GI since having the symptoms. She denies any abnormal vaginal discharge or odor. She does admit to dyspareunia. She has not used her vaginal dilator on a regular basis. LAB RESULTS:   3/18/2020 BMP:     K 3.8    BUN 15  Creat 0.7  Ca 9.2  Glucose 100    3/18/2020 CBC:   WBC 3.8  RBC 3.83  Hgb 12.3  Hct 38.4  .3  MCH 32.1  MCHC 32.0    3/18/2020: Hepatic:   Albumin 4.4  T. Bili 0.2  Direct bili <0.2  Alk phos 83  AST 22  ALT 17  T. protein 7.8        RADIOLOGY RESULTS:   7/1/2020: MRI Pelvis w wo:   CONCLUSION:   1. No acute findings about the pelvis  2. Suspicious decreased haustra of visualized colon. Consider colonoscopy if clinically indicated. 3/18/2020: CT A/P:     Impression         1. Groundglass opacity in the lung bases. Nonspecific pneumonitis can be considered. 2. Scattered stool in the colon. No bowel wall thickening or obstruction. 3.Small amount of fluid in the endometrium. Correlation is advised. Pelvic MRI can be performed as clinically warranted on a nonemergent basis. 12/18/19: PET:   IMPRESSION: Heterogeneous hypermetabolic activity in the cervix, indeterminate  and posttreatment change versus residual disease cannot be entirely excluded. Close attention on follow-up. No definite other suspicious hypermetabolic foci  or lymphadenopathy identified to suggest metastatic disease.     MEDICATIONS:   Current Outpatient Medications   Medication Sig Dispense Refill    traZODone (DESYREL) 100 MG tablet Take 1 tablet by mouth nightly 30 tablet 6    ketorolac (TORADOL) 10 MG tablet Take 1 tablet by mouth 3 times daily 15 tablet 0    phenazopyridine (PYRIDIUM) 100 MG tablet Take 100 mg by mouth 2 times daily      oxybutynin (DITROPAN-XL) 5 MG extended release tablet Take 5 mg by mouth daily      lisinopril (PRINIVIL;ZESTRIL) 10 MG tablet Take 1 tablet by mouth daily 90 tablet 1    aspirin 81 MG chewable tablet Take 1 tablet by mouth daily 30 tablet 3    lidocaine-prilocaine (EMLA) 2.5-2.5 % cream Apply topically as needed. 30 g 2    prochlorperazine (COMPAZINE) 5 MG tablet Take 1 tablet by mouth every 6 hours as needed for Nausea 30 tablet 3    ibuprofen (ADVIL;MOTRIN) 200 MG tablet Take 200 mg by mouth every 8 hours as needed for Pain      meclizine (ANTIVERT) 25 MG tablet Take 1 tablet by mouth 3 times daily as needed for Dizziness 40 tablet 1     No current facility-administered medications for this encounter. ROS: As noted in the HPI and Interval history, otherwise negative. EXAMINATION:   GENERAL: Katja Hanna is a pleasant well developed adult female. She is alert and oriented x3 in no acute distress. VITAL SIGNS:  Weight 93.3 kg, temperature 36.7 °C, blood pressure 123/77, respirations 16, pulse ox 97% on room air. HEENT: Normocephalic, atraumatic. PERRL. EOMI. Ears, nose and lips are within normal limits on external examination. Oropharynx unremarkable. NECK: Without palpable cervical adenopathy. LYMPH: Without palpable supraclavicular, axillary or inguinal adenopathy. LUNGS: Clear without adventitious sounds. Respirations easy and unlabored. HEART: Regular rate and rhythm. Without murmur. ABDOMEN: Soft, nontender, nondistended. Active bowel sounds x4. EXTREMITIES: Without clubbing or cyanosis. No edema noted. NEUROLOGIC EXAMINATION: Cranial nerves grossly intact. PELVIC: deferred just completed last month at [facility].          ASSESSMENT: Katja Hanna was diagnosed with Stage IIA HPV positive cervical cancer in June 2019.  She underwent treatment with chemotherapy, external beam radiation and brachytherapy. PET scan in December showed indeterminate activity in the cervix which is likely related to post treatment changes but residual disease could not be excluded. CT A/P in March 2020 showed: Groundglass opacity in the lung bases. Nonspecific pneumonitis can be considered. Scattered stool in the colon. No bowel wall thickening or obstruction. Small amount of fluid in the endometrium. Correlation is advised. Pelvic MRI can be performed as clinically warranted on a nonemergent basis. MRI pelvis in July 2020 showed no acute findings about the pelvis. Suspicious decreased haustra in the visualized colon. Consider colonoscopy if clinically indicated    She does not have concerning findings on today's exam in regards to cervical cancer. Imaging and direct visualization does not show any suspicious findings. Speculum exam was deferred today since this was just completed at [facility] a few weeks ago. Encouraged use of vaginal dilator to help decrease discomfort felt during intercourse. She has been having ongoing issues with loose stools and abdominal bloating recent imaging does not give an explanation for the symptoms. Discussed GI referral for further evaluation. She is agreeable but would like to stay here in [address] [place] [address] for the referral.         PLAN:  1. CT a/p 3/2020 showed  Groundglass opacity in the lung bases. Nonspecific pneumonitis can be considered. Scattered stool in the colon. No bowel wall thickening or obstruction. Small amount of fluid in the endometrium. Correlation is advised. 2. Pelvic MRI 7/2020 showed no acute findings in the pelvis. Suspicious decreased haustra in the visualized colon. Colonoscopy can be considered if clinically indicated. 3. Abdominal bloating and loose stools: GI referral for further evaluation. 4. Dyspareunia: encouraged use of vaginal dilator  5.  Pelvic exam: Speculum exam deferred as it was just completed at [facility] and did not show any concerning findings. 6. Continue follow up with [facility] as scheduled. 7. Follow up here in 6 months to offset appointments with Brianna Jennings is aware she can call for any questions, concerns or changes in condition.      Electronically signed by CHAPO Nolan CNP on 8/31/20 at 11:08 AM    CC: Dr. Anna Cheema, Dr. Fabiola BOWER, Dr. Debora Tamayo

## 2020-09-07 ENCOUNTER — HOSPITAL ENCOUNTER (EMERGENCY)
Age: 35
Discharge: HOME | End: 2020-09-07
Attending: FAMILY MEDICINE
Payer: COMMERCIAL

## 2020-09-07 ENCOUNTER — APPOINTMENT (OUTPATIENT)
Dept: CT IMAGING | Age: 35
End: 2020-09-07
Payer: COMMERCIAL

## 2020-09-07 VITALS
DIASTOLIC BLOOD PRESSURE: 100 MMHG | OXYGEN SATURATION: 96 % | HEART RATE: 90 BPM | SYSTOLIC BLOOD PRESSURE: 140 MMHG | RESPIRATION RATE: 16 BRPM | TEMPERATURE: 98.3 F | BODY MASS INDEX: 31.17 KG/M2 | WEIGHT: 205 LBS

## 2020-09-07 LAB
ALBUMIN SERPL-MCNC: 4.3 G/DL (ref 3.5–5.1)
ALP BLD-CCNC: 82 U/L (ref 38–126)
ALT SERPL-CCNC: 9 U/L (ref 11–66)
ANION GAP SERPL CALCULATED.3IONS-SCNC: 10 MEQ/L (ref 8–16)
AST SERPL-CCNC: 12 U/L (ref 5–40)
BASOPHILS # BLD: 0.4 %
BASOPHILS ABSOLUTE: 0 THOU/MM3 (ref 0–0.1)
BILIRUB SERPL-MCNC: 0.4 MG/DL (ref 0.3–1.2)
BILIRUBIN DIRECT: < 0.2 MG/DL (ref 0–0.3)
BUN BLDV-MCNC: 16 MG/DL (ref 7–22)
C-REACTIVE PROTEIN: 0.32 MG/DL (ref 0–1)
CALCIUM SERPL-MCNC: 9.7 MG/DL (ref 8.5–10.5)
CHLORIDE BLD-SCNC: 106 MEQ/L (ref 98–111)
CO2: 25 MEQ/L (ref 23–33)
CREAT SERPL-MCNC: 0.7 MG/DL (ref 0.4–1.2)
EKG ATRIAL RATE: 85 BPM
EKG P AXIS: 68 DEGREES
EKG P-R INTERVAL: 140 MS
EKG Q-T INTERVAL: 366 MS
EKG QRS DURATION: 90 MS
EKG QTC CALCULATION (BAZETT): 435 MS
EKG R AXIS: 66 DEGREES
EKG T AXIS: 44 DEGREES
EKG VENTRICULAR RATE: 85 BPM
EOSINOPHIL # BLD: 1.6 %
EOSINOPHILS ABSOLUTE: 0.1 THOU/MM3 (ref 0–0.4)
ERYTHROCYTE [DISTWIDTH] IN BLOOD BY AUTOMATED COUNT: 12.8 % (ref 11.5–14.5)
ERYTHROCYTE [DISTWIDTH] IN BLOOD BY AUTOMATED COUNT: 47.1 FL (ref 35–45)
GFR SERPL CREATININE-BSD FRML MDRD: > 90 ML/MIN/1.73M2
GLUCOSE BLD-MCNC: 98 MG/DL (ref 70–108)
HCT VFR BLD CALC: 40.1 % (ref 37–47)
HEMOGLOBIN: 13.1 GM/DL (ref 12–16)
IMMATURE GRANS (ABS): 0.02 THOU/MM3 (ref 0–0.07)
IMMATURE GRANULOCYTES: 0.4 %
LIPASE: 240.6 U/L (ref 5.6–51.3)
LYMPHOCYTES # BLD: 23.1 %
LYMPHOCYTES ABSOLUTE: 1.1 THOU/MM3 (ref 1–4.8)
MCH RBC QN AUTO: 32.6 PG (ref 26–33)
MCHC RBC AUTO-ENTMCNC: 32.7 GM/DL (ref 32.2–35.5)
MCV RBC AUTO: 99.8 FL (ref 81–99)
MONOCYTES # BLD: 9.9 %
MONOCYTES ABSOLUTE: 0.5 THOU/MM3 (ref 0.4–1.3)
NUCLEATED RED BLOOD CELLS: 0 /100 WBC
OSMOLALITY CALCULATION: 282.4 MOSMOL/KG (ref 275–300)
PLATELET # BLD: 209 THOU/MM3 (ref 130–400)
PMV BLD AUTO: 11 FL (ref 9.4–12.4)
POTASSIUM SERPL-SCNC: 4.2 MEQ/L (ref 3.5–5.2)
RBC # BLD: 4.02 MILL/MM3 (ref 4.2–5.4)
SEG NEUTROPHILS: 64.6 %
SEGMENTED NEUTROPHILS ABSOLUTE COUNT: 3.2 THOU/MM3 (ref 1.8–7.7)
SODIUM BLD-SCNC: 141 MEQ/L (ref 135–145)
TOTAL PROTEIN: 7.3 G/DL (ref 6.1–8)
WBC # BLD: 4.9 THOU/MM3 (ref 4.8–10.8)

## 2020-09-07 PROCEDURE — 82248 BILIRUBIN DIRECT: CPT

## 2020-09-07 PROCEDURE — 99285 EMERGENCY DEPT VISIT HI MDM: CPT

## 2020-09-07 PROCEDURE — 6360000004 HC RX CONTRAST MEDICATION: Performed by: FAMILY MEDICINE

## 2020-09-07 PROCEDURE — 86140 C-REACTIVE PROTEIN: CPT

## 2020-09-07 PROCEDURE — 96375 TX/PRO/DX INJ NEW DRUG ADDON: CPT

## 2020-09-07 PROCEDURE — 93005 ELECTROCARDIOGRAM TRACING: CPT | Performed by: FAMILY MEDICINE

## 2020-09-07 PROCEDURE — 36415 COLL VENOUS BLD VENIPUNCTURE: CPT

## 2020-09-07 PROCEDURE — 85025 COMPLETE CBC W/AUTO DIFF WBC: CPT

## 2020-09-07 PROCEDURE — 2580000003 HC RX 258: Performed by: FAMILY MEDICINE

## 2020-09-07 PROCEDURE — 96374 THER/PROPH/DIAG INJ IV PUSH: CPT

## 2020-09-07 PROCEDURE — 96376 TX/PRO/DX INJ SAME DRUG ADON: CPT

## 2020-09-07 PROCEDURE — 6370000000 HC RX 637 (ALT 250 FOR IP): Performed by: FAMILY MEDICINE

## 2020-09-07 PROCEDURE — 93010 ELECTROCARDIOGRAM REPORT: CPT | Performed by: NUCLEAR MEDICINE

## 2020-09-07 PROCEDURE — 83690 ASSAY OF LIPASE: CPT

## 2020-09-07 PROCEDURE — 74177 CT ABD & PELVIS W/CONTRAST: CPT

## 2020-09-07 PROCEDURE — 6360000002 HC RX W HCPCS: Performed by: FAMILY MEDICINE

## 2020-09-07 PROCEDURE — 80053 COMPREHEN METABOLIC PANEL: CPT

## 2020-09-07 RX ORDER — DICYCLOMINE HCL 20 MG
20 TABLET ORAL EVERY 6 HOURS
Qty: 20 TABLET | Refills: 0 | Status: SHIPPED | OUTPATIENT
Start: 2020-09-07 | End: 2020-10-01

## 2020-09-07 RX ORDER — HYDROCODONE BITARTRATE AND ACETAMINOPHEN 5; 325 MG/1; MG/1
1 TABLET ORAL EVERY 6 HOURS PRN
Qty: 10 TABLET | Refills: 0 | Status: SHIPPED | OUTPATIENT
Start: 2020-09-07 | End: 2020-09-14 | Stop reason: SDUPTHER

## 2020-09-07 RX ORDER — DICYCLOMINE HYDROCHLORIDE 10 MG/1
20 CAPSULE ORAL ONCE
Status: COMPLETED | OUTPATIENT
Start: 2020-09-07 | End: 2020-09-07

## 2020-09-07 RX ORDER — ONDANSETRON 2 MG/ML
4 INJECTION INTRAMUSCULAR; INTRAVENOUS ONCE
Status: COMPLETED | OUTPATIENT
Start: 2020-09-07 | End: 2020-09-07

## 2020-09-07 RX ORDER — ONDANSETRON 4 MG/1
4 TABLET, FILM COATED ORAL EVERY 8 HOURS PRN
Qty: 6 TABLET | Refills: 0 | Status: SHIPPED | OUTPATIENT
Start: 2020-09-07 | End: 2020-10-01

## 2020-09-07 RX ORDER — MORPHINE SULFATE 4 MG/ML
4 INJECTION, SOLUTION INTRAMUSCULAR; INTRAVENOUS ONCE
Status: COMPLETED | OUTPATIENT
Start: 2020-09-07 | End: 2020-09-07

## 2020-09-07 RX ORDER — SODIUM CHLORIDE 9 MG/ML
INJECTION, SOLUTION INTRAVENOUS CONTINUOUS
Status: DISCONTINUED | OUTPATIENT
Start: 2020-09-07 | End: 2020-09-07 | Stop reason: HOSPADM

## 2020-09-07 RX ADMIN — SODIUM CHLORIDE: 9 INJECTION, SOLUTION INTRAVENOUS at 12:25

## 2020-09-07 RX ADMIN — DICYCLOMINE HYDROCHLORIDE 20 MG: 10 CAPSULE ORAL at 15:27

## 2020-09-07 RX ADMIN — IOPAMIDOL 80 ML: 755 INJECTION, SOLUTION INTRAVENOUS at 13:51

## 2020-09-07 RX ADMIN — ONDANSETRON 4 MG: 2 INJECTION INTRAMUSCULAR; INTRAVENOUS at 12:25

## 2020-09-07 RX ADMIN — MORPHINE SULFATE 4 MG: 4 INJECTION, SOLUTION INTRAMUSCULAR; INTRAVENOUS at 15:27

## 2020-09-07 RX ADMIN — MORPHINE SULFATE 4 MG: 4 INJECTION, SOLUTION INTRAMUSCULAR; INTRAVENOUS at 12:26

## 2020-09-07 ASSESSMENT — ENCOUNTER SYMPTOMS
SHORTNESS OF BREATH: 0
NAUSEA: 1
BLOOD IN STOOL: 0
VOMITING: 0

## 2020-09-07 ASSESSMENT — PAIN DESCRIPTION - FREQUENCY
FREQUENCY: CONTINUOUS
FREQUENCY: CONTINUOUS

## 2020-09-07 ASSESSMENT — PAIN DESCRIPTION - LOCATION
LOCATION: ABDOMEN
LOCATION: ABDOMEN

## 2020-09-07 ASSESSMENT — PAIN SCALES - GENERAL
PAINLEVEL_OUTOF10: 10
PAINLEVEL_OUTOF10: 10
PAINLEVEL_OUTOF10: 7
PAINLEVEL_OUTOF10: 7

## 2020-09-07 ASSESSMENT — PAIN DESCRIPTION - ORIENTATION
ORIENTATION: LOWER;UPPER
ORIENTATION: LOWER;UPPER

## 2020-09-07 ASSESSMENT — PAIN DESCRIPTION - PAIN TYPE
TYPE: ACUTE PAIN
TYPE: ACUTE PAIN

## 2020-09-07 NOTE — ED TRIAGE NOTES
Patient presents to the ED with complaints of abdominal pain and constipation. Patient states she has been dealing with this pain for a week. She has a history of cervical cancer but is currently not receiving any treatment. Patient rates pain at a 10/10. She has take ibuprofen with no relief. Patient does admit to smoking marijuana to help her relax from the pain a few days ago.

## 2020-09-07 NOTE — ED PROVIDER NOTES
[facility]  eMERGENCY dEPARTMENT eNCOUnter          CHIEF COMPLAINT       Chief Complaint   Patient presents with    Constipation    Abdominal Pain       Nurses Notes reviewed and I agree except as noted in the HPI. HISTORY OF PRESENT ILLNESS    Sinan Adrian is a 37 y.o. female who presents with lower abdominal pain    Location/Symptom: Lower abdominal pain  Timing/Onset: 9/2/2020  Context/Setting: She had cervical cancer treated with radiation  She has had episodic pain similar to this lower abdominal over the last several months  Previous work-ups were negative for acute process  She is been referred to GI for outpatient follow-up though she does not know the name of the doctor  She has been followed by oncology in [address] [place] [address] treatments are completed  She often has intermittent pains in the lower abdomen which will resolve  Sometimes pains will resolve after passage of stool  Not passing blood    Quality: Lower abdominal pain persistent for the last 5 days  Some nausea but no vomiting  Pain worse with movement  She has had similar pains before relieved by stool passage which is often soft  No dysuria    Duration: 5 days  Modifying Factors: She did try half of a Percocet tablet without much relief  Severity: 8/10    REVIEW OF SYSTEMS     Review of Systems   Constitutional: Negative for chills and fever. HENT: Negative for congestion. Respiratory: Negative for shortness of breath. Cardiovascular: Negative for chest pain. Gastrointestinal: Positive for nausea. Negative for blood in stool and vomiting. Lower abdominal pain especially left lower quadrant    No upper abdominal pain       Genitourinary: Negative for difficulty urinating, dysuria and urgency. Musculoskeletal: Negative for joint swelling. Skin: Negative for rash. Neurological: Negative for headaches. Hematological: Negative for adenopathy. Psychiatric/Behavioral: Negative for confusion. PAST MEDICAL HISTORY    has a past medical history of Anxiety, Cancer of cervix ([address] Depression, and Hypertension. SURGICAL HISTORY      has a past surgical history that includes Cholecystectomy (over 5 years ago); Tubal ligation (); Cervix biopsy (N/A, 2019); INSERTION / REMOVAL / REPLACEMENT VENOUS ACCESS CATHETER (N/A, 2019); and other surgical history (10/10/2019). CURRENT MEDICATIONS       Discharge Medication List as of 2020  3:10 PM      CONTINUE these medications which have NOT CHANGED    Details   traZODone (DESYREL) 100 MG tablet Take 1 tablet by mouth nightly, Disp-30 tablet,R-6Normal      ketorolac (TORADOL) 10 MG tablet Take 1 tablet by mouth 3 times daily, Disp-15 tablet, R-0Print      phenazopyridine (PYRIDIUM) 100 MG tablet Take 100 mg by mouth 2 times dailyHistorical Med      oxybutynin (DITROPAN-XL) 5 MG extended release tablet Take 5 mg by mouth dailyHistorical Med      lisinopril (PRINIVIL;ZESTRIL) 10 MG tablet Take 1 tablet by mouth daily, Disp-90 tablet, R-1Normal      aspirin 81 MG chewable tablet Take 1 tablet by mouth daily, Disp-30 tablet, R-3Normal      lidocaine-prilocaine (EMLA) 2.5-2.5 % cream Apply topically as needed. , Disp-30 g, R-2, Normal      prochlorperazine (COMPAZINE) 5 MG tablet Take 1 tablet by mouth every 6 hours as needed for Nausea, Disp-30 tablet, R-3Normal      ibuprofen (ADVIL;MOTRIN) 200 MG tablet Take 200 mg by mouth every 8 hours as needed for PainHistorical Med      meclizine (ANTIVERT) 25 MG tablet Take 1 tablet by mouth 3 times daily as needed for Dizziness, Disp-40 tablet, R-1Normal             ALLERGIES     is allergic to percocet [oxycodone-acetaminophen]. FAMILY HISTORY     She indicated that her mother is alive. She indicated that her father is alive. She indicated that her maternal grandmother is . She indicated that her paternal grandmother is .    family history includes Cancer in her maternal grandmother diverticulitis      DIAGNOSTIC RESULTS     EKG: All EKG's are interpreted by the Emergency Department Physician who either signs or Co-signs this chart in the absence of a cardiologist.    EKG showed sinus rhythm with rate 85. QRS complexes show normal axis, normal  conduction. ST-T waves show no acute change        RADIOLOGY: non-plain film images(s) such as CT, Ultrasound and MRI are read by the radiologist.  The patient had a multiple view CT scan of the abdomen and pelvis which demonstrates a bit of a fatty liver. No evidence of bowel obstruction or colon wall thickening. Gallbladder is absent  Pancreas normal  She had some fluid seen in the endometrial cavity  No free air.   Normal aorta  Per radiology reading    [] Visualized and interpreted by me   [x] Radiologist's Wet Read Report Reviewed   [] Discussed with Radiologist.    LABS:   Labs Reviewed   CBC WITH AUTO DIFFERENTIAL - Abnormal; Notable for the following components:       Result Value    RBC 4.02 (*)     MCV 99.8 (*)     RDW-SD 47.1 (*)     All other components within normal limits   HEPATIC FUNCTION PANEL - Abnormal; Notable for the following components:    ALT 9 (*)     All other components within normal limits   LIPASE - Abnormal; Notable for the following components:    Lipase 240.6 (*)     All other components within normal limits   GASTROINTESTINAL PANEL, MOLECULAR   BASIC METABOLIC PANEL   C-REACTIVE PROTEIN   ANION GAP   GLOMERULAR FILTRATION RATE, ESTIMATED   OSMOLALITY       EMERGENCY DEPARTMENT COURSE:   Vitals:    Vitals:    09/07/20 1123 09/07/20 1228 09/07/20 1336 09/07/20 1526   BP: (!) 134/105 (!) 131/103  (!) 140/100   Pulse: 96 72 90    Resp: 15 16 16    Temp: 98.3 °F (36.8 °C)      TempSrc: Oral      SpO2: 98% 99% 96%    Weight: 205 lb (93 kg)        IV hydration     nursing notes reviewed      Morphine for pain    Zofran for nausea    WBC 4900    Hemoglobin 13.1     normal renal function Electrolytes and blood sugar     liver panel normal    Lipase 240 however patient has no upper abdominal pain. Pancreas normal on CT scan. This appears to be not related to her left lower quadrant pain    CRP 0.32 normal    At times she is indicated the past relief of pain with passage of stool. There is sometimes up and down quality to the pain suggesting colon Pain     given 1 dose of Bentyl p.o. and an additional dose of morphine    Etiology of pain is not directly identified    She does have an outpatient GI consult in the next week or so    Encourage her to get that evaluation and see if  a more definitive diagnosis can be made              CRITICAL CARE:   none    CONSULTS:  none    PROCEDURES:  None    FINAL IMPRESSION      1. Abdominal pain, left lower quadrant          DISPOSITION/PLAN     try Bentyl for pain    Prescribed a few Norco for additional pain relief    Zofran as needed for nausea      PATIENT REFERRED TO:  Follow-up with GI specialist as scheduled    In 3 days      CHAPO Mejia CNP  [address]  [phone]            DISCHARGE MEDICATIONS:  Discharge Medication List as of 9/7/2020  3:10 PM      START taking these medications    Details   dicyclomine (BENTYL) 20 MG tablet Take 1 tablet by mouth every 6 hours, Disp-20 tablet,R-0Print      HYDROcodone-acetaminophen (NORCO) 5-325 MG per tablet Take 1 tablet by mouth every 6 hours as needed for Pain for up to 10 doses. , Disp-10 tablet,R-0Print      ondansetron (ZOFRAN) 4 MG tablet Take 1 tablet by mouth every 8 hours as needed for Nausea, Disp-6 tablet,R-0Print             (Please note that portions of this note were completed with a voice recognition program.  Efforts were made to edit the dictations but occasionally words are mis-transcribed.)    MD Heather Nettles MD  09/07/20 9931

## 2020-09-07 NOTE — ED NOTES
Called radiology to determine when patient is getting her testing completed. Patient is next in line.       Leandro Mendoza RN  09/07/20 9804

## 2020-09-10 ENCOUNTER — APPOINTMENT (OUTPATIENT)
Dept: CT IMAGING | Age: 35
End: 2020-09-10
Payer: COMMERCIAL

## 2020-09-10 ENCOUNTER — HOSPITAL ENCOUNTER (EMERGENCY)
Age: 35
Discharge: HOME | End: 2020-09-10
Attending: EMERGENCY MEDICINE
Payer: COMMERCIAL

## 2020-09-10 VITALS
OXYGEN SATURATION: 98 % | WEIGHT: 205 LBS | HEIGHT: 68 IN | HEART RATE: 83 BPM | DIASTOLIC BLOOD PRESSURE: 81 MMHG | TEMPERATURE: 99.7 F | BODY MASS INDEX: 31.07 KG/M2 | RESPIRATION RATE: 16 BRPM | SYSTOLIC BLOOD PRESSURE: 128 MMHG

## 2020-09-10 LAB
ALBUMIN SERPL-MCNC: 4.8 G/DL (ref 3.5–5.1)
ALP BLD-CCNC: 96 U/L (ref 38–126)
ALT SERPL-CCNC: 15 U/L (ref 11–66)
ANION GAP SERPL CALCULATED.3IONS-SCNC: 12 MEQ/L (ref 8–16)
AST SERPL-CCNC: 16 U/L (ref 5–40)
BACTERIA: ABNORMAL /HPF
BASOPHILS # BLD: 0.3 %
BASOPHILS ABSOLUTE: 0 THOU/MM3 (ref 0–0.1)
BILIRUB SERPL-MCNC: 0.6 MG/DL (ref 0.3–1.2)
BILIRUBIN DIRECT: < 0.2 MG/DL (ref 0–0.3)
BILIRUBIN URINE: ABNORMAL
BLOOD, URINE: NEGATIVE
BUN BLDV-MCNC: 11 MG/DL (ref 7–22)
CALCIUM SERPL-MCNC: 10.4 MG/DL (ref 8.5–10.5)
CASTS 2: ABNORMAL /LPF
CASTS UA: ABNORMAL /LPF
CHARACTER, URINE: CLEAR
CHLORIDE BLD-SCNC: 105 MEQ/L (ref 98–111)
CO2: 26 MEQ/L (ref 23–33)
COLOR: ABNORMAL
CREAT SERPL-MCNC: 0.7 MG/DL (ref 0.4–1.2)
CRYSTALS, UA: ABNORMAL
EOSINOPHIL # BLD: 0.3 %
EOSINOPHILS ABSOLUTE: 0 THOU/MM3 (ref 0–0.4)
EPITHELIAL CELLS, UA: ABNORMAL /HPF
ERYTHROCYTE [DISTWIDTH] IN BLOOD BY AUTOMATED COUNT: 12.9 % (ref 11.5–14.5)
ERYTHROCYTE [DISTWIDTH] IN BLOOD BY AUTOMATED COUNT: 46.7 FL (ref 35–45)
GFR SERPL CREATININE-BSD FRML MDRD: > 90 ML/MIN/1.73M2
GLUCOSE BLD-MCNC: 98 MG/DL (ref 70–108)
GLUCOSE URINE: NEGATIVE MG/DL
HCT VFR BLD CALC: 42.5 % (ref 37–47)
HEMOCCULT STL QL: NEGATIVE
HEMOGLOBIN: 14 GM/DL (ref 12–16)
ICTOTEST: NEGATIVE
IMMATURE GRANS (ABS): 0.02 THOU/MM3 (ref 0–0.07)
IMMATURE GRANULOCYTES: 0.3 %
KETONES, URINE: 15
LEUKOCYTE ESTERASE, URINE: ABNORMAL
LIPASE: 21.6 U/L (ref 5.6–51.3)
LYMPHOCYTES # BLD: 19 %
LYMPHOCYTES ABSOLUTE: 1.2 THOU/MM3 (ref 1–4.8)
MCH RBC QN AUTO: 32.8 PG (ref 26–33)
MCHC RBC AUTO-ENTMCNC: 32.9 GM/DL (ref 32.2–35.5)
MCV RBC AUTO: 99.5 FL (ref 81–99)
MISCELLANEOUS 2: ABNORMAL
MONOCYTES # BLD: 9.5 %
MONOCYTES ABSOLUTE: 0.6 THOU/MM3 (ref 0.4–1.3)
NITRITE, URINE: NEGATIVE
NUCLEATED RED BLOOD CELLS: 0 /100 WBC
OSMOLALITY CALCULATION: 284.4 MOSMOL/KG (ref 275–300)
PH UA: 5 (ref 5–9)
PLATELET # BLD: 238 THOU/MM3 (ref 130–400)
PMV BLD AUTO: 11.1 FL (ref 9.4–12.4)
POTASSIUM REFLEX MAGNESIUM: 4.1 MEQ/L (ref 3.5–5.2)
PREGNANCY, SERUM: NEGATIVE
PROTEIN UA: ABNORMAL
RBC # BLD: 4.27 MILL/MM3 (ref 4.2–5.4)
RBC URINE: ABNORMAL /HPF
RENAL EPITHELIAL, UA: ABNORMAL
SEG NEUTROPHILS: 70.6 %
SEGMENTED NEUTROPHILS ABSOLUTE COUNT: 4.4 THOU/MM3 (ref 1.8–7.7)
SODIUM BLD-SCNC: 143 MEQ/L (ref 135–145)
SPECIFIC GRAVITY, URINE: > 1.03 (ref 1–1.03)
TOTAL PROTEIN: 8.7 G/DL (ref 6.1–8)
UROBILINOGEN, URINE: 1 EU/DL (ref 0–1)
WBC # BLD: 6.2 THOU/MM3 (ref 4.8–10.8)
WBC UA: ABNORMAL /HPF
YEAST: ABNORMAL

## 2020-09-10 PROCEDURE — 80048 BASIC METABOLIC PNL TOTAL CA: CPT

## 2020-09-10 PROCEDURE — 85025 COMPLETE CBC W/AUTO DIFF WBC: CPT

## 2020-09-10 PROCEDURE — 96375 TX/PRO/DX INJ NEW DRUG ADDON: CPT

## 2020-09-10 PROCEDURE — 83690 ASSAY OF LIPASE: CPT

## 2020-09-10 PROCEDURE — 6360000002 HC RX W HCPCS: Performed by: STUDENT IN AN ORGANIZED HEALTH CARE EDUCATION/TRAINING PROGRAM

## 2020-09-10 PROCEDURE — 74176 CT ABD & PELVIS W/O CONTRAST: CPT

## 2020-09-10 PROCEDURE — 96361 HYDRATE IV INFUSION ADD-ON: CPT

## 2020-09-10 PROCEDURE — 6360000002 HC RX W HCPCS: Performed by: EMERGENCY MEDICINE

## 2020-09-10 PROCEDURE — 81001 URINALYSIS AUTO W/SCOPE: CPT

## 2020-09-10 PROCEDURE — 82272 OCCULT BLD FECES 1-3 TESTS: CPT

## 2020-09-10 PROCEDURE — 2580000003 HC RX 258: Performed by: STUDENT IN AN ORGANIZED HEALTH CARE EDUCATION/TRAINING PROGRAM

## 2020-09-10 PROCEDURE — 80076 HEPATIC FUNCTION PANEL: CPT

## 2020-09-10 PROCEDURE — 96374 THER/PROPH/DIAG INJ IV PUSH: CPT

## 2020-09-10 PROCEDURE — 36415 COLL VENOUS BLD VENIPUNCTURE: CPT

## 2020-09-10 PROCEDURE — 99285 EMERGENCY DEPT VISIT HI MDM: CPT

## 2020-09-10 PROCEDURE — 96372 THER/PROPH/DIAG INJ SC/IM: CPT

## 2020-09-10 PROCEDURE — 84703 CHORIONIC GONADOTROPIN ASSAY: CPT

## 2020-09-10 RX ORDER — 0.9 % SODIUM CHLORIDE 0.9 %
1000 INTRAVENOUS SOLUTION INTRAVENOUS ONCE
Status: COMPLETED | OUTPATIENT
Start: 2020-09-10 | End: 2020-09-10

## 2020-09-10 RX ORDER — DICYCLOMINE HYDROCHLORIDE 10 MG/1
10 CAPSULE ORAL 4 TIMES DAILY
Qty: 120 CAPSULE | Refills: 0 | Status: SHIPPED | OUTPATIENT
Start: 2020-09-10 | End: 2020-10-01

## 2020-09-10 RX ORDER — MORPHINE SULFATE 4 MG/ML
4 INJECTION, SOLUTION INTRAMUSCULAR; INTRAVENOUS ONCE
Status: COMPLETED | OUTPATIENT
Start: 2020-09-10 | End: 2020-09-10

## 2020-09-10 RX ORDER — DICYCLOMINE HYDROCHLORIDE 10 MG/1
10 CAPSULE ORAL ONCE
Status: DISCONTINUED | OUTPATIENT
Start: 2020-09-10 | End: 2020-09-10

## 2020-09-10 RX ORDER — ONDANSETRON 2 MG/ML
4 INJECTION INTRAMUSCULAR; INTRAVENOUS ONCE
Status: COMPLETED | OUTPATIENT
Start: 2020-09-10 | End: 2020-09-10

## 2020-09-10 RX ORDER — FENTANYL CITRATE 50 UG/ML
50 INJECTION, SOLUTION INTRAMUSCULAR; INTRAVENOUS
Status: DISCONTINUED | OUTPATIENT
Start: 2020-09-10 | End: 2020-09-10 | Stop reason: HOSPADM

## 2020-09-10 RX ORDER — DICYCLOMINE HYDROCHLORIDE 10 MG/ML
20 INJECTION INTRAMUSCULAR ONCE
Status: COMPLETED | OUTPATIENT
Start: 2020-09-10 | End: 2020-09-10

## 2020-09-10 RX ORDER — MORPHINE SULFATE 4 MG/ML
4 INJECTION, SOLUTION INTRAMUSCULAR; INTRAVENOUS ONCE
Status: DISCONTINUED | OUTPATIENT
Start: 2020-09-10 | End: 2020-09-10

## 2020-09-10 RX ORDER — AMOXICILLIN AND CLAVULANATE POTASSIUM 875; 125 MG/1; MG/1
1 TABLET, FILM COATED ORAL 2 TIMES DAILY
Qty: 14 TABLET | Refills: 0 | Status: SHIPPED | OUTPATIENT
Start: 2020-09-10 | End: 2020-09-17

## 2020-09-10 RX ADMIN — SODIUM CHLORIDE 1000 ML: 9 INJECTION, SOLUTION INTRAVENOUS at 18:38

## 2020-09-10 RX ADMIN — DICYCLOMINE HYDROCHLORIDE 20 MG: 10 INJECTION INTRAMUSCULAR at 18:38

## 2020-09-10 RX ADMIN — ONDANSETRON 4 MG: 2 INJECTION INTRAMUSCULAR; INTRAVENOUS at 16:43

## 2020-09-10 RX ADMIN — FENTANYL CITRATE 50 MCG: 50 INJECTION, SOLUTION INTRAMUSCULAR; INTRAVENOUS at 16:43

## 2020-09-10 RX ADMIN — MORPHINE SULFATE 4 MG: 4 INJECTION, SOLUTION INTRAMUSCULAR; INTRAVENOUS at 18:38

## 2020-09-10 ASSESSMENT — PAIN SCALES - GENERAL
PAINLEVEL_OUTOF10: 9
PAINLEVEL_OUTOF10: 9
PAINLEVEL_OUTOF10: 6
PAINLEVEL_OUTOF10: 9

## 2020-09-10 ASSESSMENT — ENCOUNTER SYMPTOMS
DIARRHEA: 1
SINUS PAIN: 0
VOMITING: 0
BACK PAIN: 0
EYE REDNESS: 0
COUGH: 0
ABDOMINAL PAIN: 1
SORE THROAT: 0
NAUSEA: 1
SHORTNESS OF BREATH: 0
RHINORRHEA: 0
BLOOD IN STOOL: 1

## 2020-09-10 ASSESSMENT — PAIN DESCRIPTION - PAIN TYPE
TYPE: ACUTE PAIN
TYPE: ACUTE PAIN

## 2020-09-10 ASSESSMENT — PAIN DESCRIPTION - LOCATION
LOCATION: ABDOMEN;COCCYX
LOCATION: ABDOMEN;COCCYX

## 2020-09-10 ASSESSMENT — PAIN DESCRIPTION - DESCRIPTORS: DESCRIPTORS: DISCOMFORT;STABBING

## 2020-09-10 NOTE — ED NOTES
Pt reassessed at this time. Vitals remain stable. Pt states pain is still 9/10. Pt medicated per MAR. Updated on POC. Verbalized understanding. Call light in reach.  Will continue to monitor      Gina Thomas RN  09/10/20 0430

## 2020-09-10 NOTE — ED PROVIDER NOTES
[facility] ENCOUNTER          Pt Name: Fior Dickens  MRN: [id number]  [place] 1985  Date of evaluation: 9/10/2020  Treating Resident Physician: Jelly Lu MD  Supervising Physician: Dr. Jorge Sosa       Chief Complaint   Patient presents with    Abdominal Pain     History obtained from the patient. HISTORY OF PRESENT ILLNESS    HPI  Fior Dickens is a 37 y.o. female who presents to the emergency department for evaluation of her abdominal pain that she describes as taken place the last couple months however worsening over the past 3 to 4 days that she has had some diarrhea with it as well. Describes it as bilious in coloration and complains of it being mainly in the right lower quadrant however rating to her periumbilical region and left lower quadrant however more significant in the right lower quadrant that she describes as sharp. Denies any chest pain shortness of breath or cough. She also denies having any fevers or dysuria. Also mentions having one episode of what she thinks might of been some blood on the toilet diaper when wiping and some increased rectal pressure. The patient has no other acute complaints at this time. REVIEW OF SYSTEMS   Review of Systems   Constitutional: Negative for chills and fever. HENT: Negative for rhinorrhea, sinus pain and sore throat. Eyes: Negative for redness. Respiratory: Negative for cough and shortness of breath. Cardiovascular: Negative for chest pain. Gastrointestinal: Positive for abdominal pain, blood in stool, diarrhea and nausea. Negative for vomiting. Genitourinary: Negative for dysuria. Musculoskeletal: Negative for back pain. Skin: Negative for rash. Neurological: Negative for dizziness, light-headedness and headaches. Psychiatric/Behavioral: Negative for agitation.          PAST MEDICAL AND SURGICAL HISTORY     Past Medical History:   Diagnosis Date    Anxiety     Cancer of cervix ([facility] [address]     Depression     Hypertension      Past Surgical History:   Procedure Laterality Date    CERVIX BIOPSY N/A 6/7/2019    COLD KNIFE CONE BX CERVIX performed by Eve Banegas MD at [address] [place] [address]  over 5 years ago    ? dr Luisa Larson / REMOVAL / [address] N/A 8/9/2019    SINGLE LUMEN SMARTPORT INSERTION performed by Lynsey Mcgrath MD at [address] [facility]  10/10/2019    port removal-in office Dr Minh Rojas     Current Facility-Administered Medications:     fentaNYL (SUBLIMAZE) injection 50 mcg, 50 mcg, Intravenous, Q1H PRN, Lluvia Nelson MD, 50 mcg at 09/10/20 1643    0.9 % sodium chloride bolus, 1,000 mL, Intravenous, Once, Lluvia Nelson MD, Last Rate: 1,000 mL/hr at 09/10/20 1838, 1,000 mL at 09/10/20 2854    Current Outpatient Medications:     dicyclomine (BENTYL) 10 MG capsule, Take 1 capsule by mouth 4 times daily, Disp: 120 capsule, Rfl: 0    amoxicillin-clavulanate (AUGMENTIN) 875-125 MG per tablet, Take 1 tablet by mouth 2 times daily for 7 days, Disp: 14 tablet, Rfl: 0    dicyclomine (BENTYL) 20 MG tablet, Take 1 tablet by mouth every 6 hours, Disp: 20 tablet, Rfl: 0    HYDROcodone-acetaminophen (NORCO) 5-325 MG per tablet, Take 1 tablet by mouth every 6 hours as needed for Pain for up to 10 doses. , Disp: 10 tablet, Rfl: 0    ondansetron (ZOFRAN) 4 MG tablet, Take 1 tablet by mouth every 8 hours as needed for Nausea, Disp: 6 tablet, Rfl: 0    traZODone (DESYREL) 100 MG tablet, Take 1 tablet by mouth nightly, Disp: 30 tablet, Rfl: 6    ketorolac (TORADOL) 10 MG tablet, Take 1 tablet by mouth 3 times daily, Disp: 15 tablet, Rfl: 0    phenazopyridine (PYRIDIUM) 100 MG tablet, Take 100 mg by mouth 2 times daily, Disp: , Rfl:     oxybutynin (DITROPAN-XL) 5 MG extended release tablet, Take 5 mg by mouth 09/10/20 1840   BP: 128/81   Pulse: 83   Resp: 16   Temp:    SpO2: 98%       Physical Exam  Vitals signs and nursing note reviewed. Constitutional:       General: She is in acute distress. Appearance: Normal appearance. She is normal weight. She is not ill-appearing or toxic-appearing. HENT:      Head: Normocephalic and atraumatic. Right Ear: External ear normal.      Left Ear: External ear normal.      Nose: Nose normal.      Mouth/Throat:      Mouth: Mucous membranes are dry. Pharynx: Oropharynx is clear. Eyes:      General: No scleral icterus. Conjunctiva/sclera: Conjunctivae normal.   Neck:      Musculoskeletal: Normal range of motion and neck supple. No neck rigidity or muscular tenderness. Cardiovascular:      Rate and Rhythm: Regular rhythm. Tachycardia present. Pulses: Normal pulses. Heart sounds: Normal heart sounds. Pulmonary:      Effort: Pulmonary effort is normal. No respiratory distress. Breath sounds: Normal breath sounds. No wheezing or rales. Abdominal:      General: Abdomen is flat. Bowel sounds are normal. There is no distension. Palpations: Abdomen is soft. Tenderness: There is abdominal tenderness in the periumbilical area and left lower quadrant. There is no rebound. Comments: Tenderness to palpation of the left lower quadrant. With some voluntary guarding. Genitourinary:     Comments: Rectal exam reveals an external hemorrhoid as well as a palpable tender hemorrhoid. No stool was palpated in the vault. No obvious blood noted on glove was  Musculoskeletal: Normal range of motion. Right lower leg: No edema. Left lower leg: No edema. Lymphadenopathy:      Cervical: No cervical adenopathy. Skin:     General: Skin is warm and dry. Capillary Refill: Capillary refill takes less than 2 seconds. Coloration: Skin is pale. Skin is not jaundiced. Neurological:      General: No focal deficit present.       Mental Status: She is alert and oriented to person, place, and time. Mental status is at baseline. Psychiatric:         Mood and Affect: Mood normal.         MEDICAL DECISION MAKING   Initial Assessment: This is a 68-year-old female with a history of cervical cancer treated by radiation therapy also undergone a cholecystectomy tube ligation who is presenting with abdominal pain that is been present the past couple months however worsening over the past    Differential Diagnosis Included but not limited to: SBO, diverticulitis, colitis, enteritis    Plan: Pain laboratory studies as well as CT imaging to evaluate patient for condition. Morphine Bentyl Zofran were given for nausea and pain control. ET abdomen pelvis revealed colitis. She was reevaluated at 1927 and was significantly improved and was no longer in pain would like to be discharged home. She will be given a prescription for Augmentin and a prescription for Bentyl to take for pain at home. ED RESULTS   Laboratory results:  Labs Reviewed   CBC WITH AUTO DIFFERENTIAL - Abnormal; Notable for the following components:       Result Value    MCV 99.5 (*)     RDW-SD 46.7 (*)     All other components within normal limits   HEPATIC FUNCTION PANEL - Abnormal; Notable for the following components:     Total Protein 8.7 (*)     All other components within normal limits   URINE WITH REFLEXED MICRO - Abnormal; Notable for the following components:    Bilirubin Urine SMALL (*)     Ketones, Urine 15 (*)     Specific Gravity, Urine > 1.030 (*)     Protein, UA TRACE (*)     Leukocyte Esterase, Urine TRACE (*)     Color, UA DK YELLOW (*)     All other components within normal limits   BASIC METABOLIC PANEL W/ REFLEX TO MG FOR LOW K   LIPASE   HCG, SERUM, QUALITATIVE   BLOOD OCCULT STOOL SCREEN #1   ANION GAP   GLOMERULAR FILTRATION RATE, ESTIMATED   OSMOLALITY   BILE ACIDS, TOTAL       Radiologic studies results:  CT ABDOMEN PELVIS WO CONTRAST Additional Contrast? Radiologist Recommendation   Final Result   Thickening and pericolonic infiltration involving the ascending colon. Correlate for colitis. Follow-up advised to document resolution. Consider infectious or inflammatory etiologies. **This report has been created using voice recognition software. It may contain minor errors which are inherent in voice recognition technology. **      Final report electronically signed by Dr. Bogdan Bledsoe on 9/10/2020 5:58 PM          ED Medications administered this visit:   Medications   fentaNYL (SUBLIMAZE) injection 50 mcg (50 mcg Intravenous Given 9/10/20 1643)   0.9 % sodium chloride bolus (1,000 mLs Intravenous New Bag 9/10/20 1838)   ondansetron (ZOFRAN) injection 4 mg (4 mg Intravenous Given 9/10/20 1643)   morphine injection 4 mg (4 mg Intravenous Given 9/10/20 1838)   dicyclomine (BENTYL) injection 20 mg (20 mg Intramuscular Given 9/10/20 1838)         ED COURSE      Strict return precautions and follow up instructions were discussed with the patient prior to discharge, with which the patient agrees. MEDICATION CHANGES     New Prescriptions    AMOXICILLIN-CLAVULANATE (AUGMENTIN) 875-125 MG PER TABLET    Take 1 tablet by mouth 2 times daily for 7 days    DICYCLOMINE (BENTYL) 10 MG CAPSULE    Take 1 capsule by mouth 4 times daily         FINAL DISPOSITION     Final diagnoses:   Colitis   External hemorrhoid   Dehydration     Condition: condition: good  Dispo: Discharge to home      This transcription was electronically signed. Parts of this transcriptions may have been dictated by use of voice recognition software and electronically transcribed, and parts may have been transcribed with the assistance of an ED scribe. The transcription may contain errors not detected in proofreading. Please refer to my supervising physician's documentation if my documentation differs.     Electronically Signed: Moy Lora, 09/10/20, 7:31 PM            Moy Lora,

## 2020-09-10 NOTE — ED NOTES
Pt reassessed at this time. Pt rates pain currently 6/10. Pt medicated per MAR. Pt updated on POC. Verbalized understanding. Call light in reach.  Will continue to monitor      Neno Medina RN  09/10/20 5904

## 2020-09-10 NOTE — ED NOTES
Pt reassessed. Pt still rates pain in buttocks 6/10 at this time. Pt vitals stable. Denies any needs at this time. Call light in reach.  Will continue to monitor      Junaid Harmon RN  09/10/20 5310

## 2020-09-10 NOTE — ED TRIAGE NOTES
Pt presents to the ED through triage with c/o abdominal pain. Pt states abdominal pain started 1.5 weeks ago and has progressively gotten worse. Pt states she has also been nauseous but has had no episodes of vomiting. Pt states pain is 8/10 at this time and states it \"feels like there a broom going up my butt\". Vitals stable. Pt A&O x4.      [facility] similar symptoms

## 2020-09-11 ENCOUNTER — CARE COORDINATION (OUTPATIENT)
Dept: CARE COORDINATION | Age: 35
End: 2020-09-11

## 2020-09-14 ENCOUNTER — OFFICE VISIT (OUTPATIENT)
Dept: FAMILY MEDICINE CLINIC | Age: 35
End: 2020-09-14
Payer: COMMERCIAL

## 2020-09-14 VITALS
WEIGHT: 196 LBS | TEMPERATURE: 98.3 F | SYSTOLIC BLOOD PRESSURE: 142 MMHG | BODY MASS INDEX: 29.7 KG/M2 | DIASTOLIC BLOOD PRESSURE: 98 MMHG | HEIGHT: 68 IN | RESPIRATION RATE: 12 BRPM | HEART RATE: 106 BPM | OXYGEN SATURATION: 96 %

## 2020-09-14 LAB
BILIRUBIN URINE: ABNORMAL
BLOOD URINE, POC: NEGATIVE
CHARACTER, URINE: ABNORMAL
COLOR, URINE: ABNORMAL
GLUCOSE URINE: NEGATIVE MG/DL
KETONES, URINE: 15
LEUKOCYTE CLUMPS, URINE: NEGATIVE
NITRITE, URINE: NEGATIVE
PH, URINE: 6 (ref 5–9)
PROTEIN, URINE: 30 MG/DL
SPECIFIC GRAVITY, URINE: >= 1.03 (ref 1–1.03)
UROBILINOGEN, URINE: 0.2 EU/DL (ref 0–1)

## 2020-09-14 PROCEDURE — G8417 CALC BMI ABV UP PARAM F/U: HCPCS | Performed by: NURSE PRACTITIONER

## 2020-09-14 PROCEDURE — 4004F PT TOBACCO SCREEN RCVD TLK: CPT | Performed by: NURSE PRACTITIONER

## 2020-09-14 PROCEDURE — G8427 DOCREV CUR MEDS BY ELIG CLIN: HCPCS | Performed by: NURSE PRACTITIONER

## 2020-09-14 PROCEDURE — 99213 OFFICE O/P EST LOW 20 MIN: CPT | Performed by: NURSE PRACTITIONER

## 2020-09-14 RX ORDER — HYDROCODONE BITARTRATE AND ACETAMINOPHEN 5; 325 MG/1; MG/1
1 TABLET ORAL EVERY 8 HOURS PRN
Qty: 21 TABLET | Refills: 0 | Status: SHIPPED | OUTPATIENT
Start: 2020-09-14 | End: 2020-09-21

## 2020-09-14 ASSESSMENT — ENCOUNTER SYMPTOMS
ABDOMINAL DISTENTION: 1
NAUSEA: 1
VOMITING: 0
RESPIRATORY NEGATIVE: 1
ABDOMINAL PAIN: 1
CONSTIPATION: 1
RECTAL PAIN: 1
BLOOD IN STOOL: 1
DIARRHEA: 0

## 2020-09-14 ASSESSMENT — PATIENT HEALTH QUESTIONNAIRE - PHQ9
2. FEELING DOWN, DEPRESSED OR HOPELESS: 0
SUM OF ALL RESPONSES TO PHQ QUESTIONS 1-9: 0
1. LITTLE INTEREST OR PLEASURE IN DOING THINGS: 0
SUM OF ALL RESPONSES TO PHQ QUESTIONS 1-9: 0
SUM OF ALL RESPONSES TO PHQ9 QUESTIONS 1 & 2: 0

## 2020-09-14 NOTE — PROGRESS NOTES
[facility] [place] [address] [facility]ICINE  [address] [place] [address]  Dept: [phone]  Dept Fax: [phone]  Loc: [phone]    Janes Galarza is a 37 y.o. femalewho presents today for her medical conditions/complaints as noted below. Kevin Porter c/o of Abdominal Pain (x 2 weeks) and Rectal Pain (x 2 weeks)      HPI:      For the lat several weeks pt has been having increasing abdomina and rectal pain. She has been treated in [facility] for cervical cancer. Has had radiation of pelvic are for this. Has had 3 recent CT scans in th last month ha also had a pelvic MRI which have all been normal. Pt is distraught peña snot know what the pain is coming from. State her BM\"s are flat and stingy like, one time she saw blood in one. Has felt nauseous taking nausea meds, not eating much, denies acid reflux. Does have upcoming GI appt his week. States her abdomen feels bloated, she does feel better having a BM. Pt did have a uti in ER gone today. Lab Results   Component Value Date     09/12/2020    K 3.8 09/12/2020    K 4.1 09/10/2020     09/12/2020    CO2 29 09/12/2020    BUN 15 09/12/2020    CREATININE 0.74 09/12/2020    GLUCOSE 98 09/12/2020    CALCIUM 9.60 09/12/2020      Lab Results   Component Value Date    WBC 6.5 09/12/2020    HGB 12.9 09/12/2020    HCT 37.8 09/12/2020    MCV 94.7 09/12/2020     09/12/2020     Current Outpatient Medications   Medication Sig Dispense Refill    HYDROcodone-acetaminophen (NORCO) 5-325 MG per tablet Take 1 tablet by mouth every 8 hours as needed for Pain for up to 7 days.  21 tablet 0    dicyclomine (BENTYL) 10 MG capsule Take 1 capsule by mouth 4 times daily 120 capsule 0    amoxicillin-clavulanate (AUGMENTIN) 875-125 MG per tablet Take 1 tablet by mouth 2 times daily for 7 days 14 tablet 0    traZODone (DESYREL) 100 MG tablet Take 1 tablet by mouth nightly 30 tablet 6    prochlorperazine (COMPAZINE) 5 of Onset    Cancer Maternal Grandmother         ? kind    Cancer Paternal Grandmother         ? kind     Social History     Tobacco Use    Smoking status: Current Every Day Smoker     Packs/day: 1.00     Years: 20.00     Pack years: 20.00     Types: Cigarettes    Smokeless tobacco: Never Used   Substance Use Topics    Alcohol use: Yes     Alcohol/week: 16.0 standard drinks     Types: 16 Shots of liquor per week     Comment: 4 shots daily 4 times a week        Allergies   Allergen Reactions    Oxycodone Other (See Comments)    Percocet [Oxycodone-Acetaminophen] Other (See Comments)     hallucinations       Health Maintenance   Topic Date Due    Flu vaccine (1) 09/01/2020    Potassium monitoring  09/12/2021    Creatinine monitoring  09/12/2021    Cervical cancer screen  03/18/2023    DTaP/Tdap/Td vaccine (2 - Td) 06/03/2025    Hepatitis B vaccine  Completed    Pneumococcal 0-64 years Vaccine  Completed    HIV screen  Completed    Hepatitis A vaccine  Aged Out    Hib vaccine  Aged Out    Meningococcal (ACWY) vaccine  Aged Out    Varicella vaccine  Discontinued       Subjective:      Review of Systems   Constitutional: Positive for fatigue. Negative for chills and fever. Respiratory: Negative. Cardiovascular: Negative. Gastrointestinal: Positive for abdominal distention, abdominal pain, blood in stool, constipation, nausea and rectal pain. Negative for diarrhea and vomiting. Genitourinary: Negative. Skin: Negative. Neurological: Negative for dizziness, facial asymmetry, weakness and headaches. Psychiatric/Behavioral: Negative for self-injury, sleep disturbance and suicidal ideas. Objective:      BP (!) 142/98   Pulse 106   Temp 98.3 °F (36.8 °C) (Oral)   Resp 12   Ht 5' 7.72\" (1.72 m)   Wt 196 lb (88.9 kg)   LMP 08/14/2019   SpO2 96%   BMI 30.05 kg/m²      Physical Exam  Vitals signs and nursing note reviewed. Constitutional:       Appearance: She is ill-appearing. in agreement with plan. Return in about 3 months (around 12/14/2020) for physical exam.    Reccommended tobaccocessation options including pharmacologic methods, counseled great than 3 minutesduring this visit:  Yes[]  No  []       Patient given educational materials -see patient instructions. Discussed use, benefit, and side effects of prescribedmedications. All patient questions answered. Pt voiced understanding. Reviewedhealth maintenance. Instructed to continue current medications, diet and exercise. Patient agreed with treatment plan. Follow up as directed.        Electronicallysigned by CHAPO Morfin CNP on 9/14/2020 at 5:22 PM

## 2020-09-29 ENCOUNTER — APPOINTMENT (OUTPATIENT)
Dept: CT IMAGING | Age: 35
End: 2020-09-29
Payer: COMMERCIAL

## 2020-09-29 ENCOUNTER — HOSPITAL ENCOUNTER (EMERGENCY)
Age: 35
Discharge: HOME | End: 2020-09-30
Attending: EMERGENCY MEDICINE
Payer: COMMERCIAL

## 2020-09-29 LAB
ALBUMIN SERPL-MCNC: 4.8 G/DL (ref 3.5–5.1)
ALP BLD-CCNC: 121 U/L (ref 38–126)
ALT SERPL-CCNC: 21 U/L (ref 11–66)
ANION GAP SERPL CALCULATED.3IONS-SCNC: 13 MEQ/L (ref 8–16)
AST SERPL-CCNC: 12 U/L (ref 5–40)
BASOPHILS # BLD: 0.3 %
BASOPHILS ABSOLUTE: 0 THOU/MM3 (ref 0–0.1)
BILIRUB SERPL-MCNC: 0.3 MG/DL (ref 0.3–1.2)
BILIRUBIN DIRECT: < 0.2 MG/DL (ref 0–0.3)
BUN BLDV-MCNC: 8 MG/DL (ref 7–22)
C-REACTIVE PROTEIN: 0.32 MG/DL (ref 0–1)
CALCIUM SERPL-MCNC: 10 MG/DL (ref 8.5–10.5)
CHLORIDE BLD-SCNC: 107 MEQ/L (ref 98–111)
CO2: 20 MEQ/L (ref 23–33)
CREAT SERPL-MCNC: 0.5 MG/DL (ref 0.4–1.2)
EOSINOPHIL # BLD: 0.3 %
EOSINOPHILS ABSOLUTE: 0 THOU/MM3 (ref 0–0.4)
ERYTHROCYTE [DISTWIDTH] IN BLOOD BY AUTOMATED COUNT: 12.8 % (ref 11.5–14.5)
ERYTHROCYTE [DISTWIDTH] IN BLOOD BY AUTOMATED COUNT: 47.9 FL (ref 35–45)
GFR SERPL CREATININE-BSD FRML MDRD: > 90 ML/MIN/1.73M2
GLUCOSE BLD-MCNC: 114 MG/DL (ref 70–108)
HCT VFR BLD CALC: 43.3 % (ref 37–47)
HEMOGLOBIN: 13.6 GM/DL (ref 12–16)
IMMATURE GRANS (ABS): 0.02 THOU/MM3 (ref 0–0.07)
IMMATURE GRANULOCYTES: 0.3 %
LIPASE: 22.5 U/L (ref 5.6–51.3)
LYMPHOCYTES # BLD: 10 %
LYMPHOCYTES ABSOLUTE: 0.8 THOU/MM3 (ref 1–4.8)
MCH RBC QN AUTO: 32.2 PG (ref 26–33)
MCHC RBC AUTO-ENTMCNC: 31.4 GM/DL (ref 32.2–35.5)
MCV RBC AUTO: 102.4 FL (ref 81–99)
MONOCYTES # BLD: 6.7 %
MONOCYTES ABSOLUTE: 0.5 THOU/MM3 (ref 0.4–1.3)
NUCLEATED RED BLOOD CELLS: 0 /100 WBC
OSMOLALITY CALCULATION: 278.6 MOSMOL/KG (ref 275–300)
PLATELET # BLD: 253 THOU/MM3 (ref 130–400)
PMV BLD AUTO: 11 FL (ref 9.4–12.4)
POTASSIUM SERPL-SCNC: 4 MEQ/L (ref 3.5–5.2)
PROCALCITONIN: 0.02 NG/ML (ref 0.01–0.09)
RBC # BLD: 4.23 MILL/MM3 (ref 4.2–5.4)
SEG NEUTROPHILS: 82.4 %
SEGMENTED NEUTROPHILS ABSOLUTE COUNT: 6.3 THOU/MM3 (ref 1.8–7.7)
SODIUM BLD-SCNC: 140 MEQ/L (ref 135–145)
TOTAL PROTEIN: 7.9 G/DL (ref 6.1–8)
WBC # BLD: 7.7 THOU/MM3 (ref 4.8–10.8)

## 2020-09-29 PROCEDURE — 82248 BILIRUBIN DIRECT: CPT

## 2020-09-29 PROCEDURE — 2580000003 HC RX 258: Performed by: EMERGENCY MEDICINE

## 2020-09-29 PROCEDURE — 74176 CT ABD & PELVIS W/O CONTRAST: CPT

## 2020-09-29 PROCEDURE — 96374 THER/PROPH/DIAG INJ IV PUSH: CPT

## 2020-09-29 PROCEDURE — 36415 COLL VENOUS BLD VENIPUNCTURE: CPT

## 2020-09-29 PROCEDURE — 99283 EMERGENCY DEPT VISIT LOW MDM: CPT

## 2020-09-29 PROCEDURE — 96375 TX/PRO/DX INJ NEW DRUG ADDON: CPT

## 2020-09-29 PROCEDURE — 99284 EMERGENCY DEPT VISIT MOD MDM: CPT

## 2020-09-29 PROCEDURE — 86140 C-REACTIVE PROTEIN: CPT

## 2020-09-29 PROCEDURE — 6360000002 HC RX W HCPCS: Performed by: EMERGENCY MEDICINE

## 2020-09-29 PROCEDURE — 85025 COMPLETE CBC W/AUTO DIFF WBC: CPT

## 2020-09-29 PROCEDURE — 84145 PROCALCITONIN (PCT): CPT

## 2020-09-29 PROCEDURE — 83690 ASSAY OF LIPASE: CPT

## 2020-09-29 PROCEDURE — 80053 COMPREHEN METABOLIC PANEL: CPT

## 2020-09-29 RX ORDER — ONDANSETRON 2 MG/ML
4 INJECTION INTRAMUSCULAR; INTRAVENOUS ONCE
Status: COMPLETED | OUTPATIENT
Start: 2020-09-29 | End: 2020-09-29

## 2020-09-29 RX ORDER — KETOROLAC TROMETHAMINE 30 MG/ML
30 INJECTION, SOLUTION INTRAMUSCULAR; INTRAVENOUS ONCE
Status: COMPLETED | OUTPATIENT
Start: 2020-09-29 | End: 2020-09-29

## 2020-09-29 RX ORDER — 0.9 % SODIUM CHLORIDE 0.9 %
1000 INTRAVENOUS SOLUTION INTRAVENOUS ONCE
Status: COMPLETED | OUTPATIENT
Start: 2020-09-29 | End: 2020-09-29

## 2020-09-29 RX ORDER — MORPHINE SULFATE 2 MG/ML
2 INJECTION, SOLUTION INTRAMUSCULAR; INTRAVENOUS ONCE
Status: COMPLETED | OUTPATIENT
Start: 2020-09-29 | End: 2020-09-29

## 2020-09-29 RX ADMIN — ONDANSETRON 4 MG: 2 INJECTION INTRAMUSCULAR; INTRAVENOUS at 21:25

## 2020-09-29 RX ADMIN — MORPHINE SULFATE 2 MG: 2 INJECTION, SOLUTION INTRAMUSCULAR; INTRAVENOUS at 21:27

## 2020-09-29 RX ADMIN — SODIUM CHLORIDE 1000 ML: 9 INJECTION, SOLUTION INTRAVENOUS at 21:27

## 2020-09-29 RX ADMIN — KETOROLAC TROMETHAMINE 30 MG: 30 INJECTION, SOLUTION INTRAMUSCULAR; INTRAVENOUS at 21:26

## 2020-09-29 ASSESSMENT — ENCOUNTER SYMPTOMS
NAUSEA: 1
STRIDOR: 0
BACK PAIN: 0
EYE REDNESS: 0
WHEEZING: 0
CONSTIPATION: 0
SORE THROAT: 0
PHOTOPHOBIA: 0
ABDOMINAL PAIN: 1
SHORTNESS OF BREATH: 0
EYE PAIN: 0
CHEST TIGHTNESS: 0
EYE ITCHING: 0
ABDOMINAL DISTENTION: 0
COUGH: 0
DIARRHEA: 0
VOMITING: 0
RHINORRHEA: 0
EYE DISCHARGE: 0

## 2020-09-29 ASSESSMENT — PAIN DESCRIPTION - LOCATION
LOCATION: BACK;ABDOMEN;RECTUM
LOCATION: BACK;ABDOMEN;RECTUM
LOCATION: ABDOMEN;BACK;RECTUM

## 2020-09-29 ASSESSMENT — PAIN DESCRIPTION - PAIN TYPE
TYPE: ACUTE PAIN
TYPE: ACUTE PAIN

## 2020-09-29 ASSESSMENT — PAIN DESCRIPTION - ONSET: ONSET: PROGRESSIVE

## 2020-09-29 ASSESSMENT — PAIN DESCRIPTION - PROGRESSION: CLINICAL_PROGRESSION: GRADUALLY WORSENING

## 2020-09-29 ASSESSMENT — PAIN SCALES - GENERAL
PAINLEVEL_OUTOF10: 10

## 2020-09-29 ASSESSMENT — PAIN DESCRIPTION - FREQUENCY
FREQUENCY: CONTINUOUS
FREQUENCY: CONTINUOUS

## 2020-09-29 ASSESSMENT — PAIN DESCRIPTION - ORIENTATION: ORIENTATION: MID;LOWER

## 2020-09-29 ASSESSMENT — PAIN DESCRIPTION - DESCRIPTORS
DESCRIPTORS: SHARP;PRESSURE
DESCRIPTORS: SHARP;PRESSURE
DESCRIPTORS: SHARP;SQUEEZING;PRESSURE

## 2020-09-30 ENCOUNTER — NURSE ONLY (OUTPATIENT)
Dept: LAB | Age: 35
End: 2020-09-30

## 2020-09-30 VITALS
TEMPERATURE: 98.8 F | SYSTOLIC BLOOD PRESSURE: 136 MMHG | OXYGEN SATURATION: 99 % | HEIGHT: 68 IN | RESPIRATION RATE: 18 BRPM | DIASTOLIC BLOOD PRESSURE: 93 MMHG | HEART RATE: 94 BPM | BODY MASS INDEX: 29.1 KG/M2 | WEIGHT: 192 LBS

## 2020-09-30 PROCEDURE — 6360000002 HC RX W HCPCS: Performed by: EMERGENCY MEDICINE

## 2020-09-30 RX ORDER — TRAMADOL HYDROCHLORIDE 50 MG/1
50 TABLET ORAL EVERY 4 HOURS PRN
Qty: 10 TABLET | Refills: 0 | Status: SHIPPED | OUTPATIENT
Start: 2020-09-30 | End: 2020-10-03

## 2020-09-30 RX ADMIN — HYDROMORPHONE HYDROCHLORIDE 1 MG: 1 INJECTION, SOLUTION INTRAMUSCULAR; INTRAVENOUS; SUBCUTANEOUS at 00:44

## 2020-09-30 NOTE — ED TRIAGE NOTES
Pt presents to the ED with c/o tailbone pain, abdominal pain that travels up her back. Pt reports she has a Hx of CA and had a colonoscopy today with Dr Eula Velez. Pt reports the doctor called pt and pt was still in a lot of pain and advised to come to the ED for a CT scan.  Pt is visibly uncomfortable on arrival. Pt is alert and oriented x4

## 2020-09-30 NOTE — ED PROVIDER NOTES
Skin: Negative for pallor, rash and wound. Allergic/Immunologic: Negative for environmental allergies and food allergies. Neurological: Negative for dizziness, tremors, syncope, weakness and headaches. Psychiatric/Behavioral: Negative for agitation, behavioral problems, confusion, self-injury, sleep disturbance and suicidal ideas.           PAST MEDICAL HISTORY     Past Medical History:   Diagnosis Date    Anxiety     Cancer of cervix ([facility] [address]     Depression     Hypertension        SURGICAL HISTORY       Past Surgical History:   Procedure Laterality Date    CERVIX BIOPSY N/A 6/7/2019    COLD KNIFE CONE BX CERVIX performed by Lien Celestin MD at [address] [place] [address]  over 5 years ago    ? dr Elio Schneider  09/29/2020    INSERTION / REMOVAL / REPLACEMENT VENOUS ACCESS CATHETER N/A 8/9/2019    SINGLE LUMEN SMARTPORT INSERTION performed by Forest Ji MD at [address] [place] [address]  10/10/2019    port removal-in office Dr Saravanan Borden  [address] [place] [address] Medication List as of 9/30/2020 12:36 AM      CONTINUE these medications which have NOT CHANGED    Details   dicyclomine (BENTYL) 10 MG capsule Take 1 capsule by mouth 4 times daily, Disp-120 capsule,R-0Print      dicyclomine (BENTYL) 20 MG tablet Take 1 tablet by mouth every 6 hours, Disp-20 tablet,R-0Print      ondansetron (ZOFRAN) 4 MG tablet Take 1 tablet by mouth every 8 hours as needed for Nausea, Disp-6 tablet,R-0Print      traZODone (DESYREL) 100 MG tablet Take 1 tablet by mouth nightly, Disp-30 tablet,R-6Normal      ketorolac (TORADOL) 10 MG tablet Take 1 tablet by mouth 3 times daily, Disp-15 tablet, R-0Print      phenazopyridine (PYRIDIUM) 100 MG tablet Take 100 mg by mouth 2 times dailyHistorical Med      oxybutynin (DITROPAN-XL) 5 MG extended release tablet Take 5 mg by mouth dailyHistorical Med      lisinopril (PRINIVIL;ZESTRIL) 10 MG tablet Take 1 tablet by mouth daily, Disp-90 tablet, R-1Normal      aspirin 81 MG chewable tablet Take 1 tablet by mouth daily, Disp-30 tablet, R-3Normal      lidocaine-prilocaine (EMLA) 2.5-2.5 % cream Apply topically as needed. , Disp-30 g, R-2, Normal      prochlorperazine (COMPAZINE) 5 MG tablet Take 1 tablet by mouth every 6 hours as needed for Nausea, Disp-30 tablet, R-3Normal      ibuprofen (ADVIL;MOTRIN) 200 MG tablet Take 200 mg by mouth every 8 hours as needed for PainHistorical Med      meclizine (ANTIVERT) 25 MG tablet Take 1 tablet by mouth 3 times daily as needed for Dizziness, Disp-40 tablet, R-1Normal             ALLERGIES     Oxycodone and Percocet [oxycodone-acetaminophen]    FAMILY HISTORY     She indicated that her mother is alive. She indicated that her father is alive. She indicated that her maternal grandmother is . She indicated that her paternal grandmother is . family history includes Cancer in her maternal grandmother and paternal grandmother. SOCIAL HISTORY       Social History     Socioeconomic History    Marital status: Single     Spouse name: None    Number of children: 4    Years of education: None    Highest education level: None   Occupational History    None   Social Needs    Financial resource strain: None    Food insecurity     Worry: None     Inability: None    Transportation needs     Medical: None     Non-medical: None   Tobacco Use    Smoking status: Current Every Day Smoker     Packs/day: 1.00     Years: 20.00     Pack years: 20.00     Types: Cigarettes    Smokeless tobacco: Never Used   Substance and Sexual Activity    Alcohol use:  Yes     Alcohol/week: 16.0 standard drinks     Types: 16 Shots of liquor per week     Comment: 4 shots daily 4 times a week    Drug use: No    Sexual activity: Yes     Partners: Male   Lifestyle    Physical activity     Days per week: None     Minutes per session: None    Stress: None   Relationships    Social connections     Talks on phone: None     Gets together: None     Attends Restorationist service: None     Active member of club or organization: None     Attends meetings of clubs or organizations: None     Relationship status: None    Intimate partner violence     Fear of current or ex partner: None     Emotionally abused: None     Physically abused: None     Forced sexual activity: None   Other Topics Concern    None   Social History Narrative    None       PHYSICAL EXAM     INITIAL VITALS:  height is 5' 8\" (1.727 m) and weight is 192 lb (87.1 kg). Her oral temperature is 98.8 °F (37.1 °C). Her blood pressure is 136/93 (abnormal) and her pulse is 94. Her respiration is 18 and oxygen saturation is 99%. Physical Exam  Vitals signs and nursing note reviewed. Constitutional:       Appearance: She is well-developed. She is not diaphoretic. HENT:      Head: Normocephalic and atraumatic. Nose: Nose normal.   Eyes:      General: No scleral icterus. Right eye: No discharge. Left eye: No discharge. Conjunctiva/sclera: Conjunctivae normal.      Pupils: Pupils are equal, round, and reactive to light. Neck:      Musculoskeletal: Normal range of motion and neck supple. Vascular: No JVD. Trachea: No tracheal deviation. Cardiovascular:      Rate and Rhythm: Normal rate and regular rhythm. Heart sounds: Normal heart sounds. No murmur. No friction rub. No gallop. Pulmonary:      Effort: Pulmonary effort is normal. No respiratory distress. Breath sounds: Normal breath sounds. No stridor. No wheezing or rales. Chest:      Chest wall: No tenderness. Abdominal:      General: Bowel sounds are normal. There is no distension. Palpations: Abdomen is soft. There is no mass. Tenderness: There is abdominal tenderness. There is no guarding or rebound. Hernia: No hernia is present. Comments: Diffuse tenderness. Musculoskeletal:         General: No tenderness or deformity. Lymphadenopathy:      Cervical: No cervical adenopathy. Skin:     General: Skin is warm and dry. Capillary Refill: Capillary refill takes less than 2 seconds. Coloration: Skin is not pale. Findings: No erythema or rash. Neurological:      Mental Status: She is alert and oriented to person, place, and time. Cranial Nerves: No cranial nerve deficit. Sensory: No sensory deficit. Motor: No abnormal muscle tone. Coordination: Coordination normal.      Deep Tendon Reflexes: Reflexes normal.   Psychiatric:         Behavior: Behavior normal.         Thought Content: Thought content normal.         Judgment: Judgment normal.       DIFFERENTIAL DIAGNOSIS:   Colitis, colon perforation. Postop pain    DIAGNOSTIC RESULTS     EKG: All EKG's are interpreted by the Emergency Department Physician who either signs or Co-signsthis chart in the absence of a cardiologist.  Interpreted by me  None    RADIOLOGY: non-plain film images(s) such as CT, Ultrasound and MRI are read by the radiologist.    CT ABDOMEN PELVIS WO CONTRAST Additional Contrast? None   Final Result   1. No acute intra-abdominal or pelvic findings. No free intraperitoneal    air or free fluid to suggest bowel injury. No evidence of bowel    obstruction. This document has been electronically signed by: Xavi Hart MD on    09/29/2020 10:36 PM      All CT scans at this facility use dose modulation, iterative    reconstruction, and/or weight-based   dosing when appropriate to reduce radiation dose to as low as reasonably    achievable.              []Visualized and interpreted by me   [] Radiologist's Wet Read Report Reviewed   [] Discussed with Radiologist.    Lisa Higuera:   Results for orders placed or performed during the hospital encounter of 09/29/20   CBC auto differential   Result Value Ref Range    WBC 7.7 4.8 - 10.8 thou/mm3    RBC 4.23 4.20 - 5.40 mill/mm3    Hemoglobin 13.6 12.0 - 16.0 gm/dl    Hematocrit 43.3 37.0 - 47.0 %    .4 (H) 81.0 - 99.0 fL    MCH 32.2 26.0 - 33.0 pg    MCHC 31.4 (L) 32.2 - 35.5 gm/dl    RDW-CV 12.8 11.5 - 14.5 %    RDW-SD 47.9 (H) 35.0 - 45.0 fL    Platelets 231 813 - 676 thou/mm3    MPV 11.0 9.4 - 12.4 fL    Seg Neutrophils 82.4 %    Lymphocytes 10.0 %    Monocytes 6.7 %    Eosinophils 0.3 %    Basophils 0.3 %    Immature Granulocytes 0.3 %    Segs Absolute 6.3 1.8 - 7.7 thou/mm3    Lymphocytes Absolute 0.8 (L) 1.0 - 4.8 thou/mm3    Monocytes Absolute 0.5 0.4 - 1.3 thou/mm3    Eosinophils Absolute 0.0 0.0 - 0.4 thou/mm3    Basophils Absolute 0.0 0.0 - 0.1 thou/mm3    Immature Grans (Abs) 0.02 0.00 - 0.07 thou/mm3    nRBC 0 /100 wbc   Basic Metabolic Panel   Result Value Ref Range    Sodium 140 135 - 145 meq/L    Potassium 4.0 3.5 - 5.2 meq/L    Chloride 107 98 - 111 meq/L    CO2 20 (L) 23 - 33 meq/L    Glucose 114 (H) 70 - 108 mg/dL    BUN 8 7 - 22 mg/dL    CREATININE 0.5 0.4 - 1.2 mg/dL    Calcium 10.0 8.5 - 10.5 mg/dL   Lipase   Result Value Ref Range    Lipase 22.5 5.6 - 51.3 U/L   Hepatic function panel   Result Value Ref Range    Alb 4.8 3.5 - 5.1 g/dL    Total Bilirubin 0.3 0.3 - 1.2 mg/dL    Bilirubin, Direct <0.2 0.0 - 0.3 mg/dL    Alkaline Phosphatase 121 38 - 126 U/L    AST 12 5 - 40 U/L    ALT 21 11 - 66 U/L    Total Protein 7.9 6.1 - 8.0 g/dL   C-reactive protein   Result Value Ref Range    CRP 0.32 0.00 - 1.00 mg/dl   Procalcitonin   Result Value Ref Range    Procalcitonin 0.02 0.01 - 0.09 ng/mL   Anion Gap   Result Value Ref Range    Anion Gap 13.0 8.0 - 16.0 meq/L   Glomerular Filtration Rate, Estimated   Result Value Ref Range    Est, Glom Filt Rate >90 ml/min/1.73m2   Osmolality   Result Value Ref Range    Osmolality Calc 278.6 275.0 - 300.0 mOsmol/kg       EMERGENCY DEPARTMENT COURSE:   Vitals:    Vitals:    09/29/20 2028 09/29/20 2120 09/29/20 2247 09/30/20 0006   BP: (!) 138/96 (!) 142/88  (!) 136/93   Pulse: 82 84 88 94   Resp: 20 19 20 18   Temp: 98.8 °F (37.1 °C) TempSrc: Oral      SpO2: 99% 98% 98% 99%   Weight: 192 lb (87.1 kg)      Height: 5' 8\" (1.727 m)          8:54 PM: Patient is seen and evaluated in a timely fashion. ACTIONS:    Large bore IV  Tele monitor  CT ABDOMEN PELVIS WO CONTRAST  Labs Reviewed   CBC WITH AUTO DIFFERENTIAL   BASIC METABOLIC PANEL   LIPASE   HEPATIC FUNCTION PANEL   C-REACTIVE PROTEIN   PROCALCITONIN     Medications   HYDROmorphone (DILAUDID) injection 1 mg (has no administration in time range)   0.9 % sodium chloride bolus (0 mLs Intravenous Stopped 9/29/20 2246)   ketorolac (TORADOL) injection 30 mg (30 mg Intravenous Given 9/29/20 2126)   morphine (PF) injection 2 mg (2 mg Intravenous Given 9/29/20 2127)   ondansetron (ZOFRAN) injection 4 mg (4 mg Intravenous Given 9/29/20 2125)           MEDICAL DECISION MAKINGS:    Labs are reassuring. CT abdomen and pelvis:   No acute intra-abdominal or pelvic findings. No free intraperitoneal air or free fluid to suggest bowel injury. No evidence of bowel Obstruction. Pain is better with ED treatment. Medications   HYDROmorphone (DILAUDID) injection 1 mg (has no administration in time range)   0.9 % sodium chloride bolus (0 mLs Intravenous Stopped 9/29/20 2246)   ketorolac (TORADOL) injection 30 mg (30 mg Intravenous Given 9/29/20 2126)   morphine (PF) injection 2 mg (2 mg Intravenous Given 9/29/20 2127)   ondansetron (ZOFRAN) injection 4 mg (4 mg Intravenous Given 9/29/20 2125)     Discharged with GI follow up in 2 days. CRITICAL CARE:   None    CONSULTS:  None    PROCEDURES:  None    FINAL IMPRESSION      1.  Postoperative pain          DISPOSITION/PLAN   Home    PATIENT REFERRED TO:  Nayeli Couch MD  [address] [facility] [address]  [address] [facility] [address] [phone]    In 2 days  ED discharge follow up      DISCHARGE MEDICATIONS:  Discharge Medication List as of 9/30/2020 12:36 AM      START taking these medications    Details   traMADol (ULTRAM) 50 MG tablet Take 1 tablet by mouth every 4 hours as needed for Pain for up to 3 days. Intended supply: 3 days.  Take lowest dose possible to manage pain, Disp-10 tablet,R-0Print             (Please note that portions of this note were completed with a voice recognition program.  Efforts were made to edit the dictations but occasionally words aremis-transcribed.)    Hyman Spatz, MD Hyman Spatz, MD  09/30/20 0078

## 2020-09-30 NOTE — ED NOTES
Pt requesting additional pain medication due to pain.  Dr Glenny Vail notified at this time     Josseline Marcus RN  09/29/20 7149

## 2020-09-30 NOTE — ED NOTES
ED nurse-to-nurse bedside report    Chief Complaint   Patient presents with    Post-op Problem     Colonoscopy today and now pain      LOC: alert and orientated to name, place, date  Vital signs   Vitals:    09/29/20 2028 09/29/20 2120 09/29/20 2247   BP: (!) 138/96 (!) 142/88    Pulse: 82 84 88   Resp: 20 19 20   Temp: 98.8 °F (37.1 °C)     TempSrc: Oral     SpO2: 99% 98% 98%   Weight: 192 lb (87.1 kg)     Height: 5' 8\" (1.727 m)        Pain:    Pain Interventions: Toradol, Morphine  Pain Goal: 4  Oxygen: No    Current needs required Pain Management, Follow up with CT    Telemetry: No  LDAs:   Peripheral IV 09/29/20 Right Hand (Active)   Site Assessment Clean;Dry; Intact 09/29/20 2049   Line Status Flushed;Normal saline locked 09/29/20 2049   Dressing Status Clean;Dry; Intact 09/29/20 2049     Continuous Infusions:   Mobility: Requires assistance * 1  Aviles Fall Risk Score: No flowsheet data found.   Fall Interventions: Pt is not a fall risk, needs assistance due to pain  Report given to: ZE Coffey, ZE  09/29/20 9802

## 2020-10-01 ENCOUNTER — HOSPITAL ENCOUNTER (EMERGENCY)
Age: 35
Discharge: HOME | End: 2020-10-01
Attending: FAMILY MEDICINE
Payer: COMMERCIAL

## 2020-10-01 ENCOUNTER — APPOINTMENT (OUTPATIENT)
Dept: CT IMAGING | Age: 35
End: 2020-10-01
Payer: COMMERCIAL

## 2020-10-01 VITALS
OXYGEN SATURATION: 97 % | DIASTOLIC BLOOD PRESSURE: 102 MMHG | RESPIRATION RATE: 14 BRPM | HEART RATE: 75 BPM | SYSTOLIC BLOOD PRESSURE: 167 MMHG | TEMPERATURE: 98 F

## 2020-10-01 LAB
ANION GAP SERPL CALCULATED.3IONS-SCNC: 15 MEQ/L (ref 8–16)
BACTERIA: ABNORMAL /HPF
BASOPHILS # BLD: 0.3 %
BASOPHILS ABSOLUTE: 0 THOU/MM3 (ref 0–0.1)
BILIRUBIN URINE: NEGATIVE
BLOOD, URINE: NEGATIVE
BUN BLDV-MCNC: 11 MG/DL (ref 7–22)
C-REACTIVE PROTEIN: 3.41 MG/DL (ref 0–1)
CALCIUM SERPL-MCNC: 9.9 MG/DL (ref 8.5–10.5)
CASTS 2: ABNORMAL /LPF
CASTS UA: ABNORMAL /LPF
CHARACTER, URINE: CLEAR
CHLORIDE BLD-SCNC: 100 MEQ/L (ref 98–111)
CO2: 23 MEQ/L (ref 23–33)
COLOR: YELLOW
CREAT SERPL-MCNC: 0.6 MG/DL (ref 0.4–1.2)
CRYSTALS, UA: ABNORMAL
EKG ATRIAL RATE: 66 BPM
EKG P AXIS: 56 DEGREES
EKG P-R INTERVAL: 138 MS
EKG Q-T INTERVAL: 414 MS
EKG QRS DURATION: 92 MS
EKG QTC CALCULATION (BAZETT): 434 MS
EKG R AXIS: 58 DEGREES
EKG T AXIS: 43 DEGREES
EKG VENTRICULAR RATE: 66 BPM
EOSINOPHIL # BLD: 0.1 %
EOSINOPHILS ABSOLUTE: 0 THOU/MM3 (ref 0–0.4)
EPITHELIAL CELLS, UA: ABNORMAL /HPF
ERYTHROCYTE [DISTWIDTH] IN BLOOD BY AUTOMATED COUNT: 12.4 % (ref 11.5–14.5)
ERYTHROCYTE [DISTWIDTH] IN BLOOD BY AUTOMATED COUNT: 44.3 FL (ref 35–45)
GFR SERPL CREATININE-BSD FRML MDRD: > 90 ML/MIN/1.73M2
GLUCOSE BLD-MCNC: 110 MG/DL (ref 70–108)
GLUCOSE URINE: NEGATIVE MG/DL
HCT VFR BLD CALC: 40.5 % (ref 37–47)
HEMOGLOBIN: 13.3 GM/DL (ref 12–16)
IMMATURE GRANS (ABS): 0.01 THOU/MM3 (ref 0–0.07)
IMMATURE GRANULOCYTES: 0.1 %
KETONES, URINE: 15
LACTIC ACID: 1.2 MMOL/L (ref 0.5–2.2)
LEUKOCYTE ESTERASE, URINE: ABNORMAL
LIPASE: 36.4 U/L (ref 5.6–51.3)
LYMPHOCYTES # BLD: 15.5 %
LYMPHOCYTES ABSOLUTE: 1.1 THOU/MM3 (ref 1–4.8)
MCH RBC QN AUTO: 32 PG (ref 26–33)
MCHC RBC AUTO-ENTMCNC: 32.8 GM/DL (ref 32.2–35.5)
MCV RBC AUTO: 97.6 FL (ref 81–99)
MISCELLANEOUS 2: ABNORMAL
MONOCYTES # BLD: 11.8 %
MONOCYTES ABSOLUTE: 0.9 THOU/MM3 (ref 0.4–1.3)
NITRITE, URINE: POSITIVE
NUCLEATED RED BLOOD CELLS: 0 /100 WBC
OSMOLALITY CALCULATION: 275.7 MOSMOL/KG (ref 275–300)
PH UA: 6.5 (ref 5–9)
PLATELET # BLD: 239 THOU/MM3 (ref 130–400)
PMV BLD AUTO: 11.4 FL (ref 9.4–12.4)
POTASSIUM REFLEX MAGNESIUM: 3.6 MEQ/L (ref 3.5–5.2)
PREGNANCY, SERUM: NEGATIVE
PROTEIN UA: NEGATIVE
RBC # BLD: 4.15 MILL/MM3 (ref 4.2–5.4)
RBC URINE: ABNORMAL /HPF
RENAL EPITHELIAL, UA: ABNORMAL
SEG NEUTROPHILS: 72.2 %
SEGMENTED NEUTROPHILS ABSOLUTE COUNT: 5.3 THOU/MM3 (ref 1.8–7.7)
SODIUM BLD-SCNC: 138 MEQ/L (ref 135–145)
SPECIFIC GRAVITY, URINE: > 1.03 (ref 1–1.03)
UROBILINOGEN, URINE: 1 EU/DL (ref 0–1)
WBC # BLD: 7.3 THOU/MM3 (ref 4.8–10.8)
WBC UA: ABNORMAL /HPF
YEAST: ABNORMAL

## 2020-10-01 PROCEDURE — 6360000002 HC RX W HCPCS: Performed by: STUDENT IN AN ORGANIZED HEALTH CARE EDUCATION/TRAINING PROGRAM

## 2020-10-01 PROCEDURE — 6360000002 HC RX W HCPCS

## 2020-10-01 PROCEDURE — 96375 TX/PRO/DX INJ NEW DRUG ADDON: CPT

## 2020-10-01 PROCEDURE — 86140 C-REACTIVE PROTEIN: CPT

## 2020-10-01 PROCEDURE — 87077 CULTURE AEROBIC IDENTIFY: CPT

## 2020-10-01 PROCEDURE — 83690 ASSAY OF LIPASE: CPT

## 2020-10-01 PROCEDURE — 87186 SC STD MICRODIL/AGAR DIL: CPT

## 2020-10-01 PROCEDURE — 96374 THER/PROPH/DIAG INJ IV PUSH: CPT

## 2020-10-01 PROCEDURE — 85025 COMPLETE CBC W/AUTO DIFF WBC: CPT

## 2020-10-01 PROCEDURE — 83605 ASSAY OF LACTIC ACID: CPT

## 2020-10-01 PROCEDURE — 81001 URINALYSIS AUTO W/SCOPE: CPT

## 2020-10-01 PROCEDURE — 84703 CHORIONIC GONADOTROPIN ASSAY: CPT

## 2020-10-01 PROCEDURE — 2580000003 HC RX 258: Performed by: STUDENT IN AN ORGANIZED HEALTH CARE EDUCATION/TRAINING PROGRAM

## 2020-10-01 PROCEDURE — 99284 EMERGENCY DEPT VISIT MOD MDM: CPT

## 2020-10-01 PROCEDURE — 87086 URINE CULTURE/COLONY COUNT: CPT

## 2020-10-01 PROCEDURE — 6360000004 HC RX CONTRAST MEDICATION: Performed by: STUDENT IN AN ORGANIZED HEALTH CARE EDUCATION/TRAINING PROGRAM

## 2020-10-01 PROCEDURE — 80048 BASIC METABOLIC PNL TOTAL CA: CPT

## 2020-10-01 PROCEDURE — 36415 COLL VENOUS BLD VENIPUNCTURE: CPT

## 2020-10-01 PROCEDURE — 93005 ELECTROCARDIOGRAM TRACING: CPT | Performed by: STUDENT IN AN ORGANIZED HEALTH CARE EDUCATION/TRAINING PROGRAM

## 2020-10-01 PROCEDURE — 74177 CT ABD & PELVIS W/CONTRAST: CPT

## 2020-10-01 RX ORDER — ONDANSETRON 4 MG/1
4 TABLET, FILM COATED ORAL DAILY PRN
Qty: 30 TABLET | Refills: 0 | Status: SHIPPED | OUTPATIENT
Start: 2020-10-01 | End: 2020-12-03

## 2020-10-01 RX ORDER — DICYCLOMINE HYDROCHLORIDE 10 MG/1
10 CAPSULE ORAL
Qty: 120 CAPSULE | Refills: 0 | Status: SHIPPED | OUTPATIENT
Start: 2020-10-01 | End: 2021-07-01 | Stop reason: ALTCHOICE

## 2020-10-01 RX ORDER — ONDANSETRON 2 MG/ML
4 INJECTION INTRAMUSCULAR; INTRAVENOUS ONCE
Status: COMPLETED | OUTPATIENT
Start: 2020-10-01 | End: 2020-10-01

## 2020-10-01 RX ORDER — MORPHINE SULFATE 2 MG/ML
2 INJECTION, SOLUTION INTRAMUSCULAR; INTRAVENOUS ONCE
Status: COMPLETED | OUTPATIENT
Start: 2020-10-01 | End: 2020-10-01

## 2020-10-01 RX ORDER — 0.9 % SODIUM CHLORIDE 0.9 %
1000 INTRAVENOUS SOLUTION INTRAVENOUS ONCE
Status: COMPLETED | OUTPATIENT
Start: 2020-10-01 | End: 2020-10-01

## 2020-10-01 RX ADMIN — HYDROMORPHONE HYDROCHLORIDE 1 MG: 1 INJECTION, SOLUTION INTRAMUSCULAR; INTRAVENOUS; SUBCUTANEOUS at 17:56

## 2020-10-01 RX ADMIN — MORPHINE SULFATE 2 MG: 2 INJECTION, SOLUTION INTRAMUSCULAR; INTRAVENOUS at 16:29

## 2020-10-01 RX ADMIN — ONDANSETRON 4 MG: 2 INJECTION INTRAMUSCULAR; INTRAVENOUS at 16:29

## 2020-10-01 RX ADMIN — Medication 1 MG: at 17:56

## 2020-10-01 RX ADMIN — IOPAMIDOL 80 ML: 755 INJECTION, SOLUTION INTRAVENOUS at 17:58

## 2020-10-01 RX ADMIN — SODIUM CHLORIDE 1000 ML: 9 INJECTION, SOLUTION INTRAVENOUS at 16:33

## 2020-10-01 ASSESSMENT — ENCOUNTER SYMPTOMS
EYE PAIN: 0
SHORTNESS OF BREATH: 0
RHINORRHEA: 0
COUGH: 0
NAUSEA: 1
CONSTIPATION: 0
VOMITING: 1
DIARRHEA: 0
ABDOMINAL PAIN: 1

## 2020-10-01 ASSESSMENT — PAIN SCALES - GENERAL
PAINLEVEL_OUTOF10: 10
PAINLEVEL_OUTOF10: 10
PAINLEVEL_OUTOF10: 4
PAINLEVEL_OUTOF10: 10

## 2020-10-01 ASSESSMENT — PAIN DESCRIPTION - ORIENTATION
ORIENTATION: LOWER
ORIENTATION: LOWER;LEFT

## 2020-10-01 ASSESSMENT — PAIN DESCRIPTION - PROGRESSION: CLINICAL_PROGRESSION: GRADUALLY IMPROVING

## 2020-10-01 ASSESSMENT — PAIN DESCRIPTION - PAIN TYPE
TYPE: ACUTE PAIN
TYPE: ACUTE PAIN

## 2020-10-01 ASSESSMENT — PAIN DESCRIPTION - FREQUENCY
FREQUENCY: CONTINUOUS
FREQUENCY: CONTINUOUS

## 2020-10-01 ASSESSMENT — PAIN DESCRIPTION - LOCATION
LOCATION: ABDOMEN
LOCATION: ABDOMEN

## 2020-10-01 NOTE — ED PROVIDER NOTES
[facility] ENCOUNTER          Pt Name: Carlos Patricio  MRN: [id number]  [place] 1985  Date of evaluation: 10/1/2020  Treating Resident Physician: Dalton James MD  Supervising Physician: Dr. Annelise Rausch       Chief Complaint   Patient presents with    Abdominal Pain     History obtained from chart review and the patient. HISTORY OF PRESENT ILLNESS    HPI  Carlos Patricio is a 37 y.o. female who presents to the emergency department for evaluation of abdominal pain. Patient states that this pain initially began on and off since March 2020. Patient states that during September she was having daily pain. Patient had a colonoscopy done on 9/29, 2 days ago. Patient states that the colonoscopy exacerbated her pain. Patient states that she went to the ED later that day due to the pain. Per chart review, CT at that time was negative. Patient was discharged on tramadol. Patient states the tramadol does not help her pain. Currently rating her pain a 10 out of 10 worse in the lower quadrants. Pain is unrelenting. Associated factors include nausea. Patient states she has had a poor appetite due to the pain and has not eaten in several days. Patient states she spent all day yesterday vomiting. Patient denies having any bowel movement since the colonoscopy 2 days ago. The patient has no other acute complaints at this time. REVIEW OF SYSTEMS   Review of Systems   Constitutional: Positive for appetite change and fatigue. Negative for chills and fever. HENT: Negative for congestion and rhinorrhea. Eyes: Negative for pain and visual disturbance. Respiratory: Negative for cough and shortness of breath. Cardiovascular: Negative for chest pain and palpitations. Gastrointestinal: Positive for abdominal pain, nausea and vomiting. Negative for constipation and diarrhea.    Genitourinary: Negative for dysuria and urgency. Skin: Negative for rash and wound. Neurological: Negative for numbness and headaches. Psychiatric/Behavioral: Negative for agitation and behavioral problems. PAST MEDICAL AND SURGICAL HISTORY     Past Medical History:   Diagnosis Date    Anxiety     Cancer of cervix ([facility] [address]     Depression     Hypertension      Past Surgical History:   Procedure Laterality Date    CERVIX BIOPSY N/A 6/7/2019    COLD KNIFE CONE BX CERVIX performed by Collins Desai MD at [address] [place] [address]  over 5 years ago    ? dr Dg Joseph  09/29/2020    INSERTION / REMOVAL / REPLACEMENT VENOUS ACCESS CATHETER N/A 8/9/2019    SINGLE LUMEN OJLVBMHCA INSERTION performed by Freddie Moya MD at [place] [address].  10/10/2019    port removal-in office Dr Cazares Nip   No current facility-administered medications for this encounter. Current Outpatient Medications:     ondansetron (ZOFRAN) 4 MG tablet, Take 1 tablet by mouth daily as needed for Nausea or Vomiting, Disp: 30 tablet, Rfl: 0    dicyclomine (BENTYL) 10 MG capsule, Take 1 capsule by mouth 4 times daily (before meals and nightly), Disp: 120 capsule, Rfl: 0    traMADol (ULTRAM) 50 MG tablet, Take 1 tablet by mouth every 4 hours as needed for Pain for up to 3 days. Intended supply: 3 days.  Take lowest dose possible to manage pain, Disp: 10 tablet, Rfl: 0    traZODone (DESYREL) 100 MG tablet, Take 1 tablet by mouth nightly, Disp: 30 tablet, Rfl: 6    ketorolac (TORADOL) 10 MG tablet, Take 1 tablet by mouth 3 times daily (Patient not taking: Reported on 9/14/2020), Disp: 15 tablet, Rfl: 0    phenazopyridine (PYRIDIUM) 100 MG tablet, Take 100 mg by mouth 2 times daily, Disp: , Rfl:     oxybutynin (DITROPAN-XL) 5 MG extended release tablet, Take 5 mg by mouth daily, Disp: , Rfl:     lisinopril (PRINIVIL;ZESTRIL) 10 MG tablet, Take 1 tablet by mouth daily (Patient not taking: Reported on 8/31/2020), Disp: 90 tablet, Rfl: 1    aspirin 81 MG chewable tablet, Take 1 tablet by mouth daily (Patient not taking: Reported on 9/14/2020), Disp: 30 tablet, Rfl: 3    lidocaine-prilocaine (EMLA) 2.5-2.5 % cream, Apply topically as needed. (Patient not taking: Reported on 9/14/2020), Disp: 30 g, Rfl: 2    prochlorperazine (COMPAZINE) 5 MG tablet, Take 1 tablet by mouth every 6 hours as needed for Nausea, Disp: 30 tablet, Rfl: 3    ibuprofen (ADVIL;MOTRIN) 200 MG tablet, Take 200 mg by mouth every 8 hours as needed for Pain, Disp: , Rfl:     meclizine (ANTIVERT) 25 MG tablet, Take 1 tablet by mouth 3 times daily as needed for Dizziness (Patient not taking: Reported on 9/14/2020), Disp: 40 tablet, Rfl: 1      SOCIAL HISTORY     Social History     Social History Narrative    Not on file     Social History     Tobacco Use    Smoking status: Current Every Day Smoker     Packs/day: 1.00     Years: 20.00     Pack years: 20.00     Types: Cigarettes    Smokeless tobacco: Never Used   Substance Use Topics    Alcohol use: Yes     Alcohol/week: 16.0 standard drinks     Types: 16 Shots of liquor per week     Comment: 4 shots daily 4 times a week    Drug use: No       ALLERGIES     Allergies   Allergen Reactions    Oxycodone Other (See Comments)    Percocet [Oxycodone-Acetaminophen] Other (See Comments)     hallucinations       FAMILY HISTORY     Family History   Problem Relation Age of Onset    Cancer Maternal Grandmother         ? kind    Cancer Paternal Grandmother         ? kind       PREVIOUS RECORDS   Previous records reviewed: chart. PHYSICAL EXAM     ED Triage Vitals [10/01/20 1615]   BP Temp Temp src Pulse Resp SpO2 Height Weight   (!) 153/95 -- -- 72 14 100 % -- --     Initial vital signs and nursing assessment reviewed and normal. Pulsoximetry is normal per my interpretation.     Additional Vital Signs:  Vitals:    10/01/20 1826   BP: (!) 167/102   Pulse: 75   Resp: 14   Temp:    SpO2: 97%       Physical Exam  Vitals signs and nursing note reviewed. Constitutional:       General: She is not in acute distress. Appearance: She is well-developed. She is ill-appearing. She is not diaphoretic. HENT:      Head: Normocephalic and atraumatic. Right Ear: External ear normal.      Left Ear: External ear normal.      Nose: Nose normal.   Eyes:      General:         Right eye: No discharge. Left eye: No discharge. Conjunctiva/sclera: Conjunctivae normal.   Cardiovascular:      Rate and Rhythm: Normal rate and regular rhythm. Heart sounds: Normal heart sounds. No murmur. Pulmonary:      Effort: Pulmonary effort is normal.      Breath sounds: Normal breath sounds. No wheezing. Abdominal:      General: There is no distension. Palpations: Abdomen is soft. Tenderness: There is abdominal tenderness in the right lower quadrant and left lower quadrant. There is no guarding or rebound. Skin:     General: Skin is warm and dry. Findings: No erythema or rash. Neurological:      Mental Status: She is alert and oriented to person, place, and time. Cranial Nerves: No cranial nerve deficit. Psychiatric:         Behavior: Behavior normal.         Thought Content: Thought content normal.         Judgment: Judgment normal.             MEDICAL DECISION MAKING   Initial Assessment: Patient was seen and evaluated. Patient was in no acute distress however appeared in pain. Vitals signs hypertensive otherwise stable. Plan: Appropriate labs and imaging was ordered.   Patient was started on IV fluids, given morphine for pain, given Zofran for nausea    ED RESULTS   Laboratory results:  Labs Reviewed   CBC WITH AUTO DIFFERENTIAL - Abnormal; Notable for the following components:       Result Value    RBC 4.15 (*)     All other components within normal limits   BASIC METABOLIC PANEL W/ REFLEX TO MG FOR LOW K - Abnormal; Notable for the following components:    Glucose 110 (*) All other components within normal limits   C-REACTIVE PROTEIN - Abnormal; Notable for the following components:    CRP 3.41 (*)     All other components within normal limits   URINE WITH REFLEXED MICRO - Abnormal; Notable for the following components:    Ketones, Urine 15 (*)     Specific Gravity, Urine > 1.030 (*)     Nitrite, Urine POSITIVE (*)     Leukocyte Esterase, Urine MODERATE (*)     All other components within normal limits   CULTURE, REFLEXED, URINE   LIPASE   LACTIC ACID, PLASMA   HCG, SERUM, QUALITATIVE   ANION GAP   GLOMERULAR FILTRATION RATE, ESTIMATED   OSMOLALITY       Radiologic studies results:  CT ABDOMEN PELVIS W IV CONTRAST Additional Contrast? None   Final Result   Possible thickening of the colon though it is not distended. No free air. **This report has been created using voice recognition software. It may contain minor errors which are inherent in voice recognition technology. **      Final report electronically signed by Dr. Darrick Greer on 10/1/2020 6:16 PM          ED Medications administered this visit:   Medications   0.9 % sodium chloride bolus (0 mLs Intravenous Stopped 10/1/20 1733)   ondansetron (ZOFRAN) injection 4 mg (4 mg Intravenous Given 10/1/20 1629)   morphine (PF) injection 2 mg (2 mg Intravenous Given 10/1/20 1629)   HYDROmorphone (DILAUDID) injection 1 mg (1 mg Intravenous Given 10/1/20 1756)   iopamidol (ISOVUE-370) 76 % injection 80 mL (80 mLs Intravenous Given 10/1/20 1758)         ED COURSE      Patient was seen in the emergency room for abdominal pain. Patient was given Zofran and morphine with minimal relief. Patient was given a dose of Dilaudid which significantly improved her symptoms. CT imaging was performed which did not show any acute processes. Explained the results with patient. Discussed that patient was cleared to discharge.   Discussed following up with her PCP as well as with her GI physician for further work-up of her abdominal pain. Discussed that we could give her prescriptions for Zofran and Bentyl to aid with her symptoms. All questions were answered. Patient was discharged in stable condition. Strict return precautions and follow up instructions were discussed with the patient prior to discharge, with which the patient agrees. MEDICATION CHANGES     Discharge Medication List as of 10/1/2020  6:54 PM            FINAL DISPOSITION     Final diagnoses:   Lower abdominal pain   Nausea     Condition: condition: good  Dispo: Discharge to home    This transcription was electronically signed. Parts of this transcriptions may have been dictated by use of voice recognition software and electronically transcribed, and parts may have been transcribed with the assistance of an ED scribe. The transcription may contain errors not detected in proofreading. Please refer to my supervising physician's documentation if my documentation differs.     Electronically Signed: Windy Wilson, 10/01/20, 9:43 PM       Windy Wilson MD  Resident  10/01/20 1094

## 2020-10-01 NOTE — ED TRIAGE NOTES
Patient presents to ER with complaints of lower chronic abdominal pain that has been ongoing, but recently reports increased pain after having colonoscopy 2 days ago. Patient reports history of cervical cancer.

## 2020-10-02 PROCEDURE — 93010 ELECTROCARDIOGRAM REPORT: CPT | Performed by: INTERNAL MEDICINE

## 2020-10-03 LAB
ORGANISM: ABNORMAL
URINE CULTURE REFLEX: ABNORMAL

## 2020-10-04 ENCOUNTER — TELEPHONE (OUTPATIENT)
Dept: PHARMACY | Age: 35
End: 2020-10-04

## 2020-10-04 NOTE — TELEPHONE ENCOUNTER
Pharmacy Note  ED Culture Follow-up    Yulissa Keyes is a 37 y.o. female. Allergies: Oxycodone and Percocet [oxycodone-acetaminophen]     Labs:  Lab Results   Component Value Date    BUN 11 10/04/2020    CREATININE 0.70 10/04/2020    WBC 11.9 (H) 10/04/2020     Estimated Creatinine Clearance: 128 mL/min (based on SCr of 0.7 mg/dL). Current antimicrobials:   ·  None    ASSESSMENT:  Micro results:   Urine culture: positive for Klebsiella pneumoniae     PLAN:  Need for intervention: Possibly if having UTI symptoms  Discussed with: Dione Ascencio PA-C  Chosen treatment:    · If patient is having UTI symptoms, start Keflex 500 mg BID x5 days. If no UTI symptoms, no antibiotics needed and patient should follow-up with PCP if symptoms develop. Patient response:   · Call attempt #1, did not reach patient. Left message for patient to call back. Called/sent in prescription to: Not applicable    Please call with any questions.  Deneen Lui, PharmD 5:05 PM 10/4/2020

## 2020-10-05 NOTE — TELEPHONE ENCOUNTER
Call attempt #2, did not reach patient. Left message for patient to call back at [phone].     Maykel Childers, PharmD 2:38 PM 10/5/2020

## 2020-10-06 NOTE — TELEPHONE ENCOUNTER
Dulce Handy states that she is currently admitted in [place]. Will defer to the providers taking care of her there for any needed management. Patient confirms understanding.     Tawny Briceno, PharmD 10/6/2020  5:10 PM

## 2020-10-12 ENCOUNTER — HOSPITAL ENCOUNTER (EMERGENCY)
Age: 35
Discharge: HOME | End: 2020-10-12
Payer: COMMERCIAL

## 2020-10-12 ENCOUNTER — OFFICE VISIT (OUTPATIENT)
Dept: FAMILY MEDICINE CLINIC | Age: 35
End: 2020-10-12
Payer: COMMERCIAL

## 2020-10-12 ENCOUNTER — TELEPHONE (OUTPATIENT)
Dept: FAMILY MEDICINE CLINIC | Age: 35
End: 2020-10-12

## 2020-10-12 VITALS
OXYGEN SATURATION: 96 % | TEMPERATURE: 98.6 F | RESPIRATION RATE: 16 BRPM | HEART RATE: 89 BPM | HEIGHT: 68 IN | WEIGHT: 190 LBS | BODY MASS INDEX: 28.79 KG/M2 | DIASTOLIC BLOOD PRESSURE: 80 MMHG | SYSTOLIC BLOOD PRESSURE: 145 MMHG

## 2020-10-12 VITALS
WEIGHT: 183 LBS | SYSTOLIC BLOOD PRESSURE: 148 MMHG | RESPIRATION RATE: 12 BRPM | DIASTOLIC BLOOD PRESSURE: 76 MMHG | HEIGHT: 68 IN | TEMPERATURE: 98 F | OXYGEN SATURATION: 99 % | BODY MASS INDEX: 27.74 KG/M2 | HEART RATE: 102 BPM

## 2020-10-12 LAB
AMORPHOUS: ABNORMAL
BACTERIA: ABNORMAL /HPF
BILIRUBIN URINE: NEGATIVE
BLOOD, URINE: NEGATIVE
CASTS UA: ABNORMAL /LPF
CHARACTER, URINE: ABNORMAL
COLOR: YELLOW
CRYSTALS, UA: ABNORMAL
EKG ATRIAL RATE: 74 BPM
EKG P AXIS: 55 DEGREES
EKG P-R INTERVAL: 152 MS
EKG Q-T INTERVAL: 412 MS
EKG QRS DURATION: 96 MS
EKG QTC CALCULATION (BAZETT): 457 MS
EKG R AXIS: 41 DEGREES
EKG VENTRICULAR RATE: 74 BPM
EPITHELIAL CELLS, UA: ABNORMAL /HPF
GLUCOSE URINE: NEGATIVE MG/DL
KETONES, URINE: NEGATIVE
LEUKOCYTE ESTERASE, URINE: NEGATIVE
MUCUS: ABNORMAL
NITRITE, URINE: NEGATIVE
PH UA: 7.5 (ref 5–9)
PROTEIN UA: ABNORMAL
RBC URINE: ABNORMAL /HPF
RENAL EPITHELIAL, UA: ABNORMAL
SPECIFIC GRAVITY, URINE: 1.02 (ref 1–1.03)
UROBILINOGEN, URINE: 0.2 EU/DL (ref 0–1)
WBC UA: ABNORMAL /HPF
YEAST: ABNORMAL

## 2020-10-12 PROCEDURE — 6370000000 HC RX 637 (ALT 250 FOR IP): Performed by: PHYSICIAN ASSISTANT

## 2020-10-12 PROCEDURE — G8417 CALC BMI ABV UP PARAM F/U: HCPCS | Performed by: NURSE PRACTITIONER

## 2020-10-12 PROCEDURE — 99214 OFFICE O/P EST MOD 30 MIN: CPT | Performed by: NURSE PRACTITIONER

## 2020-10-12 PROCEDURE — 93005 ELECTROCARDIOGRAM TRACING: CPT | Performed by: PHYSICIAN ASSISTANT

## 2020-10-12 PROCEDURE — 96372 THER/PROPH/DIAG INJ SC/IM: CPT

## 2020-10-12 PROCEDURE — G8484 FLU IMMUNIZE NO ADMIN: HCPCS | Performed by: NURSE PRACTITIONER

## 2020-10-12 PROCEDURE — 99284 EMERGENCY DEPT VISIT MOD MDM: CPT

## 2020-10-12 PROCEDURE — G8427 DOCREV CUR MEDS BY ELIG CLIN: HCPCS | Performed by: NURSE PRACTITIONER

## 2020-10-12 PROCEDURE — 6360000002 HC RX W HCPCS: Performed by: PHYSICIAN ASSISTANT

## 2020-10-12 PROCEDURE — 81001 URINALYSIS AUTO W/SCOPE: CPT

## 2020-10-12 PROCEDURE — 99285 EMERGENCY DEPT VISIT HI MDM: CPT

## 2020-10-12 PROCEDURE — 4004F PT TOBACCO SCREEN RCVD TLK: CPT | Performed by: NURSE PRACTITIONER

## 2020-10-12 RX ORDER — TRAMADOL HYDROCHLORIDE 50 MG/1
50 TABLET ORAL EVERY 6 HOURS PRN
Qty: 10 TABLET | Refills: 0 | Status: SHIPPED | OUTPATIENT
Start: 2020-10-12 | End: 2020-10-15

## 2020-10-12 RX ORDER — CYCLOBENZAPRINE HCL 10 MG
10 TABLET ORAL 3 TIMES DAILY PRN
Qty: 12 TABLET | Refills: 0 | Status: SHIPPED | OUTPATIENT
Start: 2020-10-12 | End: 2020-12-03

## 2020-10-12 RX ORDER — ORPHENADRINE CITRATE 30 MG/ML
60 INJECTION INTRAMUSCULAR; INTRAVENOUS ONCE
Status: COMPLETED | OUTPATIENT
Start: 2020-10-12 | End: 2020-10-12

## 2020-10-12 RX ORDER — KETOROLAC TROMETHAMINE 30 MG/ML
30 INJECTION, SOLUTION INTRAMUSCULAR; INTRAVENOUS ONCE
Status: COMPLETED | OUTPATIENT
Start: 2020-10-12 | End: 2020-10-12

## 2020-10-12 RX ORDER — PREDNISONE 50 MG/1
50 TABLET ORAL DAILY
Qty: 4 TABLET | Refills: 0 | Status: SHIPPED | OUTPATIENT
Start: 2020-10-12 | End: 2020-10-16

## 2020-10-12 RX ORDER — LIDOCAINE 50 MG/G
1 PATCH TOPICAL DAILY
Qty: 10 PATCH | Refills: 0 | Status: SHIPPED | OUTPATIENT
Start: 2020-10-12 | End: 2020-10-28 | Stop reason: ALTCHOICE

## 2020-10-12 RX ORDER — PREDNISONE 20 MG/1
60 TABLET ORAL ONCE
Status: COMPLETED | OUTPATIENT
Start: 2020-10-12 | End: 2020-10-12

## 2020-10-12 RX ORDER — LIDOCAINE 4 G/G
1 PATCH TOPICAL ONCE
Status: DISCONTINUED | OUTPATIENT
Start: 2020-10-12 | End: 2020-10-12 | Stop reason: HOSPADM

## 2020-10-12 RX ADMIN — ORPHENADRINE CITRATE 60 MG: 30 INJECTION INTRAMUSCULAR; INTRAVENOUS at 08:10

## 2020-10-12 RX ADMIN — PREDNISONE 60 MG: 20 TABLET ORAL at 08:05

## 2020-10-12 RX ADMIN — KETOROLAC TROMETHAMINE 30 MG: 30 INJECTION, SOLUTION INTRAMUSCULAR; INTRAVENOUS at 08:06

## 2020-10-12 RX ADMIN — HYDROMORPHONE HYDROCHLORIDE 1 MG: 1 INJECTION, SOLUTION INTRAMUSCULAR; INTRAVENOUS; SUBCUTANEOUS at 09:41

## 2020-10-12 ASSESSMENT — ENCOUNTER SYMPTOMS
SINUS PRESSURE: 0
DIARRHEA: 1
BACK PAIN: 1
COUGH: 0
EYE DISCHARGE: 0
VOMITING: 0
EYE ITCHING: 0
SORE THROAT: 0
SHORTNESS OF BREATH: 0
RHINORRHEA: 0
ABDOMINAL PAIN: 1
NAUSEA: 0

## 2020-10-12 ASSESSMENT — PAIN DESCRIPTION - ORIENTATION: ORIENTATION: LOWER

## 2020-10-12 ASSESSMENT — PAIN DESCRIPTION - FREQUENCY: FREQUENCY: CONTINUOUS

## 2020-10-12 ASSESSMENT — PAIN SCALES - GENERAL
PAINLEVEL_OUTOF10: 7
PAINLEVEL_OUTOF10: 10
PAINLEVEL_OUTOF10: 9
PAINLEVEL_OUTOF10: 3
PAINLEVEL_OUTOF10: 10

## 2020-10-12 ASSESSMENT — PAIN DESCRIPTION - ONSET: ONSET: ON-GOING

## 2020-10-12 ASSESSMENT — PAIN DESCRIPTION - PAIN TYPE: TYPE: ACUTE PAIN

## 2020-10-12 ASSESSMENT — PAIN DESCRIPTION - DESCRIPTORS: DESCRIPTORS: SHARP

## 2020-10-12 ASSESSMENT — PAIN DESCRIPTION - PROGRESSION: CLINICAL_PROGRESSION: GRADUALLY WORSENING

## 2020-10-12 ASSESSMENT — PAIN DESCRIPTION - LOCATION: LOCATION: BACK

## 2020-10-12 NOTE — ED PROVIDER NOTES
[facility] EMERGENCY DEPT      CHIEF COMPLAINT       Chief Complaint   Patient presents with    Back Pain     chronic        Nurses Notes reviewed and I agree except as noted in the HPI. HISTORY OF PRESENT ILLNESS    Melisa Holder is a 37 y.o. female with a PMH of cervical cancer who presents for back pain. The patient states she has been worked up for chronic abdominal pain and she now believes the pain she has been experiencing is back pain. She has been dealing with this pain for months and has been on \"bed rest\" due to abdominal pain. Multiple workups, imaging, and tests have been performed including recent CT scans. She denies any specific injury or change in routine causing this back pain. The patient was pacing back in forth in the exam room in acute pain rating it a 10/10. The pain is sharp from mid-back to tailbone. She notes radiation into both legs and muscle spasms. She has not had any urinary or bowel incontinence. She states that Tylenol usually keeps the pain tolerable however is no longer working. Lying flat aggravates the pain and when sitting she needs to lean to her right for comfort. She notes some tingling to her toes however does have full sensation to her extremities. For her cervical cancer she had extensive radiation therapy she believes has something to do with the pain. She denies prior back surgery or history of back pain. She is a smoker though denies drugs and alcohol abuse. She does note diarrhea last week and SOB secondary to pain. She denies recent fever, illness, chest pain, and urinary symptoms. REVIEW OF SYSTEMS     Review of Systems   Constitutional: Positive for activity change (12-15# weight loss in 2 months) and appetite change. Negative for chills, fatigue and fever. HENT: Negative for congestion, ear pain, rhinorrhea, sinus pressure and sore throat. Eyes: Negative for photophobia, discharge and itching.    Respiratory: Negative for cough and shortness of breath. Cardiovascular: Negative for chest pain. Gastrointestinal: Positive for abdominal pain and diarrhea. Negative for nausea and vomiting. No incontinence of bowel    Endocrine: Negative for polyuria. Genitourinary: Negative for decreased urine volume, difficulty urinating, dysuria and frequency. No incontinence of bladder   Musculoskeletal: Positive for back pain and gait problem. Negative for myalgias and neck pain. Skin: Negative for rash. Neurological: Negative for weakness, light-headedness and numbness. Hematological: Negative for adenopathy. Psychiatric/Behavioral: Negative for confusion and sleep disturbance. PAST MEDICAL HISTORY    has a past medical history of Anxiety, Cancer of cervix ([facility] [address] Depression, and Hypertension. SURGICAL HISTORY      has a past surgical history that includes Cholecystectomy (over 5 years ago); Tubal ligation (2006); Cervix biopsy (N/A, 6/7/2019); INSERTION / REMOVAL / REPLACEMENT VENOUS ACCESS CATHETER (N/A, 8/9/2019); other surgical history (10/10/2019); and Colonoscopy (09/29/2020).     CURRENT MEDICATIONS       Discharge Medication List as of 10/12/2020 10:43 AM      CONTINUE these medications which have NOT CHANGED    Details   ondansetron (ZOFRAN) 4 MG tablet Take 1 tablet by mouth daily as needed for Nausea or Vomiting, Disp-30 tablet,R-0Print      dicyclomine (BENTYL) 10 MG capsule Take 1 capsule by mouth 4 times daily (before meals and nightly), Disp-120 capsule,R-0Print      traZODone (DESYREL) 100 MG tablet Take 1 tablet by mouth nightly, Disp-30 tablet,R-6Normal      ketorolac (TORADOL) 10 MG tablet Take 1 tablet by mouth 3 times daily, Disp-15 tablet, R-0Print      phenazopyridine (PYRIDIUM) 100 MG tablet Take 100 mg by mouth 2 times dailyHistorical Med      oxybutynin (DITROPAN-XL) 5 MG extended release tablet Take 5 mg by mouth dailyHistorical Med      lisinopril (PRINIVIL;ZESTRIL) 10 MG tablet Take 1 tablet by mouth daily, Disp-90 tablet, R-1Normal      aspirin 81 MG chewable tablet Take 1 tablet by mouth daily, Disp-30 tablet, R-3Normal      lidocaine-prilocaine (EMLA) 2.5-2.5 % cream Apply topically as needed. , Disp-30 g, R-2, Normal      prochlorperazine (COMPAZINE) 5 MG tablet Take 1 tablet by mouth every 6 hours as needed for Nausea, Disp-30 tablet, R-3Normal      ibuprofen (ADVIL;MOTRIN) 200 MG tablet Take 200 mg by mouth every 8 hours as needed for PainHistorical Med      meclizine (ANTIVERT) 25 MG tablet Take 1 tablet by mouth 3 times daily as needed for Dizziness, Disp-40 tablet, R-1Normal             ALLERGIES     is allergic to oxycodone and percocet [oxycodone-acetaminophen]. FAMILY HISTORY     She indicated that her mother is alive. She indicated that her father is alive. She indicated that her maternal grandmother is . She indicated that her paternal grandmother is . family history includes Cancer in her maternal grandmother and paternal grandmother. SOCIAL HISTORY    reports that she has been smoking cigarettes. She has a 20.00 pack-year smoking history. She has never used smokeless tobacco. She reports current alcohol use of about 16.0 standard drinks of alcohol per week. She reports that she does not use drugs. PHYSICAL EXAM     INITIAL VITALS:  height is 5' 8\" (1.727 m) and weight is 190 lb (86.2 kg). Her oral temperature is 98.6 °F (37 °C). Her blood pressure is 145/80 (abnormal) and her pulse is 89. Her respiration is 16 and oxygen saturation is 96%. Physical Exam  Vitals signs and nursing note reviewed. Constitutional:       General: She is not in acute distress. Appearance: Normal appearance. She is well-developed. She is not toxic-appearing or diaphoretic. Comments: Patient pacing the room bent over clutching her back. HENT:      Head: Normocephalic and atraumatic.       Right Ear: Hearing normal.      Left Ear: Hearing normal.      Nose: Nose normal. No rhinorrhea. Mouth/Throat:      Pharynx: Uvula midline. Eyes:      General: Lids are normal. No scleral icterus. Conjunctiva/sclera: Conjunctivae normal.      Pupils: Pupils are equal, round, and reactive to light. Neck:      Musculoskeletal: Normal range of motion and neck supple. No neck rigidity. Vascular: No JVD. Trachea: No tracheal deviation. Cardiovascular:      Rate and Rhythm: Regular rhythm. Tachycardia present. Pulses:           Dorsalis pedis pulses are 2+ on the right side and 2+ on the left side. Posterior tibial pulses are 2+ on the right side and 2+ on the left side. Heart sounds: Normal heart sounds. No murmur. Pulmonary:      Effort: Pulmonary effort is normal. No respiratory distress. Breath sounds: No stridor. Wheezing present. No decreased breath sounds. Abdominal:      General: There is no distension. Palpations: Abdomen is soft. Abdomen is not rigid. There is no pulsatile mass. Tenderness: There is generalized abdominal tenderness. There is no guarding or rebound. Hernia: No hernia is present. Comments: Abdominal exam limited secondary to discomfort with lying flat. Musculoskeletal: Normal range of motion. General: Tenderness present. No swelling or signs of injury. Thoracic back: She exhibits tenderness. She exhibits no swelling, no edema and no deformity. Lumbar back: She exhibits tenderness. She exhibits no bony tenderness, no swelling and no edema. Right lower leg: No edema. Left lower leg: No edema. Comments: Well perfused; good strength appreciated in all muscle groups; there is good plantar and dorsiflexion of the feet and toes. SLR is negative in the supine position on the right and positive on the left. Patient exhibited hypersensitivity to light touching of skin over the spine. Lymphadenopathy:      Cervical: No cervical adenopathy.    Skin:     General: Skin is warm and dry.      Coloration: Skin is not jaundiced or pale. Findings: No rash. Neurological:      Mental Status: She is alert and oriented to person, place, and time. GCS: GCS eye subscore is 4. GCS verbal subscore is 5. GCS motor subscore is 6. Sensory: Sensation is intact. No sensory deficit. Motor: Motor function is intact. No weakness or abnormal muscle tone. Coordination: Coordination is intact. Coordination normal.      Comments: No gross deficits observed. Sensation present to the lower extremities. Psychiatric:         Mood and Affect: Mood normal.         Speech: Speech normal.         Behavior: Behavior is agitated. Behavior is cooperative. Thought Content: Thought content normal.         DIFFERENTIAL DIAGNOSIS:   Including but not limited to: Neuropathic pain, DDD, lumbar radiculopathy, ureterolithiasis, AAA    DIAGNOSTIC RESULTS     EKG: All EKG's are interpreted by [facility] who either signs or Co-signs this chart in the absence of a cardiologist.  NONE    RADIOLOGY: non-plain film images(s) such as CT,Ultrasound and MRI are read by the radiologist.  Plain radiographic images are visualized and preliminarily interpreted by the emergency physician unless otherwise stated below. No orders to display       LABS:   Labs Reviewed   URINE WITH REFLEXED MICRO - Abnormal; Notable for the following components:       Result Value    Protein, UA TRACE (*)     All other components within normal limits       EMERGENCY DEPARTMENT COURSE:   Vitals:    Vitals:    10/12/20 0731 10/12/20 0853 10/12/20 1057   BP: (!) 143/106 (!) 145/100 (!) 145/80   Pulse: 106 100 89   Resp: 18 18 16   Temp: 98.6 °F (37 °C)     TempSrc: Oral     SpO2: 96% 96% 96%   Weight: 190 lb (86.2 kg)     Height: 5' 8\" (1.727 m)       MDM:  The patient was seen and evaluated within the ED today for acute LBP.  On exam, I appreciated a neurovascularly intact patient with hypersensitivity to light DISPOSITION/PLAN     1. Acute exacerbation of chronic low back pain    2. Sciatica of left side        PATIENT REFERRED TO:  [address]  [address] [facility] [phone]  Schedule an appointment as soon as possible for a visit         DISCHARGE MEDICATIONS:  Discharge Medication List as of 10/12/2020 10:43 AM      START taking these medications    Details   cyclobenzaprine (FLEXERIL) 10 MG tablet Take 1 tablet by mouth 3 times daily as needed for Muscle spasms, Disp-12 tablet,R-0Print      predniSONE (DELTASONE) 50 MG tablet Take 1 tablet by mouth daily for 4 days, Disp-4 tablet,R-0Print      lidocaine (LIDODERM) 5 % Place 1 patch onto the skin daily 12 hours on, 12 hours off., Disp-10 patch,R-0Print      traMADol (ULTRAM) 50 MG tablet Take 1 tablet by mouth every 6 hours as needed for Pain for up to 3 days. , Disp-10 tablet,R-0Print             (Please note that portions of this note were completed with a voice recognition program.  Efforts were made to edit the dictations but occasionally words are mis-transcribed.)    Allie Jennings PA-C 10/15/20 10:44 AM    FREEDOM Delcid PA-C  10/15/20 [id number]

## 2020-10-12 NOTE — TELEPHONE ENCOUNTER
Patient left the office after her appt on 10/12/20 and never stopped at check out to have her paperwork checked and so she did not schedule a follow up appt.

## 2020-10-12 NOTE — PROGRESS NOTES
SUBJECTIVE:  Christie Vega is a 37 y.o. y/o female that presents with Follow-up (back pain - slightly  better with medication  - want  referral )  . HPI:  Symptoms have been present for 2 month(s). she describes the pain as dull, aching, burning, throbbing, shooting, pins and needles, radiating to buttock(s) on left, hip(s) on left, upper leg(s) on left, knee(s) on left, lower leg(s) on left  in the lumbar region. Inciting injury or history of trauma? No but she has had radiation in pelvic area for cervical cancer. Has bene dealing with this pain for 2 months and it started as though she thought it was all abdominal pain and ha had multiple ct scans of abdomen both her and [facility], today felt as though it may be back in origin. Did go to ER today and given injections of dilaudid, toradol, norflex and prednisone orally   Pain is relieved by - nothing  Pain is aggravated by - standing and walking, has also tried a heating pad and pain  Meds. Radiation of the pain? Yes - as above to LLE and some tingling in foot on right. Paresthesias of the extremities? Yes - light intermittent on right   Saddle anesthesia? No  Bowel or bladder incontinence? No  Treatments tried - injection prednisone, pain patches states lidoderm path is helping.          OBJECTIVE:  BP (!) 148/76   Pulse 102   Temp 98 °F (36.7 °C) (Oral)   Resp 12   Ht 5' 7.5\" (1.715 m)   Wt 183 lb (83 kg)   LMP 08/14/2019   SpO2 99%   BMI 28.24 kg/m²   Physical Examination: General appearance - alert, well appearing, and is in distress with pain   Chest - clear to auscultation, no wheezes, rales or rhonchi, symmetric air entry  Heart - normal rate, regular rhythm, normal S1, S2, no murmurs, rubs, clicks or gallops  Back exam - limited range of motion, pain with motion noted during exam, tenderness noted with palpation of lumbar and coccyx area and bilateral SI joint areas, positive straight-leg raise positive bilaterally , sensory exam intact bilateral lower extremities,  4/5 strength globally and symmetrically in the LEs, 2+ patellar reflexes b/l  Extremities - peripheral pulses normal, no pedal edema, no clubbing or cyanosis  Skin -  Skin color, texture, turgor normal. No rashes or lesions. Psych - Affect normal, no evidence of depression or anxiety    ASSESSMENT & PLAN  Nolan Celestin was seen today for follow-up. Diagnoses and all orders for this visit:    Lumbar back pain with radiculopathy affecting left lower extremity  -     XR LUMBAR SPINE (MIN 4 VIEWS); Future  -     XR SACRUM COCCYX (MIN 2 VIEWS); Future  -     [facility]  -     MRI LUMBAR SPINE WO CONTRAST; Future  Warm moist heat  Continue current med treatment   Right leg paresthesias  -     MRI LUMBAR SPINE WO CONTRAST; Future    Acute coccygeal pain  -     XR SACRUM COCCYX (MIN 2 VIEWS); Future  -     [facility]  -     MRI LUMBAR SPINE WO CONTRAST; Future    Intractable pain  -     MRI LUMBAR SPINE WO CONTRAST; Future    Weakness of both lower extremities  -     [facility]  If symptoms worsen report to ER or [facility]  Pt voiced understanding and in agreement with plan. Return in about 3 weeks (around 11/2/2020) for f/u pain. Nolan Celestin received counseling on the following healthy behaviors: medication adherence  Reviewed prior labs and health maintenance. Continue current medications, diet and exercise. Discussed use, benefit, and side effects of prescribed medications. Barriers to medication compliance addressed. Patient given educational materials - see patient instructions. All patient questions answered. Patient voiced understanding.

## 2020-10-12 NOTE — ED TRIAGE NOTES
Patient arrived to room 10 with c/o back pain. Patient stated she has been here multiple times trying to figure out this pain she is having. Patient stated this has been going on for months, stated tylenol is no longer working. Patient stated she doesn't take any of her other medications that are prescribed.

## 2020-10-13 ENCOUNTER — CARE COORDINATION (OUTPATIENT)
Dept: CARE COORDINATION | Age: 35
End: 2020-10-13

## 2020-10-13 ENCOUNTER — HOSPITAL ENCOUNTER (OUTPATIENT)
Dept: GENERAL RADIOLOGY | Age: 35
Discharge: HOME | End: 2020-10-13
Payer: COMMERCIAL

## 2020-10-13 ENCOUNTER — HOSPITAL ENCOUNTER (OUTPATIENT)
Age: 35
Discharge: HOME | End: 2020-10-13
Payer: COMMERCIAL

## 2020-10-13 PROCEDURE — 72220 X-RAY EXAM SACRUM TAILBONE: CPT

## 2020-10-13 PROCEDURE — 72110 X-RAY EXAM L-2 SPINE 4/>VWS: CPT

## 2020-10-13 SDOH — HEALTH STABILITY: MENTAL HEALTH
STRESS IS WHEN SOMEONE FEELS TENSE, NERVOUS, ANXIOUS, OR CAN'T SLEEP AT NIGHT BECAUSE THEIR MIND IS TROUBLED. HOW STRESSED ARE YOU?: ONLY A LITTLE

## 2020-10-13 SDOH — HEALTH STABILITY: PHYSICAL HEALTH: ON AVERAGE, HOW MANY DAYS PER WEEK DO YOU ENGAGE IN MODERATE TO STRENUOUS EXERCISE (LIKE A BRISK WALK)?: 0 DAYS

## 2020-10-13 SDOH — SOCIAL STABILITY: SOCIAL NETWORK: HOW OFTEN DO YOU GET TOGETHER WITH FRIENDS OR RELATIVES?: MORE THAN THREE TIMES A WEEK

## 2020-10-13 SDOH — ECONOMIC STABILITY: INCOME INSECURITY: HOW HARD IS IT FOR YOU TO PAY FOR THE VERY BASICS LIKE FOOD, HOUSING, MEDICAL CARE, AND HEATING?: NOT VERY HARD

## 2020-10-13 SDOH — SOCIAL STABILITY: SOCIAL NETWORK
IN A TYPICAL WEEK, HOW MANY TIMES DO YOU TALK ON THE PHONE WITH FAMILY, FRIENDS, OR NEIGHBORS?: MORE THAN THREE TIMES A WEEK

## 2020-10-13 SDOH — HEALTH STABILITY: PHYSICAL HEALTH: ON AVERAGE, HOW MANY MINUTES DO YOU ENGAGE IN EXERCISE AT THIS LEVEL?: 0 MIN

## 2020-10-13 SDOH — ECONOMIC STABILITY: TRANSPORTATION INSECURITY
IN THE PAST 12 MONTHS, HAS THE LACK OF TRANSPORTATION KEPT YOU FROM MEDICAL APPOINTMENTS OR FROM GETTING MEDICATIONS?: NO

## 2020-10-13 SDOH — ECONOMIC STABILITY: FOOD INSECURITY: WITHIN THE PAST 12 MONTHS, YOU WORRIED THAT YOUR FOOD WOULD RUN OUT BEFORE YOU GOT MONEY TO BUY MORE.: NEVER TRUE

## 2020-10-13 SDOH — ECONOMIC STABILITY: TRANSPORTATION INSECURITY
IN THE PAST 12 MONTHS, HAS LACK OF TRANSPORTATION KEPT YOU FROM MEETINGS, WORK, OR FROM GETTING THINGS NEEDED FOR DAILY LIVING?: NO

## 2020-10-13 SDOH — ECONOMIC STABILITY: FOOD INSECURITY: WITHIN THE PAST 12 MONTHS, THE FOOD YOU BOUGHT JUST DIDN'T LAST AND YOU DIDN'T HAVE MONEY TO GET MORE.: NEVER TRUE

## 2020-10-13 NOTE — CARE COORDINATION
Hero, I have enrolled Mitzi Fuller into Care Coordination program to provide support and education to help manage her health conditions and reduce utilization. Please approve Plan of Care using smart phrase . Ccplanofcare. Thank you!

## 2020-10-13 NOTE — CARE COORDINATION
Ambulatory Care Coordination Note  10/13/2020  CM Risk Score: 5  Charlson 10 Year Mortality Risk Score: 10%     ACC: Maryam Way, RN    Summary Note: Spoke with Lara Painting following ED visit and enrolled into Care Coordination program to provide support and education to help with acute condition and decrease utilization. Lara Painting discussed detailed history of hx of cancer with radiation therapy. States for the past 28 days, she has been having back pain/abdominal type pain and has been going to ED for treatment and was also admitted to [facility] per [facility] but no one has been able to find the problem. States she had colonoscopy and found ulcer and does follow up with GI. She states that she has finally had some relief from her pain after PCP ordered Flexeril and steroids. States this is the most relief she has had for a month. 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 Therapy has been ordered and she will be starting that this week. There is an MRI scheduled and she is waiting to see if insurance will pay for that. Was agreeable to [facility] followup  Contact information given.   Plan  -provide education and support to help reduce utilization  -collaborate with [facility] as needed  -look into possible [facility] if insurance will not pay for MRI  -follow up with therapy  -[facility] referral?  General Assessment    Do you have any symptoms that are causing concern?:  Yes  Progression since Onset:  Gradually Improving  Reported Symptoms:  Pain (Comment: back pain/abdominal pain)             Ambulatory Care Coordination Assessment    Care Coordination Protocol  Program Enrollment:  Rising Risk  Referral from Primary Care Provider:  No  Week 1 - Initial Assessment     Do you have all of your prescriptions and are they filled?:  Yes  Barriers to medication adherence:  None  Are you able to afford your medications?: Yes  How often do you have trouble taking your medications the way you have been told to take them?:  I always take them as prescribed. Do you have Home O2 Therapy?:  No      Is patient able to live independently?:  Yes     Current Housing:  Private Residence        Per the Fall Risk Screening, did the patient have 2 or more falls or 1 fall with injury in the past year?:  No        Are you experiencing loss of meaning?:  No  Are you experiencing loss of hope and peace?:  No     Thinking about your patient's physical health needs, are there any symptoms or problems (risk indicators) you are unsure about that require further investigation?:  No identified areas of uncertainly or problems already being investigated   Are the patients physical health problems impacting on their mental well-being?:  No identified areas of concern   Are there any problems with your patients lifestyle behaviors (alcohol, drugs, diet, exercise) that are impacting on physical or mental well-being?:  No identified areas of concern   Do you have any other concerns about your patients mental well-being?  How would you rate their severity and impact on the patient?:  No identified areas of concern   How would you rate their home environment in terms of safety and stability (including domestic violence, insecure housing, neighbor harassment)?:  Consistently safe, supportive, stable, no identified problems   How do daily activities impact on the patient's well-being? (include current or anticipated unemployment, work, caregiving, access to transportation or other):  No identified problems or perceived positive benefits   How would you rate their social network (family, work, friends)?:  Good participation with social networks   How would you rate their financial resources (including ability to afford all required medical care)?:  Financially secure, some resource challenges   How wells does the patient now understand their health and well-being (symptoms, signs or risk factors) and what they need to do to manage their health?:  Reasonable to good understanding and already engages in managing health or is willing to undertake better management   How well do you think your patient can engage in healthcare discussions? (Barriers include language, deafness, aphasia, alcohol or drug problems, learning difficulties, concentration):  Clear and open communication, no identified barriers   Do other services need to be involved to help this patient?:  Other care/services not required at this time   Are current services involved with this patient well-coordinated? (Include coordination with other services you are now recommendation): All required care/services in place and well-coordinated   Suggested Interventions and Community Resources   Disease Specific Clinic:   (Comment: works with [address]:  Declined   Medication Assistance Program:  Declined   Occupational Therapy:  Completed   Physical Therapy:  Completed                  Prior to Admission medications    Medication Sig Start Date End Date Taking? Authorizing Provider   cyclobenzaprine (FLEXERIL) 10 MG tablet Take 1 tablet by mouth 3 times daily as needed for Muscle spasms 10/12/20  Yes Baudilio Osei PA-C   predniSONE (DELTASONE) 50 MG tablet Take 1 tablet by mouth daily for 4 days 10/12/20 10/16/20 Yes Baudilio Osei PA-C   lidocaine (LIDODERM) 5 % Place 1 patch onto the skin daily 12 hours on, 12 hours off. 10/12/20  Yes Baudilio Osei PA-C   traMADol (ULTRAM) 50 MG tablet Take 1 tablet by mouth every 6 hours as needed for Pain for up to 3 days.  10/12/20 10/15/20 Yes Baudilio Osei PA-C   ondansetron (ZOFRAN) 4 MG tablet Take 1 tablet by mouth daily as needed for Nausea or Vomiting 10/1/20  Yes Myla Monroy MD   dicyclomine (BENTYL) 10 MG capsule Take 1 capsule by mouth 4 times daily (before meals and nightly) 10/1/20  Yes Haleigh GARCIA Chanelle Cristobal MD   traZODone (DESYREL) 100 MG tablet Take 1 tablet by mouth nightly 4/27/20  Yes CHAPO Joe CNP   ketorolac (TORADOL) 10 MG tablet Take 1 tablet by mouth 3 times daily 3/18/20  Yes Bonifacio Roman MD   phenazopyridine (PYRIDIUM) 100 MG tablet Take 100 mg by mouth 2 times daily 8/21/19  Yes Historical Provider, MD   oxybutynin (DITROPAN-XL) 5 MG extended release tablet Take 5 mg by mouth daily 9/3/19  Yes Historical Provider, MD   lisinopril (PRINIVIL;ZESTRIL) 10 MG tablet Take 1 tablet by mouth daily 8/21/19  Yes CHAPO Joe CNP   aspirin 81 MG chewable tablet Take 1 tablet by mouth daily 8/14/19  Yes Kellen Mohan MD   lidocaine-prilocaine (EMLA) 2.5-2.5 % cream Apply topically as needed.  8/1/19  Yes Ceci Gamez MD   prochlorperazine (COMPAZINE) 5 MG tablet Take 1 tablet by mouth every 6 hours as needed for Nausea 7/31/19  Yes Ceci Gamez MD   ibuprofen (ADVIL;MOTRIN) 200 MG tablet Take 200 mg by mouth every 8 hours as needed for Pain   Yes Historical Provider, MD   meclizine (ANTIVERT) 25 MG tablet Take 1 tablet by mouth 3 times daily as needed for Dizziness 2/25/19  Yes CHAPO Joe CNP       Future Appointments   Date Time Provider Mili Jamison   12/14/2020 11:20 AM CHAPO Joe CNP [facility] [place]   2/26/2021 11:15 AM Leah Maxon Rosaline Osler, [address] [place] [address]

## 2020-10-14 ENCOUNTER — TELEPHONE (OUTPATIENT)
Dept: FAMILY MEDICINE CLINIC | Age: 35
End: 2020-10-14

## 2020-10-14 NOTE — TELEPHONE ENCOUNTER
----- Message from CHAPO Garland - CNP sent at 10/14/2020  3:24 PM EDT -----  Let pt know her lumbar x-rays do identify some moderate narrowing of the spine called arthritis, most pronounced at L5-S1 this is what we discussed in the office as a possiblity as the cause of the pain, the bones pressing on spinal cord. The x-ray ws negative for any fractures of her back. Continue with the plan discussed in the office and we il se e if the MRI goes through.

## 2020-10-15 ASSESSMENT — ENCOUNTER SYMPTOMS: PHOTOPHOBIA: 0

## 2020-10-19 ENCOUNTER — TELEPHONE (OUTPATIENT)
Dept: FAMILY MEDICINE CLINIC | Age: 35
End: 2020-10-19

## 2020-10-19 RX ORDER — PREDNISONE 20 MG/1
TABLET ORAL
Qty: 18 TABLET | Refills: 0 | Status: SHIPPED | OUTPATIENT
Start: 2020-10-19 | End: 2020-11-04

## 2020-10-19 NOTE — TELEPHONE ENCOUNTER
Pt states the back pain improved with the steroids, but returned when she completed them.  The pain has returned with shooting pain in lower back down left leg and abdomen  She was requesting another rx

## 2020-10-19 NOTE — TELEPHONE ENCOUNTER
Pt informed and verbalized understanding.   Pt hasn't heard anything about the MRI      Encounter sent to [facility] to follow-up with MRI

## 2020-10-20 ENCOUNTER — CARE COORDINATION (OUTPATIENT)
Dept: CARE COORDINATION | Age: 35
End: 2020-10-20

## 2020-10-20 NOTE — CARE COORDINATION
Ambulatory Care Coordination Note  10/20/2020  CM Risk Score: 5  [place]son 10 Year Mortality Risk Score: 10%     ACC: Pablo Farley RN    Summary Note: Spoke with Mejia Sams. States she was feeling good until the steroids stopped and then she started having pain again. Completed the xray and does show arthritis. MRI was denied by insurance until 6 weeks of therapy completed. Mejia Sams was hesitant to start therapy as she is concerned how much she can handle it. I encouraged her to participate to allow for possible relief and also her insurance would move forward with needed treatments. She discussed some constipation and I encouraged her to start fiber supplements to help keep her regular and to increase her mobility as she could. She is still working off and on as she has days where she is able to work. Plan  -reinforce education completed and complete additional education to help manage chronic conditions  -encourage participation with therapy  -follow up with possible [address]air 76 if MRI not covered by insurance  General Assessment    Do you have any symptoms that are causing concern?:  Yes  Progression since Onset:  Unchanged  Reported Symptoms:  Other (Comment: back pain)               Care Coordination Interventions    Program Enrollment:  Rising Risk  Referral from Primary Care Provider:  No  Suggested Interventions and Community Resources  Disease Specific Clinic:   (Comment: works with [employer])  [address]:  Declined  Medication Assistance Program:  Declined  Occupational Therapy:  Completed  Physical Therapy:  Completed         Goals Addressed                 This Visit's Progress     Conditions and Symptoms   On track     I will schedule office visits, as directed by my provider. I will keep my appointment or reschedule if I have to cancel. I will notify my provider of any barriers to my plan of care.   I will notify my provider of any symptoms that indicate a worsening of my condition. Barriers: back/abdominal pain  Plan for overcoming my barriers: family and ACM support  Confidence: 8/10  Anticipated Goal Completion Date: 1-13-21              Prior to Admission medications    Medication Sig Start Date End Date Taking? Authorizing Provider   predniSONE (DELTASONE) 20 MG tablet 1 tab po tid for 3 days 1 tab po bid fro 3 days 1 tab po once day for 3 days 10/19/20  Yes CHAPO Garland CNP   cyclobenzaprine (FLEXERIL) 10 MG tablet Take 1 tablet by mouth 3 times daily as needed for Muscle spasms 10/12/20  Yes Matthew Galeana PA-C   lidocaine (LIDODERM) 5 % Place 1 patch onto the skin daily 12 hours on, 12 hours off. 10/12/20  Yes Matthew Galeana PA-C   ondansetron (ZOFRAN) 4 MG tablet Take 1 tablet by mouth daily as needed for Nausea or Vomiting 10/1/20  Yes Diamante Johnston MD   dicyclomine (BENTYL) 10 MG capsule Take 1 capsule by mouth 4 times daily (before meals and nightly) 10/1/20  Yes Diamante Johnston MD   traZODone (DESYREL) 100 MG tablet Take 1 tablet by mouth nightly 4/27/20  Yes CHAPO Garland CNP   ketorolac (TORADOL) 10 MG tablet Take 1 tablet by mouth 3 times daily 3/18/20  Yes Jose Coombs MD   phenazopyridine (PYRIDIUM) 100 MG tablet Take 100 mg by mouth 2 times daily 8/21/19  Yes Historical Provider, MD   oxybutynin (DITROPAN-XL) 5 MG extended release tablet Take 5 mg by mouth daily 9/3/19  Yes Historical Provider, MD   lisinopril (PRINIVIL;ZESTRIL) 10 MG tablet Take 1 tablet by mouth daily 8/21/19  Yes CHAPO Garland CNP   aspirin 81 MG chewable tablet Take 1 tablet by mouth daily 8/14/19  Yes Sai Arce MD   lidocaine-prilocaine (EMLA) 2.5-2.5 % cream Apply topically as needed.  8/1/19  Yes David Baum MD   prochlorperazine (COMPAZINE) 5 MG tablet Take 1 tablet by mouth every 6 hours as needed for Nausea 7/31/19  Yes David Baum MD   ibuprofen (ADVIL;MOTRIN) 200 MG tablet Take 200 mg by mouth every 8 hours as needed for Pain   Yes Historical Provider, MD   meclizine (ANTIVERT) 25 MG tablet Take 1 tablet by mouth 3 times daily as needed for Dizziness 2/25/19  Yes CHAPO Ramirez CNP       Future Appointments   Date Time Provider Mili Chowi   10/21/2020 10:45 AM Jannet Shay, PT [facility] PT [facility]   12/14/2020 11:20 AM [facility]   2/26/2021 11:15 AM Zoila Sellers, [address] [place] [address]

## 2020-10-20 NOTE — TELEPHONE ENCOUNTER
Let pt know her MRI was denied, the insurance requests 6 weeks of physical therapy first. Please have pt continue with the plan discussed in the office. I do see wehre she does have her first PT appt set up. Let me know if she has any questions.

## 2020-10-21 ENCOUNTER — HOSPITAL ENCOUNTER (OUTPATIENT)
Dept: PHYSICAL THERAPY | Age: 35
Setting detail: THERAPIES SERIES
Discharge: HOME | End: 2020-10-21
Payer: COMMERCIAL

## 2020-10-21 PROCEDURE — 97162 PT EVAL MOD COMPLEX 30 MIN: CPT

## 2020-10-21 PROCEDURE — 97032 APPL MODALITY 1+ESTIM EA 15: CPT

## 2020-10-21 NOTE — PROGRESS NOTES
** PLEASE SIGN, DATE AND TIME CERTIFICATION BELOW AND RETURN TO [facility] OUTPATIENT REHABILITATION (FAX #: [phone]). ATTEST/CO-SIGN IF ACCESSING VIA [employer]. THANK YOU.**    I certify that I have examined the patient below and determined that Physical Medicine and Rehabilitation service is necessary and that I approve the established plan of care for up to 90 days or as specifically noted. Attestation, signature or co-signature of physician indicates approval of certification requirements.    ________________________ ____________ __________  Physician Signature   Date   Time  [facility]  PHYSICAL THERAPY  [x] EVALUATION  [] DAILY NOTE (LAND) [] DAILY NOTE (AQUATIC ) [] PROGRESS NOTE [] DISCHARGE NOTE    [x] [facility]   [] [place] [address]    [] [facility]   [] Barbara Jill    Date: 10/21/2020  Patient Name:  Ijeoma Welch  : 1985  MRN: [id number]    Referring Practitioner Hero Vazquez APRN - *   Diagnosis Radiculopathy, lumbar region [M54.16]    Treatment Diagnosis LBP with bilateral radiculopathy, core and leg weakness, difficulty with gait, balance deficits   Date of Evaluation 10/21/20    Additional Pertinent History Arthritis. Cervical cancer within last 1-2 years. Functional Outcome Measure Used None done    Functional Outcome Score       Insurance: Primary: Payor: [address] [place] [address]  [address] /  /  / ,   Secondary:    Authorization Information: PT allowed 30 visits per calendar year. Aquatics and modalities except HP/CP and Ionto covered, No telehealth   Visit # 1, 1/10 for progress note   Visits Allowed: 30   Recertification Date:    Physician Follow-Up: 2020   Physician Orders:    History of Present Illness:      SUBJECTIVE: Patient reports was diagnosed with cervical cancer 1-2 years ago. States had chemo/radiation and it was done in 2019.  States then this 2020 flexed posture   Stairs Using non-reciprocal pattern due to pain   Balance Unsteady but no falls recently   Special Tests          TREATMENT   Precautions: Do not overdo activity   Pain: 2/10 low back    X in shaded column indicates activity completed today   Modalities Parameters/  Location  Notes   E-stim (IFC) to low back in sitting 100% scan Intensity started at 12 and increased to 13.5 x HP to low back but had to take off after 10 minutes due to too much pressure. Manual Therapy Time/Technique  Notes                     Exercise/Intervention   Notes                                                                                  Specific Interventions Next Treatment: E-stim (discussed TENS unit), can try positioning for symptom relief but she is not comfortable anywhere except for sitting and leaning forward, Can try some strengthening or stretching, may need to do pool therapy if cannot tolerate land    Activity/Treatment Tolerance:  []  Patient tolerated treatment well  []  Patient limited by fatigue  [x]  Patient limited by pain   []  Patient limited by medical complications  []  Other:     Assessment: Patient not tolerating therapy well today. She did report feeling better after E-stim but will have to se how long it lasts. Will trial what have access to at therapy to see how patient tolerates but may have to try pool if cannot tolerate land. Unsure as to true cause of her pain due to could be some damage done by chemo and radiation which were all done in that area.   Body Structures/Functions/Activity Limitations: impaired activity tolerance, impaired balance, impaired endurance, impaired ROM, impaired sensation, impaired strength, pain and abnormal gait  Prognosis: fair    GOALS:  Patient Goal: To heal her back or learn what can do to help control her pain and symptoms    Short Term Goals:  Time Frame: 4 weeks  1) Patient to start land or pool therapy without increase in pain to be able to progress activity  2) Patient to report 25% decrease in pain for tolerance of different positions to be able to progress strength/stretching program  3) Patient to have 25-50% increase in lumbar mobility without pain for ease with dressing  4) Patient to be able to stand and sit upright without pain for completion of daily activity  5) Patient to demonstrate abdominal bracing with no more than 1-2 cues and demonstrate 4- to 4/5 strength in bilat legs for ease with cleaning her home. Long Term Goals:  Time Frame: 12 weeks  1) Patient to be independent with home or pool program to perform all daily activity with minimal pain and symptoms. Patient Education:   [x]  HEP/Education Completed: Plan of Care, Goals, See how feels after E-stim   Medbridge Access Code:  []  No new Education completed  []  Reviewed Prior HEP      [x]  Patient verbalized and/or demonstrated understanding of education provided. []  Patient unable to verbalize and/or demonstrate understanding of education provided. Will continue education. []  Barriers to learning: None    PLAN:  Treatment Recommendations: Strengthening, Range of Motion, Balance Training, Functional Mobility Training, Endurance Training, Gait Training, Stair Training, Neuromuscular Re-education, Manual Therapy - Soft Tissue Mobilization, Pain Management, Home Exercise Program, Patient Education, Aquatics and Modalities    [x]  Plan of care initiated. Plan to see patient 2 times per week for 12 weeks to address the treatment planned outlined above.   []  Continue with current plan of care  []  Modify plan of care as follows:    []  Hold pending physician visit  []  Discharge    Time In 1050   Time Out 1140   Timed Code Minutes: 15 min   Total Treatment Time: 50 min       Electronically Signed by: [place] [id number]

## 2020-10-27 ENCOUNTER — CARE COORDINATION (OUTPATIENT)
Dept: CARE COORDINATION | Age: 35
End: 2020-10-27

## 2020-10-27 ENCOUNTER — HOSPITAL ENCOUNTER (OUTPATIENT)
Dept: PHYSICAL THERAPY | Age: 35
Setting detail: THERAPIES SERIES
Discharge: HOME | End: 2020-10-27
Payer: COMMERCIAL

## 2020-10-27 NOTE — CARE COORDINATION
appointment or reschedule if I have to cancel. I will notify my provider of any barriers to my plan of care. I will notify my provider of any symptoms that indicate a worsening of my condition. Barriers: back/abdominal pain  Plan for overcoming my barriers: family and ACM support  Confidence: 8/10  Anticipated Goal Completion Date: 1-13-21              Prior to Admission medications    Medication Sig Start Date End Date Taking? Authorizing Provider   predniSONE (DELTASONE) 20 MG tablet 1 tab po tid for 3 days 1 tab po bid fro 3 days 1 tab po once day for 3 days 10/19/20  Yes CHAPO Rivera CNP   cyclobenzaprine (FLEXERIL) 10 MG tablet Take 1 tablet by mouth 3 times daily as needed for Muscle spasms 10/12/20  Yes Allie Jennings PA-C   lidocaine (LIDODERM) 5 % Place 1 patch onto the skin daily 12 hours on, 12 hours off. 10/12/20  Yes Allie Jennings PA-C   ondansetron (ZOFRAN) 4 MG tablet Take 1 tablet by mouth daily as needed for Nausea or Vomiting 10/1/20  Yes Thi James MD   dicyclomine (BENTYL) 10 MG capsule Take 1 capsule by mouth 4 times daily (before meals and nightly) 10/1/20  Yes Thi James MD   traZODone (DESYREL) 100 MG tablet Take 1 tablet by mouth nightly 4/27/20  Yes CHAPO Rivera CNP   ketorolac (TORADOL) 10 MG tablet Take 1 tablet by mouth 3 times daily 3/18/20  Yes Lewayne Siemens, MD   phenazopyridine (PYRIDIUM) 100 MG tablet Take 100 mg by mouth 2 times daily 8/21/19  Yes Historical Provider, MD   oxybutynin (DITROPAN-XL) 5 MG extended release tablet Take 5 mg by mouth daily 9/3/19  Yes Historical Provider, MD   lisinopril (PRINIVIL;ZESTRIL) 10 MG tablet Take 1 tablet by mouth daily 8/21/19  Yes CHAPO Rivera CNP   aspirin 81 MG chewable tablet Take 1 tablet by mouth daily 8/14/19  Yes Anna Smith MD   lidocaine-prilocaine (EMLA) 2.5-2.5 % cream Apply topically as needed.  8/1/19  Yes Timoteo Wong MD   prochlorperazine (COMPAZINE) 5 MG tablet Take 1 tablet by mouth every 6 hours as needed for Nausea 7/31/19  Yes Ceci Gamez MD   ibuprofen (ADVIL;MOTRIN) 200 MG tablet Take 200 mg by mouth every 8 hours as needed for Pain   Yes Historical Provider, MD   meclizine (ANTIVERT) 25 MG tablet Take 1 tablet by mouth 3 times daily as needed for Dizziness 2/25/19  Yes CHAPO Joe CNP       Future Appointments   Date Time Provider Mili Jamison   10/29/2020 11:30 AM Dub Divine, PTA [facility] PT [address] [facility]   11/2/2020 11:15 AM Sharlette Safe Shininger, PT [facility] PT [place] [facility]   11/4/2020 11:30 AM Sharlette Safe Shininger, PT [facility] PT [address] [place] [address]   11/9/2020 11:30 AM Dub Divine, PTA [facility] PT [address] [facility]   11/11/2020 10:30 AM Dub Divine, PTA [facility] PT [address] [facility]   11/16/2020 10:30 AM Sharlette Safe Shininger, PT [facility] PT [place] [facility]   12/14/2020 11:20 AM CHAPO Joe CNP [facility] - [address] [facility]   2/26/2021 11:15 AM Leah Maxon Rosaline Osler, [address] [place] [address]

## 2020-10-27 NOTE — CARE COORDINATION
Hero, I spoke to Malachi Ca today and she is not feeling well at all. She states her back pain continues to be very severe and she is hardly able to get out of bed. She has not been able to go to her therapy appts as she cannot get out of bed most days. She states she finished her second round of steroids but admits she accidentally took it like a medrol dose pack and took 3 pills the first day. She states she corrected the dose after she realized she took them wrong. States she felt jittery that day but no other complications. She is asking for an OIO referral at this time. If no OIO referral, she is considering calling Dr. Danilo Murphy office in [place] which is her oncologist.  Please advise. Thank you!

## 2020-10-27 NOTE — CARE COORDINATION
Alli Garcia informed and verbalized understanding. I informed her if she did not hear from [facility] in a day or two to call me back and I would follow up with setting up that appt. Thank you!

## 2020-10-27 NOTE — CARE COORDINATION
I wish she would have contacted me to let me know! I did advise her if she could not follow through with therapy to tell me. I did place the OIO referral and they will contact her for an appt.  She also has the option of the ER. thanks

## 2020-10-28 ENCOUNTER — HOSPITAL ENCOUNTER (EMERGENCY)
Age: 35
Discharge: HOME | End: 2020-10-28
Attending: EMERGENCY MEDICINE
Payer: COMMERCIAL

## 2020-10-28 VITALS
RESPIRATION RATE: 19 BRPM | HEIGHT: 68 IN | DIASTOLIC BLOOD PRESSURE: 129 MMHG | SYSTOLIC BLOOD PRESSURE: 157 MMHG | HEART RATE: 89 BPM | WEIGHT: 180 LBS | OXYGEN SATURATION: 98 % | TEMPERATURE: 98.7 F | BODY MASS INDEX: 27.28 KG/M2

## 2020-10-28 LAB
ALBUMIN SERPL-MCNC: 4.3 G/DL (ref 3.5–5.1)
ALP BLD-CCNC: 143 U/L (ref 38–126)
ALT SERPL-CCNC: 42 U/L (ref 11–66)
ANION GAP SERPL CALCULATED.3IONS-SCNC: 12 MEQ/L (ref 8–16)
AST SERPL-CCNC: 19 U/L (ref 5–40)
BACTERIA: ABNORMAL /HPF
BASOPHILS # BLD: 0.1 %
BASOPHILS ABSOLUTE: 0 THOU/MM3 (ref 0–0.1)
BILIRUB SERPL-MCNC: 0.6 MG/DL (ref 0.3–1.2)
BILIRUBIN URINE: NEGATIVE
BLOOD, URINE: NEGATIVE
BUN BLDV-MCNC: 14 MG/DL (ref 7–22)
C-REACTIVE PROTEIN: 1.67 MG/DL (ref 0–1)
CALCIUM SERPL-MCNC: 10 MG/DL (ref 8.5–10.5)
CASTS 2: ABNORMAL /LPF
CASTS UA: ABNORMAL /LPF
CHARACTER, URINE: CLEAR
CHLORIDE BLD-SCNC: 99 MEQ/L (ref 98–111)
CO2: 27 MEQ/L (ref 23–33)
COLOR: ABNORMAL
CREAT SERPL-MCNC: 0.6 MG/DL (ref 0.4–1.2)
CRYSTALS, UA: ABNORMAL
EOSINOPHIL # BLD: 0.2 %
EOSINOPHILS ABSOLUTE: 0 THOU/MM3 (ref 0–0.4)
EPITHELIAL CELLS, UA: ABNORMAL /HPF
ERYTHROCYTE [DISTWIDTH] IN BLOOD BY AUTOMATED COUNT: 13.3 % (ref 11.5–14.5)
ERYTHROCYTE [DISTWIDTH] IN BLOOD BY AUTOMATED COUNT: 47.8 FL (ref 35–45)
GFR SERPL CREATININE-BSD FRML MDRD: > 90 ML/MIN/1.73M2
GLUCOSE BLD-MCNC: 100 MG/DL (ref 70–108)
GLUCOSE URINE: NEGATIVE MG/DL
HCT VFR BLD CALC: 41.7 % (ref 37–47)
HEMOGLOBIN: 13.6 GM/DL (ref 12–16)
IMMATURE GRANS (ABS): 0.04 THOU/MM3 (ref 0–0.07)
IMMATURE GRANULOCYTES: 0.4 %
KETONES, URINE: NEGATIVE
LACTIC ACID: 1.7 MMOL/L (ref 0.5–2.2)
LEUKOCYTE ESTERASE, URINE: ABNORMAL
LIPASE: 48.9 U/L (ref 5.6–51.3)
LYMPHOCYTES # BLD: 16.2 %
LYMPHOCYTES ABSOLUTE: 1.6 THOU/MM3 (ref 1–4.8)
MCH RBC QN AUTO: 31.9 PG (ref 26–33)
MCHC RBC AUTO-ENTMCNC: 32.6 GM/DL (ref 32.2–35.5)
MCV RBC AUTO: 97.9 FL (ref 81–99)
MISCELLANEOUS 2: ABNORMAL
MONOCYTES # BLD: 10.1 %
MONOCYTES ABSOLUTE: 1 THOU/MM3 (ref 0.4–1.3)
NITRITE, URINE: NEGATIVE
NUCLEATED RED BLOOD CELLS: 0 /100 WBC
OSMOLALITY CALCULATION: 276.2 MOSMOL/KG (ref 275–300)
PH UA: 6.5 (ref 5–9)
PLATELET # BLD: 287 THOU/MM3 (ref 130–400)
PMV BLD AUTO: 10.4 FL (ref 9.4–12.4)
POTASSIUM SERPL-SCNC: 3.6 MEQ/L (ref 3.5–5.2)
PROTEIN UA: NEGATIVE
RBC # BLD: 4.26 MILL/MM3 (ref 4.2–5.4)
RBC URINE: ABNORMAL /HPF
RENAL EPITHELIAL, UA: ABNORMAL
SEDIMENTATION RATE, ERYTHROCYTE: 19 MM/HR (ref 0–20)
SEG NEUTROPHILS: 73 %
SEGMENTED NEUTROPHILS ABSOLUTE COUNT: 7 THOU/MM3 (ref 1.8–7.7)
SODIUM BLD-SCNC: 138 MEQ/L (ref 135–145)
SPECIFIC GRAVITY, URINE: 1.03 (ref 1–1.03)
TOTAL PROTEIN: 7.6 G/DL (ref 6.1–8)
UROBILINOGEN, URINE: 1 EU/DL (ref 0–1)
WBC # BLD: 9.6 THOU/MM3 (ref 4.8–10.8)
WBC UA: ABNORMAL /HPF
YEAST: ABNORMAL

## 2020-10-28 PROCEDURE — 85025 COMPLETE CBC W/AUTO DIFF WBC: CPT

## 2020-10-28 PROCEDURE — 96375 TX/PRO/DX INJ NEW DRUG ADDON: CPT

## 2020-10-28 PROCEDURE — 99285 EMERGENCY DEPT VISIT HI MDM: CPT

## 2020-10-28 PROCEDURE — 36415 COLL VENOUS BLD VENIPUNCTURE: CPT

## 2020-10-28 PROCEDURE — 6360000002 HC RX W HCPCS: Performed by: EMERGENCY MEDICINE

## 2020-10-28 PROCEDURE — 81001 URINALYSIS AUTO W/SCOPE: CPT

## 2020-10-28 PROCEDURE — 80053 COMPREHEN METABOLIC PANEL: CPT

## 2020-10-28 PROCEDURE — 83690 ASSAY OF LIPASE: CPT

## 2020-10-28 PROCEDURE — 86140 C-REACTIVE PROTEIN: CPT

## 2020-10-28 PROCEDURE — 96374 THER/PROPH/DIAG INJ IV PUSH: CPT

## 2020-10-28 PROCEDURE — 6370000000 HC RX 637 (ALT 250 FOR IP)

## 2020-10-28 PROCEDURE — 6370000000 HC RX 637 (ALT 250 FOR IP): Performed by: EMERGENCY MEDICINE

## 2020-10-28 PROCEDURE — 85651 RBC SED RATE NONAUTOMATED: CPT

## 2020-10-28 PROCEDURE — 83605 ASSAY OF LACTIC ACID: CPT

## 2020-10-28 PROCEDURE — 2580000003 HC RX 258: Performed by: EMERGENCY MEDICINE

## 2020-10-28 RX ORDER — ACETAMINOPHEN 325 MG/1
650 TABLET ORAL ONCE
Status: COMPLETED | OUTPATIENT
Start: 2020-10-28 | End: 2020-10-28

## 2020-10-28 RX ORDER — MORPHINE SULFATE 4 MG/ML
4 INJECTION, SOLUTION INTRAMUSCULAR; INTRAVENOUS ONCE
Status: COMPLETED | OUTPATIENT
Start: 2020-10-28 | End: 2020-10-28

## 2020-10-28 RX ORDER — 0.9 % SODIUM CHLORIDE 0.9 %
1000 INTRAVENOUS SOLUTION INTRAVENOUS ONCE
Status: COMPLETED | OUTPATIENT
Start: 2020-10-28 | End: 2020-10-28

## 2020-10-28 RX ORDER — LIDOCAINE 4 G/G
2 PATCH TOPICAL DAILY
Status: DISCONTINUED | OUTPATIENT
Start: 2020-10-28 | End: 2020-10-29 | Stop reason: HOSPADM

## 2020-10-28 RX ORDER — LIDOCAINE 50 MG/G
1 PATCH TOPICAL DAILY
Qty: 30 PATCH | Refills: 0 | Status: SHIPPED | OUTPATIENT
Start: 2020-10-28 | End: 2021-01-25 | Stop reason: SDUPTHER

## 2020-10-28 RX ORDER — DIAZEPAM 5 MG/1
5 TABLET ORAL ONCE
Status: COMPLETED | OUTPATIENT
Start: 2020-10-28 | End: 2020-10-28

## 2020-10-28 RX ORDER — DIAZEPAM 2 MG/1
2 TABLET ORAL EVERY 8 HOURS PRN
Qty: 10 TABLET | Refills: 0 | Status: SHIPPED | OUTPATIENT
Start: 2020-10-28 | End: 2020-11-07

## 2020-10-28 RX ORDER — KETOROLAC TROMETHAMINE 10 MG/1
10 TABLET, FILM COATED ORAL EVERY 6 HOURS PRN
Qty: 15 TABLET | Refills: 0 | Status: SHIPPED | OUTPATIENT
Start: 2020-10-28 | End: 2020-12-03

## 2020-10-28 RX ORDER — LIDOCAINE 4 G/G
PATCH TOPICAL
Status: DISCONTINUED
Start: 2020-10-28 | End: 2020-10-29 | Stop reason: HOSPADM

## 2020-10-28 RX ORDER — KETOROLAC TROMETHAMINE 30 MG/ML
30 INJECTION, SOLUTION INTRAMUSCULAR; INTRAVENOUS ONCE
Status: COMPLETED | OUTPATIENT
Start: 2020-10-28 | End: 2020-10-28

## 2020-10-28 RX ADMIN — KETOROLAC TROMETHAMINE 30 MG: 30 INJECTION, SOLUTION INTRAMUSCULAR; INTRAVENOUS at 19:23

## 2020-10-28 RX ADMIN — SODIUM CHLORIDE 1000 ML: 9 INJECTION, SOLUTION INTRAVENOUS at 19:23

## 2020-10-28 RX ADMIN — HYDROMORPHONE HYDROCHLORIDE: 1 INJECTION, SOLUTION INTRAMUSCULAR; INTRAVENOUS; SUBCUTANEOUS at 22:22

## 2020-10-28 RX ADMIN — MORPHINE SULFATE 4 MG: 4 INJECTION, SOLUTION INTRAMUSCULAR; INTRAVENOUS at 19:23

## 2020-10-28 RX ADMIN — DIAZEPAM 5 MG: 5 TABLET ORAL at 22:22

## 2020-10-28 RX ADMIN — ACETAMINOPHEN 650 MG: 325 TABLET ORAL at 19:22

## 2020-10-28 ASSESSMENT — ENCOUNTER SYMPTOMS
EYE DISCHARGE: 0
SHORTNESS OF BREATH: 0
RHINORRHEA: 0
SORE THROAT: 0
EYE ITCHING: 0
PHOTOPHOBIA: 0
EYE PAIN: 0
VOMITING: 0
ABDOMINAL PAIN: 1
DIARRHEA: 0
NAUSEA: 1
COUGH: 0
CHEST TIGHTNESS: 0
STRIDOR: 0
EYE REDNESS: 0
WHEEZING: 0
BACK PAIN: 1
ABDOMINAL DISTENTION: 0
CONSTIPATION: 0

## 2020-10-28 ASSESSMENT — PAIN SCALES - GENERAL
PAINLEVEL_OUTOF10: 8
PAINLEVEL_OUTOF10: 10
PAINLEVEL_OUTOF10: 6
PAINLEVEL_OUTOF10: 1

## 2020-10-28 ASSESSMENT — PAIN DESCRIPTION - PAIN TYPE: TYPE: ACUTE PAIN

## 2020-10-28 ASSESSMENT — PAIN DESCRIPTION - ORIENTATION: ORIENTATION: LOWER

## 2020-10-28 ASSESSMENT — PAIN DESCRIPTION - LOCATION: LOCATION: BACK;ABDOMEN

## 2020-10-28 NOTE — ED NOTES
ED nurse-to-nurse bedside report    Chief Complaint   Patient presents with    Abdominal Pain    Back Pain      LOC: alert and orientated to name, place, date  Vital signs   Vitals:    10/28/20 1754   BP: (!) 152/109   Pulse: 87   Resp: 18   Temp: 98.7 °F (37.1 °C)   TempSrc: Oral   SpO2: 99%   Weight: 180 lb (81.6 kg)   Height: 5' 8\" (1.727 m)      Pain:    Pain Interventions: none prior to this time. Pain Goal: 5  Oxygen: No    Current needs required none   Telemetry: No  LDAs:    Continuous Infusions:   Mobility: Independent  [place] Fall Risk Score: Fall Risk 10/13/2020   2 or more falls in past year? no   Fall with injury in past year? no     Fall Interventions: Bed at lowest position, side rails up x2, call light in reach.   Report given to: Farrah Pollard RN  10/28/20 1319

## 2020-10-28 NOTE — ED NOTES
Arrives with lower back pain and lower abdominal pain. Family member at bedside states she has had this \"pain for month\". Pt states it has worsened over the past week. She is currently seeing pain management and just finished a dose of prednisone yesterday. She took a norco this morning and states it did not help her pain.       Harshad Lang RN  10/28/20 9058

## 2020-10-28 NOTE — ED PROVIDER NOTES
[address] ENCOUNTER      PATIENT NAME: Paresh Giordano  MRN: [id number]  : 1985  ROBINS: 10/28/2020  PROVIDER: Hira Cummings MD      CHIEF COMPLAINT       Chief Complaint   Patient presents with    Abdominal Pain    Back Pain       Nurses Notes reviewed and I agreeexcept as noted in the HPI. HISTORY OF PRESENT ILLNESS    Paresh Giordano is a 37 y.o. female who presents to Emergency Department with lower abdomen and back pain. Onset: Gradual  Location: At home  Severity: Severe  Timing: For at least 1 year. Pain is chronic. Modifying factors: Worse on movement  Quality: Cramping \"labor pain\" from lower abdomen across to lower back. Radiation: Bilateral lower back  Duration: Persistent  Context: Her lower abdominal pain and back pain are chronic and now. Patient has a lumbar MRI ordered on 11/3/2020. Prior episodes: Frequent  Therapy today: None  Associated Symptoms: Nausea  Additional history:  Stage IB2 cervical cancer. She completed radiation on 2019 at [address] [place] [address]. She states that the only medication that works for her pain is \"Dilaudid\". This HPI was provided by the patient. REVIEW OF SYSTEMS     Review of Systems   Constitutional: Negative for activity change, appetite change, chills, fatigue, fever and unexpected weight change. HENT: Negative for congestion, ear discharge, ear pain, hearing loss, nosebleeds, rhinorrhea and sore throat. Eyes: Negative for photophobia, pain, discharge, redness and itching. Respiratory: Negative for cough, chest tightness, shortness of breath, wheezing and stridor. Cardiovascular: Negative for chest pain, palpitations and leg swelling. Gastrointestinal: Positive for abdominal pain and nausea. Negative for abdominal distention, constipation, diarrhea and vomiting. Endocrine: Negative for cold intolerance, heat intolerance, polydipsia and polyphagia.    Genitourinary: Negative for dysuria, flank nightly, Disp-30 tablet,R-6Normal      phenazopyridine (PYRIDIUM) 100 MG tablet Take 100 mg by mouth 2 times dailyHistorical Med      oxybutynin (DITROPAN-XL) 5 MG extended release tablet Take 5 mg by mouth dailyHistorical Med      lisinopril (PRINIVIL;ZESTRIL) 10 MG tablet Take 1 tablet by mouth daily, Disp-90 tablet, R-1Normal      aspirin 81 MG chewable tablet Take 1 tablet by mouth daily, Disp-30 tablet, R-3Normal      lidocaine-prilocaine (EMLA) 2.5-2.5 % cream Apply topically as needed. , Disp-30 g, R-2, Normal      prochlorperazine (COMPAZINE) 5 MG tablet Take 1 tablet by mouth every 6 hours as needed for Nausea, Disp-30 tablet, R-3Normal      ibuprofen (ADVIL;MOTRIN) 200 MG tablet Take 200 mg by mouth every 8 hours as needed for PainHistorical Med      meclizine (ANTIVERT) 25 MG tablet Take 1 tablet by mouth 3 times daily as needed for Dizziness, Disp-40 tablet, R-1Normal             ALLERGIES     Oxycodone and Percocet [oxycodone-acetaminophen]    FAMILY HISTORY     She indicated that her mother is alive. She indicated that her father is alive. She indicated that her maternal grandmother is . She indicated that her paternal grandmother is . family history includes Cancer in her maternal grandmother and paternal grandmother. SOCIAL HISTORY      reports that she has been smoking cigarettes. She has a 20.00 pack-year smoking history. She has never used smokeless tobacco. She reports current alcohol use of about 16.0 standard drinks of alcohol per week. She reports that she does not use drugs. PHYSICAL EXAM     INITIAL VITALS:  height is 5' 8\" (1.727 m) and weight is 180 lb (81.6 kg). Her oral temperature is 98.7 °F (37.1 °C). Her blood pressure is 157/129 (abnormal) and her pulse is 89. Her respiration is 19 and oxygen saturation is 98%. Physical Exam  Vitals signs and nursing note reviewed. Constitutional:       Appearance: She is well-developed. She is not diaphoretic.    HENT: Head: Normocephalic and atraumatic. Nose: Nose normal.   Eyes:      General: No scleral icterus. Right eye: No discharge. Left eye: No discharge. Conjunctiva/sclera: Conjunctivae normal.      Pupils: Pupils are equal, round, and reactive to light. Neck:      Musculoskeletal: Normal range of motion and neck supple. Vascular: No JVD. Trachea: No tracheal deviation. Cardiovascular:      Rate and Rhythm: Normal rate and regular rhythm. Heart sounds: Normal heart sounds. No murmur. No friction rub. No gallop. Pulmonary:      Effort: Pulmonary effort is normal. No respiratory distress. Breath sounds: Normal breath sounds. No stridor. No wheezing or rales. Chest:      Chest wall: No tenderness. Abdominal:      General: Bowel sounds are normal. There is no distension. Palpations: Abdomen is soft. There is no mass. Tenderness: There is abdominal tenderness in the right lower quadrant and left lower quadrant. There is no guarding or rebound. Hernia: No hernia is present. Musculoskeletal:         General: Tenderness present. No deformity. Back:    Lymphadenopathy:      Cervical: No cervical adenopathy. Skin:     General: Skin is warm and dry. Capillary Refill: Capillary refill takes less than 2 seconds. Coloration: Skin is not pale. Findings: No erythema or rash. Neurological:      Mental Status: She is alert and oriented to person, place, and time. Cranial Nerves: No cranial nerve deficit. Sensory: No sensory deficit. Motor: No abnormal muscle tone. Coordination: Coordination normal.      Deep Tendon Reflexes: Reflexes normal.   Psychiatric:         Behavior: Behavior normal.         Thought Content:  Thought content normal.         Judgment: Judgment normal.         DIFFERENTIAL DIAGNOSIS:   Chronic pain, side effects from radiation, cancer metastasis, anxiety, drug-seeking behavior, lumbar radiculopathy    DIAGNOSTIC RESULTS   EKG: All EKG's are interpreted by the Emergency Department Physician who either signs or Co-signsthis chart in the absence of a cardiologist.  Interpreted by me  Not indicated    RADIOLOGY: non-plain film images(s) such as CT, Ultrasound and MRI are read by the radiologist.  No orders to display       LABS:   Results for orders placed or performed during the hospital encounter of 10/28/20   CBC Auto Differential   Result Value Ref Range    WBC 9.6 4.8 - 10.8 thou/mm3    RBC 4.26 4.20 - 5.40 mill/mm3    Hemoglobin 13.6 12.0 - 16.0 gm/dl    Hematocrit 41.7 37.0 - 47.0 %    MCV 97.9 81.0 - 99.0 fL    MCH 31.9 26.0 - 33.0 pg    MCHC 32.6 32.2 - 35.5 gm/dl    RDW-CV 13.3 11.5 - 14.5 %    RDW-SD 47.8 (H) 35.0 - 45.0 fL    Platelets 072 315 - 303 thou/mm3    MPV 10.4 9.4 - 12.4 fL    Seg Neutrophils 73.0 %    Lymphocytes 16.2 %    Monocytes 10.1 %    Eosinophils 0.2 %    Basophils 0.1 %    Immature Granulocytes 0.4 %    Segs Absolute 7.0 1.8 - 7.7 thou/mm3    Lymphocytes Absolute 1.6 1.0 - 4.8 thou/mm3    Monocytes Absolute 1.0 0.4 - 1.3 thou/mm3    Eosinophils Absolute 0.0 0.0 - 0.4 thou/mm3    Basophils Absolute 0.0 0.0 - 0.1 thou/mm3    Immature Grans (Abs) 0.04 0.00 - 0.07 thou/mm3    nRBC 0 /100 wbc   Comprehensive Metabolic Panel   Result Value Ref Range    Glucose 100 70 - 108 mg/dL    CREATININE 0.6 0.4 - 1.2 mg/dL    BUN 14 7 - 22 mg/dL    Sodium 138 135 - 145 meq/L    Potassium 3.6 3.5 - 5.2 meq/L    Chloride 99 98 - 111 meq/L    CO2 27 23 - 33 meq/L    Calcium 10.0 8.5 - 10.5 mg/dL    AST 19 5 - 40 U/L    Alkaline Phosphatase 143 (H) 38 - 126 U/L    Total Protein 7.6 6.1 - 8.0 g/dL    Alb 4.3 3.5 - 5.1 g/dL    Total Bilirubin 0.6 0.3 - 1.2 mg/dL    ALT 42 11 - 66 U/L   Lipase   Result Value Ref Range    Lipase 48.9 5.6 - 51.3 U/L   Lactic acid, plasma   Result Value Ref Range    Lactic Acid 1.7 0.5 - 2.2 mmol/L   C-reactive protein   Result Value Ref Range    CRP 1.67 (H) 0.00 - 1.00 mg/dl   Sedimentation Rate   Result Value Ref Range    Sed Rate 19 0 - 20 mm/hr   Anion Gap   Result Value Ref Range    Anion Gap 12.0 8.0 - 16.0 meq/L   Glomerular Filtration Rate, Estimated   Result Value Ref Range    Est, Glom Filt Rate >90 ml/min/1.73m2   Osmolality   Result Value Ref Range    Osmolality Calc 276.2 275.0 - 300.0 mOsmol/kg   Urine with Reflexed Micro   Result Value Ref Range    Glucose, Ur NEGATIVE NEGATIVE mg/dl    Bilirubin Urine NEGATIVE NEGATIVE    Ketones, Urine NEGATIVE NEGATIVE    Specific Gravity, Urine 1.026 1.002 - 1.030    Blood, Urine NEGATIVE NEGATIVE    pH, UA 6.5 5.0 - 9.0    Protein, UA NEGATIVE NEGATIVE    Urobilinogen, Urine 1.0 0.0 - 1.0 eu/dl    Nitrite, Urine NEGATIVE NEGATIVE    Leukocyte Esterase, Urine TRACE (A) NEGATIVE    Color, UA DK YELLOW (A) STRAW-YELLOW    Character, Urine CLEAR CLEAR-SL CLOUD    RBC, UA 0-2 0-2/hpf /hpf    WBC, UA 2-4 0-4/hpf /hpf    Epithelial Cells, UA 3-5 3-5/hpf /hpf    Bacteria, UA NONE SEEN FEW/NONE SEEN /hpf    Casts UA NONE SEEN NONE SEEN /lpf    Crystals, UA NONE SEEN NONE SEEN    Renal Epithelial, UA NONE SEEN NONE SEEN    Yeast, UA NONE SEEN NONE SEEN    CASTS 2 NONE SEEN NONE SEEN /lpf    MISCELLANEOUS 2 NONE SEEN        EMERGENCY DEPARTMENT COURSE:   Vitals:    Vitals:    10/28/20 1754 10/28/20 1929   BP: (!) 152/109 (!) 157/129   Pulse: 87 89   Resp: 18 19   Temp: 98.7 °F (37.1 °C)    TempSrc: Oral    SpO2: 99% 98%   Weight: 180 lb (81.6 kg)    Height: 5' 8\" (1.727 m)      6:55 PM: Patient is seen and evaluated in a timely fashion.      ACTIONS:  Large bore IV  Tele monitor  None  Labs Reviewed   CBC WITH AUTO DIFFERENTIAL   COMPREHENSIVE METABOLIC PANEL   LIPASE   LACTIC ACID, PLASMA   C-REACTIVE PROTEIN   SEDIMENTATION RATE   URINE RT REFLEX TO CULTURE     Medications   ketorolac (TORADOL) injection 30 mg (30 mg Intravenous Given 10/28/20 1923)   morphine injection 4 mg (4 mg Intravenous Given 10/28/20 1923) acetaminophen (TYLENOL) tablet 650 mg (650 mg Oral Given 10/28/20 1922)   0.9 % sodium chloride bolus (0 mLs Intravenous Stopped 10/28/20 2054)   HYDROmorphone (DILAUDID) injection 0.5 mg ( Intravenous Given 10/28/20 2222)   diazePAM (VALIUM) tablet 5 mg (5 mg Oral Given 10/28/20 2222)       MEDICAL DECISION MAKINGS:    Labs were reassuring and at her baselines. Pain improved to some extent after she was treated with above ED medications. No indication for abdominal imaging at this time as her abdominal exam is benign, no surgical abdomen. I estimate there is LOW risk for ACUTE APPENDICITIS, BOWEL OBSTRUCTION, CHOLECYSTITIS, DIVERTICULITIS, INCARCERATED HERNIA, PANCREATITIS, or PERFORATED BOWEL or ULCER, thus I consider the discharge disposition reasonable. Also, there is no evidence or peritonitis, sepsis, or toxicity. The patient and/or family and I have discussed the diagnosis and risks, and we agree with discharging home to follow-up with their primary doctor. We also discussed returning to the Emergency Department immediately if new or worsening symptoms occur. We have discussed the symptoms which are most concerning (e.g., bloody stool, fever, changing or worsening pain, vomiting) that necessitate immediate return. Her abdominal pain is chronic, I suggested seeing a pain management specialist.  She is discharged in stable conditions with pain management follow-up in 1 week. She is prescribed lidocaine patch and Toradol. Due to her extensive pelvic radiation history, I gave her some Valium in case there is pelvic muscle spasm. CRITICAL CARE:   None    CONSULTS:  None    PROCEDURES:  none    FINAL IMPRESSION      1. Chronic bilateral lower abdominal pain          DISPOSITION/PLAN   Home    PATIENT REFERRED TO:  MD Toribio Montiel [facility]  [phone]    In 1 week  ED discharge follow up.  Chronic pain managment      DISCHARGE MEDICATIONS:  Discharge Medication List as of 10/28/2020 10:27 PM      START taking these medications    Details   lidocaine (LIDODERM) 5 % Place 1 patch onto the skin daily 12 hours on, 12 hours off., Disp-30 patch,R-0Print      diazePAM (VALIUM) 2 MG tablet Take 1 tablet by mouth every 8 hours as needed for Anxiety for up to 10 days. , Disp-10 tablet,R-0Print      ketorolac (TORADOL) 10 MG tablet Take 1 tablet by mouth every 6 hours as needed for Pain, Disp-15 tablet,R-0Print             (Please note that portions of this note were completed with a voice recognition program.  Efforts were made to edit the dictations but occasionally words aremis-transcribed.)    MD Rina Aquino MD  10/29/20 3991

## 2020-10-29 ENCOUNTER — VIRTUAL VISIT (OUTPATIENT)
Dept: FAMILY MEDICINE CLINIC | Age: 35
End: 2020-10-29
Payer: COMMERCIAL

## 2020-10-29 ENCOUNTER — TELEPHONE (OUTPATIENT)
Dept: FAMILY MEDICINE CLINIC | Age: 35
End: 2020-10-29

## 2020-10-29 ENCOUNTER — CARE COORDINATION (OUTPATIENT)
Dept: CARE COORDINATION | Age: 35
End: 2020-10-29

## 2020-10-29 PROCEDURE — 99213 OFFICE O/P EST LOW 20 MIN: CPT | Performed by: NURSE PRACTITIONER

## 2020-10-29 PROCEDURE — G8427 DOCREV CUR MEDS BY ELIG CLIN: HCPCS | Performed by: NURSE PRACTITIONER

## 2020-10-29 RX ORDER — LIDOCAINE 50 MG/G
1 PATCH TOPICAL DAILY
Qty: 10 PATCH | Refills: 0 | Status: SHIPPED | OUTPATIENT
Start: 2020-10-29 | End: 2020-11-08

## 2020-10-29 RX ORDER — OXYCODONE HYDROCHLORIDE AND ACETAMINOPHEN 5; 325 MG/1; MG/1
1 TABLET ORAL EVERY 8 HOURS PRN
Qty: 21 TABLET | Refills: 0 | Status: SHIPPED | OUTPATIENT
Start: 2020-10-29 | End: 2020-11-05

## 2020-10-29 ASSESSMENT — ENCOUNTER SYMPTOMS
BACK PAIN: 1
ABDOMINAL PAIN: 0
RESPIRATORY NEGATIVE: 1
ABDOMINAL DISTENTION: 0

## 2020-10-29 NOTE — CARE COORDINATION
Ambulatory Care Coordination  ED Follow up Call    Reason for ED visit:  Back pain   Status:     not changed    Did you call your PCP prior to going to the ED? Yes      Did you receive a discharge instructions from the Emergency Room? Yes  Review of Instructions:     Understands what to report/when to return?:  Yes   Understands discharge instructions?:  Yes   Following discharge instructions?:  Yes   If not why? Are there any new complaints of pain? Yes back pain   New Pain Meds? Yes    Constipation prophylaxis needed? No    If you have a wound is the dressing clean, dry, and intact? No  Understands wound care regimen? N/A    Are there any other complaints/concerns that you wish to tell your provider? Made follow up appt with PCP (via video visit) for ED follow up and continued back pain    FU appts/Provider:    Future Appointments   Date Time Provider Mili Jamison   10/29/2020 11:30 AM Sebastián Richey PTA [facility] [place] [facility]   11/2/2020 11:15 AM Jose Alexander, PT [facility] [place]   11/4/2020 11:30 AM Jose Alexander, PT [facility] [place]   11/9/2020 11:30 AM Sebastián Richey PTA [facility] [place]   11/11/2020 10:30 AM Sebastián Richey, PTA [facility] [place]   11/16/2020 10:30 AM Jose Alexander, PT [facility] [place] [facility]   12/14/2020 11:20 AM CHAPO Rivera CNP [facility] [place]   2/26/2021 11:15 AM CHAPO Costello CNP [facility]           New Medications?:   Yes      Medication Reconciliation by phone - Yes  Understands Medications? Yes  Taking Medications? No  Can you swallow your pills? Yes    Any further needs in the home i.e. Equipment?   No    Link to services in community?:  No   Which services:

## 2020-10-29 NOTE — PROGRESS NOTES
meclizine (ANTIVERT) 25 MG tablet Take 1 tablet by mouth 3 times daily as needed for Dizziness  Alicia Guillaume, APRN - CNP       Social History     Tobacco Use    Smoking status: Current Every Day Smoker     Packs/day: 1.00     Years: 20.00     Pack years: 20.00     Types: Cigarettes    Smokeless tobacco: Never Used   Substance Use Topics    Alcohol use: Yes     Alcohol/week: 16.0 standard drinks     Types: 16 Shots of liquor per week     Comment: 4 shots daily 4 times a week    Drug use: No        Allergies   Allergen Reactions    Oxycodone Other (See Comments)    Percocet [Oxycodone-Acetaminophen] Other (See Comments)     hallucinations   ,   Past Medical History:   Diagnosis Date    Anxiety     Cancer of cervix ([facility] [address]     Depression     Hypertension    ,   Past Surgical History:   Procedure Laterality Date    CERVIX BIOPSY N/A 6/7/2019    COLD KNIFE CONE BX CERVIX performed by Dorothy Banks MD at [address] [place] [address]  over 5 years ago    ? dr Allen Jones  09/29/2020    INSERTION / REMOVAL / REPLACEMENT VENOUS ACCESS CATHETER N/A 8/9/2019    SINGLE LUMEN MAUCBCLTD INSERTION performed by Misty Murray MD at [facility]  10/10/2019    port removal-in office Dr Lauryn Wang   ,   Social History     Tobacco Use    Smoking status: Current Every Day Smoker     Packs/day: 1.00     Years: 20.00     Pack years: 20.00     Types: Cigarettes    Smokeless tobacco: Never Used   Substance Use Topics    Alcohol use:  Yes     Alcohol/week: 16.0 standard drinks     Types: 16 Shots of liquor per week     Comment: 4 shots daily 4 times a week    Drug use: No   ,   Family History   Problem Relation Age of Onset    Cancer Maternal Grandmother         ? kind    Cancer Paternal Grandmother         ? kind   ,   Immunization History   Administered Date(s) Administered    Pneumococcal Polysaccharide ([id number]) 07/03/2017    Tdap (Boostrix, Adacel) 06/03/2015   ,   Health Maintenance   Topic Date Due    Flu vaccine (1) 09/01/2020    Potassium monitoring  10/04/2021    Creatinine monitoring  10/04/2021    Cervical cancer screen  03/18/2023    DTaP/Tdap/Td vaccine (2 - Td) 06/03/2025    Hepatitis B vaccine  Completed    Pneumococcal 0-64 years Vaccine  Completed    HIV screen  Completed    Hepatitis A vaccine  Aged Out    Hib vaccine  Aged Out    Meningococcal (ACWY) vaccine  Aged Out    Varicella vaccine  Discontinued       PHYSICAL EXAMINATION:  [ INSTRUCTIONS:  \"[x]\" Indicates a positive item  \"[]\" Indicates a negative item  -- DELETE ALL ITEMS NOT EXAMINED]  Vital Signs: (As obtained by patient/caregiver or practitioner observation)    Blood pressure-  Heart rate-    Respiratory rate-    Temperature-  Pulse oximetry-     Constitutional: [] Appears well-developed and well-nourished [] No apparent distress      [x] Abnormal- pt in distress with pain  Mental status  [x] Alert and awake  [] Oriented to person/place/time []Able to follow commands      Eyes:  EOM    []  Normal  [] Abnormal-  Sclera  [x]  Normal  [] Abnormal -         Discharge [x]  None visible  [] Abnormal -    HENT:   [] Normocephalic, atraumatic.   [] Abnormal   [] Mouth/Throat: Mucous membranes are moist.     External Ears [] Normal  [] Abnormal-     Neck: [] No visualized mass     Pulmonary/Chest: [x] Respiratory effort normal.  [x] No visualized signs of difficulty breathing or respiratory distress        [] Abnormal-      Musculoskeletal:   [] Normal gait with no signs of ataxia         [] Normal range of motion of neck        [x] Abnormal- gait alteration, unable to ambulate    Neurological:        [] No Facial Asymmetry (Cranial nerve 7 motor function) (limited exam to video visit)          [] No gaze palsy        [] Abnormal-         Skin:        [x] No significant exanthematous lesions or discoloration noted on facial skin         [] Abnormal-            Psychiatric:       [] Normal Affect [] No Hallucinations        [] Abnormal-     Other pertinent observable physical exam findings-     ASSESSMENT/PLAN:  1. Intractable low back pain  Keep [facility] appt on 11/3/2020  Await MRI results  Pt in agreement with plan. - oxyCODONE-acetaminophen (PERCOCET) 5-325 MG per tablet; Take 1 tablet by mouth every 8 hours as needed for Pain for up to 7 days. Intended supply: 3 days. Take lowest dose possible to manage pain  Dispense: 21 tablet; Refill: 0    Controlled substances monitoring: possible medication side effects, risk of tolerance and/or dependence, and alternative treatments discussed, no signs of potential drug abuse or diversion identified and [place]AR[place] report reviewed today- activity consistent with treatment plan. No follow-ups on file. Antonella Alvares is a 37 y.o. female being evaluated by a Virtual Visit (video visit) encounter to address concerns as mentioned above. A caregiver was present when appropriate. Due to this being a TeleHealth encounter (During ZBNPF-52 public health emergency), evaluation of the following organ systems was limited: Vitals/Constitutional/EENT/Resp/CV/GI//MS/Neuro/Skin/Heme-Lymph-Imm. Pursuant to the emergency declaration under the [address] [place] [address] authority and the [employer] Act, this Virtual Visit was conducted with patient's (and/or legal guardian's) consent, to reduce the patient's risk of exposure to COVID-19 and provide necessary medical care. The patient (and/or legal guardian) has also been advised to contact this office for worsening conditions or problems, and seek emergency medical treatment and/or call 911 if deemed necessary.      Patient identification was verified at the start of the visit: Yes    Total time spent on this encounter: Not billed by time    Services were provided through a video synchronous discussion virtually to substitute for in-person clinic visit. Patient and provider were located at their individual homes. --CHAPO Frenandez CNP on 10/29/2020 at 2:03 PM    An electronic signature was used to authenticate this note.

## 2020-11-02 ENCOUNTER — HOSPITAL ENCOUNTER (OUTPATIENT)
Dept: PHYSICAL THERAPY | Age: 35
Setting detail: THERAPIES SERIES
Discharge: HOME | End: 2020-11-02
Payer: COMMERCIAL

## 2020-11-02 PROCEDURE — 97110 THERAPEUTIC EXERCISES: CPT

## 2020-11-02 PROCEDURE — 97140 MANUAL THERAPY 1/> REGIONS: CPT

## 2020-11-02 PROCEDURE — 97032 APPL MODALITY 1+ESTIM EA 15: CPT

## 2020-11-02 NOTE — PROGRESS NOTES
much therapy today and so will try pool therapy next session and see how she does. Patient brought Lidocaine patch with her and PT helped her put on her low back before leaving therapy. Patient reported feeling a little better than when she came to therapy today. GOALS:  Patient Goal: To heal her back or learn what can do to help control her pain and symptoms    Short Term Goals:  Time Frame: 4 weeks  1) Patient to start land or pool therapy without increase in pain to be able to progress activity  2) Patient to report 25% decrease in pain for tolerance of different positions to be able to progress strength/stretching program  3) Patient to have 25-50% increase in lumbar mobility without pain for ease with dressing  4) Patient to be able to stand and sit upright without pain for completion of daily activity  5) Patient to demonstrate abdominal bracing with no more than 1-2 cues and demonstrate 4- to 4/5 strength in bilat legs for ease with cleaning her home. Long Term Goals:  Time Frame: 12 weeks  1) Patient to be independent with home or pool program to perform all daily activity with minimal pain and symptoms. Patient Education:   [x]  HEP/Education Completed: Plan of Care, Goals, See how feels after E-stim. Start pool therapy next session.  [employer] Apps Access Code:  []  No new Education completed  []  Reviewed Prior HEP      [x]  Patient verbalized and/or demonstrated understanding of education provided. []  Patient unable to verbalize and/or demonstrate understanding of education provided. Will continue education. []  Barriers to learning: None    PLAN:    [x]  Plan of care initiated. Plan to see patient 2 times per week for 12 weeks to address the treatment planned outlined above.   []  Continue with current plan of care  []  Modify plan of care as follows:    []  Hold pending physician visit  []  Discharge    Time In 1120   Time Out 1200   Timed Code Minutes: 40 min   Total Treatment Time: 40 min       Electronically Signed by: Al Cox

## 2020-11-04 ENCOUNTER — APPOINTMENT (OUTPATIENT)
Dept: PHYSICAL THERAPY | Age: 35
End: 2020-11-04
Payer: COMMERCIAL

## 2020-11-04 ENCOUNTER — CARE COORDINATION (OUTPATIENT)
Dept: CARE COORDINATION | Age: 35
End: 2020-11-04

## 2020-11-04 RX ORDER — PREDNISONE 10 MG/1
TABLET ORAL
Qty: 30 TABLET | Refills: 0 | Status: SHIPPED | OUTPATIENT
Start: 2020-11-04 | End: 2020-11-20

## 2020-11-05 ENCOUNTER — APPOINTMENT (OUTPATIENT)
Dept: PHYSICAL THERAPY | Age: 35
End: 2020-11-05
Payer: COMMERCIAL

## 2020-11-09 ENCOUNTER — HOSPITAL ENCOUNTER (OUTPATIENT)
Dept: PHYSICAL THERAPY | Age: 35
Setting detail: THERAPIES SERIES
Discharge: HOME | End: 2020-11-09
Payer: COMMERCIAL

## 2020-11-09 PROCEDURE — 97113 AQUATIC THERAPY/EXERCISES: CPT

## 2020-11-09 NOTE — PROGRESS NOTES
[address] [facility]  PHYSICAL THERAPY  [] EVALUATION  [] DAILY NOTE (LAND) [x] DAILY NOTE (AQUATIC ) [] PROGRESS NOTE [] DISCHARGE NOTE    [x] [facility] [place]   [] [place] [address]    [] [facility]   [] Clemencia Amabil    Date: 2020  Patient Name:  Marguerite Braden  : 1985  MRN: [id number]    Referring Practitioner Hero Holland APRN - *   Diagnosis Radiculopathy, lumbar region [M54.16]    Treatment Diagnosis LBP with bilateral radiculopathy, core and leg weakness, difficulty with gait, balance deficits   Date of Evaluation 10/21/20    Additional Pertinent History Arthritis. Cervical cancer within last 1-2 years. Functional Outcome Measure Used None done    Functional Outcome Score       Insurance: Primary: Payor: [address] [place] [address]  [address] /  /  / ,   Secondary:    Authorization Information: PT allowed 30 visits per calendar year. Aquatics and modalities except HP/CP and Ionto covered, No telehealth   Visit # 3, 3/10 for progress note   Visits Allowed: 30   Recertification Date: 3026   Physician Follow-Up: 2020   Physician Orders:    History of Present Illness:      SUBJECTIVE: Patient reports that she is feeling \"weak\" today and that she gets short of breath with the more activity she does. She reports that she is having 5-6/10 pain in her back today.     AQUATICS TREATMENT   Precautions:    Pain: 5-6/10    X in shaded column indicates activity completed today   Exercise/Intervention Sets/Sec  Notes   Walk Forward x2 laps   X In 3ft 6in water to 4 foot   Walk Backward   X Increased pain in abdominal region    Walk Sideways   X Increased pulling in lower back           Lower Extremity Exercises:   X  4ft, Cues for abdominal bracing    Heel/Toe Raises 5x   X    Marches 5x each  X Bilaterally    Squats       3 Way Hip 5x each  X Bilaterally, Held LLE hip extension   Hamstring Curls 5x each  X Bilaterally    Lunges Step-Ups              Lower Extremity Stretches:              Seated Exercises:              Upper Extremity Exercises:       Shoulder Flexion       Shoulder ABD/ADD       Shoulder Horizontal ABD/ADD       Shoulder IR/ER       Shoulder Circles       Shoulder Shrugs       Rows       Bicep Curls              Upper Extremity Stretches:              Balance:              Dynamic Gait:              Deep Water:       Hang 4-5 min  X 4ft 10in with 2 pool noodles   Bicycle       Hip ABD/ADD       Hip Flex/Ext         Specific Interventions Next Treatment: E-stim (discussed TENS unit), can try positioning for symptom relief but she is not comfortable anywhere except for sitting and leaning forward, Can try some strengthening or stretching, may need to do pool therapy if cannot tolerate land    Activity/Treatment Tolerance:  []  Patient tolerated treatment well  []  Patient limited by fatigue  [x]  Patient limited by pain   []  Patient limited by medical complications  []  Other:     Assessment: Initiated aquatic therapy exercises today as documented above. Patient limited with exercises and reps today due to her pain. Patient reported that she felt a little better in the pool at the conclusion of treatment session and like she had more energy. Cues provided for posture, abdominal bracing and proper technique while completing exercises.      GOALS:  Patient Goal: To heal her back or learn what can do to help control her pain and symptoms    Short Term Goals:  Time Frame: 4 weeks  1) Patient to start land or pool therapy without increase in pain to be able to progress activity  2) Patient to report 25% decrease in pain for tolerance of different positions to be able to progress strength/stretching program  3) Patient to have 25-50% increase in lumbar mobility without pain for ease with dressing  4) Patient to be able to stand and sit upright without pain for completion of daily activity  5) Patient to demonstrate abdominal bracing with no more than 1-2 cues and demonstrate 4- to 4/5 strength in bilat legs for ease with cleaning her home. Long Term Goals:  Time Frame: 12 weeks  1) Patient to be independent with home or pool program to perform all daily activity with minimal pain and symptoms. Patient Education:   [x]  HEP/Education Completed: Initiated aquatic therapy. Educated on monitor pain today after her treatment session.  [place] Access Code:  []  No new Education completed  []  Reviewed Prior HEP      [x]  Patient verbalized and/or demonstrated understanding of education provided. []  Patient unable to verbalize and/or demonstrate understanding of education provided. Will continue education. []  Barriers to learning: None    PLAN:    []  Plan of care initiated. Plan to see patient 2 times per week for 12 weeks to address the treatment planned outlined above.   [x]  Continue with current plan of care  []  Modify plan of care as follows:    []  Hold pending physician visit  []  Discharge    Time In 1131   Time Out 1159   Timed Code Minutes: 28 min   Total Treatment Time:  28 min       Electronically Signed by: Haley Johnson

## 2020-11-11 ENCOUNTER — HOSPITAL ENCOUNTER (OUTPATIENT)
Dept: PHYSICAL THERAPY | Age: 35
Setting detail: THERAPIES SERIES
Discharge: HOME | End: 2020-11-11
Payer: COMMERCIAL

## 2020-11-12 ENCOUNTER — CARE COORDINATION (OUTPATIENT)
Dept: CARE COORDINATION | Age: 35
End: 2020-11-12

## 2020-11-12 ENCOUNTER — TELEPHONE (OUTPATIENT)
Dept: FAMILY MEDICINE CLINIC | Age: 35
End: 2020-11-12

## 2020-11-12 ENCOUNTER — APPOINTMENT (OUTPATIENT)
Dept: PHYSICAL THERAPY | Age: 35
End: 2020-11-12
Payer: COMMERCIAL

## 2020-11-12 NOTE — TELEPHONE ENCOUNTER
Therapy notes indicate pt was discharged from PT because she can't tolerate therapy.  She needs an office visit to discuss this maybe we can jose her MRI approved

## 2020-11-12 NOTE — CARE COORDINATION
Ambulatory Care Coordination Note  11/12/2020  CM Risk Score: 5  Charlson 10 Year Mortality Risk Score: 10%     ACC: Pratima Kothari RN    Summary Note: Spoke with Cisco Chowdhury. Things remains unchanged for her at this time. She is following up with [facility] and they are trying to get MRI approved according to Cisco Chowdhury. MRI was declined by insurance when PCP tried to order it. Therapy is not going well as it causes her to hurt more and she has been cancelling most cessions. Offered to enroll her into [facility] but she declined at this time. Plan  -reinforce education completed and complete additional education to help manage chronic conditions  -offer MyChart sign up  -collaborate with [facility] as needed  -Collaborate with therapy as needed. General Assessment    Do you have any symptoms that are causing concern?:  Yes  Progression since Onset:  Unchanged  Reported Symptoms:  Pain                 Care Coordination Interventions    Program Enrollment:  Rising Risk  Referral from Primary Care Provider:  No  Suggested Interventions and Community Resources  Disease Specific Clinic:   (Comment: works with [employer])  [address]:  Declined  Medication Assistance Program:  Declined  Occupational Therapy:  Completed  Physical Therapy:  Completed         Goals Addressed                 This Visit's Progress     Conditions and Symptoms        I will schedule office visits, as directed by my provider. I will keep my appointment or reschedule if I have to cancel. I will notify my provider of any barriers to my plan of care. I will notify my provider of any symptoms that indicate a worsening of my condition. Barriers: back/abdominal pain  Plan for overcoming my barriers: family and ACM support  Confidence: 8/10  Anticipated Goal Completion Date: 1-13-21    Update:  Has appt with [facility]. [facility] is trying to get MRI approved which was recently declined by insurance when PCP tried to order it.   She is not able to tolerate therapy and has only been to a few sessions. 11-12-20              Prior to Admission medications    Medication Sig Start Date End Date Taking? Authorizing Provider   predniSONE (DELTASONE) 10 MG tablet 4 tabs PO ONCE Daily x 4 days, then 2 tabs PO ONCE daily x 4 days, then 1 TAB PO ONCE daily x 4 days, then 1/2 TAB PO ONCE daily x 4 days 11/4/20 11/20/20 Yes  [place], DO   lidocaine (LIDODERM) 5 % Place 1 patch onto the skin daily 12 hours on, 12 hours off. 10/28/20  Yes Elisabet Brody MD   ketorolac (TORADOL) 10 MG tablet Take 1 tablet by mouth every 6 hours as needed for Pain 10/28/20  Yes Elisabet Brody MD   cyclobenzaprine (FLEXERIL) 10 MG tablet Take 1 tablet by mouth 3 times daily as needed for Muscle spasms 10/12/20  Yes Lazarus Ishihara, PA-C   ondansetron (ZOFRAN) 4 MG tablet Take 1 tablet by mouth daily as needed for Nausea or Vomiting 10/1/20  Yes Wiliam Lee MD   dicyclomine (BENTYL) 10 MG capsule Take 1 capsule by mouth 4 times daily (before meals and nightly) 10/1/20  Yes Wiliam Lee MD   traZODone (DESYREL) 100 MG tablet Take 1 tablet by mouth nightly 4/27/20  Yes CHAPO Galo CNP   phenazopyridine (PYRIDIUM) 100 MG tablet Take 100 mg by mouth 2 times daily 8/21/19  Yes Historical Provider, MD   oxybutynin (DITROPAN-XL) 5 MG extended release tablet Take 5 mg by mouth daily 9/3/19  Yes Historical Provider, MD   lisinopril (PRINIVIL;ZESTRIL) 10 MG tablet Take 1 tablet by mouth daily 8/21/19  Yes CHAPO Galo CNP   aspirin 81 MG chewable tablet Take 1 tablet by mouth daily 8/14/19  Yes Zina Winchester MD   lidocaine-prilocaine (EMLA) 2.5-2.5 % cream Apply topically as needed.  8/1/19  Yes Carolina Florian MD   prochlorperazine (COMPAZINE) 5 MG tablet Take 1 tablet by mouth every 6 hours as needed for Nausea 7/31/19  Yes Carolina Florian MD   ibuprofen (ADVIL;MOTRIN) 200 MG tablet Take 200 mg by mouth every 8 hours as needed for Pain   Yes Historical Provider, MD   meclizine (ANTIVERT) 25 MG tablet Take 1 tablet by mouth 3 times daily as needed for Dizziness 2/25/19  Yes CHAPO Dawkins CNP       Future Appointments   Date Time Provider Mili Jamison   12/14/2020 11:20 AM CHAPO Dawkins CNP [facility] [place]   2/26/2021 11:15 AM James Colmenares, [address] [place] [address]

## 2020-11-12 NOTE — DISCHARGE SUMMARY
[facility]    Patient Name: Robert Martin        CSN: [id number]   YOB: 1985  Gender: female  CHAPO Vasquez - *,    Radiculopathy, lumbar region [M54.16] ,      Patient is discharged from Physical Therapy services at this time. See last note for details related to results of therapy and goal achievement. Reason for discharge: Compliance. Patient has attended her evaluation on 10-21-20 and only 2 visits since then with cancelling or no showing for 6 appts. Per our attendance policy she has to be discharged. She is not able to tolerate therapy and is in too much pain. Something else needs to be done for her due to therapy is not able to [place] [id number]: 11/12/2020

## 2020-11-16 ENCOUNTER — APPOINTMENT (OUTPATIENT)
Dept: PHYSICAL THERAPY | Age: 35
End: 2020-11-16
Payer: COMMERCIAL

## 2020-11-17 ENCOUNTER — APPOINTMENT (OUTPATIENT)
Dept: CT IMAGING | Age: 35
End: 2020-11-17
Payer: COMMERCIAL

## 2020-11-17 ENCOUNTER — HOSPITAL ENCOUNTER (EMERGENCY)
Age: 35
Discharge: HOME | End: 2020-11-17
Attending: FAMILY MEDICINE
Payer: COMMERCIAL

## 2020-11-17 ENCOUNTER — CARE COORDINATION (OUTPATIENT)
Dept: CARE COORDINATION | Age: 35
End: 2020-11-17

## 2020-11-17 VITALS
OXYGEN SATURATION: 99 % | BODY MASS INDEX: 27.37 KG/M2 | TEMPERATURE: 98.5 F | WEIGHT: 180 LBS | SYSTOLIC BLOOD PRESSURE: 127 MMHG | RESPIRATION RATE: 18 BRPM | DIASTOLIC BLOOD PRESSURE: 98 MMHG | HEART RATE: 106 BPM

## 2020-11-17 LAB
ALBUMIN SERPL-MCNC: 4.2 G/DL (ref 3.5–5.1)
ALP BLD-CCNC: 170 U/L (ref 38–126)
ALT SERPL-CCNC: 14 U/L (ref 11–66)
AMPHETAMINE+METHAMPHETAMINE URINE SCREEN: NEGATIVE
ANION GAP SERPL CALCULATED.3IONS-SCNC: 21 MEQ/L (ref 8–16)
AST SERPL-CCNC: 17 U/L (ref 5–40)
BACTERIA: ABNORMAL
BARBITURATE QUANTITATIVE URINE: NEGATIVE
BASOPHILS # BLD: 0.4 %
BASOPHILS ABSOLUTE: 0 THOU/MM3 (ref 0–0.1)
BENZODIAZEPINE QUANTITATIVE URINE: POSITIVE
BILIRUB SERPL-MCNC: 0.3 MG/DL (ref 0.3–1.2)
BILIRUBIN DIRECT: < 0.2 MG/DL (ref 0–0.3)
BILIRUBIN URINE: NEGATIVE
BLOOD, URINE: NEGATIVE
BUN BLDV-MCNC: 23 MG/DL (ref 7–22)
C-REACTIVE PROTEIN: 4 MG/DL (ref 0–1)
CALCIUM SERPL-MCNC: 10.6 MG/DL (ref 8.5–10.5)
CANNABINOID QUANTITATIVE URINE: POSITIVE
CASTS: ABNORMAL /LPF
CHARACTER, URINE: ABNORMAL
CHLORIDE BLD-SCNC: 91 MEQ/L (ref 98–111)
CO2: 20 MEQ/L (ref 23–33)
COCAINE METABOLITE QUANTITATIVE URINE: NEGATIVE
COLOR: ABNORMAL
CREAT SERPL-MCNC: 0.7 MG/DL (ref 0.4–1.2)
CRYSTALS: ABNORMAL
EOSINOPHIL # BLD: 0.2 %
EOSINOPHILS ABSOLUTE: 0 THOU/MM3 (ref 0–0.4)
EPITHELIAL CELLS, UA: ABNORMAL /HPF
ERYTHROCYTE [DISTWIDTH] IN BLOOD BY AUTOMATED COUNT: 12.9 % (ref 11.5–14.5)
ERYTHROCYTE [DISTWIDTH] IN BLOOD BY AUTOMATED COUNT: 43.8 FL (ref 35–45)
GFR SERPL CREATININE-BSD FRML MDRD: > 90 ML/MIN/1.73M2
GLUCOSE BLD-MCNC: 96 MG/DL (ref 70–108)
GLUCOSE, URINE: NEGATIVE MG/DL
HCT VFR BLD CALC: 46.4 % (ref 37–47)
HEMOGLOBIN: 16.1 GM/DL (ref 12–16)
IMMATURE GRANS (ABS): 0.04 THOU/MM3 (ref 0–0.07)
IMMATURE GRANULOCYTES: 0.5 %
KETONES, URINE: 80
LACTIC ACID: 1.2 MMOL/L (ref 0.5–2.2)
LD: 157 U/L (ref 100–190)
LEUKOCYTE ESTERASE, URINE: ABNORMAL
LIPASE: 24.6 U/L (ref 5.6–51.3)
LYMPHOCYTES # BLD: 17.7 %
LYMPHOCYTES ABSOLUTE: 1.5 THOU/MM3 (ref 1–4.8)
MCH RBC QN AUTO: 32 PG (ref 26–33)
MCHC RBC AUTO-ENTMCNC: 34.7 GM/DL (ref 32.2–35.5)
MCV RBC AUTO: 92.2 FL (ref 81–99)
MISCELLANEOUS LAB TEST RESULT: ABNORMAL
MONOCYTES # BLD: 8.6 %
MONOCYTES ABSOLUTE: 0.7 THOU/MM3 (ref 0.4–1.3)
NITRITE, URINE: NEGATIVE
NUCLEATED RED BLOOD CELLS: 0 /100 WBC
OPIATES, URINE: POSITIVE
OSMOLALITY CALCULATION: 268.1 MOSMOL/KG (ref 275–300)
OXYCODONE: NEGATIVE
PH UA: 5.5 (ref 5–9)
PHENCYCLIDINE QUANTITATIVE URINE: NEGATIVE
PLATELET # BLD: 387 THOU/MM3 (ref 130–400)
PMV BLD AUTO: 11.3 FL (ref 9.4–12.4)
POTASSIUM SERPL-SCNC: 3.5 MEQ/L (ref 3.5–5.2)
PREGNANCY, SERUM: NEGATIVE
PROTEIN UA: 100 MG/DL
RBC # BLD: 5.03 MILL/MM3 (ref 4.2–5.4)
RBC URINE: ABNORMAL /HPF
RENAL EPITHELIAL, UA: ABNORMAL
SEG NEUTROPHILS: 72.6 %
SEGMENTED NEUTROPHILS ABSOLUTE COUNT: 6.1 THOU/MM3 (ref 1.8–7.7)
SODIUM BLD-SCNC: 132 MEQ/L (ref 135–145)
SPECIFIC GRAVITY UA: > 1.03 (ref 1–1.03)
TOTAL PROTEIN: 7.7 G/DL (ref 6.1–8)
UROBILINOGEN, URINE: 1 EU/DL (ref 0–1)
WBC # BLD: 8.4 THOU/MM3 (ref 4.8–10.8)
WBC UA: ABNORMAL /HPF
YEAST: ABNORMAL

## 2020-11-17 PROCEDURE — 80307 DRUG TEST PRSMV CHEM ANLYZR: CPT

## 2020-11-17 PROCEDURE — 81001 URINALYSIS AUTO W/SCOPE: CPT

## 2020-11-17 PROCEDURE — 83615 LACTATE (LD) (LDH) ENZYME: CPT

## 2020-11-17 PROCEDURE — 83605 ASSAY OF LACTIC ACID: CPT

## 2020-11-17 PROCEDURE — 6360000004 HC RX CONTRAST MEDICATION: Performed by: FAMILY MEDICINE

## 2020-11-17 PROCEDURE — 96374 THER/PROPH/DIAG INJ IV PUSH: CPT

## 2020-11-17 PROCEDURE — 84703 CHORIONIC GONADOTROPIN ASSAY: CPT

## 2020-11-17 PROCEDURE — 74177 CT ABD & PELVIS W/CONTRAST: CPT

## 2020-11-17 PROCEDURE — 6370000000 HC RX 637 (ALT 250 FOR IP): Performed by: FAMILY MEDICINE

## 2020-11-17 PROCEDURE — 96375 TX/PRO/DX INJ NEW DRUG ADDON: CPT

## 2020-11-17 PROCEDURE — 80053 COMPREHEN METABOLIC PANEL: CPT

## 2020-11-17 PROCEDURE — 86140 C-REACTIVE PROTEIN: CPT

## 2020-11-17 PROCEDURE — 82248 BILIRUBIN DIRECT: CPT

## 2020-11-17 PROCEDURE — 85025 COMPLETE CBC W/AUTO DIFF WBC: CPT

## 2020-11-17 PROCEDURE — 99285 EMERGENCY DEPT VISIT HI MDM: CPT

## 2020-11-17 PROCEDURE — 6360000002 HC RX W HCPCS: Performed by: FAMILY MEDICINE

## 2020-11-17 PROCEDURE — 36415 COLL VENOUS BLD VENIPUNCTURE: CPT

## 2020-11-17 PROCEDURE — 96361 HYDRATE IV INFUSION ADD-ON: CPT

## 2020-11-17 PROCEDURE — 2500000003 HC RX 250 WO HCPCS: Performed by: FAMILY MEDICINE

## 2020-11-17 PROCEDURE — 87086 URINE CULTURE/COLONY COUNT: CPT

## 2020-11-17 PROCEDURE — 2580000003 HC RX 258: Performed by: FAMILY MEDICINE

## 2020-11-17 PROCEDURE — 83690 ASSAY OF LIPASE: CPT

## 2020-11-17 RX ORDER — 0.9 % SODIUM CHLORIDE 0.9 %
1000 INTRAVENOUS SOLUTION INTRAVENOUS ONCE
Status: COMPLETED | OUTPATIENT
Start: 2020-11-17 | End: 2020-11-17

## 2020-11-17 RX ORDER — 0.9 % SODIUM CHLORIDE 0.9 %
500 INTRAVENOUS SOLUTION INTRAVENOUS ONCE
Status: COMPLETED | OUTPATIENT
Start: 2020-11-17 | End: 2020-11-17

## 2020-11-17 RX ORDER — ONDANSETRON 2 MG/ML
4 INJECTION INTRAMUSCULAR; INTRAVENOUS ONCE
Status: COMPLETED | OUTPATIENT
Start: 2020-11-17 | End: 2020-11-17

## 2020-11-17 RX ORDER — SODIUM CHLORIDE 9 MG/ML
INJECTION, SOLUTION INTRAVENOUS CONTINUOUS
Status: DISCONTINUED | OUTPATIENT
Start: 2020-11-17 | End: 2020-11-17 | Stop reason: HOSPADM

## 2020-11-17 RX ORDER — OXYCODONE HYDROCHLORIDE AND ACETAMINOPHEN 5; 325 MG/1; MG/1
1 TABLET ORAL EVERY 6 HOURS PRN
Qty: 12 TABLET | Refills: 0 | Status: SHIPPED | OUTPATIENT
Start: 2020-11-17 | End: 2020-11-20

## 2020-11-17 RX ORDER — DICYCLOMINE HCL 20 MG
20 TABLET ORAL
Qty: 40 TABLET | Refills: 0 | Status: SHIPPED | OUTPATIENT
Start: 2020-11-17 | End: 2021-07-01 | Stop reason: ALTCHOICE

## 2020-11-17 RX ORDER — OXYCODONE HYDROCHLORIDE AND ACETAMINOPHEN 5; 325 MG/1; MG/1
1 TABLET ORAL ONCE
Status: COMPLETED | OUTPATIENT
Start: 2020-11-17 | End: 2020-11-17

## 2020-11-17 RX ADMIN — SODIUM CHLORIDE 500 ML: 9 INJECTION, SOLUTION INTRAVENOUS at 11:59

## 2020-11-17 RX ADMIN — IOPAMIDOL 80 ML: 755 INJECTION, SOLUTION INTRAVENOUS at 12:53

## 2020-11-17 RX ADMIN — FAMOTIDINE 20 MG: 10 INJECTION INTRAVENOUS at 11:59

## 2020-11-17 RX ADMIN — SODIUM CHLORIDE 1000 ML: 9 INJECTION, SOLUTION INTRAVENOUS at 13:52

## 2020-11-17 RX ADMIN — OXYCODONE HYDROCHLORIDE AND ACETAMINOPHEN 1 TABLET: 5; 325 TABLET ORAL at 14:57

## 2020-11-17 RX ADMIN — ONDANSETRON 4 MG: 2 INJECTION INTRAMUSCULAR; INTRAVENOUS at 11:59

## 2020-11-17 RX ADMIN — HYDROMORPHONE HYDROCHLORIDE 1 MG: 1 INJECTION, SOLUTION INTRAMUSCULAR; INTRAVENOUS; SUBCUTANEOUS at 11:59

## 2020-11-17 ASSESSMENT — PAIN SCALES - GENERAL
PAINLEVEL_OUTOF10: 4
PAINLEVEL_OUTOF10: 3
PAINLEVEL_OUTOF10: 7
PAINLEVEL_OUTOF10: 6
PAINLEVEL_OUTOF10: 3

## 2020-11-17 ASSESSMENT — PAIN DESCRIPTION - LOCATION
LOCATION: ABDOMEN

## 2020-11-17 ASSESSMENT — ENCOUNTER SYMPTOMS
VOMITING: 0
BACK PAIN: 1
COUGH: 0
NAUSEA: 1
SHORTNESS OF BREATH: 0
BLOOD IN STOOL: 0
EYE DISCHARGE: 0
ABDOMINAL PAIN: 1
WHEEZING: 0

## 2020-11-17 ASSESSMENT — PAIN DESCRIPTION - DESCRIPTORS
DESCRIPTORS: PATIENT UNABLE TO DESCRIBE

## 2020-11-17 ASSESSMENT — PAIN DESCRIPTION - ORIENTATION
ORIENTATION: RIGHT;LEFT;LOWER
ORIENTATION: RIGHT;LEFT;LOWER
ORIENTATION: LEFT;RIGHT;LOWER
ORIENTATION: LEFT;RIGHT;LOWER

## 2020-11-17 ASSESSMENT — PAIN DESCRIPTION - FREQUENCY
FREQUENCY: CONTINUOUS

## 2020-11-17 ASSESSMENT — PAIN DESCRIPTION - PAIN TYPE
TYPE: ACUTE PAIN

## 2020-11-17 NOTE — CARE COORDINATION
Thank you for the update. I would be happy to se her but [facility] is now managing her pain and they are the ones who ordered the MRI. She can also follow up with them for this.

## 2020-11-17 NOTE — ED PROVIDER NOTES
[facility]  eMERGENCY dEPARTMENT eNCOUnter          [address] [facility]       Chief Complaint   Patient presents with    Dizziness    Abdominal Pain       Nurses Notes reviewed and I agree except as noted in the HPI. HISTORY OF PRESENT ILLNESS    Arleth Caceres is a 37 y.o. female who presents with abdominal pain    Location/Symptom: Abdominal and back pain  Timing/Onset: Several months ago  Context/Setting: She had cervical cancer treated with both intracavitary and external beam radiation. Along with chemotherapy. She has had persistent lower abdominal pain and back pain  Intermittent prednisone courses seem to help transiently. In the past is used Percocet but does not have that currently  She had an MRI of her lumbar spine at [facility] [place] today  Tobacco smoker 1 pack/day  She did have a colonoscopy in September 2020 at [place] which apparently was negative  Quality: Lower abdominal pain  Constant  Nausea but no vomiting  Pain is worse with eating so she does not eat much  Not having any fevers  Bowel movements have varied. Sometimes small pencillike  Sometimes mucousy with gas  Sometimes she will have intensification of her abdominal pain with the urge to move her bowels and get transient slight improvement though incomplete with passage of stool or flatus  Duration: Pain has been going on for months  Modifying Factors: In the past prednisone tapers, Percocet, Tylenol  Severity: 7/10    REVIEW OF SYSTEMS     Review of Systems   Constitutional: Negative for chills and fever. HENT: Negative for congestion. Eyes: Negative for discharge. Respiratory: Negative for cough, shortness of breath and wheezing. Cardiovascular: Negative for chest pain. Gastrointestinal: Positive for abdominal pain and nausea. Negative for blood in stool and vomiting. Genitourinary: Negative for dysuria.         Cervical cancer treated with chemotherapy and radiation   Musculoskeletal: Positive for back pain. Back pain lumbar worse with activity   Skin: Negative for rash. Neurological: Negative for speech difficulty and weakness. Hematological: Does not bruise/bleed easily. Psychiatric/Behavioral: Negative for confusion. PAST MEDICAL HISTORY    has a past medical history of Anxiety, Cancer of cervix ([address] Depression, and Hypertension. SURGICAL HISTORY      has a past surgical history that includes Cholecystectomy (over 5 years ago); Tubal ligation (2006); Cervix biopsy (N/A, 6/7/2019); INSERTION / REMOVAL / REPLACEMENT VENOUS ACCESS CATHETER (N/A, 8/9/2019); other surgical history (10/10/2019); and Colonoscopy (09/29/2020). CURRENT MEDICATIONS       Previous Medications    ASPIRIN 81 MG CHEWABLE TABLET    Take 1 tablet by mouth daily    CYCLOBENZAPRINE (FLEXERIL) 10 MG TABLET    Take 1 tablet by mouth 3 times daily as needed for Muscle spasms    DICYCLOMINE (BENTYL) 10 MG CAPSULE    Take 1 capsule by mouth 4 times daily (before meals and nightly)    IBUPROFEN (ADVIL;MOTRIN) 200 MG TABLET    Take 200 mg by mouth every 8 hours as needed for Pain    KETOROLAC (TORADOL) 10 MG TABLET    Take 1 tablet by mouth every 6 hours as needed for Pain    LIDOCAINE (LIDODERM) 5 %    Place 1 patch onto the skin daily 12 hours on, 12 hours off. LIDOCAINE-PRILOCAINE (EMLA) 2.5-2.5 % CREAM    Apply topically as needed.     LISINOPRIL (PRINIVIL;ZESTRIL) 10 MG TABLET    Take 1 tablet by mouth daily    MECLIZINE (ANTIVERT) 25 MG TABLET    Take 1 tablet by mouth 3 times daily as needed for Dizziness    ONDANSETRON (ZOFRAN) 4 MG TABLET    Take 1 tablet by mouth daily as needed for Nausea or Vomiting    OXYBUTYNIN (DITROPAN-XL) 5 MG EXTENDED RELEASE TABLET    Take 5 mg by mouth daily    PHENAZOPYRIDINE (PYRIDIUM) 100 MG TABLET    Take 100 mg by mouth 2 times daily    PREDNISONE (DELTASONE) 10 MG TABLET    4 tabs PO ONCE Daily x 4 days, then 2 tabs PO ONCE daily x 4 days, then 1 TAB PO ONCE daily x 4 days, then 1/2 TAB PO ONCE daily x 4 days    PROCHLORPERAZINE (COMPAZINE) 5 MG TABLET    Take 1 tablet by mouth every 6 hours as needed for Nausea    TRAZODONE (DESYREL) 100 MG TABLET    Take 1 tablet by mouth nightly       ALLERGIES     is allergic to oxycodone and percocet [oxycodone-acetaminophen]. FAMILY HISTORY     She indicated that her mother is alive. She indicated that her father is alive. She indicated that her maternal grandmother is . She indicated that her paternal grandmother is . family history includes Cancer in her maternal grandmother and paternal grandmother. SOCIAL HISTORY      reports that she has been smoking cigarettes. She has a 20.00 pack-year smoking history. She has never used smokeless tobacco. She reports current alcohol use of about 16.0 standard drinks of alcohol per week. She reports that she does not use drugs. PHYSICAL EXAM     INITIAL VITALS:  weight is 180 lb (81.6 kg). Her blood pressure is 121/98 (abnormal) and her pulse is 100. Her respiration is 18 and oxygen saturation is 98%. Physical Exam  Vitals signs and nursing note reviewed. Constitutional:       Comments: GCS 15   HENT:      Head: Normocephalic and atraumatic. Eyes:      General: No scleral icterus. Extraocular Movements: Extraocular movements intact. Pupils: Pupils are equal, round, and reactive to light. Neck:      Musculoskeletal: Normal range of motion and neck supple. No neck rigidity. Cardiovascular:      Rate and Rhythm: Regular rhythm. Tachycardia present. Heart sounds: Normal heart sounds. Pulmonary:      Effort: Pulmonary effort is normal.      Breath sounds: Normal breath sounds. No wheezing. Abdominal:      Palpations: Abdomen is soft. There is no mass. Tenderness: There is abdominal tenderness. There is no guarding or rebound.       Comments: Patient is tender in abdomen especially left lower quadrant and suprapubic region Musculoskeletal:         General: No swelling. Skin:     General: Skin is warm and dry. Neurological:      General: No focal deficit present. Mental Status: She is alert. Psychiatric:         Mood and Affect: Mood normal.         Behavior: Behavior normal.           DIFFERENTIAL DIAGNOSIS:     Abdominal pain persistent, for months    She has had different evaluations with at times questionable area of intestinal narrowing.     Evaluate for bowel obstruction    Check hydration    Treat symptomatically        DIAGNOSTIC RESULTS       RADIOLOGY: non-plain film images(s) such as CT, Ultrasound and MRI are read by the radiologist.  The patient had a multiple view CT scan of the abdomen and pelvis which demonstrates thickening of the colon in the right and transverse colon consistent with possible colitis    [x] Visualized and interpreted by me   [x] Radiologist's Wet Read Report Reviewed   [] Discussed with Radiologist.    LABS:   Labs Reviewed   CBC WITH AUTO DIFFERENTIAL - Abnormal; Notable for the following components:       Result Value    Hemoglobin 16.1 (*)     All other components within normal limits   BASIC METABOLIC PANEL - Abnormal; Notable for the following components:    Sodium 132 (*)     Chloride 91 (*)     CO2 20 (*)     BUN 23 (*)     Calcium 10.6 (*)     All other components within normal limits   HEPATIC FUNCTION PANEL - Abnormal; Notable for the following components:    Alkaline Phosphatase 170 (*)     All other components within normal limits   C-REACTIVE PROTEIN - Abnormal; Notable for the following components:    CRP 4.00 (*)     All other components within normal limits   URINALYSIS WITH MICROSCOPIC - Abnormal; Notable for the following components:    Ketones, Urine 80 (*)     Specific Gravity, UA >1.030 (*)     Protein,  (*)     Leukocyte Esterase, Urine SMALL (*)     All other components within normal limits   ANION GAP - Abnormal; Notable for the following components:    Anion Gap 21.0 (*)     All other components within normal limits   OSMOLALITY - Abnormal; Notable for the following components:    Osmolality Calc 268.1 (*)     All other components within normal limits   CULTURE, URINE   LIPASE   HCG, SERUM, QUALITATIVE   LACTIC ACID, PLASMA   LACTATE DEHYDROGENASE   URINE DRUG SCREEN   GLOMERULAR FILTRATION RATE, ESTIMATED       EMERGENCY DEPARTMENT COURSE:   Vitals:    Vitals:    11/17/20 1146 11/17/20 1151 11/17/20 1314   BP: 139/77  (!) 121/98   Pulse: 126  100   Resp: 28  18   TempSrc:  Oral    SpO2: 97%  98%   Weight: 180 lb (81.6 kg)       Nursing notes reviewed    IV hydration 1 L bolus    BUN/creatinine 23/0.7 suggestive of some degree of dehydration    Liver panel showed alk phosphatase 170    Normal white count    Not anemic    Lactic acid normal    CRP 4 elevated    Urine shows specific gravity greater than 1.030 with 80 mg/dL ketones    Urine drug screen positive for benzodiazepines, marijuana, opiates    Note she was given IV Dilaudid prior to urine being obtained    After the CT scan and with medications including Dilaudid plus Zofran she is some improved    We will give second liter of saline and reassess    At this point she is feeling enough improvement that she can try outpatient follow-up    She had a colonoscopy in the last 2 months    At this point recommend follow-up with her oncology specialist in [facility] as scheduled. We will treat symptomatically with a small amount of Percocet plus add Bentyl    CRITICAL CARE:   none    CONSULTS:  none    PROCEDURES:  None    FINAL IMPRESSION      1. Lower abdominal pain    2.  Colitis          DISPOSITION/PLAN     Discharge home    PATIENT REFERRED TO:  CHAPO Monae - CNP  [address] [phone]    In 3 days        DISCHARGE MEDICATIONS:  New Prescriptions    DICYCLOMINE (BENTYL) 20 MG TABLET    Take 1 tablet by mouth 4 times daily (before meals and nightly)    OXYCODONE-ACETAMINOPHEN (PERCOCET) 5-325 MG PER TABLET    Take 1 tablet by mouth every 6 hours as needed for Pain for up to 3 days.        (Please note that portions of this note were completed with a voice recognition program.  Efforts were made to edit the dictations but occasionally words are mis-transcribed.)    MD Andreea Magana MD  11/17/20 8405

## 2020-11-17 NOTE — CARE COORDINATION
Lashaun Alonzo called me this morning and states she is in terrible pain. States her mobility is decreased, she can hardly get out of bed. States she only feels mild relief when she is taking steroids and when the steroids stop she has severe pain again. She is currently getting MRI completed today at 10am.  She states she is there now waiting to get it done. I offered her a face to face appt with PCP but she states she doesn't know if she can physically get into the office especially following the MRI procedure today. She was willing to do VV but wanted your recommendations first.  Please advise. Thank you.

## 2020-11-17 NOTE — ED NOTES
In for hourly rounding. Pt resting on cot in position of comfort. Pt remains A&Ox4, resps easy and unlabored. IV continues to infuse and shows no s/s of infection or infiltration. Pt slightly increased at this time. Pt up to use restroom and have bowel movement. Pt states pain typically gets worse after bowel movement. Medicated pt per [facility] prior to restroom. Monitor remains in place. Updated pt on POC. Will monitor.      Jeanne Steward RN  11/17/20 0592

## 2020-11-17 NOTE — ED NOTES
In for hourly rounding. Pt resting on cot in position of comfort. Pt remains A&Ox4, resps easy and unlabored. IV continues to infuse and shows no s/s of infection or infiltration. Pt pain has greatly improved at this time and pt is able to get some rest. Monitor remains in place. Updated pt on POC. Will monitor.      Yuval Barillas RN  11/17/20 9762

## 2020-11-17 NOTE — ED NOTES
In for hourly rounding. Pt resting on cot in position of comfort. Pt remains A&Ox4, resps easy and unlabored. IV continues to infuse and shows no s/s of infection or infiltration. Pt pain remains unchanged at this time. Monitor remains in place. Updated pt on POC. Will monitor.      Jyoti Donovan RN  11/17/20 0322

## 2020-11-18 LAB
ORGANISM: ABNORMAL
URINE CULTURE, ROUTINE: ABNORMAL

## 2020-11-30 ENCOUNTER — CARE COORDINATION (OUTPATIENT)
Dept: CARE COORDINATION | Age: 35
End: 2020-11-30

## 2020-11-30 RX ORDER — OXYCODONE HYDROCHLORIDE AND ACETAMINOPHEN 5; 325 MG/1; MG/1
1 TABLET ORAL EVERY 4 HOURS PRN
COMMUNITY
End: 2020-12-03

## 2020-11-30 NOTE — CARE COORDINATION
Ambulatory Care Coordination Note  11/30/2020  CM Risk Score: 5  Charlson 10 Year Mortality Risk Score: 10%     ACC: Judd Rodrigues, RN    Summary Note: Spoke with Langeloth. States that her pain is unchanged. Results of MRI were negative. [facility] states there is nothing on MRI that could relate to her pain. She is still following up with [facility] and they gave her more steroids and Percocet to help manage pain. Langeloth states she is going to move onto GI because she feels her sx's may be stomach/esophagus related. She has an open referral with Dr. Abhishek Nunez and she states she is going to call them today. Informed her I could assist her with any barriers. I will continue to follow up to provide support. Plan  -reinforce education completed and complete additional education to help manage chronic conditions  -ensure GI appt made  -work towards graduation once no new barriers present  General Assessment    Do you have any symptoms that are causing concern?:  Yes  Progression since Onset:  Unchanged  Reported Symptoms:  Pain               Care Coordination Interventions    Program Enrollment:  Rising Risk  Referral from Primary Care Provider:  No  Suggested Interventions and Community Resources  Disease Specific Clinic:   (Comment: works with [employer])  [address]:  Declined  Medication Assistance Program:  Declined  Occupational Therapy:  Completed  Physical Therapy:  Completed         Goals Addressed                 This Visit's Progress     Conditions and Symptoms        I will schedule office visits, as directed by my provider. I will keep my appointment or reschedule if I have to cancel. I will notify my provider of any barriers to my plan of care. I will notify my provider of any symptoms that indicate a worsening of my condition.     Barriers: back/abdominal pain  Plan for overcoming my barriers: family and ACM support  Confidence: 8/10  Anticipated Goal Completion Date: 1-13-21    Update: Has appt with MARIMARO. [facility] is trying to get MRI approved which was recently declined by insurance when PCP tried to order it. She is not able to tolerate therapy and has only been to a few sessions. 11-12-20  Update:  MRI results back with no findings. Was ordered steroids and percocet. Prior to Admission medications    Medication Sig Start Date End Date Taking? Authorizing Provider   oxyCODONE-acetaminophen (PERCOCET) 5-325 MG per tablet Take 1 tablet by mouth every 4 hours as needed for Pain. Yes Historical Provider, MD   dicyclomine (BENTYL) 20 MG tablet Take 1 tablet by mouth 4 times daily (before meals and nightly) 11/17/20  Yes Rajan Gallardo MD   lidocaine (LIDODERM) 5 % Place 1 patch onto the skin daily 12 hours on, 12 hours off. 10/28/20  Yes Moris Petty MD   ketorolac (TORADOL) 10 MG tablet Take 1 tablet by mouth every 6 hours as needed for Pain 10/28/20  Yes Moris Petty MD   cyclobenzaprine (FLEXERIL) 10 MG tablet Take 1 tablet by mouth 3 times daily as needed for Muscle spasms 10/12/20  Yes April Willis PA-C   ondansetron (ZOFRAN) 4 MG tablet Take 1 tablet by mouth daily as needed for Nausea or Vomiting 10/1/20  Yes Keith Sutherland MD   dicyclomine (BENTYL) 10 MG capsule Take 1 capsule by mouth 4 times daily (before meals and nightly) 10/1/20  Yes Keith Sutherland MD   traZODone (DESYREL) 100 MG tablet Take 1 tablet by mouth nightly 4/27/20  Yes CHAPO Nash CNP   phenazopyridine (PYRIDIUM) 100 MG tablet Take 100 mg by mouth 2 times daily 8/21/19  Yes Historical Provider, MD   oxybutynin (DITROPAN-XL) 5 MG extended release tablet Take 5 mg by mouth daily 9/3/19  Yes Historical Provider, MD   lisinopril (PRINIVIL;ZESTRIL) 10 MG tablet Take 1 tablet by mouth daily 8/21/19  Yes CHAPO Nash CNP   aspirin 81 MG chewable tablet Take 1 tablet by mouth daily 8/14/19  Yes Kristen Gallardo MD   lidocaine-prilocaine (EMLA) 2.5-2.5 % cream Apply topically as needed.  8/1/19 Yes Carolina Florian MD   prochlorperazine (COMPAZINE) 5 MG tablet Take 1 tablet by mouth every 6 hours as needed for Nausea 7/31/19  Yes Carolina Florian MD   ibuprofen (ADVIL;MOTRIN) 200 MG tablet Take 200 mg by mouth every 8 hours as needed for Pain   Yes Historical Provider, MD   meclizine (ANTIVERT) 25 MG tablet Take 1 tablet by mouth 3 times daily as needed for Dizziness 2/25/19  Yes CHAPO Galo CNP       Future Appointments   Date Time Provider Mili Jamison   12/3/2020  9:30 AM [facility] SPECIAL PROCEDURE ROOM 1 [facility] Radiolog   12/14/2020 11:20 AM CHAPO Galo CNP [facility]   2/26/2021 11:15 AM Harleen Weiss Mail, [address] [place] [address]

## 2020-12-01 ENCOUNTER — CARE COORDINATION (OUTPATIENT)
Dept: CARE COORDINATION | Age: 35
End: 2020-12-01

## 2020-12-01 NOTE — CARE COORDINATION
[place] called and states she was very pleased that she talked to GI physician (Dr. Marysol Whitaker) and had appt today. She states she has colitis, c-diff (possibly). States she was given 3-4 dx that may be causing her pain. States was not able to tell me the dx over the phone as she didn't have the paperwork in front of her from GI office. [facility] GI is going to test stool for C diff. States she is scheduled for scope procedure this Friday at [address] [facility]. She has a call into [facility] to give her some pain medication until she can get into pain management which was recommended by [facility] at these dx will cause chronic pain. I will ask [facility] for this referral and continue to follow up.

## 2020-12-01 NOTE — CARE COORDINATION
Giovanna Winter called and left me a message. I tried to return phone call and had to leave message.

## 2020-12-01 NOTE — CARE COORDINATION
Patient is also current with GI at [facility] and saw them in October of this year. She was also treated for C diff at this time and colitis. They started her on bentyl and gabaepentin for her abdominal pain. I would recommend she follow up with them if these meds are not helping her and they can refer her to pain management if they deem necessary.  Thank you

## 2020-12-02 RX ORDER — DEXAMETHASONE SODIUM PHOSPHATE 4 MG/ML
4 INJECTION, SOLUTION INTRA-ARTICULAR; INTRALESIONAL; INTRAMUSCULAR; INTRAVENOUS; SOFT TISSUE ONCE
Status: CANCELLED | OUTPATIENT
Start: 2020-12-02 | End: 2020-12-02

## 2020-12-02 RX ORDER — BUPIVACAINE HYDROCHLORIDE 2.5 MG/ML
5 INJECTION, SOLUTION EPIDURAL; INFILTRATION; INTRACAUDAL ONCE
Status: CANCELLED | OUTPATIENT
Start: 2020-12-02 | End: 2020-12-02

## 2020-12-03 ENCOUNTER — HOSPITAL ENCOUNTER (EMERGENCY)
Age: 35
Discharge: HOME | End: 2020-12-03
Attending: EMERGENCY MEDICINE
Payer: COMMERCIAL

## 2020-12-03 VITALS
WEIGHT: 160 LBS | SYSTOLIC BLOOD PRESSURE: 128 MMHG | BODY MASS INDEX: 24.25 KG/M2 | HEIGHT: 68 IN | HEART RATE: 94 BPM | RESPIRATION RATE: 18 BRPM | TEMPERATURE: 98.3 F | OXYGEN SATURATION: 98 % | DIASTOLIC BLOOD PRESSURE: 95 MMHG

## 2020-12-03 LAB
ALBUMIN SERPL-MCNC: 4.1 G/DL (ref 3.5–5.1)
ALP BLD-CCNC: 132 U/L (ref 38–126)
ALT SERPL-CCNC: 28 U/L (ref 11–66)
ANION GAP SERPL CALCULATED.3IONS-SCNC: 17 MEQ/L (ref 8–16)
AST SERPL-CCNC: 21 U/L (ref 5–40)
BACTERIA: ABNORMAL
BASOPHILS # BLD: 0.2 %
BASOPHILS ABSOLUTE: 0 THOU/MM3 (ref 0–0.1)
BILIRUB SERPL-MCNC: 0.5 MG/DL (ref 0.3–1.2)
BILIRUBIN URINE: ABNORMAL
BLOOD, URINE: NEGATIVE
BUN BLDV-MCNC: 7 MG/DL (ref 7–22)
C-REACTIVE PROTEIN: 0.24 MG/DL (ref 0–1)
CALCIUM SERPL-MCNC: 9.9 MG/DL (ref 8.5–10.5)
CASTS: ABNORMAL /LPF
CASTS: ABNORMAL /LPF
CHARACTER, URINE: CLEAR
CHLORIDE BLD-SCNC: 100 MEQ/L (ref 98–111)
CO2: 25 MEQ/L (ref 23–33)
COLOR: ABNORMAL
CREAT SERPL-MCNC: 0.6 MG/DL (ref 0.4–1.2)
CRYSTALS: ABNORMAL
EOSINOPHIL # BLD: 0.2 %
EOSINOPHILS ABSOLUTE: 0 THOU/MM3 (ref 0–0.4)
EPITHELIAL CELLS, UA: ABNORMAL /HPF
ERYTHROCYTE [DISTWIDTH] IN BLOOD BY AUTOMATED COUNT: 15.1 % (ref 11.5–14.5)
ERYTHROCYTE [DISTWIDTH] IN BLOOD BY AUTOMATED COUNT: 53.3 FL (ref 35–45)
GFR SERPL CREATININE-BSD FRML MDRD: > 90 ML/MIN/1.73M2
GLUCOSE BLD-MCNC: 116 MG/DL (ref 70–108)
GLUCOSE, URINE: NEGATIVE MG/DL
HCT VFR BLD CALC: 41.2 % (ref 37–47)
HEMOGLOBIN: 13.7 GM/DL (ref 12–16)
ICTOTEST: NEGATIVE
IMMATURE GRANS (ABS): 0.02 THOU/MM3 (ref 0–0.07)
IMMATURE GRANULOCYTES: 0.3 %
KETONES, URINE: 15
LEUKOCYTE EST, POC: ABNORMAL
LIPASE: 11.2 U/L (ref 5.6–51.3)
LYMPHOCYTES # BLD: 12.5 %
LYMPHOCYTES ABSOLUTE: 0.8 THOU/MM3 (ref 1–4.8)
MAGNESIUM: 2.2 MG/DL (ref 1.6–2.4)
MCH RBC QN AUTO: 32.1 PG (ref 26–33)
MCHC RBC AUTO-ENTMCNC: 33.3 GM/DL (ref 32.2–35.5)
MCV RBC AUTO: 96.5 FL (ref 81–99)
MISCELLANEOUS LAB TEST RESULT: ABNORMAL
MONOCYTES # BLD: 5.3 %
MONOCYTES ABSOLUTE: 0.3 THOU/MM3 (ref 0.4–1.3)
NITRITE, URINE: NEGATIVE
NUCLEATED RED BLOOD CELLS: 0 /100 WBC
OSMOLALITY CALCULATION: 282.1 MOSMOL/KG (ref 275–300)
PH UA: 5 (ref 5–9)
PLATELET # BLD: 316 THOU/MM3 (ref 130–400)
PMV BLD AUTO: 10.5 FL (ref 9.4–12.4)
POTASSIUM REFLEX MAGNESIUM: 3.3 MEQ/L (ref 3.5–5.2)
PROTEIN UA: 30 MG/DL
RBC # BLD: 4.27 MILL/MM3 (ref 4.2–5.4)
RBC URINE: ABNORMAL /HPF
RENAL EPITHELIAL, UA: ABNORMAL
SEG NEUTROPHILS: 81.5 %
SEGMENTED NEUTROPHILS ABSOLUTE COUNT: 5.2 THOU/MM3 (ref 1.8–7.7)
SODIUM BLD-SCNC: 142 MEQ/L (ref 135–145)
SPECIFIC GRAVITY UA: > 1.03 (ref 1–1.03)
TOTAL PROTEIN: 7.2 G/DL (ref 6.1–8)
UROBILINOGEN, URINE: 1 EU/DL (ref 0–1)
WBC # BLD: 6.4 THOU/MM3 (ref 4.8–10.8)
WBC UA: ABNORMAL /HPF
YEAST: ABNORMAL

## 2020-12-03 PROCEDURE — 86140 C-REACTIVE PROTEIN: CPT

## 2020-12-03 PROCEDURE — 80053 COMPREHEN METABOLIC PANEL: CPT

## 2020-12-03 PROCEDURE — 99285 EMERGENCY DEPT VISIT HI MDM: CPT

## 2020-12-03 PROCEDURE — 83690 ASSAY OF LIPASE: CPT

## 2020-12-03 PROCEDURE — 36415 COLL VENOUS BLD VENIPUNCTURE: CPT

## 2020-12-03 PROCEDURE — 81001 URINALYSIS AUTO W/SCOPE: CPT

## 2020-12-03 PROCEDURE — 85025 COMPLETE CBC W/AUTO DIFF WBC: CPT

## 2020-12-03 PROCEDURE — 6370000000 HC RX 637 (ALT 250 FOR IP): Performed by: STUDENT IN AN ORGANIZED HEALTH CARE EDUCATION/TRAINING PROGRAM

## 2020-12-03 PROCEDURE — 83735 ASSAY OF MAGNESIUM: CPT

## 2020-12-03 RX ORDER — OXYCODONE HYDROCHLORIDE AND ACETAMINOPHEN 5; 325 MG/1; MG/1
1 TABLET ORAL EVERY 6 HOURS PRN
Qty: 12 TABLET | Refills: 0 | Status: SHIPPED | OUTPATIENT
Start: 2020-12-03 | End: 2020-12-06

## 2020-12-03 RX ORDER — DOCUSATE SODIUM 100 MG/1
100 CAPSULE, LIQUID FILLED ORAL 2 TIMES DAILY
Qty: 60 CAPSULE | Refills: 0 | Status: SHIPPED | OUTPATIENT
Start: 2020-12-03 | End: 2021-01-02

## 2020-12-03 RX ORDER — PANTOPRAZOLE SODIUM 40 MG/1
40 TABLET, DELAYED RELEASE ORAL ONCE
Status: COMPLETED | OUTPATIENT
Start: 2020-12-03 | End: 2020-12-03

## 2020-12-03 RX ORDER — POTASSIUM CHLORIDE 20 MEQ/1
20 TABLET, EXTENDED RELEASE ORAL 2 TIMES DAILY
Qty: 6 TABLET | Refills: 0 | Status: SHIPPED | OUTPATIENT
Start: 2020-12-03 | End: 2021-06-28

## 2020-12-03 RX ORDER — OXYCODONE HYDROCHLORIDE AND ACETAMINOPHEN 5; 325 MG/1; MG/1
1 TABLET ORAL ONCE
Status: COMPLETED | OUTPATIENT
Start: 2020-12-03 | End: 2020-12-03

## 2020-12-03 RX ORDER — POTASSIUM CHLORIDE 20 MEQ/1
40 TABLET, EXTENDED RELEASE ORAL ONCE
Status: COMPLETED | OUTPATIENT
Start: 2020-12-03 | End: 2020-12-03

## 2020-12-03 RX ORDER — ONDANSETRON 4 MG/1
4 TABLET, ORALLY DISINTEGRATING ORAL ONCE
Status: DISCONTINUED | OUTPATIENT
Start: 2020-12-03 | End: 2020-12-03 | Stop reason: HOSPADM

## 2020-12-03 RX ORDER — ONDANSETRON 4 MG/1
4 TABLET, ORALLY DISINTEGRATING ORAL EVERY 8 HOURS PRN
Qty: 15 TABLET | Refills: 0 | Status: SHIPPED | OUTPATIENT
Start: 2020-12-03 | End: 2020-12-08

## 2020-12-03 RX ADMIN — PANTOPRAZOLE SODIUM 40 MG: 40 TABLET, DELAYED RELEASE ORAL at 17:08

## 2020-12-03 RX ADMIN — OXYCODONE HYDROCHLORIDE AND ACETAMINOPHEN 1 TABLET: 5; 325 TABLET ORAL at 16:00

## 2020-12-03 RX ADMIN — POTASSIUM CHLORIDE 40 MEQ: 1500 TABLET, EXTENDED RELEASE ORAL at 16:00

## 2020-12-03 ASSESSMENT — ENCOUNTER SYMPTOMS
BLOOD IN STOOL: 0
NAUSEA: 1
COUGH: 0
VOMITING: 0
DIARRHEA: 0
ANAL BLEEDING: 0
ABDOMINAL PAIN: 0
SHORTNESS OF BREATH: 0
TROUBLE SWALLOWING: 0
CONSTIPATION: 1
EYE PAIN: 0

## 2020-12-03 ASSESSMENT — PAIN DESCRIPTION - ORIENTATION
ORIENTATION_2: LOWER
ORIENTATION: LOWER

## 2020-12-03 ASSESSMENT — PAIN DESCRIPTION - LOCATION
LOCATION: ABDOMEN
LOCATION_2: BACK
LOCATION: ABDOMEN

## 2020-12-03 ASSESSMENT — PAIN DESCRIPTION - PAIN TYPE
TYPE: ACUTE PAIN
TYPE: ACUTE PAIN

## 2020-12-03 ASSESSMENT — PAIN DESCRIPTION - DURATION: DURATION_2: INTERMITTENT

## 2020-12-03 ASSESSMENT — PAIN SCALES - GENERAL
PAINLEVEL_OUTOF10: 3
PAINLEVEL_OUTOF10: 7

## 2020-12-03 ASSESSMENT — PAIN DESCRIPTION - INTENSITY: RATING_2: 2

## 2020-12-03 NOTE — ED PROVIDER NOTES
[place] ENCOUNTER          Pt Name: Bisi Dumont  MRN: [id number]  [place] 1985  Date of evaluation: 12/3/2020  Treating Resident Physician: Shari Wadsworth MD  Supervising Physician: Dr. Lou Figueroa       Chief Complaint   Patient presents with    Abdominal Pain     hx cervical cancer    Back Pain     lower     History obtained from chart review and the patient. HISTORY OF PRESENT ILLNESS    HPI  Bisi Dumont is a 37 y.o. female with a history of anxiety, depression, HTN, and cervical cancer, who presents to the emergency department complaining of abdominal pain. Per chart review, she has an oncologist in [place]. Has had multiple ED presentations for abdominal pain, most recently 9/29, 10/1, 10/28, and 11/17 of 2020. Has previously tried gabapentin, percocet, without any significant improvement. She reports 10/10 lower abdominal pain as well as epigastric pain that feels like \"stabbing,\" and is associated with dry heaves. She states she was taking percocet daily however this ran out 2 days ago and her pain has progressively been getting worse. She also complains of constipation. She states she sees her PCP, GI, and cancer specialist. She states she completed chemo and radiation in September of 2020 and has had abdominal pain almost constantly since then. She reports poor appetite secondary to pain and constipation. She otherwise denies SOB, CP, vision loss or vision changes, fever, chills, diarrhea, hematuria, dysuria, melena, hematochezia. The patient has no other acute complaints at this time. REVIEW OF SYSTEMS   Review of Systems   Constitutional: Positive for fatigue. Negative for chills and fever. HENT: Negative for ear pain, postnasal drip and trouble swallowing. Eyes: Negative for pain and visual disturbance. Respiratory: Negative for cough and shortness of breath.     Cardiovascular: Negative for chest pain and palpitations. Gastrointestinal: Positive for constipation and nausea. Negative for abdominal pain, anal bleeding, blood in stool, diarrhea and vomiting. Genitourinary: Negative for dysuria and hematuria. Musculoskeletal: Negative for neck pain and neck stiffness. Skin: Negative for rash and wound. Neurological: Negative for dizziness and headaches. Psychiatric/Behavioral: Negative for dysphoric mood and hallucinations. The patient is nervous/anxious (secondary to pain). PAST MEDICAL AND SURGICAL HISTORY     Past Medical History:   Diagnosis Date    Anxiety     Cancer of cervix ([facility] [address]     Depression     Hypertension      Past Surgical History:   Procedure Laterality Date    CERVIX BIOPSY N/A 6/7/2019    COLD KNIFE CONE BX CERVIX performed by Clayton Farley MD at [address] [place] [address]  over 5 years ago    ? dr Pa Padilla  09/29/2020    INSERTION / REMOVAL / REPLACEMENT VENOUS ACCESS CATHETER N/A 8/9/2019    SINGLE LUMEN ZKUMFXCUV INSERTION performed by Niraj Whaley MD at [place] [address]  10/10/2019    port removal-in office Dr Cyril Díaz   No current facility-administered medications for this encounter. Current Outpatient Medications:     potassium chloride (KLOR-CON M) 20 MEQ extended release tablet, Take 1 tablet by mouth 2 times daily for 3 days, Disp: 6 tablet, Rfl: 0    oxyCODONE-acetaminophen (PERCOCET) 5-325 MG per tablet, Take 1 tablet by mouth every 6 hours as needed for Pain for up to 3 days. Intended supply: 3 days.  Take lowest dose possible to manage pain, Disp: 12 tablet, Rfl: 0    ondansetron (ZOFRAN ODT) 4 MG disintegrating tablet, Take 1 tablet by mouth every 8 hours as needed for Nausea or Vomiting, Disp: 15 tablet, Rfl: 0    docusate sodium (COLACE) 100 MG capsule, Take 1 capsule by mouth 2 times daily, Disp: 60 capsule, Rfl: 0    lidocaine (LIDODERM) 5 %, Place 1 patch onto the skin daily 12 hours on, 12 hours off., Disp: 30 patch, Rfl: 0    dicyclomine (BENTYL) 10 MG capsule, Take 1 capsule by mouth 4 times daily (before meals and nightly), Disp: 120 capsule, Rfl: 0    traZODone (DESYREL) 100 MG tablet, Take 1 tablet by mouth nightly, Disp: 30 tablet, Rfl: 6    dicyclomine (BENTYL) 20 MG tablet, Take 1 tablet by mouth 4 times daily (before meals and nightly), Disp: 40 tablet, Rfl: 0    phenazopyridine (PYRIDIUM) 100 MG tablet, Take 100 mg by mouth 2 times daily, Disp: , Rfl:     oxybutynin (DITROPAN-XL) 5 MG extended release tablet, Take 5 mg by mouth daily, Disp: , Rfl:     lisinopril (PRINIVIL;ZESTRIL) 10 MG tablet, Take 1 tablet by mouth daily, Disp: 90 tablet, Rfl: 1    aspirin 81 MG chewable tablet, Take 1 tablet by mouth daily, Disp: 30 tablet, Rfl: 3    lidocaine-prilocaine (EMLA) 2.5-2.5 % cream, Apply topically as needed. , Disp: 30 g, Rfl: 2    prochlorperazine (COMPAZINE) 5 MG tablet, Take 1 tablet by mouth every 6 hours as needed for Nausea, Disp: 30 tablet, Rfl: 3    ibuprofen (ADVIL;MOTRIN) 200 MG tablet, Take 200 mg by mouth every 8 hours as needed for Pain, Disp: , Rfl:       SOCIAL HISTORY     Social History     Social History Narrative    No barriers with medication affordability    No barriers with transportation     denies need for community services     Social History     Tobacco Use    Smoking status: Current Every Day Smoker     Packs/day: 1.00     Years: 20.00     Pack years: 20.00     Types: Cigarettes    Smokeless tobacco: Never Used   Substance Use Topics    Alcohol use: Yes     Alcohol/week: 16.0 standard drinks     Types: 16 Shots of liquor per week     Comment: 4 shots daily 4 times a week    Drug use: No         ALLERGIES     Allergies   Allergen Reactions    Oxycodone Other (See Comments)         FAMILY HISTORY     Family History   Problem Relation Age of Onset    Cancer Maternal Grandmother         ? kind    Cancer Paternal Grandmother         ? kind         PREVIOUS RECORDS   Previous records reviewed: Multiple past presentations to ED for abdominal pain and pain management. [facility] PHYSICAL EXAM     ED Triage Vitals   BP Temp Temp src Pulse Resp SpO2 Height Weight   -- -- -- -- -- -- -- --     Initial vital signs and nursing assessment reviewed and c/w tachycardia likely secondary to acute pain. Pulsoximetry is normal per my interpretation. Additional Vital Signs:  Vitals:    12/03/20 1708   BP: (!) 128/95   Pulse: 94   Resp: 18   Temp:    SpO2: 98%       Physical Exam  Vitals signs and nursing note reviewed. Constitutional:       General: She is not in acute distress. Appearance: She is well-developed. She is ill-appearing. She is not diaphoretic. HENT:      Head: Normocephalic and atraumatic. Right Ear: External ear normal.      Left Ear: External ear normal.      Nose: Nose normal.   Eyes:      General: No scleral icterus. Right eye: No discharge. Left eye: No discharge. Conjunctiva/sclera: Conjunctivae normal.   Neck:      Musculoskeletal: Normal range of motion. Cardiovascular:      Rate and Rhythm: Regular rhythm. Tachycardia present. Heart sounds: Normal heart sounds. No murmur. Pulmonary:      Effort: Pulmonary effort is normal.      Breath sounds: Normal breath sounds. Skin:     General: Skin is warm and dry. Findings: No erythema or rash. Neurological:      Mental Status: She is alert and oriented to person, place, and time. Cranial Nerves: No cranial nerve deficit. Psychiatric:         Behavior: Behavior normal.         Thought Content: Thought content normal.         Judgment: Judgment normal.             MEDICAL DECISION MAKING   Initial Assessment: Acute on chronic abdominal pain, recent discontinuation of Percocet 2 days ago, ran out of medication; opioid induced constipation, nausea.   Plan: Percocet now, CBC, CMP, lipase, UA.        ED RESULTS   Laboratory results:  Labs Reviewed   CBC WITH AUTO DIFFERENTIAL - Abnormal; Notable for the following components:       Result Value    RDW-CV 15.1 (*)     RDW-SD 53.3 (*)     Lymphocytes Absolute 0.8 (*)     Monocytes Absolute 0.3 (*)     All other components within normal limits   COMPREHENSIVE METABOLIC PANEL W/ REFLEX TO MG FOR LOW K - Abnormal; Notable for the following components:    Glucose 116 (*)     Potassium reflex Magnesium 3.3 (*)     Alkaline Phosphatase 132 (*)     All other components within normal limits   MICROSCOPIC URINALYSIS - Abnormal; Notable for the following components:    Bilirubin Urine LARGE (*)     Ketones, Urine 15 (*)     Specific Gravity, UA >1.030 (*)     Protein, UA 30 (*)     Leukocytes, UA TRACE (*)     Color, UA DK YELLOW (*)     All other components within normal limits   ANION GAP - Abnormal; Notable for the following components:    Anion Gap 17.0 (*)     All other components within normal limits   LIPASE   C-REACTIVE PROTEIN   ICTOTEST, URINE   GLOMERULAR FILTRATION RATE, ESTIMATED   MAGNESIUM   OSMOLALITY       Radiologic studies results:  No orders to display       ED Medications administered this visit:   Medications   oxyCODONE-acetaminophen (PERCOCET) 5-325 MG per tablet 1 tablet (1 tablet Oral Given 12/3/20 1600)   potassium chloride (KLOR-CON M) extended release tablet 40 mEq (40 mEq Oral Given 12/3/20 1600)   pantoprazole (PROTONIX) tablet 40 mg (40 mg Oral Given 12/3/20 1708)         ED COURSE        Patient's pain significantly improved after receiving percocet, zofran, and protonix orally. CMP c/w hypokalemia - 40 meq K+ oral supplementation given with 20 meq BID x 3 days on discharge. Percocet prescription given as well. She is strongly advised to see GI tomorrow as scheduled and further discuss her chronic nausea, heartburn, and abdominal pain. She is advised to push clear fluids at home as much as possible.  Colace BID prescription given

## 2020-12-03 NOTE — ED PROVIDER NOTES
(36.8 °C). Her blood pressure is 128/95 (abnormal) and her pulse is 94. Her respiration is 18 and oxygen saturation is 98%. Gen:    Non-toxic, well appearing  Head:  Atraumatic, normocephalic              Extraocular muscles intact, pupils equally reactive              Oropharynx Clear, mucous membranes moist  Neck:  Supple, no meningismus; No LAD  Chest: Clear to auscultation bilateral  Heart. Regular rate and rhythm, no heave  Abd:    Soft,tenderness on the lower abdomen, there is no rebound no masses  Extrem: No edema, neg homans.   Neuro:   Alert, Awake, no lateralizing deficts               CN's grossly intact bilaterally    DIAGNOSTIC RESULTS     RADIOLOGY:   No orders to display         LABS:  Labs Reviewed   CBC WITH AUTO DIFFERENTIAL - Abnormal; Notable for the following components:       Result Value    RDW-CV 15.1 (*)     RDW-SD 53.3 (*)     Lymphocytes Absolute 0.8 (*)     Monocytes Absolute 0.3 (*)     All other components within normal limits   COMPREHENSIVE METABOLIC PANEL W/ REFLEX TO MG FOR LOW K - Abnormal; Notable for the following components:    Glucose 116 (*)     Potassium reflex Magnesium 3.3 (*)     Alkaline Phosphatase 132 (*)     All other components within normal limits   MICROSCOPIC URINALYSIS - Abnormal; Notable for the following components:    Bilirubin Urine LARGE (*)     Ketones, Urine 15 (*)     Specific Gravity, UA >1.030 (*)     Protein, UA 30 (*)     Leukocytes, UA TRACE (*)     Color, UA DK YELLOW (*)     All other components within normal limits   ANION GAP - Abnormal; Notable for the following components:    Anion Gap 17.0 (*)     All other components within normal limits   LIPASE   C-REACTIVE PROTEIN   ICTOTEST, URINE   GLOMERULAR FILTRATION RATE, ESTIMATED   MAGNESIUM   OSMOLALITY         EMERGENCY DEPARTMENT COURSE:     Vitals:    Vitals:    12/03/20 1428 12/03/20 1708   BP: 139/89 (!) 128/95   Pulse: 106 94   Resp: 18 18   Temp: 98.3 °F (36.8 °C)    TempSrc: Oral needed for Nausea or Vomiting, Disp-15 tablet,R-0Normal      docusate sodium (COLACE) 100 MG capsule Take 1 capsule by mouth 2 times daily, Disp-60 capsule,R-0Normal               (Please note that portions of this note were completed with a voice recognition program and electronically transcribed. Efforts were [facility] edit the dictations but occasionally words are mis-transcribed . The transcription may contain errors not detected in proofreading.   This transcription was electronically signed.)      Sherrell Melara MD  Attending Emergency Physician          Sherrell Melara MD  12/06/20 [id number]

## 2020-12-03 NOTE — ED NOTES
Bed: 030A  Expected date:   Expected time:   Means of arrival:   Comments:  Kalyani Arthur RN  12/03/20 4800

## 2020-12-03 NOTE — ED NOTES
Upon first contact with patient this RN receives bedside shift report from [place]. Pt resting on cot in stable condition. Voices no new concerns at this time. Call light in reach.  Will continue to monitor      April Baron RN  12/03/20 5119

## 2020-12-04 ENCOUNTER — NURSE ONLY (OUTPATIENT)
Dept: LAB | Age: 35
End: 2020-12-04

## 2020-12-08 ENCOUNTER — CARE COORDINATION (OUTPATIENT)
Dept: CARE COORDINATION | Age: 35
End: 2020-12-08

## 2020-12-08 NOTE — CARE COORDINATION
Ambulatory Care Coordination Note  12/8/2020  CM Risk Score: 5  Charlson 10 Year Mortality Risk Score: 10%     ACC: Judd Rodrigues RN    Summary Note: Spoke with Malachi Ca. Had a short conversation today as she was just leaving oncology in [place]. She stated that the oncologist informed her that he has seen this type of sx's before as a result of radiation treatments. Malachi Ca states she is scheduled to have a PET scan on the 18th and he will be able to see more of what is going on to make a treatment plan. Malachi Ca states she feels pretty good today other than the long car ride to [place]. I will continue to follow up. Plan  -reinforce education completed and complete additional education to help manage chronic conditions  -collaborate with GI and oncology as needed  -reinforce COVID precautions          Care Coordination Interventions    Program Enrollment:  Rising Risk  Referral from Primary Care Provider:  No  Suggested Interventions and Community Resources  Disease Specific Clinic:   (Comment: works with [employer])  [address]:  Declined  Medication Assistance Program:  Declined  Occupational Therapy:  Completed  Physical Therapy:  Completed         Goals Addressed    None         Prior to Admission medications    Medication Sig Start Date End Date Taking?  Authorizing Provider   potassium chloride (KLOR-CON M) 20 MEQ extended release tablet Take 1 tablet by mouth 2 times daily for 3 days 12/3/20 12/6/20  Kelechi Guillaume MD   ondansetron (ZOFRAN ODT) 4 MG disintegrating tablet Take 1 tablet by mouth every 8 hours as needed for Nausea or Vomiting 12/3/20 12/8/20  Kelechi Guillaume MD   docusate sodium (COLACE) 100 MG capsule Take 1 capsule by mouth 2 times daily 12/3/20 1/2/21  Kelechi Guillaume MD   dicyclomine (BENTYL) 20 MG tablet Take 1 tablet by mouth 4 times daily (before meals and nightly) 11/17/20   Ethan Rodgers MD   lidocaine (LIDODERM) 5 % Place 1 patch onto the skin daily 12 hours on, 12 hours off. 10/28/20   Tanisha Shaw MD   dicyclomine (BENTYL) 10 MG capsule Take 1 capsule by mouth 4 times daily (before meals and nightly) 10/1/20   Diamante Johnston MD   traZODone (DESYREL) 100 MG tablet Take 1 tablet by mouth nightly 4/27/20   CHAPO Garland - CNP   phenazopyridine (PYRIDIUM) 100 MG tablet Take 100 mg by mouth 2 times daily 8/21/19   Historical Provider, MD   oxybutynin (DITROPAN-XL) 5 MG extended release tablet Take 5 mg by mouth daily 9/3/19   Historical Provider, MD   lisinopril (PRINIVIL;ZESTRIL) 10 MG tablet Take 1 tablet by mouth daily 8/21/19   CHAPO Garland CNP   aspirin 81 MG chewable tablet Take 1 tablet by mouth daily 8/14/19   Sai Arce MD   lidocaine-prilocaine (EMLA) 2.5-2.5 % cream Apply topically as needed.  8/1/19   David Baum MD   prochlorperazine (COMPAZINE) 5 MG tablet Take 1 tablet by mouth every 6 hours as needed for Nausea 7/31/19   David Baum MD   ibuprofen (ADVIL;MOTRIN) 200 MG tablet Take 200 mg by mouth every 8 hours as needed for Pain    Historical Provider, MD       Future Appointments   Date Time Provider Mili Yaquelin   12/14/2020 11:20 AM Alena Riley APRN - [id number] [address] [place]   12/15/2020  8:00 AM STR NM INJECTION RM1 [facility] NUC MED STR Radiolog   12/15/2020  8:15 AM STR NUCLEAR MEDICINE RM3 GE INFINIA 1 [facility] NUC MED STR Radiolog   12/15/2020  9:00 AM STR NUCLEAR MEDICINE RM3 GE INFINIA 1 [facility] NUC MED STR Radiolog   12/15/2020 10:00 AM STR NUCLEAR MEDICINE RM3 GE INFINIA 1 [facility] NUC MED STR Radiolog   12/15/2020 11:00 AM STR NUCLEAR MEDICINE RM3 GE INFINIA 1 [facility] NUC MED STR Radiolog   12/15/2020 12:00 PM STR NUCLEAR MEDICINE RM3 GE INFINIA 1 STRZ NUC MED STR Radiolog   2/26/2021 11:15 AM Harleen Gregg Beaumont, APRN - CNP [address]

## 2020-12-14 ENCOUNTER — OFFICE VISIT (OUTPATIENT)
Dept: FAMILY MEDICINE CLINIC | Age: 35
End: 2020-12-14
Payer: COMMERCIAL

## 2020-12-14 VITALS
RESPIRATION RATE: 16 BRPM | BODY MASS INDEX: 24.8 KG/M2 | TEMPERATURE: 98.2 F | DIASTOLIC BLOOD PRESSURE: 62 MMHG | OXYGEN SATURATION: 98 % | SYSTOLIC BLOOD PRESSURE: 104 MMHG | HEART RATE: 105 BPM | WEIGHT: 163.6 LBS | HEIGHT: 68 IN

## 2020-12-14 PROCEDURE — G8420 CALC BMI NORM PARAMETERS: HCPCS | Performed by: NURSE PRACTITIONER

## 2020-12-14 PROCEDURE — 4004F PT TOBACCO SCREEN RCVD TLK: CPT | Performed by: NURSE PRACTITIONER

## 2020-12-14 PROCEDURE — 99213 OFFICE O/P EST LOW 20 MIN: CPT | Performed by: NURSE PRACTITIONER

## 2020-12-14 PROCEDURE — G8484 FLU IMMUNIZE NO ADMIN: HCPCS | Performed by: NURSE PRACTITIONER

## 2020-12-14 PROCEDURE — G8427 DOCREV CUR MEDS BY ELIG CLIN: HCPCS | Performed by: NURSE PRACTITIONER

## 2020-12-14 RX ORDER — POLYETHYLENE GLYCOL 3350 17 G/17G
17 POWDER ORAL DAILY
Qty: 225 G | Refills: 3 | Status: SHIPPED | OUTPATIENT
Start: 2020-12-14 | End: 2021-11-29 | Stop reason: ALTCHOICE

## 2020-12-14 RX ORDER — MESALAMINE 400 MG/1
800 CAPSULE, DELAYED RELEASE ORAL 3 TIMES DAILY
COMMUNITY
End: 2021-07-01 | Stop reason: ALTCHOICE

## 2020-12-14 ASSESSMENT — ENCOUNTER SYMPTOMS
CONSTIPATION: 1
ABDOMINAL DISTENTION: 1
NAUSEA: 0
RESPIRATORY NEGATIVE: 1
ANAL BLEEDING: 0
VOMITING: 0
RECTAL PAIN: 1
DIARRHEA: 0
BLOOD IN STOOL: 0
ABDOMINAL PAIN: 1

## 2020-12-14 NOTE — PATIENT INSTRUCTIONS
Patient Education        Learning About Colitis  What is colitis? Colitis is swelling (inflammation) of the colon. The colon makes up most of the large intestine. Many conditions can cause colitis. What are some types of colitis? · Infectious colitis is a type that appears suddenly. It's caused by a bacteria or virus, such as salmonella, shigella, or campylobacter. · Ischemic colitis is caused by problems with blood flow to the colon. This can happen after surgery. It can also be caused by other health problems. · Microscopic colitis doesn't always have a clear cause. It can only be found with special tests. · Crohn's disease and ulcerative colitis are lifelong diseases. They can cause swelling, inflammation, and deep sores in the lining of the digestive tract. What are the symptoms? Symptoms may include fever, diarrhea that may be bloody, or belly pain. You may also have an urgent need to move your bowels or pain when you move your bowels. Or you may have bleeding from the rectum or weight loss. Your symptoms may depend on the type of colitis you have. For example, microscopic colitis may cause watery diarrhea. Sometimes symptoms go away on their own. If they don't go away, or if you have bleeding or severe pain, call your doctor right away. How is it diagnosed? You may need blood tests or a stool test. You also may need imaging tests like a CT scan. You may have a colonoscopy so that a doctor can look inside your colon. In some cases, the doctor may want to test a sample of tissue from the intestine. This test is called a biopsy. How is it treated? Treatment for colitis depends on the condition that is causing it. · Antibiotics may be used to treat an infection. · Diet changes may help with symptoms. · Medicines can also help to relieve inflammation and control symptoms. · In some cases, surgery to remove parts of the intestine may be needed.   Follow-up care is a key part of your treatment and safety. Be sure to make and go to all appointments, and call your doctor if you are having problems. It's also a good idea to know your test results and keep a list of the medicines you take. Where can you learn more? Go to https://chmarla.[email or web address] org and sign in to your [employer] account. Enter C130 in the [employer] box to learn more about \"Learning About Colitis. \"     If you do not have an account, please click on the \"Sign Up Now\" link. Current as of: April 15, 2020               Content Version: 12.6  © 1282-3103 [employer]. Care instructions adapted under license by [employer]. If you have questions about a medical condition or this instruction, always ask your healthcare professional. [address] any warranty or liability for your use of this information. Patient Education        Constipation: Care Instructions  Your Care Instructions     Constipation means that you have a hard time passing stools (bowel movements). People pass stools from 3 times a day to once every 3 days. What is normal for you may be different. Constipation may occur with pain in the rectum and cramping. The pain may get worse when you try to pass stools. Sometimes there are small amounts of bright red blood on toilet paper or the surface of stools. This is because of enlarged veins near the rectum (hemorrhoids). A few changes in your diet and lifestyle may help you avoid ongoing constipation. Your doctor may also prescribe medicine to help loosen your stool. Some medicines can cause constipation. These include pain medicines and antidepressants. Tell your doctor about all the medicines you take. Your doctor may want to make a medicine change to ease your symptoms. Follow-up care is a key part of your treatment and safety. Be sure to make and go to all appointments, and call your doctor if you are having problems.  It's also a good idea to know your test results and keep a list of the medicines you take. How can you care for yourself at home? · Drink plenty of fluids, enough so that your urine is light yellow or clear like water. If you have kidney, heart, or liver disease and have to limit fluids, talk with your doctor before you increase the amount of fluids you drink. · Include high-fiber foods in your diet each day. These include fruits, vegetables, beans, and whole grains. · Get at least 30 minutes of exercise on most days of the week. Walking is a good choice. You also may want to do other activities, such as running, swimming, cycling, or playing tennis or team sports. · Take a fiber supplement, such as Citrucel or Metamucil, every day. Read and follow all instructions on the label. · Schedule time each day for a bowel movement. A daily routine may help. Take your time having your bowel movement. · Support your feet with a small step stool when you sit on the toilet. This helps flex your hips and places your pelvis in a squatting position. · Your doctor may recommend an over-the-counter laxative to relieve your constipation. Examples are Milk of Magnesia and MiraLax. Read and follow all instructions on the label. Do not use laxatives on a long-term basis. When should you call for help? Call your doctor now or seek immediate medical care if:    · You have new or worse belly pain.     · You have new or worse nausea or vomiting.     · You have blood in your stools. Watch closely for changes in your health, and be sure to contact your doctor if:    · Your constipation is getting worse.     · You do not get better as expected. Where can you learn more? Go to https://RoomActuallymarla.[employer]. org and sign in to your [employer] account. Enter [phone] in the [employer] box to learn more about \"Constipation: Care Instructions. \"     If you do not have an account, please click on the \"Sign Up Now\" link.   Current as of: June 26, 1682               YGOPWLS Version: 12.6  © 0209-9420 [employer]. Care instructions adapted under license by [facility] ([facility]). If you have questions about a medical condition or this instruction, always ask your healthcare professional. [address] any warranty or liability for your use of this information.

## 2020-12-14 NOTE — PROGRESS NOTES
[facility]  [address] [place] [address]  Dept: [phone]  Dept Fax: [phone]  Loc: [phone]    Robert Martin is a 37 y.o. femalewho presents today for her medical conditions/complaints as noted below. Promise Tothis c/o of Abdominal Pain (Pt presents c/o genera;ized abd pain, fatigue, and weakness that started back in march. She has an oncologist she is seeing about this. )      HPI:      Pt here to discuss her chronic abdominal pain. She has seen GI and ortho and her oncologist in [place] for this condition. Pt states the pain started a few months ago, states it comes and goes, thinks straining to have a BM brings it on. Denies a fever, denies nausea, states she has not been able to eat much, will eat a few spoonfuls of food and then feels full, thinks her stool is building up in her making her feel bloated and she will try to have a BM and feels as though it all bubbles up in her esophagus. She did have an EGD and throat stretching, does not think it has helped. Had a colonoscopy in fall and has colitis on mesalamine for this along with bentyl, does think these meds are helping. States has not had a BM since December 1st, not eating much, her oncologist thinks he knows what is wrong but is waiting on all testing to be done. She does take colace, will add miralax. She is in agreement with this plan. Information given to her regarding colitis diets. She has f/u this week with Oncology in [place] and GI. Has been treated for cervical cancer with pelvic radiation. Pt not in pain at present, sitting calmly in chair.           Current Outpatient Medications   Medication Sig Dispense Refill    mesalamine (DELZICOL) 400 MG CPDR delayed release capsule Take 800 mg by mouth 3 times daily      polyethylene glycol (MIRALAX) 17 GM/SCOOP POWD powder Take 17 g by mouth daily 225 g 3    docusate sodium (COLACE) 100 MG capsule Take 1 capsule by mouth 2 times daily 60 capsule 0    lidocaine (LIDODERM) 5 % Place 1 patch onto the skin daily 12 hours on, 12 hours off. 30 patch 0    dicyclomine (BENTYL) 10 MG capsule Take 1 capsule by mouth 4 times daily (before meals and nightly) 120 capsule 0    traZODone (DESYREL) 100 MG tablet Take 1 tablet by mouth nightly 30 tablet 6    lidocaine-prilocaine (EMLA) 2.5-2.5 % cream Apply topically as needed. 30 g 2    ibuprofen (ADVIL;MOTRIN) 200 MG tablet Take 200 mg by mouth every 8 hours as needed for Pain      potassium chloride (KLOR-CON M) 20 MEQ extended release tablet Take 1 tablet by mouth 2 times daily for 3 days (Patient not taking: Reported on 12/14/2020) 6 tablet 0    dicyclomine (BENTYL) 20 MG tablet Take 1 tablet by mouth 4 times daily (before meals and nightly) (Patient not taking: Reported on 12/14/2020) 40 tablet 0    phenazopyridine (PYRIDIUM) 100 MG tablet Take 100 mg by mouth 2 times daily      aspirin 81 MG chewable tablet Take 1 tablet by mouth daily (Patient not taking: Reported on 12/14/2020) 30 tablet 3    prochlorperazine (COMPAZINE) 5 MG tablet Take 1 tablet by mouth every 6 hours as needed for Nausea (Patient not taking: Reported on 12/14/2020) 30 tablet 3     No current facility-administered medications for this visit.            Past Medical History:   Diagnosis Date    Anxiety     Cancer of cervix ([address]     Depression     Hypertension       Past Surgical History:   Procedure Laterality Date    CERVIX BIOPSY N/A 6/7/2019    COLD KNIFE CONE BX CERVIX performed by Edith Garner MD at [address] [place] [address]  over 5 years ago    ? dr Fina Kaur  09/29/2020    INSERTION / REMOVAL / REPLACEMENT VENOUS ACCESS CATHETER N/A 8/9/2019    SINGLE LUMEN VVZUMNSUV INSERTION performed by Jessie Aleman MD at [address] [place] [address]  10/10/2019    port removal-in office Dr Petrona Cooper     Family History Problem Relation Age of Onset    Cancer Maternal Grandmother         ? kind    Cancer Paternal Grandmother         ? kind     Social History     Tobacco Use    Smoking status: Current Every Day Smoker     Packs/day: 1.00     Years: 20.00     Pack years: 20.00     Types: Cigarettes    Smokeless tobacco: Never Used   Substance Use Topics    Alcohol use: Yes     Alcohol/week: 16.0 standard drinks     Types: 16 Shots of liquor per week     Comment: 4 shots daily 4 times a week        Allergies   Allergen Reactions    Oxycodone Other (See Comments)       Health Maintenance   Topic Date Due    Flu vaccine (1) 09/01/2020    Potassium monitoring  12/03/2021    Creatinine monitoring  12/03/2021    Cervical cancer screen  03/18/2023    DTaP/Tdap/Td vaccine (2 - Td) 06/03/2025    Hepatitis B vaccine  Completed    Pneumococcal 0-64 years Vaccine  Completed    HIV screen  Completed    Hepatitis A vaccine  Aged Out    Hib vaccine  Aged Out    Meningococcal (ACWY) vaccine  Aged Out    Varicella vaccine  Discontinued       Subjective:      Review of Systems   Constitutional: Negative for chills, fatigue and fever. HENT: Negative. Respiratory: Negative. Cardiovascular: Negative. Gastrointestinal: Positive for abdominal distention, abdominal pain, constipation and rectal pain (with BM's). Negative for anal bleeding, blood in stool, diarrhea, nausea and vomiting. Genitourinary: Negative for difficulty urinating and dysuria. Musculoskeletal: Negative for arthralgias and myalgias. Skin: Negative. Objective:      /62   Pulse 105   Temp 98.2 °F (36.8 °C)   Resp 16   Ht 5' 8\" (1.727 m)   Wt 163 lb 9.6 oz (74.2 kg)   LMP 08/14/2019   SpO2 98%   BMI 24.88 kg/m²      Physical Exam  Vitals signs and nursing note reviewed. Constitutional:       Appearance: She is not ill-appearing. HENT:      Head: Normocephalic. Cardiovascular:      Rate and Rhythm: Normal rate and regular rhythm. Pulses: Normal pulses. Heart sounds: Normal heart sounds. No murmur. Pulmonary:      Effort: Pulmonary effort is normal. No respiratory distress. Breath sounds: Normal breath sounds. No wheezing. Abdominal:      General: Abdomen is flat. Bowel sounds are normal.      Tenderness: There is generalized abdominal tenderness. There is no right CVA tenderness, left CVA tenderness or rebound. Negative signs include Lucas's sign, McBurney's sign, psoas sign and obturator sign. Hernia: No hernia is present. Neurological:      Mental Status: She is alert. Assessment/Plan:           1. Constipation, unspecified constipation type  Continue current plan  Keep f/ with GI and Oncology in [place] for resolution,   - polyethylene glycol (MIRALAX) 17 GM/SCOOP POWD powder; Take 17 g by mouth daily  Dispense: 225 g; Refill: 3    2. Abdominal pain, unspecified abdominal location  As above #1 pt declines gabapentin, did not ask about pain management referral      3. Colitis  F/u with GI  continue all current meds      No follow-ups on file. Reccommended tobaccocessation options including pharmacologic methods, counseled great than 3 minutesduring this visit:  Yes[]  No  []       Patient given educational materials -see patient instructions. Discussed use, benefit, and side effects of prescribedmedications. All patient questions answered. Pt voiced understanding. Reviewedhealth maintenance. Instructed to continue current medications, diet and exercise. Patient agreed with treatment plan. Follow up as directed.        Electronicallysigned by CHAPO Dawkins CNP on 12/14/2020 at 12:30 PM

## 2020-12-16 ENCOUNTER — HOSPITAL ENCOUNTER (OUTPATIENT)
Dept: NUCLEAR MEDICINE | Age: 35
Discharge: HOME | End: 2020-12-16
Payer: COMMERCIAL

## 2020-12-16 ENCOUNTER — HOSPITAL ENCOUNTER (OUTPATIENT)
Dept: NUCLEAR MEDICINE | Age: 35
Discharge: HOME | End: 2020-12-15
Payer: COMMERCIAL

## 2020-12-16 ENCOUNTER — APPOINTMENT (OUTPATIENT)
Dept: GENERAL RADIOLOGY | Age: 35
End: 2020-12-16
Payer: COMMERCIAL

## 2020-12-16 ENCOUNTER — HOSPITAL ENCOUNTER (EMERGENCY)
Age: 35
Discharge: HOME | End: 2020-12-16
Attending: FAMILY MEDICINE
Payer: COMMERCIAL

## 2020-12-16 VITALS
TEMPERATURE: 98 F | BODY MASS INDEX: 24.25 KG/M2 | SYSTOLIC BLOOD PRESSURE: 137 MMHG | HEART RATE: 95 BPM | OXYGEN SATURATION: 98 % | WEIGHT: 160 LBS | RESPIRATION RATE: 18 BRPM | DIASTOLIC BLOOD PRESSURE: 89 MMHG | HEIGHT: 68 IN

## 2020-12-16 LAB
ALBUMIN SERPL-MCNC: 4.1 G/DL (ref 3.5–5.1)
ALP BLD-CCNC: 115 U/L (ref 38–126)
ALT SERPL-CCNC: 10 U/L (ref 11–66)
ANION GAP SERPL CALCULATED.3IONS-SCNC: 13 MEQ/L (ref 8–16)
AST SERPL-CCNC: 15 U/L (ref 5–40)
BASOPHILS # BLD: 0.5 %
BASOPHILS ABSOLUTE: 0 THOU/MM3 (ref 0–0.1)
BILIRUB SERPL-MCNC: 0.4 MG/DL (ref 0.3–1.2)
BILIRUBIN DIRECT: < 0.2 MG/DL (ref 0–0.3)
BUN BLDV-MCNC: 9 MG/DL (ref 7–22)
C-REACTIVE PROTEIN: 0.24 MG/DL (ref 0–1)
CALCIUM SERPL-MCNC: 9.6 MG/DL (ref 8.5–10.5)
CHLORIDE BLD-SCNC: 108 MEQ/L (ref 98–111)
CO2: 24 MEQ/L (ref 23–33)
CREAT SERPL-MCNC: 0.6 MG/DL (ref 0.4–1.2)
EOSINOPHIL # BLD: 0.5 %
EOSINOPHILS ABSOLUTE: 0 THOU/MM3 (ref 0–0.4)
ERYTHROCYTE [DISTWIDTH] IN BLOOD BY AUTOMATED COUNT: 15.4 % (ref 11.5–14.5)
ERYTHROCYTE [DISTWIDTH] IN BLOOD BY AUTOMATED COUNT: 55.8 FL (ref 35–45)
GFR SERPL CREATININE-BSD FRML MDRD: > 90 ML/MIN/1.73M2
GLUCOSE BLD-MCNC: 113 MG/DL (ref 70–108)
HCT VFR BLD CALC: 40.4 % (ref 37–47)
HEMOGLOBIN: 13.2 GM/DL (ref 12–16)
IMMATURE GRANS (ABS): 0.02 THOU/MM3 (ref 0–0.07)
IMMATURE GRANULOCYTES: 0.3 %
LACTIC ACID: 0.9 MMOL/L (ref 0.5–2.2)
LYMPHOCYTES # BLD: 18.9 %
LYMPHOCYTES ABSOLUTE: 1.2 THOU/MM3 (ref 1–4.8)
MAGNESIUM: 2.1 MG/DL (ref 1.6–2.4)
MCH RBC QN AUTO: 32.2 PG (ref 26–33)
MCHC RBC AUTO-ENTMCNC: 32.7 GM/DL (ref 32.2–35.5)
MCV RBC AUTO: 98.5 FL (ref 81–99)
MONOCYTES # BLD: 8 %
MONOCYTES ABSOLUTE: 0.5 THOU/MM3 (ref 0.4–1.3)
NUCLEATED RED BLOOD CELLS: 0 /100 WBC
OSMOLALITY CALCULATION: 288.2 MOSMOL/KG (ref 275–300)
PHOSPHORUS: 3.3 MG/DL (ref 2.4–4.7)
PLATELET # BLD: 250 THOU/MM3 (ref 130–400)
PMV BLD AUTO: 10.6 FL (ref 9.4–12.4)
POTASSIUM REFLEX MAGNESIUM: 4.1 MEQ/L (ref 3.5–5.2)
RBC # BLD: 4.1 MILL/MM3 (ref 4.2–5.4)
SEDIMENTATION RATE, ERYTHROCYTE: 20 MM/HR (ref 0–20)
SEG NEUTROPHILS: 71.8 %
SEGMENTED NEUTROPHILS ABSOLUTE COUNT: 4.5 THOU/MM3 (ref 1.8–7.7)
SODIUM BLD-SCNC: 145 MEQ/L (ref 135–145)
TOTAL PROTEIN: 7 G/DL (ref 6.1–8)
WBC # BLD: 6.3 THOU/MM3 (ref 4.8–10.8)

## 2020-12-16 PROCEDURE — 96372 THER/PROPH/DIAG INJ SC/IM: CPT

## 2020-12-16 PROCEDURE — 86140 C-REACTIVE PROTEIN: CPT

## 2020-12-16 PROCEDURE — 6360000002 HC RX W HCPCS: Performed by: STUDENT IN AN ORGANIZED HEALTH CARE EDUCATION/TRAINING PROGRAM

## 2020-12-16 PROCEDURE — 84100 ASSAY OF PHOSPHORUS: CPT

## 2020-12-16 PROCEDURE — 6360000002 HC RX W HCPCS: Performed by: FAMILY MEDICINE

## 2020-12-16 PROCEDURE — 80048 BASIC METABOLIC PNL TOTAL CA: CPT

## 2020-12-16 PROCEDURE — 83735 ASSAY OF MAGNESIUM: CPT

## 2020-12-16 PROCEDURE — 74022 RADEX COMPL AQT ABD SERIES: CPT

## 2020-12-16 PROCEDURE — 99285 EMERGENCY DEPT VISIT HI MDM: CPT

## 2020-12-16 PROCEDURE — 96376 TX/PRO/DX INJ SAME DRUG ADON: CPT

## 2020-12-16 PROCEDURE — A9541 TC99M SULFUR COLLOID: HCPCS | Performed by: NURSE PRACTITIONER

## 2020-12-16 PROCEDURE — 85025 COMPLETE CBC W/AUTO DIFF WBC: CPT

## 2020-12-16 PROCEDURE — 80076 HEPATIC FUNCTION PANEL: CPT

## 2020-12-16 PROCEDURE — 85651 RBC SED RATE NONAUTOMATED: CPT

## 2020-12-16 PROCEDURE — 3430000000 HC RX DIAGNOSTIC RADIOPHARMACEUTICAL: Performed by: NURSE PRACTITIONER

## 2020-12-16 PROCEDURE — 78264 GASTRIC EMPTYING IMG STUDY: CPT

## 2020-12-16 PROCEDURE — 83605 ASSAY OF LACTIC ACID: CPT

## 2020-12-16 PROCEDURE — 36415 COLL VENOUS BLD VENIPUNCTURE: CPT

## 2020-12-16 PROCEDURE — 96374 THER/PROPH/DIAG INJ IV PUSH: CPT

## 2020-12-16 PROCEDURE — 96375 TX/PRO/DX INJ NEW DRUG ADDON: CPT

## 2020-12-16 RX ORDER — LACTULOSE 10 G/15ML
10-20 SOLUTION ORAL 2 TIMES DAILY PRN
Qty: 240 ML | Refills: 1 | Status: SHIPPED | OUTPATIENT
Start: 2020-12-16 | End: 2021-07-01 | Stop reason: ALTCHOICE

## 2020-12-16 RX ORDER — DICYCLOMINE HYDROCHLORIDE 10 MG/ML
20 INJECTION INTRAMUSCULAR ONCE
Status: COMPLETED | OUTPATIENT
Start: 2020-12-16 | End: 2020-12-16

## 2020-12-16 RX ORDER — ONDANSETRON 2 MG/ML
4 INJECTION INTRAMUSCULAR; INTRAVENOUS ONCE
Status: COMPLETED | OUTPATIENT
Start: 2020-12-16 | End: 2020-12-16

## 2020-12-16 RX ADMIN — DICYCLOMINE HYDROCHLORIDE 20 MG: 10 INJECTION INTRAMUSCULAR at 12:46

## 2020-12-16 RX ADMIN — ONDANSETRON 4 MG: 2 INJECTION INTRAMUSCULAR; INTRAVENOUS at 11:22

## 2020-12-16 RX ADMIN — HYDROMORPHONE HYDROCHLORIDE 0.5 MG: 1 INJECTION, SOLUTION INTRAMUSCULAR; INTRAVENOUS; SUBCUTANEOUS at 11:22

## 2020-12-16 RX ADMIN — Medication 1 MILLICURIE: at 07:20

## 2020-12-16 RX ADMIN — HYDROMORPHONE HYDROCHLORIDE 0.5 MG: 1 INJECTION, SOLUTION INTRAMUSCULAR; INTRAVENOUS; SUBCUTANEOUS at 12:46

## 2020-12-16 ASSESSMENT — ENCOUNTER SYMPTOMS
COUGH: 0
ABDOMINAL PAIN: 1
BACK PAIN: 1
RHINORRHEA: 0
CHEST TIGHTNESS: 0
WHEEZING: 0
SHORTNESS OF BREATH: 0
BLOOD IN STOOL: 0

## 2020-12-16 ASSESSMENT — PAIN DESCRIPTION - DIRECTION
RADIATING_TOWARDS: LOWER BACK
RADIATING_TOWARDS: LOWER BACK

## 2020-12-16 ASSESSMENT — PAIN DESCRIPTION - PROGRESSION: CLINICAL_PROGRESSION: GRADUALLY IMPROVING

## 2020-12-16 ASSESSMENT — PAIN DESCRIPTION - FREQUENCY
FREQUENCY: INTERMITTENT
FREQUENCY: INTERMITTENT

## 2020-12-16 ASSESSMENT — PAIN DESCRIPTION - ONSET
ONSET: GRADUAL
ONSET: GRADUAL

## 2020-12-16 ASSESSMENT — PAIN DESCRIPTION - PAIN TYPE
TYPE: ACUTE PAIN
TYPE: ACUTE PAIN

## 2020-12-16 ASSESSMENT — PAIN SCALES - GENERAL
PAINLEVEL_OUTOF10: 8
PAINLEVEL_OUTOF10: 10

## 2020-12-16 ASSESSMENT — PAIN DESCRIPTION - LOCATION
LOCATION: ABDOMEN
LOCATION: ABDOMEN

## 2020-12-16 ASSESSMENT — PAIN DESCRIPTION - ORIENTATION
ORIENTATION: LOWER
ORIENTATION: LOWER

## 2020-12-16 NOTE — ED PROVIDER NOTES
[facility] [address] [address] EMERGENCY DEPT  EMERGENCY DEPARTMENT     Pt Name: Tristan Raines  MRN: [id number]  [place] 1985  Date of evaluation: 12/16/2020  Provider: Blanca Castillo MD    [address] [place] [address]       Chief Complaint   Patient presents with    Abdominal Pain    Back Pain       HISTORY OF PRESENT ILLNESS    Tristan Raines is a 37 y.o. female with history of cervical cancer status post radiation (last treatment ~ 9/2019) and hypertension who presents to the emergency department from GI office visit chief complaints of worsening abdominal pain with associated lower back pain. She reports distended lower abdominal pain with lower back pain bilaterally that has been persistent since 9/29/2020 when she had her colonoscopy. The has intermittent episodes of sharp stabbing pain in the same areas and this pain is unbearable. No exacerbating or alleviating factors identified. Patient states that she started having the symptoms but pain was intermittent and lower in intensity. Patient states she has had multiple ED visits as well as office visits with her PCP, GI specialist, oncology for the symptoms but has not been able to get an answer for what is causing it. Previous CT abdomen done in the ED showed possible colitis. Patient reports she has had colonoscopy in September which was inconlusive. Today, denies having any nuclear medicine gastric medicine study done outpatient with GI but it was stopped midway due to her unbearable pain. Reports associated changes in her bowel movement, states that she has frequent urge and feels a pressure to go but is able to have only a small amount of stool that is soft. Still has nausea and chills when episodes of sharp pain occurs lower abdominal areas and back. Reports no associated subjective fevers, dizziness/lightheadedness, hematochezia, melena, hematuria, dysuria.   Currently pain has settled down at 4/10 but during evaluation patient had 2 episodes of sharp pain rated 10+. No radiation down her legs or associated numbness and tingling. No  urinary/stool incontinence reported. Triage notes and Nursing notes were reviewed by myself. Any discrepancies are addressed above. PAST MEDICAL HISTORY     Past Medical History:   Diagnosis Date    Anxiety     Cancer of cervix ([address]     Depression     Hypertension        SURGICAL HISTORY       Past Surgical History:   Procedure Laterality Date    CERVIX BIOPSY N/A 6/7/2019    COLD KNIFE CONE BX CERVIX performed by Ari Tyson MD at [address] [place] [address]  over 5 years ago    ? dr Ector Norwood  09/29/2020    INSERTION / REMOVAL / REPLACEMENT VENOUS ACCESS CATHETER N/A 8/9/2019    SINGLE LUMEN RAEJOJPUZ INSERTION performed by Genoveva Torres MD at [address] [place] [address]  10/10/2019    port removal-in office Dr Blaire Peña       Previous Medications    ASPIRIN 81 MG CHEWABLE TABLET    Take 1 tablet by mouth daily    DICYCLOMINE (BENTYL) 10 MG CAPSULE    Take 1 capsule by mouth 4 times daily (before meals and nightly)    DICYCLOMINE (BENTYL) 20 MG TABLET    Take 1 tablet by mouth 4 times daily (before meals and nightly)    DOCUSATE SODIUM (COLACE) 100 MG CAPSULE    Take 1 capsule by mouth 2 times daily    IBUPROFEN (ADVIL;MOTRIN) 200 MG TABLET    Take 200 mg by mouth every 8 hours as needed for Pain    LIDOCAINE (LIDODERM) 5 %    Place 1 patch onto the skin daily 12 hours on, 12 hours off. LIDOCAINE-PRILOCAINE (EMLA) 2.5-2.5 % CREAM    Apply topically as needed.     MESALAMINE (DELZICOL) 400 MG CPDR DELAYED RELEASE CAPSULE    Take 800 mg by mouth 3 times daily    PHENAZOPYRIDINE (PYRIDIUM) 100 MG TABLET    Take 100 mg by mouth 2 times daily    POLYETHYLENE GLYCOL (MIRALAX) 17 GM/SCOOP POWD POWDER    Take 17 g by mouth daily    POTASSIUM CHLORIDE (KLOR-CON M) 20 MEQ EXTENDED RELEASE TABLET    Take 1 tablet by mouth 2 times daily for 3 days PROCHLORPERAZINE (COMPAZINE) 5 MG TABLET    Take 1 tablet by mouth every 6 hours as needed for Nausea    TRAZODONE (DESYREL) 100 MG TABLET    Take 1 tablet by mouth nightly       ALLERGIES     Patient has no known allergies. FAMILY HISTORY       Family History   Problem Relation Age of Onset    Cancer Maternal Grandmother         ? kind    Cancer Paternal Grandmother         ? kind        SOCIAL HISTORY       Social History     Socioeconomic History    Marital status: Single     Spouse name: None    Number of children: 3    Years of education: None    Highest education level: None   Occupational History    None   Social Needs    Financial resource strain: Not very hard    Food insecurity     Worry: Never true     Inability: Never true    Transportation needs     Medical: No     Non-medical: No   Tobacco Use    Smoking status: Current Every Day Smoker     Packs/day: 1.00     Years: 20.00     Pack years: 20.00     Types: Cigarettes    Smokeless tobacco: Never Used   Substance and Sexual Activity    Alcohol use: Not Currently     Alcohol/week: 16.0 standard drinks     Types: 16 Shots of liquor per week     Comment: 4 shots daily 4 times a week    Drug use: Yes     Types: Marijuana     Comment: Occasion     Sexual activity: Yes     Partners: Male   Lifestyle    Physical activity     Days per week: 0 days     Minutes per session: 0 min    Stress:  Only a little   Relationships    Social connections     Talks on phone: More than three times a week     Gets together: More than three times a week     Attends Evangelical service: None     Active member of club or organization: None     Attends meetings of clubs or organizations: None     Relationship status: None    Intimate partner violence     Fear of current or ex partner: None     Emotionally abused: None     Physically abused: None     Forced sexual activity: None   Other Topics Concern    None   Social History Narrative    No barriers with medication affordability    No barriers with transportation     denies need for community services       REVIEW OF SYSTEMS     Review of Systems   Constitutional: Negative for appetite change, chills, diaphoresis, fatigue and fever. HENT: Negative for rhinorrhea. Eyes: Negative for visual disturbance. Respiratory: Negative for cough, chest tightness, shortness of breath and wheezing. Cardiovascular: Negative for chest pain, palpitations and leg swelling. Gastrointestinal: Positive for abdominal pain. Negative for blood in stool. Genitourinary: Negative for dysuria and vaginal pain. Musculoskeletal: Positive for back pain. Negative for arthralgias, joint swelling and myalgias. Skin: Negative for rash and wound. Neurological: Negative for dizziness, syncope, weakness, light-headedness and numbness. Psychiatric/Behavioral: The patient is nervous/anxious. All other systems reviewed and are negative. Except as noted above the remainder of the review of systems was reviewed and is. PHYSICAL EXAM       ED Triage Vitals [12/16/20 0938]   BP Temp Temp Source Pulse Resp SpO2 Height Weight   (!) 154/88 98 °F (36.7 °C) Oral 101 20 100 % 5' 8\" (1.727 m) 160 lb (72.6 kg)       Physical Exam  Constitutional:       General: She is in acute distress. Appearance: She is normal weight. Comments: In acute distress due to pain   HENT:      Head: Normocephalic and atraumatic. Mouth/Throat:      Mouth: Mucous membranes are moist.      Pharynx: Oropharynx is clear. Eyes:      Extraocular Movements: Extraocular movements intact. Pupils: Pupils are equal, round, and reactive to light. Cardiovascular:      Rate and Rhythm: Normal rate and regular rhythm. Heart sounds: No murmur. Pulmonary:      Effort: Pulmonary effort is normal.      Breath sounds: Normal breath sounds. No wheezing, rhonchi or rales. Abdominal:      General: Bowel sounds are normal. There is no distension. Palpations: Abdomen is soft. There is no shifting dullness, hepatomegaly, splenomegaly or mass. Tenderness: There is abdominal tenderness in the right lower quadrant, periumbilical area and left lower quadrant. There is no right CVA tenderness or left CVA tenderness. Comments: Generally sore and sensitive but mostly tender in lower quadrants   Skin:     General: Skin is warm and dry. Coloration: Skin is not mottled. Findings: No erythema or rash. Neurological:      General: No focal deficit present. Mental Status: She is alert and oriented to person, place, and time. Psychiatric:         Mood and Affect: Mood normal.         DIAGNOSTIC RESULTS     EKG:(none if blank)  All EKG's are interpreted by [facility] who either signs or Co-signs this chart in the absence of a cardiologist.      RADIOLOGY: (none if blank)   Interpretation per the Radiologistbelow, if available at the time of this note:    XR ACUTE ABD SERIES CHEST 1 VW   Final Result   1. No acute cardiopulmonary disease   2. Colonic retention may indicate constipation. 3. No free air no other acute abnormality identified            **This report has been created using voice recognition software. It may contain minor errors which are inherent in voice recognition technology. **      Final report electronically signed by Dr. Jermaine Melton on 12/16/2020 11:15 AM          LABS:  Labs Reviewed   CBC WITH AUTO DIFFERENTIAL - Abnormal; Notable for the following components:       Result Value    RBC 4.10 (*)     RDW-CV 15.4 (*)     RDW-SD 55.8 (*)     All other components within normal limits   BASIC METABOLIC PANEL W/ REFLEX TO MG FOR LOW K - Abnormal; Notable for the following components:    Glucose 113 (*)     All other components within normal limits   HEPATIC FUNCTION PANEL - Abnormal; Notable for the following components:    ALT 10 (*)     All other components within normal limits   MAGNESIUM   SEDIMENTATION RATE   LACTIC ACID, PLASMA   C-REACTIVE PROTEIN   PHOSPHORUS   ANION GAP   OSMOLALITY   GLOMERULAR FILTRATION RATE, ESTIMATED       All other labs were within normal range or not returned as of this dictation. Please note, any cultures that may have been sent were not resulted at the time of this patient visit. EMERGENCY DEPARTMENT COURSE andMedical Decision Making:   Female with history of cervical cancer status post radiation presented due to acutely worsening abdominal pain with associated lower back pain that has been going on for the past 1 month persistently. Was undergoing a nuclear medicine gastric emptying study this morning when the pain became unbearable and the study was stopped midway. On presentation, she was afebrile and hemodynamically stable. Labs were unremarkable. Acute abdominal series imaging showed chronic retention indicating constipation, no acute process otherwise. Patient given Dilaudid with Zofran for pain control and nausea. She remained afebrile and hemodynamically stable. Will give lactulose to help with constipation. Patient instructed to titrate up as needed in combination with home Miralax. Follow-up with PCP and GI for further management. ED Medications administered this visit:    Medications   HYDROmorphone (DILAUDID) injection 0.5 mg (0.5 mg Intravenous Given 12/16/20 1122)   ondansetron (ZOFRAN) injection 4 mg (4 mg Intravenous Given 12/16/20 1122)   HYDROmorphone (DILAUDID) injection 0.5 mg (0.5 mg Intravenous Given 12/16/20 1246)   dicyclomine (BENTYL) injection 20 mg (20 mg Intramuscular Given 12/16/20 1246)         Procedures: (None if blank)       CLINICAL       1. Lower abdominal pain    2.  Constipation, unspecified constipation type          DISPOSITION/PLAN   DISPOSITION Decision To Discharge 12/16/2020 01:28:09 PM           PATIENT REFERRED TO:  CHAPO Fernandez CNP  [address] [place] [address]  [phone]    In 1 week  As needed, If symptoms worsen    [facility] EMERGENCY DEPT  [address] [facility] [address] [phone]  In 2 days  If symptoms worsen      DISCHARGE MEDICATIONS:  New Prescriptions    LACTULOSE (CHRONULAC) 10 GM/15ML SOLUTION    Take 15-30 mLs by mouth 2 times daily as needed (constipation)              (Please note that portions of this note were completed with a voice recognition program.  Efforts were made to edit the dictations but occasionallywords are mis-transcribed.)      Jessy Perdue MD,  PGY1, Internal Medicine     Jessy Perdue MD  Resident  12/16/20 0591

## 2020-12-16 NOTE — ED PROVIDER NOTES
[facility]  eMERGENCY dEPARTMENT eNCOUnter   Physician Attestation    Pt Name: Mikala Randall  MRN: [id number]  [place] 1985  Date of evaluation: 12/16/20        Physician Note:    I personally saw and examined the patient. I have reviewed and agree with the Resident's findings, including all diagnostic interpretations and treatment plans as written. I was present for key portion of any procedure performed and the inclusive time noted in any critical care statement      Evaluation:  I have personally performed a face-to-face evaluation on this patient. I have reviewed Dr. Chris Oliveira findings and agree. History and exam by me shows the following information. Patient has chronic constipation    She will have urge to move the bowels with  intense cramping pains. Just minimal stool    She has also tried MiraLAX but she has not yet    Followed By GI and had a nuclear medicine gastric emptying study today which was stopped early because of her abdominal pain    3 view abdominal series showed chest x-ray was clear, increased stool in the colon but no abnormal gas pattern. Normal CBC renal panel liver panel     Given Dilaudid 0.5 mg IV x2 doses    Given Bentyl 20 mg IM    We will discharge home on lactulose          1. Lower abdominal pain    2.  Constipation, unspecified constipation type          DISPOSITION/PLAN  PATIENT REFERRED TO:  CHAPO Rhodes - CNP  [address]  [place] [address]  [phone]    In 1 week  As needed, If symptoms worsen    [facility] EMERGENCY DEPT  [address]  [phone]  In 2 days  If symptoms worsen    DISCHARGE MEDICATIONS:  New Prescriptions    LACTULOSE ([address] [place] [address]) 10 GM/15ML SOLUTION    Take 15-30 mLs by mouth 2 times daily as needed (constipation)         MD Jazz Amor MD  12/16/20 6665

## 2020-12-16 NOTE — ED NOTES
at bedside to collect blood work. Patient sitting side of cot. No respiratory distress. Received ordered IV medication for pain and nausea (see MAR). Will monitor.        Dionte Duff RN  12/16/20 8193

## 2020-12-16 NOTE — ED TRIAGE NOTES
Arrives to  ER from radiology for the evaluation of lower abdominal pain that radiates into the lower back. Patient has been ongoing, was controled for 3 weeks and just started reoccurring again over the last few days. Patient was having a gastric emptying study today and pain became uncontrollable. Has a Hx of cervical cancer and radiation treatment. States that she was told her bowel is dead from radiation tx. Has been constipated. States has a very small BM daily but that is it, otherwise has \"wet farts\". Takes a stool softner, was started on Miralax from PCP but has not picked up from [facility] yet. Patient has a hard time getting comfortable due to the lower abdominal pain. Rates pain a 10/10 in severity and is described as cramping, sharp, stabbing, pressure. Patient states she took a percocet that she had at home from previously prescribed but only had the 1 pill. No longer has a mediport implanted in the left chest.  Patient denies chest pain, SOB, vomiting, fever, chills. Does have nausea. Waiting provider to assess for orders. Will monitor.

## 2020-12-21 RX ORDER — TRAZODONE HYDROCHLORIDE 100 MG/1
100 TABLET ORAL NIGHTLY
Qty: 30 TABLET | Refills: 6 | Status: SHIPPED | OUTPATIENT
Start: 2020-12-21 | End: 2021-08-24 | Stop reason: SDUPTHER

## 2020-12-22 ENCOUNTER — CARE COORDINATION (OUTPATIENT)
Dept: CARE COORDINATION | Age: 35
End: 2020-12-22

## 2020-12-22 NOTE — CARE COORDINATION
barriers to my plan of care. I will notify my provider of any symptoms that indicate a worsening of my condition. Barriers: back/abdominal pain  Plan for overcoming my barriers: family and ACM support  Confidence: 8/10  Anticipated Goal Completion Date: 1-13-21    Update:  Has appt with [facility]. [facility] is trying to get MRI approved which was recently declined by insurance when PCP tried to order it. She is not able to tolerate therapy and has only been to a few sessions. 11-12-20  Update:  MRI results back with no findings. Was ordered steroids and percocet. Update: PET scan scheduled for 1-4-21.  12/22/20              Prior to Admission medications    Medication Sig Start Date End Date Taking?  Authorizing Provider   traZODone (DESYREL) 100 MG tablet Take 1 tablet by mouth nightly 12/21/20  Yes CHAPO Ramirez CNP   lactulose ([address]) 10 GM/15ML solution Take 15-30 mLs by mouth 2 times daily as needed (constipation) 12/16/20  Yes Oswald Lefort, MD   mesalamine (DELZICOL) 400 MG CPDR delayed release capsule Take 800 mg by mouth 3 times daily   Yes Historical Provider, MD   polyethylene glycol (MIRALAX) 17 GM/SCOOP POWD powder Take 17 g by mouth daily 12/14/20  Yes CHAPO Ramirez CNP   docusate sodium (COLACE) 100 MG capsule Take 1 capsule by mouth 2 times daily 12/3/20 1/2/21 Yes Eve Osei MD   dicyclomine (BENTYL) 20 MG tablet Take 1 tablet by mouth 4 times daily (before meals and nightly) 11/17/20  Yes Oswald Lefort, MD   lidocaine (LIDODERM) 5 % Place 1 patch onto the skin daily 12 hours on, 12 hours off. 10/28/20  Yes Hyman Spatz, MD   dicyclomine (BENTYL) 10 MG capsule Take 1 capsule by mouth 4 times daily (before meals and nightly) 10/1/20  Yes Duyen Montanez MD   phenazopyridine (PYRIDIUM) 100 MG tablet Take 100 mg by mouth 2 times daily 8/21/19  Yes Historical Provider, MD   aspirin 81 MG chewable tablet Take 1 tablet by mouth daily 8/14/19  Yes Hira Davis MD lidocaine-prilocaine (EMLA) 2.5-2.5 % cream Apply topically as needed.  8/1/19  Yes Ceci Gamez MD   prochlorperazine (COMPAZINE) 5 MG tablet Take 1 tablet by mouth every 6 hours as needed for Nausea 7/31/19  Yes Ceci Gamez MD   ibuprofen (ADVIL;MOTRIN) 200 MG tablet Take 200 mg by mouth every 8 hours as needed for Pain   Yes Historical Provider, MD   potassium chloride (KLOR-CON M) 20 MEQ extended release tablet Take 1 tablet by mouth 2 times daily for 3 days  Patient not taking: Reported on 12/14/2020 12/3/20 12/14/20  Omkar Licona MD       Future Appointments   Date Time Provider Mili Jamison   2/26/2021 11:15 AM Mishel Barillas, [address] [place] [address]

## 2021-01-05 ENCOUNTER — CARE COORDINATION (OUTPATIENT)
Dept: CARE COORDINATION | Age: 36
End: 2021-01-05

## 2021-01-06 ENCOUNTER — CARE COORDINATION (OUTPATIENT)
Dept: CARE COORDINATION | Age: 36
End: 2021-01-06

## 2021-01-15 ENCOUNTER — CARE COORDINATION (OUTPATIENT)
Dept: CARE COORDINATION | Age: 36
End: 2021-01-15

## 2021-01-15 NOTE — CARE COORDINATION
Ambulatory Care Coordination Note  1/15/2021  CM Risk Score: 5  Charlson 10 Year Mortality Risk Score: 10%     ACC: Simón Cannon, RN    Summary Note: Spoke with Ramiro Estrada. Her condition remains unchanged. She states she struggled to have a bowel movement for 10 days and was finally able to move her bowels after much medication, teas and time. Working with GI her locally but main source of support is [facility] in [facility] with Dr. Asher العراقي. PET scan completed and Ramiro Estrada was told some spots were \"concerning\". She states [place]'s office put in an emergency referral to a surgeon in [facility] and she has that appt Jan 20th. She has transportation to that appt. She is hoping that surgery is a possibility to have her pain and abdominal issues resolved soon. I will follow up after consultation to provide additional support. Plan  -reinforce education completed  -collaborate with [facility] as needed  -encourage use of PCP for any additional needs  General Assessment    Do you have any symptoms that are causing concern?: Yes  Progression since Onset: Unchanged  Reported Symptoms: Pain (Comment: abdominal pain/complications from radiation tx's)               Care Coordination Interventions    Program Enrollment: Rising Risk  Referral from Primary Care Provider: No  Suggested Interventions and Community Resources  Disease Specific Clinic:  (Comment: works with [facility])  [address]: Declined  Medication Assistance Program: Declined  Occupational Therapy: Completed  Physical Therapy: Completed         Goals Addressed                 This Visit's Progress     Conditions and Symptoms   No change     I will schedule office visits, as directed by my provider. I will keep my appointment or reschedule if I have to cancel. I will notify my provider of any barriers to my plan of care. I will notify my provider of any symptoms that indicate a worsening of my condition.     Barriers: MD Loreto   prochlorperazine (COMPAZINE) 5 MG tablet Take 1 tablet by mouth every 6 hours as needed for Nausea 7/31/19  Yes Larry Montes MD   ibuprofen (ADVIL;MOTRIN) 200 MG tablet Take 200 mg by mouth every 8 hours as needed for Pain   Yes Historical Provider, MD   potassium chloride (KLOR-CON M) 20 MEQ extended release tablet Take 1 tablet by mouth 2 times daily for 3 days  Patient not taking: Reported on 12/14/2020 12/3/20 12/14/20  Bakari Mccallum MD       Future Appointments   Date Time Provider Mili Jamison   2/26/2021 11:15 AM Wing Pena, [address] [place] [address]

## 2021-01-25 ENCOUNTER — CARE COORDINATION (OUTPATIENT)
Dept: CARE COORDINATION | Age: 36
End: 2021-01-25

## 2021-01-25 ENCOUNTER — TELEPHONE (OUTPATIENT)
Dept: FAMILY MEDICINE CLINIC | Age: 36
End: 2021-01-25

## 2021-01-25 DIAGNOSIS — M54.50 LUMBAR PAIN: Primary | ICD-10-CM

## 2021-01-25 RX ORDER — LIDOCAINE 50 MG/G
1 PATCH TOPICAL DAILY
Qty: 30 PATCH | Refills: 2 | Status: SHIPPED | OUTPATIENT
Start: 2021-01-25 | End: 2021-07-01 | Stop reason: ALTCHOICE

## 2021-01-25 NOTE — CARE COORDINATION
Norma Zhu called and is asking for Lidoderm patch refill for her back pain. I did not completed a med refill encounter as you were not the last prescribing doctor for this order. I will complete a med refill encounter if needed and approved for refill. If approved, she would like the scripts sent to [facility] on [address] which is a new pharmacy for her. Please advise. Thank you.

## 2021-01-25 NOTE — CARE COORDINATION
Hi Lakisha, I did send in a refill for it. We will see if her insurance covers it. What pain did she want it for? Back or abdomen?  Thanks, Hero

## 2021-02-01 ENCOUNTER — CARE COORDINATION (OUTPATIENT)
Dept: CARE COORDINATION | Age: 36
End: 2021-02-01

## 2021-02-01 RX ORDER — PANTOPRAZOLE SODIUM 40 MG/1
40 TABLET, DELAYED RELEASE ORAL DAILY
COMMUNITY
End: 2021-08-24

## 2021-02-01 RX ORDER — DOCUSATE SODIUM 100 MG/1
100 CAPSULE, LIQUID FILLED ORAL 2 TIMES DAILY
COMMUNITY

## 2021-02-01 RX ORDER — SUCRALFATE ORAL 1 G/10ML
1 SUSPENSION ORAL 4 TIMES DAILY
COMMUNITY
End: 2021-07-01 | Stop reason: ALTCHOICE

## 2021-02-01 NOTE — CARE COORDINATION
Ambulatory Care Coordination Note  2/1/2021  CM Risk Score: 5  Charlson 10 Year Mortality Risk Score: 10%     ACC: Sujatha Li RN    Summary Note: Spoke with Connie Thompson. Had a long discussion with her today about her disease course and seeking treatment at [facility] in [facility]. She seen a surgeon in [facility] in January and was informed that there is nothing that they can correct surgically. She states they started her on carafate QID. I completed a Med Rec. There are some medications added and some that she is not taking as they are older medications from her cancer history. She is scheduled to see a new GI in [facility] at the end of March and is hoping for some answers. We discussed looking at her in network insurance providers and getting a second opinion as she is not happy with her currently oncologist and feels he doesn't listen to her. Plan  -reinforce education completed  -collaborate with [facility]  -assist Connie Thompson with getting second opinion if she chooses        Care Coordination Interventions    Program Enrollment: Rising Risk  Referral from Primary Care Provider: No  Suggested Interventions and Community Resources  Disease Specific Clinic:  (Comment: works with [employer])  [address]: Declined  Medication Assistance Program: Declined  Occupational Therapy: Completed  Physical Therapy: Completed         Goals Addressed                 This Visit's Progress     Conditions and Symptoms   On track     I will schedule office visits, as directed by my provider. I will keep my appointment or reschedule if I have to cancel. I will notify my provider of any barriers to my plan of care. I will notify my provider of any symptoms that indicate a worsening of my condition. Barriers: back/abdominal pain  Plan for overcoming my barriers: family and ACM support  Confidence: 8/10  Anticipated Goal Completion Date: 1-13-21 Update 2/15/21    Update:  Has appt with BEN. OIO is trying to get MRI approved which was recently declined by insurance when PCP tried to order it. She is not able to tolerate therapy and has only been to a few sessions. 11-12-20  Update:  MRI results back with no findings. Was ordered steroids and percocet. Update: PET scan scheduled for 1-4-21.  12/22/20  Update: PET scan results shared with pt, emergency referral placed with surgeon in [facility]. Consultation with surgeon scheduled for Jan 20th.  1/15/21              Prior to Admission medications    Medication Sig Start Date End Date Taking?  Authorizing Provider   sucralfate (CARAFATE) 1 GM/10ML suspension Take 1 g by mouth 4 times daily   Yes Historical Provider, MD   pantoprazole (PROTONIX) 40 MG tablet Take 40 mg by mouth daily   Yes Historical Provider, MD   docusate sodium (COLACE) 100 MG capsule Take 100 mg by mouth 2 times daily   Yes Historical Provider, MD   lidocaine (LIDODERM) 5 % Place 1 patch onto the skin daily 12 hours on, 12 hours off. 1/25/21  Yes CHAPO Tabares CNP   traZODone (DESYREL) 100 MG tablet Take 1 tablet by mouth nightly 12/21/20  Yes CHAPO Tabares CNP   mesalamine (DELZICOL) 400 MG CPDR delayed release capsule Take 800 mg by mouth 3 times daily   Yes Historical Provider, MD   polyethylene glycol (MIRALAX) 17 GM/SCOOP POWD powder Take 17 g by mouth daily 12/14/20  Yes CHAPO Tabaers CNP   dicyclomine (BENTYL) 20 MG tablet Take 1 tablet by mouth 4 times daily (before meals and nightly) 11/17/20  Yes Farhat Churchill MD   dicyclomine (BENTYL) 10 MG capsule Take 1 capsule by mouth 4 times daily (before meals and nightly) 10/1/20  Yes Mendy Jackson MD   lactulose (CHRONULAC) 10 GM/15ML solution Take 15-30 mLs by mouth 2 times daily as needed (constipation)  Patient not taking: Reported on 2/1/2021 12/16/20   Farhat Churchill MD   potassium chloride (KLOR-CON M) 20 MEQ extended release tablet Take 1 tablet by mouth 2 times daily for 3 days  Patient

## 2021-02-23 ENCOUNTER — CARE COORDINATION (OUTPATIENT)
Dept: CARE COORDINATION | Age: 36
End: 2021-02-23

## 2021-03-02 ENCOUNTER — CARE COORDINATION (OUTPATIENT)
Dept: CARE COORDINATION | Age: 36
End: 2021-03-02

## 2021-03-10 ENCOUNTER — CARE COORDINATION (OUTPATIENT)
Dept: CARE COORDINATION | Age: 36
End: 2021-03-10

## 2021-03-10 NOTE — CARE COORDINATION
Hero, I have been working with Kristopher Perry on Care Coordination program to provide support and education to help manage chronic conditions. She has met those goals and all education has been completed with no new barriers noted. I would like to graduate her  from Care Coordination at this time pending PCP approval.  Do you approve of this discharge? Please advise. Thank you.

## 2021-04-02 NOTE — CARE COORDINATION
Ambulatory Care Coordination Note  3/10/2021  CM Risk Score: 5  Charlson 10 Year Mortality Risk Score: 10%     ACC: Ole Ramirez RN    Summary Note: Spoke with Michela Valles. She is still collaborating with surgeon in [facility] to help resolve her side effects from radiation procedure for cancer treatments. She has all education completed. She has a good understanding of utilizing her PCP, speciality offices to help manage her chronic conditions. Informed her I would be discharging her from program at this time. Encouraged her to keep my contact information and let me know if there is anything I can assist her with. Care Coordination Interventions    Program Enrollment: Rising Risk  Referral from Primary Care Provider: No  Suggested Interventions and Community Resources  Disease Specific Clinic:  (Comment: works with [facility])  [address]: Declined  Medication Assistance Program: Declined  Occupational Therapy: Completed  Physical Therapy: Completed         Goals Addressed                 This Visit's Progress     COMPLETED: Conditions and Symptoms        I will schedule office visits, as directed by my provider. I will keep my appointment or reschedule if I have to cancel. I will notify my provider of any barriers to my plan of care. I will notify my provider of any symptoms that indicate a worsening of my condition. Barriers: back/abdominal pain  Plan for overcoming my barriers: family and ACM support  Confidence: 8/10  Anticipated Goal Completion Date: 1-13-21 Update 2/15/21    Update:  Has appt with BEN. OIO is trying to get MRI approved which was recently declined by insurance when PCP tried to order it. She is not able to tolerate therapy and has only been to a few sessions. 11-12-20  Update:  MRI results back with no findings. Was ordered steroids and percocet.     Update: PET scan scheduled for 1-4-21.  12/22/20  Update: PET scan results shared with pt, emergency referral placed with surgeon in [place]. Consultation with surgeon scheduled for Jan 20th.  1/15/21              Prior to Admission medications    Medication Sig Start Date End Date Taking? Authorizing Provider   sucralfate (CARAFATE) 1 GM/10ML suspension Take 1 g by mouth 4 times daily   Yes Historical Provider, MD   pantoprazole (PROTONIX) 40 MG tablet Take 40 mg by mouth daily   Yes Historical Provider, MD   docusate sodium (COLACE) 100 MG capsule Take 100 mg by mouth 2 times daily   Yes Historical Provider, MD   lidocaine (LIDODERM) 5 % Place 1 patch onto the skin daily 12 hours on, 12 hours off. 1/25/21  Yes Helga Harely APRN - CNP   traZODone (DESYREL) 100 MG tablet Take 1 tablet by mouth nightly 12/21/20  Yes Helga Harley APRN - CNP   lactulose ([address]) 10 GM/15ML solution Take 15-30 mLs by mouth 2 times daily as needed (constipation) 12/16/20  Yes Martin Guo MD   mesalamine (DELZICOL) 400 MG CPDR delayed release capsule Take 800 mg by mouth 3 times daily   Yes Historical Provider, MD   polyethylene glycol (MIRALAX) 17 GM/SCOOP POWD powder Take 17 g by mouth daily 12/14/20  Yes Helga Harley APRN - CNP   dicyclomine (BENTYL) 20 MG tablet Take 1 tablet by mouth 4 times daily (before meals and nightly) 11/17/20  Yes Martin Guo MD   dicyclomine (BENTYL) 10 MG capsule Take 1 capsule by mouth 4 times daily (before meals and nightly) 10/1/20  Yes Chhaya Bernstein MD   phenazopyridine (PYRIDIUM) 100 MG tablet Take 100 mg by mouth 2 times daily 8/21/19  Yes Historical Provider, MD   aspirin 81 MG chewable tablet Take 1 tablet by mouth daily 8/14/19  Yes Ole Castillo MD   lidocaine-prilocaine (EMLA) 2.5-2.5 % cream Apply topically as needed.  8/1/19  Yes Stephen Huston MD   prochlorperazine (COMPAZINE) 5 MG tablet Take 1 tablet by mouth every 6 hours as needed for Nausea 7/31/19  Yes Stephen Huston MD   ibuprofen (ADVIL;MOTRIN) 200 MG tablet Take 200 mg by mouth every 8 hours as needed for Pain Yes Historical Provider, MD   potassium chloride (KLOR-CON M) 20 MEQ extended release tablet Take 1 tablet by mouth 2 times daily for 3 days  Patient not taking: Reported on 12/14/2020 12/3/20 12/14/20  Charlene Myrick MD       No future appointments. 5-Fu Counseling: 5-Fluorouracil Counseling:  I discussed with the patient the risks of 5-fluorouracil including but not limited to erythema, scaling, itching, weeping, crusting, and pain.

## 2021-04-15 ENCOUNTER — HOSPITAL ENCOUNTER (EMERGENCY)
Age: 36
Discharge: HOME | End: 2021-04-15
Payer: COMMERCIAL

## 2021-04-15 ENCOUNTER — APPOINTMENT (OUTPATIENT)
Dept: CT IMAGING | Age: 36
End: 2021-04-15
Payer: COMMERCIAL

## 2021-04-15 VITALS
HEIGHT: 68 IN | RESPIRATION RATE: 13 BRPM | OXYGEN SATURATION: 96 % | HEART RATE: 87 BPM | TEMPERATURE: 98.3 F | BODY MASS INDEX: 20.46 KG/M2 | SYSTOLIC BLOOD PRESSURE: 140 MMHG | DIASTOLIC BLOOD PRESSURE: 100 MMHG | WEIGHT: 135 LBS

## 2021-04-15 DIAGNOSIS — K52.9 COLITIS: Primary | ICD-10-CM

## 2021-04-15 LAB
ALBUMIN SERPL-MCNC: 4.2 G/DL (ref 3.5–5.1)
ALP BLD-CCNC: 85 U/L (ref 38–126)
ALT SERPL-CCNC: 7 U/L (ref 11–66)
ANION GAP SERPL CALCULATED.3IONS-SCNC: 11 MEQ/L (ref 8–16)
AST SERPL-CCNC: 10 U/L (ref 5–40)
BACTERIA: ABNORMAL /HPF
BASOPHILS # BLD: 0.3 %
BASOPHILS ABSOLUTE: 0 THOU/MM3 (ref 0–0.1)
BILIRUB SERPL-MCNC: 0.4 MG/DL (ref 0.3–1.2)
BILIRUBIN DIRECT: < 0.2 MG/DL (ref 0–0.3)
BILIRUBIN URINE: NEGATIVE
BLOOD, URINE: NEGATIVE
BUN BLDV-MCNC: 7 MG/DL (ref 7–22)
CALCIUM SERPL-MCNC: 9.8 MG/DL (ref 8.5–10.5)
CASTS 2: ABNORMAL /LPF
CASTS UA: ABNORMAL /LPF
CHARACTER, URINE: CLEAR
CHLORIDE BLD-SCNC: 103 MEQ/L (ref 98–111)
CO2: 28 MEQ/L (ref 23–33)
COLOR: YELLOW
CREAT SERPL-MCNC: 0.6 MG/DL (ref 0.4–1.2)
CRYSTALS, UA: ABNORMAL
EKG ATRIAL RATE: 91 BPM
EKG P AXIS: 76 DEGREES
EKG P-R INTERVAL: 140 MS
EKG Q-T INTERVAL: 368 MS
EKG QRS DURATION: 78 MS
EKG QTC CALCULATION (BAZETT): 452 MS
EKG R AXIS: 70 DEGREES
EKG T AXIS: 8 DEGREES
EKG VENTRICULAR RATE: 91 BPM
EOSINOPHIL # BLD: 1.2 %
EOSINOPHILS ABSOLUTE: 0.1 THOU/MM3 (ref 0–0.4)
EPITHELIAL CELLS, UA: ABNORMAL /HPF
ERYTHROCYTE [DISTWIDTH] IN BLOOD BY AUTOMATED COUNT: 13.2 % (ref 11.5–14.5)
ERYTHROCYTE [DISTWIDTH] IN BLOOD BY AUTOMATED COUNT: 47.5 FL (ref 35–45)
GFR SERPL CREATININE-BSD FRML MDRD: > 90 ML/MIN/1.73M2
GLUCOSE BLD-MCNC: 104 MG/DL (ref 70–108)
GLUCOSE URINE: NEGATIVE MG/DL
HCT VFR BLD CALC: 42.1 % (ref 37–47)
HEMOGLOBIN: 13.5 GM/DL (ref 12–16)
IMMATURE GRANS (ABS): 0.03 THOU/MM3 (ref 0–0.07)
IMMATURE GRANULOCYTES: 0.5 %
KETONES, URINE: NEGATIVE
LEUKOCYTE ESTERASE, URINE: ABNORMAL
LIPASE: 20.8 U/L (ref 5.6–51.3)
LYMPHOCYTES # BLD: 26.5 %
LYMPHOCYTES ABSOLUTE: 1.6 THOU/MM3 (ref 1–4.8)
MAGNESIUM: 2.1 MG/DL (ref 1.6–2.4)
MCH RBC QN AUTO: 31 PG (ref 26–33)
MCHC RBC AUTO-ENTMCNC: 32.1 GM/DL (ref 32.2–35.5)
MCV RBC AUTO: 96.8 FL (ref 81–99)
MISCELLANEOUS 2: ABNORMAL
MONOCYTES # BLD: 9.2 %
MONOCYTES ABSOLUTE: 0.6 THOU/MM3 (ref 0.4–1.3)
NITRITE, URINE: NEGATIVE
NUCLEATED RED BLOOD CELLS: 0 /100 WBC
OSMOLALITY CALCULATION: 281.4 MOSMOL/KG (ref 275–300)
PH UA: 7 (ref 5–9)
PLATELET # BLD: 247 THOU/MM3 (ref 130–400)
PMV BLD AUTO: 10.6 FL (ref 9.4–12.4)
POTASSIUM SERPL-SCNC: 3.8 MEQ/L (ref 3.5–5.2)
PREGNANCY, SERUM: NEGATIVE
PROTEIN UA: NEGATIVE
RBC # BLD: 4.35 MILL/MM3 (ref 4.2–5.4)
RBC URINE: ABNORMAL /HPF
RENAL EPITHELIAL, UA: ABNORMAL
SEG NEUTROPHILS: 62.3 %
SEGMENTED NEUTROPHILS ABSOLUTE COUNT: 3.8 THOU/MM3 (ref 1.8–7.7)
SODIUM BLD-SCNC: 142 MEQ/L (ref 135–145)
SPECIFIC GRAVITY, URINE: > 1.03 (ref 1–1.03)
TOTAL PROTEIN: 7.3 G/DL (ref 6.1–8)
TROPONIN T: < 0.01 NG/ML
UROBILINOGEN, URINE: 0.2 EU/DL (ref 0–1)
WBC # BLD: 6.1 THOU/MM3 (ref 4.8–10.8)
WBC UA: ABNORMAL /HPF
YEAST: ABNORMAL

## 2021-04-15 PROCEDURE — 93010 ELECTROCARDIOGRAM REPORT: CPT | Performed by: NUCLEAR MEDICINE

## 2021-04-15 PROCEDURE — 93005 ELECTROCARDIOGRAM TRACING: CPT | Performed by: PHYSICIAN ASSISTANT

## 2021-04-15 PROCEDURE — 96374 THER/PROPH/DIAG INJ IV PUSH: CPT

## 2021-04-15 PROCEDURE — 99285 EMERGENCY DEPT VISIT HI MDM: CPT

## 2021-04-15 PROCEDURE — 83735 ASSAY OF MAGNESIUM: CPT

## 2021-04-15 PROCEDURE — 96361 HYDRATE IV INFUSION ADD-ON: CPT

## 2021-04-15 PROCEDURE — 83690 ASSAY OF LIPASE: CPT

## 2021-04-15 PROCEDURE — 36415 COLL VENOUS BLD VENIPUNCTURE: CPT

## 2021-04-15 PROCEDURE — 84484 ASSAY OF TROPONIN QUANT: CPT

## 2021-04-15 PROCEDURE — 85025 COMPLETE CBC W/AUTO DIFF WBC: CPT

## 2021-04-15 PROCEDURE — 96375 TX/PRO/DX INJ NEW DRUG ADDON: CPT

## 2021-04-15 PROCEDURE — 84703 CHORIONIC GONADOTROPIN ASSAY: CPT

## 2021-04-15 PROCEDURE — 6360000004 HC RX CONTRAST MEDICATION: Performed by: PHYSICIAN ASSISTANT

## 2021-04-15 PROCEDURE — 2580000003 HC RX 258: Performed by: PHYSICIAN ASSISTANT

## 2021-04-15 PROCEDURE — 6360000002 HC RX W HCPCS: Performed by: PHYSICIAN ASSISTANT

## 2021-04-15 PROCEDURE — 96376 TX/PRO/DX INJ SAME DRUG ADON: CPT

## 2021-04-15 PROCEDURE — 74177 CT ABD & PELVIS W/CONTRAST: CPT

## 2021-04-15 PROCEDURE — 80053 COMPREHEN METABOLIC PANEL: CPT

## 2021-04-15 PROCEDURE — 87086 URINE CULTURE/COLONY COUNT: CPT

## 2021-04-15 PROCEDURE — 81001 URINALYSIS AUTO W/SCOPE: CPT

## 2021-04-15 PROCEDURE — 82248 BILIRUBIN DIRECT: CPT

## 2021-04-15 RX ORDER — HYDROCODONE BITARTRATE AND ACETAMINOPHEN 5; 325 MG/1; MG/1
1 TABLET ORAL EVERY 6 HOURS PRN
Qty: 12 TABLET | Refills: 0 | Status: SHIPPED | OUTPATIENT
Start: 2021-04-15 | End: 2021-04-18

## 2021-04-15 RX ORDER — ONDANSETRON 2 MG/ML
4 INJECTION INTRAMUSCULAR; INTRAVENOUS ONCE
Status: COMPLETED | OUTPATIENT
Start: 2021-04-15 | End: 2021-04-15

## 2021-04-15 RX ORDER — ONDANSETRON 4 MG/1
4 TABLET, ORALLY DISINTEGRATING ORAL EVERY 8 HOURS PRN
Qty: 20 TABLET | Refills: 0 | Status: SHIPPED | OUTPATIENT
Start: 2021-04-15 | End: 2021-07-01 | Stop reason: ALTCHOICE

## 2021-04-15 RX ORDER — 0.9 % SODIUM CHLORIDE 0.9 %
1000 INTRAVENOUS SOLUTION INTRAVENOUS ONCE
Status: COMPLETED | OUTPATIENT
Start: 2021-04-15 | End: 2021-04-15

## 2021-04-15 RX ORDER — MORPHINE SULFATE 4 MG/ML
4 INJECTION, SOLUTION INTRAMUSCULAR; INTRAVENOUS ONCE
Status: COMPLETED | OUTPATIENT
Start: 2021-04-15 | End: 2021-04-15

## 2021-04-15 RX ADMIN — MORPHINE SULFATE 4 MG: 4 INJECTION, SOLUTION INTRAMUSCULAR; INTRAVENOUS at 12:41

## 2021-04-15 RX ADMIN — ONDANSETRON 4 MG: 2 INJECTION INTRAMUSCULAR; INTRAVENOUS at 09:53

## 2021-04-15 RX ADMIN — SODIUM CHLORIDE 1000 ML: 9 INJECTION, SOLUTION INTRAVENOUS at 09:53

## 2021-04-15 RX ADMIN — IOPAMIDOL 80 ML: 755 INJECTION, SOLUTION INTRAVENOUS at 10:32

## 2021-04-15 RX ADMIN — MORPHINE SULFATE 4 MG: 4 INJECTION, SOLUTION INTRAMUSCULAR; INTRAVENOUS at 09:53

## 2021-04-15 ASSESSMENT — ENCOUNTER SYMPTOMS
VOMITING: 0
BACK PAIN: 1
SHORTNESS OF BREATH: 0
ABDOMINAL PAIN: 1
NAUSEA: 1

## 2021-04-15 ASSESSMENT — PAIN SCALES - GENERAL
PAINLEVEL_OUTOF10: 5
PAINLEVEL_OUTOF10: 7
PAINLEVEL_OUTOF10: 5
PAINLEVEL_OUTOF10: 8

## 2021-04-15 ASSESSMENT — PAIN DESCRIPTION - PAIN TYPE: TYPE: ACUTE PAIN

## 2021-04-15 ASSESSMENT — PAIN DESCRIPTION - ORIENTATION: ORIENTATION: LOWER

## 2021-04-15 NOTE — ED NOTES
Pt medicated per orders and updated on plan of care. Provided with blanket per request. No further needs at this time.      Jocelyn Smith RN  04/15/21 0909

## 2021-04-15 NOTE — ED NOTES
Pt states that the pain is still tolerable at this time. Pt ambulates to restroom to provide urine sample. Pain is worse with movement.      Jimbo Phelps RN  04/15/21 9924

## 2021-04-15 NOTE — ED NOTES
Pt to the ED for evaluation of lower abd pain and pelvic pain that began yesterday. Pt states that pain is due from scarring in her intestines due to radiation from her cervical cancer. Pt states that her GI doctor told her to come in if pain gets severe because he would be concerned about perforation. Pain 8/10 at this time.      Sj Zelaya RN  04/15/21 4836

## 2021-04-15 NOTE — ED NOTES
Pt to CT scans. VS obtained.  Pt states that medication took the edge off of her pain     Jimbo Phelps RN  04/15/21 3421

## 2021-04-15 NOTE — ED PROVIDER NOTES
[facility] COMPLAINT       Chief Complaint   Patient presents with    Pelvic Pain       Nurses Notes reviewed and I agree except as notedin the HPI. HISTORY OF PRESENT ILLNESS    Jared Dash is a 38 y.o. female who presents for severe lower abdominal, pelvic, and back pain. Pain goes from her umbilicus over to the left side of her abdomen into her left flank and radiates down into her pelvis. She is a cervical cancer survivor who underwent radiation therapy. She states her last dose of radiation was in 9/2019. Her mother presents with the patient and states that she was recently diagnosed with \"narrowing of her lower intestines\" and \"fusion of her uterus\" due to her previous radiation. This pain has been going on for over 6 months, but this is the worst it has been since it initially started. Patient states she \"just wants the colostomy so this can all be over with\". Her mother states that she follows with a cancer clinic in [place] who is concerned she may develop a \"tear in her intestines\". She endorses nausea but states it is only due to the pain. Pain is worse when lying flat on her back, slightly better when she lies on her side. Denies chest pain, shortness of breath, vomiting, fever, and chills. REVIEW OF SYSTEMS     Review of Systems   Reason unable to perform ROS: limited due to patient's severe pain. Constitutional: Negative for chills and fever. Respiratory: Negative for shortness of breath. Cardiovascular: Negative for chest pain. Gastrointestinal: Positive for abdominal pain and nausea. Negative for vomiting. Genitourinary: Positive for flank pain and pelvic pain. Musculoskeletal: Positive for back pain. PAST MEDICAL HISTORY    has a past medical history of Anxiety, Cancer of cervix ([address] Depression, and Hypertension.     SURGICAL HISTORY      has a past surgical history that includes Cholecystectomy (over 5 years ago); Tubal ligation (2006); Cervix biopsy (N/A, 6/7/2019); INSERTION / REMOVAL / REPLACEMENT VENOUS ACCESS CATHETER (N/A, 8/9/2019); other surgical history (10/10/2019); and Colonoscopy (09/29/2020). CURRENT MEDICATIONS       Discharge Medication List as of 4/15/2021 12:42 PM      CONTINUE these medications which have NOT CHANGED    Details   sucralfate (CARAFATE) 1 GM/10ML suspension Take 1 g by mouth 4 times dailyHistorical Med      pantoprazole (PROTONIX) 40 MG tablet Take 40 mg by mouth dailyHistorical Med      docusate sodium (COLACE) 100 MG capsule Take 100 mg by mouth 2 times dailyHistorical Med      lidocaine (LIDODERM) 5 % Place 1 patch onto the skin daily 12 hours on, 12 hours off., Disp-30 patch, R-2Normal      traZODone (DESYREL) 100 MG tablet Take 1 tablet by mouth nightly, Disp-30 tablet, R-6Normal      lactulose (CHRONULAC) 10 GM/15ML solution Take 15-30 mLs by mouth 2 times daily as needed (constipation), Disp-240 mL, R-1Print      mesalamine (DELZICOL) 400 MG CPDR delayed release capsule Take 800 mg by mouth 3 times dailyHistorical Med      polyethylene glycol (MIRALAX) 17 GM/SCOOP POWD powder Take 17 g by mouth daily, Disp-225 g, R-3Normal      potassium chloride (KLOR-CON M) 20 MEQ extended release tablet Take 1 tablet by mouth 2 times daily for 3 days, Disp-6 tablet, R-0Normal      dicyclomine (BENTYL) 20 MG tablet Take 1 tablet by mouth 4 times daily (before meals and nightly), Disp-40 tablet,R-0Print      dicyclomine (BENTYL) 10 MG capsule Take 1 capsule by mouth 4 times daily (before meals and nightly), Disp-120 capsule,R-0Print      phenazopyridine (PYRIDIUM) 100 MG tablet Take 100 mg by mouth 2 times dailyHistorical Med      aspirin 81 MG chewable tablet Take 1 tablet by mouth daily, Disp-30 tablet, R-3Normal      lidocaine-prilocaine (EMLA) 2.5-2.5 % cream Apply topically as needed. , Disp-30 g, R-2, Normal      prochlorperazine (COMPAZINE) 5 MG tablet Take 1 tablet by mouth every 6 hours as needed for Nausea, Disp-30 tablet, R-3Normal      ibuprofen (ADVIL;MOTRIN) 200 MG tablet Take 200 mg by mouth every 8 hours as needed for PainHistorical Med             ALLERGIES     has No Known Allergies. HISTORY     She indicated that her mother is alive. She indicated that her father is alive. She indicated that her maternal grandmother is . She indicated that her paternal grandmother is . family history includes Cancer in her maternal grandmother and paternal grandmother. SOCIALHISTORY      reports that she has been smoking cigarettes. She has a 20.00 pack-year smoking history. She has never used smokeless tobacco. She reports previous alcohol use of about 16.0 standard drinks of alcohol per week. She reports current drug use. Drug: Marijuana. PHYSICAL EXAM     INITIAL VITALS:  height is 5' 8\" (1.727 m) and weight is 135 lb (61.2 kg). Her oral temperature is 98.3 °F (36.8 °C). Her blood pressure is 140/100 (abnormal) and her pulse is 87. Her respiration is 13 and oxygen saturation is 96%. Physical Exam  Constitutional:       General: She is in acute distress. Appearance: She is normal weight. Comments: Patient is tearful and writhing in pain   HENT:      Head: Normocephalic and atraumatic. Cardiovascular:      Rate and Rhythm: Regular rhythm. Tachycardia present. Heart sounds: Normal heart sounds. Pulmonary:      Effort: Pulmonary effort is normal. No respiratory distress. Breath sounds: Rhonchi present. No wheezing or rales. Chest:      Chest wall: No tenderness. Abdominal:      General: Abdomen is flat. Bowel sounds are normal.      Palpations: Abdomen is soft. Tenderness: There is abdominal tenderness in the periumbilical area, suprapubic area and left lower quadrant. There is left CVA tenderness and guarding. Musculoskeletal:         General: Tenderness present. Lumbar back: She exhibits tenderness. Back:    Neurological:      General: No focal deficit present. Mental Status: She is alert and oriented to person, place, and time. DIFFERENTIAL DIAGNOSIS:   Radiation adhesions, bowel obstruction, bowel perforation, mesenteric ischemia, acute diverticulitis    DIAGNOSTIC RESULTS     EKG: All EKG's are interpreted by the Emergency Department Physician who either signs or Co-signs this chart in the absence of a cardiologist.      RADIOLOGY: non-plain film images(s) such as CT, Ultrasound and MRI are read by the radiologist.     CT ABDOMEN PELVIS W IV CONTRAST Additional Contrast? None (Final result)  Result time 04/15/21 11:15:41  Final result by Andree Faith (04/15/21 11:15:41)                Impression:    Cholecystectomy clips are present. Collapse of previous anterior left medial segment hepatic cyst or resolved fatty change. No current lesion is seen in this area. .   Subtle colitis has shown improvement. **This report has been created using voice recognition software. It may contain minor errors which are inherent in voice recognition technology. **     Final report electronically signed by Dr. Suzi Barrios on 4/15/2021 11:15 AM            Narrative:    PROCEDURE: CT ABDOMEN PELVIS W IV CONTRAST     CLINICAL INFORMATION: Abdominal pain rule out perforation . COMPARISON: CT abdomen and pelvis of 11/17/2020. TECHNIQUE: Axial 5 mm CT images were obtained through the abdomen and pelvis after the administration of intravenous contrast. Coronal and sagittal reconstructions were obtained. All CT scans at this facility use dose modulation, iterative reconstruction, and/or weight-based dosing when appropriate to reduce radiation dose to as low as reasonably achievable. FINDINGS:   Lung bases: The visualized aspects of the lung bases are clear.     Heart: The base of the heart is within appropriate limits. Liver: Mild enlargement of the right lobe of 18.0 cm. No focal lesions. Normal density. Previous cyst or focal fatty change at the anterior left medial segment is not seen   Gallbladder: Cholecystectomy clips are present.     Adrenals: Normal size and contour. Kidneys: Ring Maizes is no hydronephrosis or stones. No renal masses. Pancreas: No mass, cyst, or duct dilatation. No inflammatory change. Bowel: The stomach shows no unusual distention or wall thickening No abnormalities of the small bowel loops are noted. The colon shows subtle increased wall thickening which is improved from the prior study. This is greater towards the descending   portion. Minimal edema or infiltration of the perirectal fat. Spleen: Normal size and no focal lesions. Vessels: The IVC and aorta are of normal caliber. Nodes: No adenopathy. Bladder: Collapse bladder. Free fluid: No abdomen or pelvic free fluid. Reproductive:   Bones: Levocurvature trachea upper lumbar spine. .                         LABS:   Labs Reviewed   CBC WITH AUTO DIFFERENTIAL - Abnormal; Notable for the following components:       Result Value    MCHC 32.1 (*)     RDW-SD 47.5 (*)     All other components within normal limits   HEPATIC FUNCTION PANEL - Abnormal; Notable for the following components:    ALT 7 (*)     All other components within normal limits   URINE WITH REFLEXED MICRO - Abnormal; Notable for the following components:    Specific Gravity, Urine > 1.030 (*)     Leukocyte Esterase, Urine MODERATE (*)     All other components within normal limits   CULTURE, REFLEXED, URINE   BASIC METABOLIC PANEL   LIPASE   TROPONIN   MAGNESIUM   HCG, SERUM, QUALITATIVE   ANION GAP   GLOMERULAR FILTRATION RATE, ESTIMATED   OSMOLALITY       EMERGENCY DEPARTMENT COURSE:   :    Vitals:    04/15/21 1021 04/15/21 1117 04/15/21 1118 04/15/21 1233   BP: (!) 156/98  (!) 137/90 (!) 140/100   Pulse: 76 91  87   Resp: 16 13  13   Temp:       TempSrc:       SpO2: 99% 97%  96%   Weight:       Height:         Patient was seen history physical exam was performed. 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 The patient's pain seemed to be a bit worse. There is no signs of obstruction. We did give morphine and Zofran here and will discharge home with Norco and instructed follow-up with her surgeon in [place]. See disposition below    CRITICAL CARE:  None    CONSULTS:  None    PROCEDURES:  None    FINAL IMPRESSION      1. Colitis          DISPOSITION/PLAN   Discharge    PATIENT REFERRED TO:  CHAPO Siegel CNP  [address] [place] [address]  [phone]    In 2 days  Call [phone] if interest in scheduling COVID vaccine      DISCHARGE MEDICATIONS:  Discharge Medication List as of 4/15/2021 12:42 PM      START taking these medications    Details   HYDROcodone-acetaminophen (NORCO) 5-325 MG per tablet Take 1 tablet by mouth every 6 hours as needed for Pain for up to 3 days. Intended supply: 3 days.  Take lowest dose possible to manage pain, Disp-12 tablet, R-0Print      ondansetron (ZOFRAN ODT) 4 MG disintegrating tablet Take 1 tablet by mouth every 8 hours as needed for Nausea, Disp-20 tablet, R-0Print             (Please note that portions of this note were completed with a voice recognitionprogram.  Efforts were made to edit the dictations but occasionally words are mis-transcribed.)    CRYSTAL Davidson, [id number]  04/15/21 6416

## 2021-04-16 LAB
ORGANISM: ABNORMAL
URINE CULTURE REFLEX: ABNORMAL

## 2021-04-19 ENCOUNTER — HOSPITAL ENCOUNTER (OUTPATIENT)
Dept: MRI IMAGING | Age: 36
Discharge: HOME | End: 2021-04-19
Payer: COMMERCIAL

## 2021-04-19 DIAGNOSIS — C53.9 MALIGNANT NEOPLASM OF CERVIX, UNSPECIFIED SITE (HCC): ICD-10-CM

## 2021-04-19 PROCEDURE — 6360000004 HC RX CONTRAST MEDICATION: Performed by: COLON & RECTAL SURGERY

## 2021-04-19 PROCEDURE — A9579 GAD-BASE MR CONTRAST NOS,1ML: HCPCS | Performed by: COLON & RECTAL SURGERY

## 2021-04-19 PROCEDURE — 72197 MRI PELVIS W/O & W/DYE: CPT

## 2021-04-19 RX ADMIN — GADOTERIDOL 15 ML: 279.3 INJECTION, SOLUTION INTRAVENOUS at 09:22

## 2021-04-28 ENCOUNTER — HOSPITAL ENCOUNTER (OUTPATIENT)
Age: 36
Discharge: HOME | End: 2021-04-28
Payer: COMMERCIAL

## 2021-04-28 PROCEDURE — U0003 INFECTIOUS AGENT DETECTION BY NUCLEIC ACID (DNA OR RNA); SEVERE ACUTE RESPIRATORY SYNDROME CORONAVIRUS 2 (SARS-COV-2) (CORONAVIRUS DISEASE [COVID-19]), AMPLIFIED PROBE TECHNIQUE, MAKING USE OF HIGH THROUGHPUT TECHNOLOGIES AS DESCRIBED BY CMS-2020-01-R: HCPCS

## 2021-04-28 PROCEDURE — U0005 INFEC AGEN DETEC AMPLI PROBE: HCPCS

## 2021-04-30 LAB
SARS-COV-2: NOT DETECTED
SOURCE: NORMAL

## 2021-05-24 ENCOUNTER — OFFICE VISIT (OUTPATIENT)
Dept: BEHAVIORAL/MENTAL HEALTH CLINIC | Age: 36
End: 2021-05-24
Payer: COMMERCIAL

## 2021-05-24 ENCOUNTER — OFFICE VISIT (OUTPATIENT)
Dept: FAMILY MEDICINE CLINIC | Age: 36
End: 2021-05-24
Payer: COMMERCIAL

## 2021-05-24 VITALS
DIASTOLIC BLOOD PRESSURE: 78 MMHG | BODY MASS INDEX: 19.34 KG/M2 | HEIGHT: 68 IN | WEIGHT: 127.6 LBS | SYSTOLIC BLOOD PRESSURE: 116 MMHG | OXYGEN SATURATION: 97 % | RESPIRATION RATE: 12 BRPM | HEART RATE: 117 BPM | TEMPERATURE: 98.3 F

## 2021-05-24 DIAGNOSIS — Z09 HOSPITAL DISCHARGE FOLLOW-UP: Primary | ICD-10-CM

## 2021-05-24 DIAGNOSIS — F41.1 GENERALIZED ANXIETY DISORDER: ICD-10-CM

## 2021-05-24 DIAGNOSIS — F32.1 CURRENT MODERATE EPISODE OF MAJOR DEPRESSIVE DISORDER WITHOUT PRIOR EPISODE (HCC): ICD-10-CM

## 2021-05-24 DIAGNOSIS — F32.1 CURRENT MODERATE EPISODE OF MAJOR DEPRESSIVE DISORDER, UNSPECIFIED WHETHER RECURRENT (HCC): Primary | ICD-10-CM

## 2021-05-24 DIAGNOSIS — F41.9 ANXIETY: ICD-10-CM

## 2021-05-24 DIAGNOSIS — Z90.49 S/P PARTIAL COLECTOMY: ICD-10-CM

## 2021-05-24 DIAGNOSIS — Z13.31 POSITIVE DEPRESSION SCREENING: ICD-10-CM

## 2021-05-24 DIAGNOSIS — C53.8 MALIGNANT NEOPLASM OF OVERLAPPING SITES OF CERVIX UTERI (HCC): ICD-10-CM

## 2021-05-24 PROCEDURE — 1111F DSCHRG MED/CURRENT MED MERGE: CPT | Performed by: NURSE PRACTITIONER

## 2021-05-24 PROCEDURE — 99215 OFFICE O/P EST HI 40 MIN: CPT | Performed by: NURSE PRACTITIONER

## 2021-05-24 PROCEDURE — G8431 POS CLIN DEPRES SCRN F/U DOC: HCPCS | Performed by: NURSE PRACTITIONER

## 2021-05-24 PROCEDURE — 4004F PT TOBACCO SCREEN RCVD TLK: CPT | Performed by: SOCIAL WORKER

## 2021-05-24 PROCEDURE — 90791 PSYCH DIAGNOSTIC EVALUATION: CPT | Performed by: SOCIAL WORKER

## 2021-05-24 PROCEDURE — 96160 PT-FOCUSED HLTH RISK ASSMT: CPT | Performed by: NURSE PRACTITIONER

## 2021-05-24 ASSESSMENT — ANXIETY QUESTIONNAIRES
1. FEELING NERVOUS, ANXIOUS, OR ON EDGE: 1
3. WORRYING TOO MUCH ABOUT DIFFERENT THINGS: 1
5. BEING SO RESTLESS THAT IT IS HARD TO SIT STILL: 0
4. TROUBLE RELAXING: 1
IF YOU CHECKED OFF ANY PROBLEMS ON THIS QUESTIONNAIRE, HOW DIFFICULT HAVE THESE PROBLEMS MADE IT FOR YOU TO DO YOUR WORK, TAKE CARE OF THINGS AT HOME, OR GET ALONG WITH OTHER PEOPLE: SOMEWHAT DIFFICULT
7. FEELING AFRAID AS IF SOMETHING AWFUL MIGHT HAPPEN: 1
GAD7 TOTAL SCORE: 8

## 2021-05-24 ASSESSMENT — PATIENT HEALTH QUESTIONNAIRE - PHQ9
SUM OF ALL RESPONSES TO PHQ QUESTIONS 1-9: 19
7. TROUBLE CONCENTRATING ON THINGS, SUCH AS READING THE NEWSPAPER OR WATCHING TELEVISION: 3
1. LITTLE INTEREST OR PLEASURE IN DOING THINGS: 2
3. TROUBLE FALLING OR STAYING ASLEEP: 3
SUM OF ALL RESPONSES TO PHQ9 QUESTIONS 1 & 2: 3
SUM OF ALL RESPONSES TO PHQ QUESTIONS 1-9: 19
4. FEELING TIRED OR HAVING LITTLE ENERGY: 3
5. POOR APPETITE OR OVEREATING: 3

## 2021-05-24 ASSESSMENT — COLUMBIA-SUICIDE SEVERITY RATING SCALE - C-SSRS
6. HAVE YOU EVER DONE ANYTHING, STARTED TO DO ANYTHING, OR PREPARED TO DO ANYTHING TO END YOUR LIFE?: NO
2. HAVE YOU ACTUALLY HAD ANY THOUGHTS OF KILLING YOURSELF?: NO

## 2021-05-24 NOTE — PROGRESS NOTES
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 Becky Cowan is a 38 y.o. female with a history of rectal stricture after pelvic radiation for cervical cancer. She underwent a partial colectomy with loop colostomy creation on 5/3/2021. Interoperative findings include densely adherent rectum to pelvis. She tolerated the procedure well and there were no immediate complications. Post operative recovery was complicated by an Ileus which required NG placement and initiation of TPN. Additionally palliative medicine was consulted for assistance with pain and symptom management. She began to have more robust bowel function on 5/18/2021, therefore her NG tube was removed, TPN discontinued and her diet was advanced as tolerated. She is being discharged on 5/21/2021 to home in stable condition. OARRS has been reviewed for this patient on 5/21/2021. Based on the information, it is appropriate to prescribe narcotics. She has return of bowel function, vss, is tolerating a regular diet, voiding spontaneously, and pain is controlled with oral pain meds. She will follow-up with Dr. Adrienne Monique in clinic on 6/2/2021 for a postoperative follow up and evaluation. She also has arranged follow up with palliative medicine on 5/26/2021    Post-Discharge Transitional Care Management Services or Hospital Follow Up    Pt continues to do well at visit today. States she is having bowel movements, and urinating well, did have some difficulty with swallowing due to sore throat but getting better. She does have home health waiting for their first visit. Becky Cowan   YOB: 1985    Date of Office Visit:  5/24/2021  Date of Hospital Admission: 4/15/21  Date of Hospital Discharge: 4/15/21  Risk of hospital readmission (high >=14%.  Medium >=10%) :No data recorded    Care management risk score Rising risk (score 2-5) and Complex Care (Scores >=6): 5     Non face to face  following discharge, date last encounter closed (first attempt may have been earlier): *No documented post hospital discharge outreach found in the last 14 days    Call initiated 2 business days of discharge: *No response recorded in the last 14 days    Patient Active Problem List   Diagnosis    Psychophysiological insomnia    Malignant neoplasm of overlapping sites of cervix ([address]    Other chest pain     Has concerns if she is getting depressed, would like to talk with someone about this. Does feel down and sad, trouble sleeping but has been through quite an ordeal in the last 9 months. Deneis thoughts of hurting herself or others. Declines meds today will think about it   No Known Allergies    Medications listed as ordered at the time of discharge from [facility]   Home Medication Instructions YASIR:    Printed on:05/24/21 0897   Medication Information                      Acetaminophen (TYLENOL 8 HOUR PO)  Take by mouth             aspirin 81 MG chewable tablet  Take 1 tablet by mouth daily             dicyclomine (BENTYL) 10 MG capsule  Take 1 capsule by mouth 4 times daily (before meals and nightly)             dicyclomine (BENTYL) 20 MG tablet  Take 1 tablet by mouth 4 times daily (before meals and nightly)             docusate sodium (COLACE) 100 MG capsule  Take 100 mg by mouth 2 times daily             GABAPENTIN PO  Take by mouth             ibuprofen (ADVIL;MOTRIN) 200 MG tablet  Take 200 mg by mouth every 8 hours as needed for Pain             lactulose (CHRONULAC) 10 GM/15ML solution  Take 15-30 mLs by mouth 2 times daily as needed (constipation)             lidocaine (LIDODERM) 5 %  Place 1 patch onto the skin daily 12 hours on, 12 hours off.             lidocaine-prilocaine (EMLA) 2.5-2.5 % cream  Apply topically as needed.              mesalamine (DELZICOL) 400 MG CPDR delayed release capsule  Take 800 mg by mouth 3 times daily ondansetron (ZOFRAN ODT) 4 MG disintegrating tablet  Take 1 tablet by mouth every 8 hours as needed for Nausea             pantoprazole (PROTONIX) 40 MG tablet  Take 40 mg by mouth daily             phenazopyridine (PYRIDIUM) 100 MG tablet  Take 100 mg by mouth 2 times daily             polyethylene glycol (MIRALAX) 17 GM/SCOOP POWD powder  Take 17 g by mouth daily             potassium chloride (KLOR-CON M) 20 MEQ extended release tablet  Take 1 tablet by mouth 2 times daily for 3 days             prochlorperazine (COMPAZINE) 5 MG tablet  Take 1 tablet by mouth every 6 hours as needed for Nausea             sucralfate (CARAFATE) 1 GM/10ML suspension  Take 1 g by mouth 4 times daily             traZODone (DESYREL) 100 MG tablet  Take 1 tablet by mouth nightly                   Medications marked \"taking\" at this time  Outpatient Medications Marked as Taking for the 5/24/21 encounter (Office Visit) with CHAPO Hwang - CNP   Medication Sig Dispense Refill    GABAPENTIN PO Take by mouth      Acetaminophen (TYLENOL 8 HOUR PO) Take by mouth      pantoprazole (PROTONIX) 40 MG tablet Take 40 mg by mouth daily      lidocaine (LIDODERM) 5 % Place 1 patch onto the skin daily 12 hours on, 12 hours off.  30 patch 2    traZODone (DESYREL) 100 MG tablet Take 1 tablet by mouth nightly 30 tablet 6    ibuprofen (ADVIL;MOTRIN) 200 MG tablet Take 200 mg by mouth every 8 hours as needed for Pain          Medications patient taking as of now reconciled against medications ordered at time of hospital discharge: Yes    Chief Complaint   Patient presents with    Follow-Up from [facility] colectomy and lumpcolostomy, she also wants to get you caught up on what is going on     Medication Refill     on anxiety medication, states it was for the hospital visit but wanted to know if it could be continued     Other     possible referral to help her        History of Present illness - Follow up of Hospital diagnosis(es): s/p colectomy with partial bowel resection    Inpatient course: Discharge summary reviewed- see chart. Interval history/Current status: stable , improving each day     A comprehensive review of systems was negative except for what was noted in the HPI. Vitals:    05/24/21 1131   BP: 116/78   Pulse: 117   Resp: 12   Temp: 98.3 °F (36.8 °C)   TempSrc: Oral   SpO2: 97%   Weight: 127 lb 9.6 oz (57.9 kg)   Height: 5' 8\" (1.727 m)     Body mass index is 19.4 kg/m². Wt Readings from Last 3 Encounters:   05/24/21 127 lb 9.6 oz (57.9 kg)   04/15/21 135 lb (61.2 kg)   12/16/20 160 lb (72.6 kg)     BP Readings from Last 3 Encounters:   05/24/21 116/78   04/15/21 (!) 140/100   12/16/20 137/89        Physical Exam:  General Appearance: alert and oriented to person, place and time, well-developed and well-nourished, in no acute distress  Head: normocephalic and atraumatic  Eyes: pupils equal, round, and reactive to light, extraocular eye movements intact, conjunctivae normal  Pulmonary/Chest: clear to auscultation bilaterally- no wheezes, rales or rhonchi, normal air movement, no respiratory distress  Cardiovascular: normal rate, normal S1 and S2, no gallops, intact distal pulses and no carotid bruits  Abdomen: abdomen with laparoscopy incision healed well, bowel sounds hyperactive colostomy bag in place. Pt does appear to be weak     Assessment/Plan:  1. Hospital discharge follow-up    - OK DISCHARGE MEDS RECONCILED W/ CURRENT OUTPATIENT MED LIST    2. Positive depression screening    - Positive Screen for Clinical Depression with a Documented Follow-up Plan     3. S/P partial colectomy  Stable and doing well  Continue f/u with [facility] surgeon  - OK DISCHARGE MEDS RECONCILED W/ CURRENT OUTPATIENT MED LIST    4. Malignant neoplasm of overlapping sites of cervix uteri ([facility] [address]  Stable and treatment has been completed for this at [facility]    5.  Current moderate episode of major depressive disorder without prior episode ([facility] [address]  Pt

## 2021-05-24 NOTE — PSYCHOTHERAPY
Behavioral Health Consultation  JENNI Reaves  5/24/2021  2:59PM EDT      Time spent with Patient: 30minutes  This is patient's first  [facility] appointment. Reason for Consult:    Chief Complaint   Patient presents with    Depression    Anxiety     Referring Provider: CHAPO Malagon CNP  [address]  [place]    Pt provided informed consent for the behavioral health program. Discussed with patient model of service to include the limits of confidentiality (i.e. abuse reporting, suicide intervention, etc.) and short-term intervention focused approach. Pt indicated understanding. Feedback given to PCP. S:  Pt identified the presenting problem as symptoms of anxiety and depression and indicated this as her primary needs to address through behavioral health services. The history of the problem was explored with pt. Pt acknowledged that recovery from surgery and healing from cancer has significantly impacted her symptoms. The current situation was processed with pt in examining thoughts, feelings, and impairment on daily functioning. Pt indicated symptoms of poor sleep, difficulties with concentration, depressed mood,  feeling anxious/edgy, and ruminating on negative/worrisome thoughts. Pt was guided in exploring areas of behavioral change, development of effective coping strategies, and assessing the management of environmental factors influencing the problem. The PHQ-9 was administered and pt scored 19, indicating Moderate major depression. LUIS ENRIQUE 7 scored at an 8 indicating Generalized anxiety to be in the moderate range. Pt denied thoughts that she would be better off dead. Pt was advised of indications of depressive symptoms and instructed to monitor if symptoms worsen or interfere with daily functioning, to consider following-up with either your primary care team or a behavioral health provider for possible counseling or medication management.  If suicidal thoughts are experienced, daily/weekly-rest, relaxation, stress management, supportive relationships, increased physical outlets, engagement in enjoyable activities.   4. Pt will follow up with JENNI López

## 2021-05-24 NOTE — PATIENT INSTRUCTIONS
1. The Stress Response and How It Can Affect You   The stress response, or fight or flight response is the emergency reaction system of the body. It is there to keep you safe in emergencies. The stress response includes physical and thought responses to your perception of various situations. When the stress response is turned on, your body may release substances like adrenaline and cortisol. Your organs are programmed to respond in certain ways to situations that are viewed as challenging or threatening. The stress response can work against you. You can turn it on when you dont really need it and, as a result, perceive something as an emergency when its really not. It can turn on when you are just thinking about past or future events. Harmless, chronic conditions can be intensified by the stress response activating too often, with too much intensity, or for too long. Stress responses can be different for different individuals. Below is a list of some common stress related responses people have. ([place] the responses you have had in the last 2 weeks.)                                                                                                 Physical Responses   Muscle aches   Insomnia   ? Heart rate   Headache   Weight gain   Nausea   Constipation   Dry mouth   Muscle twitching  Weight loss   Low energy   Weakness   Tight chest   Diarrhea   Dizziness   Trembling   Stomach cramps  Chills    Hot flashes   Sweating   Pounding heart  Choking feeling  Chest pain   Leg cramps   Numb hands/feet Dry throat   Appetite change  Face flushing   ? Blood pressure  Light-headedness  Feeling faint        Trouble swallowing   Rash ?    Urination   Neck pain             Tingling hands/feet                                                                                             Emotional and Thought Responses   Restlessness   Agitation   Insecurity             Worthlessness   Anxiety   Stress    Depression Hopelessness   Guilt    Defensiveness  Anger            Racing thoughts   Nightmares   Intense thinking  Sensitivity            Expecting the worst   Numbness   Lack of motivation  Mood swings             Forgetfulness   ? Concentration  Rigidity              Preoccupation  Intolerance                                                Behavioral Responses   Avoidance   Withdrawal   Neglect   ? Alcohol use    Smoking   ? Eating   Arguing        Poor appearance   ? Spending   Poor hygiene   ? Eating   Seeking reassurance   Nail biting   Skin picking   ? Talking         ? Body checking   Sexual problems  Foot tapping   Fidgeting Rapid walking    ? Exercise   Teeth clenching           Multitasking   Aggressive speaking       ? Fun activities  ? Sleeping      ? Relaxing activities     Seeking information     The parasympathetic nervous system in your body is designed to turn on your bodys relaxation response. Your behaviors and thinking can keep your bodys natural relaxation response from operating at its best.     Getting your body to relax on a daily basis for at least brief periods can help decrease unpleasant stress responses. Learning to relax your body, through specific breathing and relaxation exercises as well as by minimizing stressful thinking, can help your bodys natural relaxation system be more effective. 2. Depression: Facts, Myths & Tips for Feeling Better    Facts    Depression is very common  Nearly 20% of the [place] population experiences a significant depression during their lifetime. Depression is treatable  Because depression affects so many, and can have such a powerful and negative impact on life, there has been an enormous amount of research conducted on how to reduce symptoms and improve functioning. As a result, we now know there are behaviors YOU can engage in to make yourself feel better.   Depression is not a weakness  People suffer from depression for a variety of reasons, biological, environmental and behavioral.  Research indicates that [employer] is NOT one of the reasons people become depressed. Depression is not something you are powerless against  Evidence suggests that you can directly impact the intensity and duration of depression by what you do and by altering the way you think about certain things. The Downward Spiral    Depression often begins as a drop in mood due to an environmental or biological trigger that makes people feel less like being active. Being less active, in turn, often causes people to experience an even lower mood and feel even less like being active, and so the cycle begins. How can I start feeling better? The first and best way to reverse the downward cycle is to get active! Your body produces its own anti-depressants every time you exercise or do something pleasurable. Regular exercise is one of the very best ways to improve your mood. In fact some studies have shown that a solid exercise program is as effective as psychotherapy or anti-depressant medication for some people. *See physical activity section of handout    Force yourself to do something you found pleasurable before depression. This may be different for everyone and it doesnt matter if its gardening, playing bridge, walking, reading a novel, or simply talking to a close friend. What matters is that YOU find the activity pleasurable! Even if you dont feel like doing something pleasurable for yourself, DO IT ANYWAY. We call this the fake it until you make it principle. Educate yourself! Often people feel powerless against medical conditions because they do not understand what is happening in their body. Just by reading this handout you know more than most people about depression. Knowledge is power when you can apply it, and make yourself feel better. *See recommended reading list at the bottom of this handout\"    Begin to notice unhealthy and unhelpful thoughts!  In addition to how we behave, how we think influences our mood directly. Notice recurrent or alarming thoughts that have an impact on your mood. Ask yourself is this type of thinking helping me or hurting me?  if your answer is it's hurting me here are some things you can do: *see disputation of negative thoughts section of handout  - Challenge the negative thought. Is it truly accurate? Wheres the proof? Become your own  and collect the facts.  - Reframe the negative thought. How can I think differently about this problem, situation, or view of myself? Allow yourself to view a situation from more than one angle, how might my spouse, friend, or someone I admire view this same problem?  - Use the best friend scenario. How would you help your best friend if he or she was having these same thoughts? Would you criticize him or her as harshly as you criticize yourself? Remember, YOU know YOU better than anyone else. You likely know what kinds of activities, thoughts and reinforcement you respond to. Doing whats easiest and most doable is the key. Pick 1 or 2 things that are easy and get started feeling better TODAY! * Use the following handout sections to guide you through behavioral and thinking exercises to help you manage your depressive symptoms, improve your functioning and to begin living your life well again. Recommended readings on managing depression    - Feeling Good by Dr. Anne Brandt     - Mind over [place] by Sierra Dang and [facility] by Dr. Daryl Gay by Dr. Berto Wade      Disputation:  Challenging Upsetting Thinking    Examine your thoughts for key words:  1. must, need, got to, have to, should (unrealistic standards)  2. never, always, completely, totally, all everything, everyone (predictions /  labeling)  3. awful, terrible, horrible, unbearable, disaster, worst ever (labeling / predictions)  4. jerk, slob, creep, hypocrite, bully, stupid (labels)  Dispute or question the accuracy of the questionable thoughts. 1. Am I upsetting myself unnecessarily? How can I see this another way? 2. Is my thinking working for or against me? How could I view this in a less upsetting way? 3. What am I demanding must happen? What do I want or prefer, rather than need? 4. Am I making something too terrible? Is it really that awful? What would be so terrible about that? 5. Am I labeling a person? What is the action that I dont like? 6. Whats untrue about my thoughts? How can I stick to the facts? Whats the proof for what I am thinking or believing about this? 7. Am I using extreme, black-and-white language? What less extreme words might be more accurate? 8. Am I fortune telling or mind reading in a way that gets me upset or unhappy? What are the odds (percent chance -- e.g., there is a 5% chance. ..) that it will really turn out the way Im thinking or imagining? 9. What are my options in this situation? How would I like to respond? 10. Create more moderate, helpful, or realistic statements to replace the upsetting ones. 11. Have I had any experiences that show that this thought might not be totally true? 12. If my best friend or someone I loved had this thought, what would I tell them? 15. If my best friend or someone I loved knew I was thinking this thought, what would they say to me? What evidence would they point out to me that would suggest that my thought is not completely true? 14. Are there strengths in me or positives in the situation that I am ignoring? Am I underestimating my ability to cope with unfortunate circumstances? 15. When I am not feeling this way, do I think about this situation any differently? How?  16. Have I been in this type of situation before? What happened? What have I learned from prior experiences that could help me now?   17. Five years from now, if I look back on this situation, will I look at it any differently? Will I focus on any different part of my experience? 25. Am I blaming myself for something over which I do not have complete control? 3. What is deep breathing? Deep breathing involves using your diaphragm muscle to help bring about a state of physiological relaxation. The diaphragm is a large muscle that rests across the bottom of your rib cage. When you inhale, the diaphragm muscle drops, opening up space so air can come in. When watching someone do this it looks like your stomach is filling with air. This type of breathing helps activate the part of your nervous system that controls relaxation. It can lead to decreased heart rate, blood pressure, muscle tension and overall feelings of relaxation. Why be concerned with how I'm breathing?    -To increase your awareness of the role that breathing plays in increased physical tension and contributing to your body's stress response.  -To lower your level of stress-related arousal and tension.  -To give you a method of taking calm, relaxing breaths immediately in a stressful situation to break the cycle of increasing arousal.    What is the best way to use deep breathing exercises?    -Use deep breathing frequently. -Take deep breaths at the first signs of stress, anxiety, physical tension, or other symptoms.  -Schedule time for relaxation. My scheduled time for deep breathing will be _AM, NOON, PM________. 5. Pt will promote effective self care habits daily/weekly-rest, relaxation, stress management, supportive relationships, increased physical outlets, engagement in enjoyable activities.   6. Pt will follow up with JENNI Neely

## 2021-06-11 ENCOUNTER — HOSPITAL ENCOUNTER (EMERGENCY)
Age: 36
Discharge: HOME | End: 2021-06-11
Payer: COMMERCIAL

## 2021-06-11 ENCOUNTER — APPOINTMENT (OUTPATIENT)
Dept: GENERAL RADIOLOGY | Age: 36
End: 2021-06-11
Payer: COMMERCIAL

## 2021-06-11 VITALS
OXYGEN SATURATION: 98 % | RESPIRATION RATE: 16 BRPM | TEMPERATURE: 97.8 F | WEIGHT: 118 LBS | BODY MASS INDEX: 17.88 KG/M2 | SYSTOLIC BLOOD PRESSURE: 92 MMHG | HEIGHT: 68 IN | DIASTOLIC BLOOD PRESSURE: 60 MMHG | HEART RATE: 111 BPM

## 2021-06-11 DIAGNOSIS — M54.31 SCIATICA OF RIGHT SIDE: Primary | ICD-10-CM

## 2021-06-11 PROCEDURE — 99213 OFFICE O/P EST LOW 20 MIN: CPT

## 2021-06-11 PROCEDURE — 6360000002 HC RX W HCPCS: Performed by: NURSE PRACTITIONER

## 2021-06-11 PROCEDURE — 73502 X-RAY EXAM HIP UNI 2-3 VIEWS: CPT

## 2021-06-11 PROCEDURE — 99213 OFFICE O/P EST LOW 20 MIN: CPT | Performed by: NURSE PRACTITIONER

## 2021-06-11 PROCEDURE — 96372 THER/PROPH/DIAG INJ SC/IM: CPT

## 2021-06-11 PROCEDURE — 6370000000 HC RX 637 (ALT 250 FOR IP): Performed by: NURSE PRACTITIONER

## 2021-06-11 RX ORDER — KETOROLAC TROMETHAMINE 10 MG/1
10 TABLET, FILM COATED ORAL EVERY 6 HOURS PRN
Qty: 20 TABLET | Refills: 0 | Status: SHIPPED | OUTPATIENT
Start: 2021-06-11 | End: 2021-07-01 | Stop reason: ALTCHOICE

## 2021-06-11 RX ORDER — CYCLOBENZAPRINE HCL 10 MG
10 TABLET ORAL ONCE
Status: COMPLETED | OUTPATIENT
Start: 2021-06-11 | End: 2021-06-11

## 2021-06-11 RX ORDER — KETOROLAC TROMETHAMINE 30 MG/ML
30 INJECTION, SOLUTION INTRAMUSCULAR; INTRAVENOUS ONCE
Status: DISCONTINUED | OUTPATIENT
Start: 2021-06-11 | End: 2021-06-11

## 2021-06-11 RX ORDER — CYCLOBENZAPRINE HCL 10 MG
10 TABLET ORAL 3 TIMES DAILY PRN
Qty: 21 TABLET | Refills: 0 | Status: SHIPPED | OUTPATIENT
Start: 2021-06-11 | End: 2021-06-21

## 2021-06-11 RX ORDER — ORPHENADRINE CITRATE 30 MG/ML
60 INJECTION INTRAMUSCULAR; INTRAVENOUS ONCE
Status: DISCONTINUED | OUTPATIENT
Start: 2021-06-11 | End: 2021-06-11 | Stop reason: HOSPADM

## 2021-06-11 RX ORDER — TRAMADOL HYDROCHLORIDE 50 MG/1
50 TABLET ORAL ONCE
Status: COMPLETED | OUTPATIENT
Start: 2021-06-11 | End: 2021-06-11

## 2021-06-11 RX ADMIN — CYCLOBENZAPRINE 10 MG: 10 TABLET, FILM COATED ORAL at 16:37

## 2021-06-11 RX ADMIN — TRAMADOL HYDROCHLORIDE 50 MG: 50 TABLET, FILM COATED ORAL at 16:37

## 2021-06-11 ASSESSMENT — ENCOUNTER SYMPTOMS
COUGH: 0
VOMITING: 0
DIARRHEA: 0
SHORTNESS OF BREATH: 0
CHEST TIGHTNESS: 0
SORE THROAT: 0
RHINORRHEA: 0
NAUSEA: 0

## 2021-06-11 ASSESSMENT — PAIN SCALES - GENERAL: PAINLEVEL_OUTOF10: 4

## 2021-06-11 ASSESSMENT — PAIN DESCRIPTION - LOCATION: LOCATION: HIP

## 2021-06-11 NOTE — ED PROVIDER NOTES
[facility]  Urgent Care Encounter       CHIEF COMPLAINT       Chief Complaint   Patient presents with    Hip Pain     feels like it is out of place. Nurses Notes reviewed and I agree except as noted in the HPI. HISTORY OF PRESENT ILLNESS   Rui Hackett is a 38 y.o. female who [facility] for evaluation of right hip pain. She reports pain started roughly 2 to 3 days ago. She reports the pain to be posterior constantly rated at 3 out of 10. She reports at worst it is 7 or 8 out of 10. She reports aggravating factors including movement, sitting, or lying. She reports alleviating factors as not moving. She does report a recent history of cervical cancer and radiation therapy. She also was recently admitted for a rectal stricture and had a colectomy. She does report having outpatient physical therapy which has seemed to aggravate the pain. She reports the pain radiates to lower extremity. She reports mild lower extremity numbness and tingling. She does report that she is prescribed gabapentin, but reports she has not been taking it for a while. Denies dysuria or flank pain. The history is provided by the patient. No  was used. REVIEW OF SYSTEMS     Review of Systems   Constitutional: Negative for activity change, appetite change, chills, fatigue and fever. HENT: Negative for ear discharge, ear pain, rhinorrhea and sore throat. Respiratory: Negative for cough, chest tightness and shortness of breath. Cardiovascular: Negative for chest pain. Gastrointestinal: Negative for diarrhea, nausea and vomiting. Genitourinary: Negative for dysuria. Musculoskeletal: Positive for arthralgias. Skin: Negative for rash. Allergic/Immunologic: Negative for environmental allergies and food allergies. Neurological: Negative for dizziness and headaches.        PAST MEDICAL HISTORY         Diagnosis Date    Anxiety  Cancer of cervix ([facility] [address]     Depression     Hypertension        SURGICALHISTORY     Patient  has a past surgical history that includes Cholecystectomy (over 5 years ago); Tubal ligation (2006); Cervix biopsy (N/A, 6/7/2019); INSERTION / REMOVAL / REPLACEMENT VENOUS ACCESS CATHETER (N/A, 8/9/2019); other surgical history (10/10/2019); and Colonoscopy (09/29/2020).     CURRENT MEDICATIONS       Discharge Medication List as of 6/11/2021  4:59 PM      CONTINUE these medications which have NOT CHANGED    Details   GABAPENTIN PO Take by mouthHistorical Med      Acetaminophen (TYLENOL 8 HOUR PO) Take by mouthHistorical Med      ondansetron (ZOFRAN ODT) 4 MG disintegrating tablet Take 1 tablet by mouth every 8 hours as needed for Nausea, Disp-20 tablet, R-0Print      sucralfate (CARAFATE) 1 GM/10ML suspension Take 1 g by mouth 4 times dailyHistorical Med      pantoprazole (PROTONIX) 40 MG tablet Take 40 mg by mouth dailyHistorical Med      docusate sodium (COLACE) 100 MG capsule Take 100 mg by mouth 2 times dailyHistorical Med      lidocaine (LIDODERM) 5 % Place 1 patch onto the skin daily 12 hours on, 12 hours off., Disp-30 patch, R-2Normal      traZODone (DESYREL) 100 MG tablet Take 1 tablet by mouth nightly, Disp-30 tablet, R-6Normal      lactulose (CHRONULAC) 10 GM/15ML solution Take 15-30 mLs by mouth 2 times daily as needed (constipation), Disp-240 mL, R-1Print      mesalamine (DELZICOL) 400 MG CPDR delayed release capsule Take 800 mg by mouth 3 times dailyHistorical Med      polyethylene glycol (MIRALAX) 17 GM/SCOOP POWD powder Take 17 g by mouth daily, Disp-225 g, R-3Normal      potassium chloride (KLOR-CON M) 20 MEQ extended release tablet Take 1 tablet by mouth 2 times daily for 3 days, Disp-6 tablet, R-0Normal      dicyclomine (BENTYL) 20 MG tablet Take 1 tablet by mouth 4 times daily (before meals and nightly), Disp-40 tablet,R-0Print      dicyclomine (BENTYL) 10 MG capsule Take 1 capsule by mouth 4 times daily (before meals and nightly), Disp-120 capsule,R-0Print      phenazopyridine (PYRIDIUM) 100 MG tablet Take 100 mg by mouth 2 times dailyHistorical Med      aspirin 81 MG chewable tablet Take 1 tablet by mouth daily, Disp-30 tablet, R-3Normal      lidocaine-prilocaine (EMLA) 2.5-2.5 % cream Apply topically as needed. , Disp-30 g, R-2, Normal      prochlorperazine (COMPAZINE) 5 MG tablet Take 1 tablet by mouth every 6 hours as needed for Nausea, Disp-30 tablet, R-3Normal             ALLERGIES     Patient is has No Known Allergies. Patients   Immunization History   Administered Date(s) Administered    Pneumococcal Polysaccharide (Tcyaehyic91) 07/03/2017    Tdap (Boostrix, Adacel) 06/03/2015       FAMILY HISTORY     Patient's family history includes Cancer in her maternal grandmother and paternal grandmother. SOCIAL HISTORY     Patient  reports that she has been smoking cigarettes. She has a 20.00 pack-year smoking history. She has never used smokeless tobacco. She reports previous alcohol use of about 16.0 standard drinks of alcohol per week. She reports current drug use. Drug: Marijuana. PHYSICAL EXAM     ED TRIAGE VITALS  BP: 92/60, Temp: 97.8 °F (36.6 °C), Pulse: 111, Resp: 16, SpO2: 98 %,Estimated body mass index is 17.94 kg/m² as calculated from the following:    Height as of this encounter: 5' 8\" (1.727 m). Weight as of this encounter: 118 lb (53.5 kg). ,Patient's last menstrual period was 08/14/2019. Physical Exam  Vitals and nursing note reviewed. Constitutional:       General: She is not in acute distress. Appearance: Normal appearance. She is not ill-appearing, toxic-appearing or diaphoretic. HENT:      Head: Normocephalic. Right Ear: Ear canal and external ear normal.      Left Ear: Ear canal and external ear normal.      Nose: Nose normal. No congestion or rhinorrhea. Mouth/Throat:      Mouth: Mucous membranes are moist.      Pharynx: Oropharynx is clear.  No oropharyngeal exudate or posterior oropharyngeal erythema. Cardiovascular:      Rate and Rhythm: Normal rate. Pulses: Normal pulses. Pulmonary:      Effort: Pulmonary effort is normal. No respiratory distress. Breath sounds: No stridor. No wheezing or rhonchi. Abdominal:      General: Abdomen is flat. Bowel sounds are normal.      Palpations: Abdomen is soft. Musculoskeletal:         General: No swelling. Normal range of motion. Cervical back: Normal range of motion. Lumbar back: Tenderness present. Back:    Neurological:      General: No focal deficit present. Mental Status: She is alert and oriented to person, place, and time. Psychiatric:         Mood and Affect: Mood normal.         Behavior: Behavior normal.         DIAGNOSTIC RESULTS     Labs:No results found for this visit on 06/11/21. IMAGING:    XR HIP RIGHT (2-3 VIEWS)   Final Result   1. Unremarkable right hip series. **This report has been created using voice recognition software. It may contain minor errors which are inherent in voice recognition technology. **      Final report electronically signed by Dr Ganesh Esteban on 6/11/2021 4:39 PM            EKG: None      URGENT CARE COURSE:     Vitals:    06/11/21 1557   BP: 92/60   Pulse: 111   Resp: 16   Temp: 97.8 °F (36.6 °C)   TempSrc: Temporal   SpO2: 98%   Weight: 118 lb (53.5 kg)   Height: 5' 8\" (1.727 m)       Medications   orphenadrine (NORFLEX) injection 60 mg (60 mg Intramuscular Not Given 6/11/21 1628)   traMADol (ULTRAM) tablet 50 mg (50 mg Oral Given 6/11/21 1637)   cyclobenzaprine (FLEXERIL) tablet 10 mg (10 mg Oral Given 6/11/21 1637)            PROCEDURES:  None    FINAL IMPRESSION      1. Sciatica of right side          DISPOSITION/ PLAN     Patient seen and evaluated for right hip pain. Assessment consistent with right-sided sciatica.   I did obtain a right hip x-ray to rule out pathological fracture due to the recent radiation therapy. She received oral Flexeril and tramadol while at at urgent care. She has provided prescriptions for Flexeril and ketorolac. She is instructed to follow-up with PCP in 5 to 7 days with continued pain. She is agreeable to the above plan and denies questions or concerns at this time.       PATIENT REFERRED TO:  CHAPO Castano CNP  [address] / [address] [address]      DISCHARGE MEDICATIONS:  Discharge Medication List as of 6/11/2021  4:59 PM      START taking these medications    Details   cyclobenzaprine (FLEXERIL) 10 MG tablet Take 1 tablet by mouth 3 times daily as needed for Muscle spasms, Disp-21 tablet, R-0Normal      ketorolac (TORADOL) 10 MG tablet Take 1 tablet by mouth every 6 hours as needed for Pain, Disp-20 tablet, R-0Normal             Discharge Medication List as of 6/11/2021  4:59 PM          Discharge Medication List as of 6/11/2021  4:59 PM          CHAPO Sanchez CNP    (Please note that portions of this note were completed with a voice recognition program. Efforts were made to edit the dictations but occasionally words are mis-transcribed.)           CHAPO Sanchez CNP  06/11/21 6930

## 2021-06-11 NOTE — ED NOTES
To [facility] with complaints or right hip pain. Started about 2.5 days ago. No injury. States it feels like it is out of place, states it feels like there is a big lump in there. States she was in bed a lot and has recently started some in home therapy and wasn't sure if it was from that or not.  Much worse with moving     Gail Pineda RN  06/11/21 3282

## 2021-06-28 ENCOUNTER — OFFICE VISIT (OUTPATIENT)
Dept: FAMILY MEDICINE CLINIC | Age: 36
End: 2021-06-28
Payer: COMMERCIAL

## 2021-06-28 VITALS
HEIGHT: 68 IN | WEIGHT: 137.4 LBS | SYSTOLIC BLOOD PRESSURE: 116 MMHG | RESPIRATION RATE: 10 BRPM | TEMPERATURE: 98.5 F | BODY MASS INDEX: 20.82 KG/M2 | HEART RATE: 80 BPM | OXYGEN SATURATION: 98 % | DIASTOLIC BLOOD PRESSURE: 82 MMHG

## 2021-06-28 DIAGNOSIS — R60.0 PEDAL EDEMA: Primary | ICD-10-CM

## 2021-06-28 DIAGNOSIS — R63.5 WEIGHT GAIN: ICD-10-CM

## 2021-06-28 PROCEDURE — 4004F PT TOBACCO SCREEN RCVD TLK: CPT | Performed by: NURSE PRACTITIONER

## 2021-06-28 PROCEDURE — 99214 OFFICE O/P EST MOD 30 MIN: CPT | Performed by: NURSE PRACTITIONER

## 2021-06-28 PROCEDURE — G8427 DOCREV CUR MEDS BY ELIG CLIN: HCPCS | Performed by: NURSE PRACTITIONER

## 2021-06-28 PROCEDURE — G8420 CALC BMI NORM PARAMETERS: HCPCS | Performed by: NURSE PRACTITIONER

## 2021-06-28 RX ORDER — FAMOTIDINE 40 MG/1
40 TABLET, FILM COATED ORAL
COMMUNITY
Start: 2021-06-28 | End: 2021-11-29 | Stop reason: ALTCHOICE

## 2021-06-28 RX ORDER — SPIRONOLACTONE 25 MG/1
25 TABLET ORAL DAILY
Qty: 30 TABLET | Refills: 3 | Status: SHIPPED | OUTPATIENT
Start: 2021-06-28 | End: 2021-08-24

## 2021-06-28 RX ORDER — IBUPROFEN 600 MG/1
TABLET ORAL
COMMUNITY
Start: 2021-05-21 | End: 2021-11-29 | Stop reason: ALTCHOICE

## 2021-06-28 RX ORDER — GABAPENTIN 100 MG/1
CAPSULE ORAL
COMMUNITY
Start: 2021-05-21 | End: 2021-11-29 | Stop reason: ALTCHOICE

## 2021-06-28 ASSESSMENT — ENCOUNTER SYMPTOMS
ABDOMINAL DISTENTION: 0
RESPIRATORY NEGATIVE: 1
ABDOMINAL PAIN: 0

## 2021-06-28 NOTE — PROGRESS NOTES
[address] [place] [address]  Dept: [phone]  Dept Fax: [phone]  Loc: [phone]    Juliana Chávez is a 38 y.o. femalewho presents today for her medical conditions/complaints as noted below. Abdullahi Tothis c/o of Edema (both lower extremities swollen x 9 days Saturday & Sunday the worse)      HPI:      Pt here to discuss pedal edema. States for the last 9 days she has had swelling of her LE, states she has been up and around on her feet getting ready for an event and has noticed BLE edema. She also had recent abdominal surgery, has a colostomy, denies a fever chills or sweats. States the swelling is improved as the day goes on, it is getting better and swelling less, legs will feel tight and achey when swelling is worse. Luc any redness or warmth of the legs has not been traveling, no chest pain or sob. Has gained 10 pounds in the last month. Has not had any travel, both legs equally swollen. Current Outpatient Medications   Medication Sig Dispense Refill    gabapentin (NEURONTIN) 100 MG capsule       ibuprofen (ADVIL;MOTRIN) 600 MG tablet       famotidine (PEPCID) 40 MG tablet Take 40 mg by mouth      spironolactone (ALDACTONE) 25 MG tablet Take 1 tablet by mouth daily 30 tablet 3    ketorolac (TORADOL) 10 MG tablet Take 1 tablet by mouth every 6 hours as needed for Pain 20 tablet 0    GABAPENTIN PO Take by mouth      Acetaminophen (TYLENOL 8 HOUR PO) Take by mouth      pantoprazole (PROTONIX) 40 MG tablet Take 40 mg by mouth daily      docusate sodium (COLACE) 100 MG capsule Take 100 mg by mouth 2 times daily      lidocaine (LIDODERM) 5 % Place 1 patch onto the skin daily 12 hours on, 12 hours off.  30 patch 2    traZODone (DESYREL) 100 MG tablet Take 1 tablet by mouth nightly 30 tablet 6    lactulose (CHRONULAC) 10 GM/15ML solution Take 15-30 mLs by mouth 2 times daily as needed (constipation) 240 mL 1    polyethylene glycol (MIRALAX) 17 GM/SCOOP POWD powder Take 17 g by mouth daily 225 g 3    ondansetron (ZOFRAN ODT) 4 MG disintegrating tablet Take 1 tablet by mouth every 8 hours as needed for Nausea (Patient not taking: Reported on 6/28/2021) 20 tablet 0    sucralfate (CARAFATE) 1 GM/10ML suspension Take 1 g by mouth 4 times daily  (Patient not taking: Reported on 6/28/2021)      mesalamine (DELZICOL) 400 MG CPDR delayed release capsule Take 800 mg by mouth 3 times daily  (Patient not taking: Reported on 6/28/2021)      dicyclomine (BENTYL) 20 MG tablet Take 1 tablet by mouth 4 times daily (before meals and nightly) (Patient not taking: Reported on 6/28/2021) 40 tablet 0    dicyclomine (BENTYL) 10 MG capsule Take 1 capsule by mouth 4 times daily (before meals and nightly) (Patient not taking: Reported on 6/28/2021) 120 capsule 0    phenazopyridine (PYRIDIUM) 100 MG tablet Take 100 mg by mouth 2 times daily  (Patient not taking: Reported on 6/28/2021)      aspirin 81 MG chewable tablet Take 1 tablet by mouth daily (Patient not taking: Reported on 6/28/2021) 30 tablet 3    lidocaine-prilocaine (EMLA) 2.5-2.5 % cream Apply topically as needed. (Patient not taking: Reported on 6/28/2021) 30 g 2    prochlorperazine (COMPAZINE) 5 MG tablet Take 1 tablet by mouth every 6 hours as needed for Nausea (Patient not taking: Reported on 6/28/2021) 30 tablet 3     No current facility-administered medications for this visit.           Past Medical History:   Diagnosis Date    Anxiety     Cancer of cervix ([address]     Depression     Hypertension       Past Surgical History:   Procedure Laterality Date    CERVIX BIOPSY N/A 6/7/2019    COLD KNIFE CONE BX CERVIX performed by Pepe Dickey MD at [address] [place] [address]  over 5 years ago    ? dr Enriqueta Beaver  09/29/2020    INSERTION / REMOVAL / REPLACEMENT VENOUS ACCESS CATHETER N/A 8/9/2019    SINGLE LUMEN SMARTPORT INSERTION performed by Rolando Hendricks MD at [address]  10/10/2019    port removal-in office Dr Sindi Alcaraz     Family History   Problem Relation Age of Onset    Cancer Maternal Grandmother         ? kind    Cancer Paternal Grandmother         ? kind     Social History     Tobacco Use    Smoking status: Current Every Day Smoker     Packs/day: 1.00     Years: 20.00     Pack years: 20.00     Types: Cigarettes    Smokeless tobacco: Never Used   Substance Use Topics    Alcohol use: Not Currently     Alcohol/week: 16.0 standard drinks     Types: 16 Shots of liquor per week     Comment: 4 shots daily 4 times a week        No Known Allergies    Health Maintenance   Topic Date Due    Hepatitis C screen  Never done    COVID-19 Vaccine (1) Never done    Flu vaccine (Season Ended) 09/01/2021    Potassium monitoring  04/15/2022    Creatinine monitoring  04/15/2022    Cervical cancer screen  05/24/2024    DTaP/Tdap/Td vaccine (2 - Td or Tdap) 06/03/2025    Pneumococcal 0-64 years Vaccine (2 of 2 - PPSV23) 12/30/2048    Hepatitis B vaccine  Completed    HIV screen  Completed    Hepatitis A vaccine  Aged Out    Hib vaccine  Aged Out    Meningococcal (ACWY) vaccine  Aged Out    Varicella vaccine  Discontinued       Subjective:      Review of Systems   Constitutional: Negative for chills, fatigue and fever. Respiratory: Negative. Cardiovascular: Positive for leg swelling. Negative for chest pain and palpitations. Gastrointestinal: Negative for abdominal distention and abdominal pain. Musculoskeletal: Negative for arthralgias, gait problem and myalgias. Skin: Negative. Psychiatric/Behavioral: Negative for self-injury, sleep disturbance and suicidal ideas.        Objective:      /82   Pulse 80   Temp 98.5 °F (36.9 °C) (Oral)   Resp 10   Ht 5' 8.31\" (1.735 m)   Wt 137 lb 6.4 oz (62.3 kg)   LMP 08/14/2019   SpO2 98%   BMI 20.70 kg/m²      Physical Exam  Vitals and nursing note reviewed. Constitutional:       Appearance: She is not ill-appearing. Cardiovascular:      Rate and Rhythm: Normal rate and regular rhythm. Pulses: Normal pulses. Heart sounds: Normal heart sounds. No murmur heard. Comments: BLE pedal pulses equal and strong no LE erythema or warmth noted negative paradise's bilaterally    No abdominal ascites noted    Pulmonary:      Effort: Pulmonary effort is normal. No respiratory distress. Breath sounds: Normal breath sounds. No wheezing. Musculoskeletal:      Right lower le+ Edema present. Left lower le+ Edema present. Skin:     General: Skin is warm and dry. Capillary Refill: Capillary refill takes less than 2 seconds. Neurological:      Mental Status: She is alert. Psychiatric:         Mood and Affect: Mood normal.         Behavior: Behavior normal.         Thought Content: Thought content normal.         Judgment: Judgment normal.          Assessment/Plan:           1. Pedal edema  Declines potassium states she can not take it so will not order lasix or hctz, will order aldactone   question lymphedema due to recent surgery  Will monitor   Lab Results   Component Value Date     04/15/2021    K 3.8 04/15/2021    K 4.1 2020     04/15/2021    CO2 28 04/15/2021    BUN 7 04/15/2021    CREATININE 0.6 04/15/2021    GLUCOSE 104 04/15/2021    GLUCOSE 108 10/04/2020    CALCIUM 9.8 04/15/2021     Stable hctz or     2. Weight gain  Likely due to pedal edema  Wear sherry hose  Avoid extra salt in diet  Pt in agreement with plan       Return in about 3 days (around 2021). Reccommended tobaccocessation options including pharmacologic methods, counseled great than 3 minutesduring this visit:  Yes[]  No  []       Patient given educational materials -see patient instructions. Discussed use, benefit, and side effects of prescribedmedications. All patient questions answered.   Pt voiced understanding. Reviewedhealth maintenance. Instructed to continue current medications, diet and exercise. Patient agreed with treatment plan. Follow up as directed.        Electronicallysigned by CHAPO Ceunca CNP on 6/28/2021 at 3:54 PM

## 2021-07-01 ENCOUNTER — TELEPHONE (OUTPATIENT)
Dept: FAMILY MEDICINE CLINIC | Age: 36
End: 2021-07-01

## 2021-07-01 ENCOUNTER — OFFICE VISIT (OUTPATIENT)
Dept: FAMILY MEDICINE CLINIC | Age: 36
End: 2021-07-01
Payer: COMMERCIAL

## 2021-07-01 VITALS
HEIGHT: 68 IN | BODY MASS INDEX: 20.46 KG/M2 | SYSTOLIC BLOOD PRESSURE: 132 MMHG | WEIGHT: 135 LBS | OXYGEN SATURATION: 97 % | DIASTOLIC BLOOD PRESSURE: 80 MMHG | TEMPERATURE: 98.5 F | RESPIRATION RATE: 14 BRPM | HEART RATE: 76 BPM

## 2021-07-01 DIAGNOSIS — R63.5 WEIGHT GAIN: ICD-10-CM

## 2021-07-01 DIAGNOSIS — R60.0 PEDAL EDEMA: Primary | ICD-10-CM

## 2021-07-01 PROCEDURE — G8427 DOCREV CUR MEDS BY ELIG CLIN: HCPCS | Performed by: NURSE PRACTITIONER

## 2021-07-01 PROCEDURE — 4004F PT TOBACCO SCREEN RCVD TLK: CPT | Performed by: NURSE PRACTITIONER

## 2021-07-01 PROCEDURE — G8420 CALC BMI NORM PARAMETERS: HCPCS | Performed by: NURSE PRACTITIONER

## 2021-07-01 PROCEDURE — 99213 OFFICE O/P EST LOW 20 MIN: CPT | Performed by: NURSE PRACTITIONER

## 2021-07-01 ASSESSMENT — ENCOUNTER SYMPTOMS
ABDOMINAL PAIN: 0
ABDOMINAL DISTENTION: 0
RESPIRATORY NEGATIVE: 1

## 2021-07-01 NOTE — PROGRESS NOTES
[address] [place] [address]  [address] [place] [facility] [address]  Dept: [phone]  Dept Fax: [phone]  Loc: [phone]    Negro Boone is a 38 y.o. femalewho presents today for her medical conditions/complaints as noted below. Anuj Patel c/o of Edema (3 recheck bilateral lower leg edema. Pt doing better )      HPI:      Pt here to discuss pedal edema. States for the last 12 days she has had swelling of her LE, states she has been up and around on her feet getting ready for an event and has noticed BLE edema. She also had recent abdominal surgery, has a colostomy, denies a fever chills or sweats. States the swelling is improved as the day goes on, it is getting better and swelling less, legs will feel tight and achey when swelling is worse. Luc any redness or warmth of the legs has not been traveling, no chest pain or sob. Has gained 10 pounds in the last month. Has not had any travel, both legs equally swollen. Took aldactone for the last 3 days and has lost between 3-4 pounds, she has also been using ace wraps on her LE. States she feels much better. LLE edema is completely gone  , RLE remains with 1 non pitting edema, the right LE is tender to touch and she states it aches sometimes.           Current Outpatient Medications   Medication Sig Dispense Refill    gabapentin (NEURONTIN) 100 MG capsule       ibuprofen (ADVIL;MOTRIN) 600 MG tablet       famotidine (PEPCID) 40 MG tablet Take 40 mg by mouth      spironolactone (ALDACTONE) 25 MG tablet Take 1 tablet by mouth daily 30 tablet 3    Acetaminophen (TYLENOL 8 HOUR PO) Take by mouth      pantoprazole (PROTONIX) 40 MG tablet Take 40 mg by mouth daily      docusate sodium (COLACE) 100 MG capsule Take 100 mg by mouth 2 times daily      traZODone (DESYREL) 100 MG tablet Take 1 tablet by mouth nightly 30 tablet 6    polyethylene glycol (MIRALAX) 17 GM/SCOOP POWD powder Take 17 g by mouth daily 225 g 3     No current facility-administered medications for this visit. Past Medical History:   Diagnosis Date    Anxiety     Cancer of cervix ([facility] [address]     Depression     Hypertension       Past Surgical History:   Procedure Laterality Date    CERVIX BIOPSY N/A 6/7/2019    COLD KNIFE CONE BX CERVIX performed by Nicole Valentino MD at [address] [place] [address]  over 5 years ago    ? dr Angelica Velez  09/29/2020    INSERTION / REMOVAL / REPLACEMENT VENOUS ACCESS CATHETER N/A 8/9/2019    SINGLE LUMEN [facility]  [facility]    port removal-in office Dr Vee Shaw     Family History   Problem Relation Age of Onset    Cancer Maternal Grandmother         ? kind    Cancer Paternal Grandmother         ? kind     Social History     Tobacco Use    Smoking status: Current Every Day Smoker     Packs/day: 1.00     Years: 20.00     Pack years: 20.00     Types: Cigarettes    Smokeless tobacco: Never Used   Substance Use Topics    Alcohol use: Not Currently     Alcohol/week: 16.0 standard drinks     Types: 16 Shots of liquor per week     Comment: 4 shots daily 4 times a week        No Known Allergies    Health Maintenance   Topic Date Due    Hepatitis C screen  Never done    COVID-19 Vaccine (1) Never done    Flu vaccine (1) 09/01/2021    Potassium monitoring  04/15/2022    Creatinine monitoring  04/15/2022    Cervical cancer screen  05/24/2024    DTaP/Tdap/Td vaccine (2 - Td or Tdap) 06/03/2025    Pneumococcal 0-64 years Vaccine (2 of 2 - PPSV23) 12/30/2048    Hepatitis B vaccine  Completed    HIV screen  Completed    Hepatitis A vaccine  Aged Out    Hib vaccine  Aged Out    Meningococcal (ACWY) vaccine  Aged Out    Varicella vaccine  Discontinued       Subjective:      Review of Systems   Constitutional: Negative for chills, fatigue and fever. Respiratory: Negative. Cardiovascular: Positive for leg swelling. Negative for chest pain and palpitations. Gastrointestinal: Negative for abdominal distention and abdominal pain. Musculoskeletal: Negative for arthralgias, gait problem and myalgias. Skin: Negative. Psychiatric/Behavioral: Negative for self-injury, sleep disturbance and suicidal ideas. Objective:      /80   Pulse 76   Temp 98.5 °F (36.9 °C) (Oral)   Resp 14   Ht 5' 8\" (1.727 m)   Wt 135 lb (61.2 kg)   LMP 08/14/2019   SpO2 97%   BMI 20.53 kg/m²      Physical Exam  Vitals and nursing note reviewed. Constitutional:       Appearance: She is not ill-appearing. Cardiovascular:      Rate and Rhythm: Normal rate and regular rhythm. Pulses: Normal pulses. Heart sounds: Normal heart sounds. No murmur heard. Comments: BLE pedal pulses equal and strong no LE erythema or warmth noted negative paradise's bilaterally    No abdominal ascites noted    Pulmonary:      Effort: Pulmonary effort is normal. No respiratory distress. Breath sounds: Normal breath sounds. No wheezing. Musculoskeletal:      Right lower leg: Edema (1 + non pitting  BLE pedal pulses equal and strong ) present. Left lower leg: No edema. Skin:     General: Skin is warm and dry. Capillary Refill: Capillary refill takes less than 2 seconds. Neurological:      Mental Status: She is alert. Psychiatric:         Mood and Affect: Mood normal.         Behavior: Behavior normal.         Thought Content: Thought content normal.         Judgment: Judgment normal.          Assessment/Plan:           1. Pedal edema  Condition improving   Declines potassium states she can not take it so will not order lasix or hctz, continue with  aldactone   question lymphedema due to recent surgery  Will monitor   May keep ace wraps off for now and see if edema returns, if so begin using them again.    Pt in agreement with plan   Lab Results   Component Value Date     04/15/2021    K 3.8 04/15/2021    K 4.1 12/16/2020     04/15/2021    CO2 28 04/15/2021    BUN 7 04/15/2021    CREATININE 0.6 04/15/2021    GLUCOSE 104 04/15/2021    GLUCOSE 108 10/04/2020    CALCIUM 9.8 04/15/2021     Stable hctz or     2. Weight gain  Likely due to pedal edema  Wear sherry hose  Avoid extra salt in diet  Pt in agreement with plan       Return if symptoms worsen or fail to improve. Reccommended tobaccocessation options including pharmacologic methods, counseled great than 3 minutesduring this visit:  Yes[]  No  []       Patient given educational materials -see patient instructions. Discussed use, benefit, and side effects of prescribedmedications. All patient questions answered. Pt voiced understanding. Reviewedhealth maintenance. Instructed to continue current medications, diet and exercise. Patient agreed with treatment plan. Follow up as directed.        Electronicallysigned by CHAPO Orellana CNP on 7/1/2021 at 3:32 PM

## 2021-07-01 NOTE — TELEPHONE ENCOUNTER
Pt scheduled for appt today 7/1/21 with  Alena Riley at 3:00 . Provider requested that we call pt to see if this time still works for pt. Left detailed msg on elizabeth requesting pt to call back if appt time does not work for her.

## 2021-08-02 ENCOUNTER — HOSPITAL ENCOUNTER (OUTPATIENT)
Dept: GENERAL RADIOLOGY | Age: 36
Discharge: HOME | End: 2021-08-02
Payer: COMMERCIAL

## 2021-08-02 DIAGNOSIS — K56.699 COLONIC STRICTURE (HCC): ICD-10-CM

## 2021-08-02 PROCEDURE — 6360000004 HC RX CONTRAST MEDICATION: Performed by: NURSE PRACTITIONER

## 2021-08-02 PROCEDURE — 74270 X-RAY XM COLON 1CNTRST STD: CPT

## 2021-08-02 PROCEDURE — A4641 RADIOPHARM DX AGENT NOC: HCPCS | Performed by: NURSE PRACTITIONER

## 2021-08-02 RX ADMIN — BARIUM SULFATE 900 ML: 1.05 SUSPENSION ORAL; RECTAL at 10:24

## 2021-08-03 ENCOUNTER — NURSE ONLY (OUTPATIENT)
Dept: LAB | Age: 36
End: 2021-08-03

## 2021-08-03 DIAGNOSIS — R63.5 WEIGHT GAIN: ICD-10-CM

## 2021-08-03 DIAGNOSIS — R60.0 PEDAL EDEMA: ICD-10-CM

## 2021-08-03 LAB
ANION GAP SERPL CALCULATED.3IONS-SCNC: 14 MEQ/L (ref 8–16)
CHLORIDE BLD-SCNC: 108 MEQ/L (ref 98–111)
CO2: 23 MEQ/L (ref 23–33)
POTASSIUM SERPL-SCNC: 4.2 MEQ/L (ref 3.5–5.2)
SODIUM BLD-SCNC: 145 MEQ/L (ref 135–145)

## 2021-08-04 ENCOUNTER — TELEPHONE (OUTPATIENT)
Dept: FAMILY MEDICINE CLINIC | Age: 36
End: 2021-08-04

## 2021-08-04 NOTE — TELEPHONE ENCOUNTER
----- Message from Corinne Raja, APRN - CNP sent at 8/4/2021  8:01 AM EDT -----  Let pt know her labs are stable electrolytes in normal range continue all current meds

## 2021-08-12 ENCOUNTER — HOSPITAL ENCOUNTER (OUTPATIENT)
Age: 36
Discharge: HOME | End: 2021-08-12
Payer: COMMERCIAL

## 2021-08-12 ENCOUNTER — TELEPHONE (OUTPATIENT)
Dept: FAMILY MEDICINE CLINIC | Age: 36
End: 2021-08-12

## 2021-08-12 LAB
INFLUENZA A: NOT DETECTED
INFLUENZA B: NOT DETECTED
SARS-COV-2 RNA, RT PCR: NOT DETECTED

## 2021-08-12 PROCEDURE — 87636 SARSCOV2 & INF A&B AMP PRB: CPT

## 2021-08-12 NOTE — TELEPHONE ENCOUNTER
Patient states that she is scheduled for a surgery on 08.16.2021 and Dr Enriqueta Clifford has requested that she contact her PCP to order a EKG but that she does not need to have a pre-op appointment only the EKG.  Patient states that this is due to her home health nurse becoming alarmed when she explained to the [facility] nurse that she was having some indigestion issues  With jolynn being out of the office until Monday I suggested to the patient that she go to one of the  or schedule with a different provider in the office including using the walk-in clinic       Please advise

## 2021-08-12 NOTE — TELEPHONE ENCOUNTER
----- Message from Nuria Mccray sent at 8/12/2021  2:07 PM EDT -----  Subject: Message to Provider    QUESTIONS  Information for Provider? Pt need an EKG for her appointment on Monday she   does not have to do a pre-op but she just needs an EKG that surgeon   requested for her PCP to do the EKG.   ---------------------------------------------------------------------------  --------------  CALL BACK INFO  What is the best way for the office to contact you? OK to leave message on   voicemail  Preferred Call Back Phone Number? [phone]  ---------------------------------------------------------------------------  --------------  SCRIPT ANSWERS  Relationship to Patient?  Self

## 2021-08-12 NOTE — TELEPHONE ENCOUNTER
Even better  Carry on  Thanks     Future Appointments   Date Time Provider Mili Jamison   8/24/2021  1:00 PM CHAPO Ramirez

## 2021-08-12 NOTE — TELEPHONE ENCOUNTER
I spoke with Dacia Han from Dr Cody Condon office who states that the patient just had an EKG done on 08.06.2021 and does not need another one done    Patient has been informed and voiced understanding

## 2021-08-24 ENCOUNTER — OFFICE VISIT (OUTPATIENT)
Dept: FAMILY MEDICINE CLINIC | Age: 36
End: 2021-08-24
Payer: COMMERCIAL

## 2021-08-24 VITALS
HEIGHT: 68 IN | WEIGHT: 153.6 LBS | BODY MASS INDEX: 23.28 KG/M2 | HEART RATE: 104 BPM | OXYGEN SATURATION: 95 % | TEMPERATURE: 98.8 F | RESPIRATION RATE: 16 BRPM | SYSTOLIC BLOOD PRESSURE: 136 MMHG | DIASTOLIC BLOOD PRESSURE: 66 MMHG

## 2021-08-24 DIAGNOSIS — F51.04 PSYCHOPHYSIOLOGICAL INSOMNIA: ICD-10-CM

## 2021-08-24 DIAGNOSIS — R94.30 LOW LEFT VENTRICULAR EJECTION FRACTION: ICD-10-CM

## 2021-08-24 DIAGNOSIS — Z98.0 INTESTINAL BYPASS OR ANASTOMOSIS STATUS: ICD-10-CM

## 2021-08-24 DIAGNOSIS — R00.0 TACHYCARDIA: ICD-10-CM

## 2021-08-24 DIAGNOSIS — R23.9 ALTERATION IN SKIN INTEGRITY RELATED TO SURGICAL INCISION: ICD-10-CM

## 2021-08-24 DIAGNOSIS — Z09 HOSPITAL DISCHARGE FOLLOW-UP: Primary | ICD-10-CM

## 2021-08-24 PROCEDURE — 1111F DSCHRG MED/CURRENT MED MERGE: CPT | Performed by: NURSE PRACTITIONER

## 2021-08-24 PROCEDURE — G8427 DOCREV CUR MEDS BY ELIG CLIN: HCPCS | Performed by: NURSE PRACTITIONER

## 2021-08-24 PROCEDURE — 4004F PT TOBACCO SCREEN RCVD TLK: CPT | Performed by: NURSE PRACTITIONER

## 2021-08-24 PROCEDURE — G8420 CALC BMI NORM PARAMETERS: HCPCS | Performed by: NURSE PRACTITIONER

## 2021-08-24 PROCEDURE — 99204 OFFICE O/P NEW MOD 45 MIN: CPT | Performed by: NURSE PRACTITIONER

## 2021-08-24 RX ORDER — TRAZODONE HYDROCHLORIDE 100 MG/1
100 TABLET ORAL NIGHTLY
Qty: 30 TABLET | Refills: 6 | Status: SHIPPED | OUTPATIENT
Start: 2021-08-24 | End: 2022-07-28

## 2021-08-24 NOTE — PROGRESS NOTES
PO) Take by mouth      docusate sodium (COLACE) 100 MG capsule Take 100 mg by mouth 2 times daily          Medications patient taking as of now reconciled against medications ordered at time of hospital discharge: Yes    Chief Complaint   Patient presents with    Follow-up     would like to discuss aldactone     Post-op Problem     pt would like you to look at her incision     Follow-Up from Hospital     pt would like to talk to you in regards to heart issues the hospital was concerned about        History of Present illness - Follow up of Hospital diagnosis(es): s/p reanastomosis of bowel s/p radiation for cervical cancer      Inpatient course: Discharge summary reviewed- see chart. Interval history/Current status: stable, I did review pt hospital notes and d/C summary. Pt no longer taking spironolactone, denies any pedal edema. I did review lab results from [address] [place] [address] labs stable hgb a little at 10, sodium and potassium normal range, renal function normal range. Lab Results   Component Value Date    WBC 6.1 04/15/2021    HGB 13.5 04/15/2021    HCT 42.1 04/15/2021    MCV 96.8 04/15/2021     04/15/2021   states she had a suture stuck in her abdominal wound and she picked it out yesterday and has cleaned it with betadine. Denies a fever chills or sweats, eating and drinking well no N/V urinating well    taking 50 mg of trazodone at night for  Sleep it works well for her. and having normal BM's    A comprehensive review of systems was negative except for what was noted in the HPI. Vitals:    08/24/21 1302   BP: 136/66   Pulse: 104   Resp: 16   Temp: 98.8 °F (37.1 °C)   TempSrc: Oral   SpO2: 95%   Weight: 153 lb 9.6 oz (69.7 kg)   Height: 5' 8\" (1.727 m)     Body mass index is 23.35 kg/m².    Wt Readings from Last 3 Encounters:   08/24/21 153 lb 9.6 oz (69.7 kg)   07/01/21 135 lb (61.2 kg)   06/28/21 137 lb 6.4 oz (62.3 kg)     BP Readings from Last 3 Encounters:   08/24/21 136/66   07/01/21 132/80 06/28/21 116/82        Physical Exam:  Skin: warm and dry, no rash or erythema  Head: normocephalic and atraumatic  Pulmonary/Chest: clear to auscultation bilaterally- no wheezes, rales or rhonchi, normal air movement, no respiratory distress  Cardiovascular: normal S1 and S2, no gallops, intact distal pulses, no carotid bruits and noted to have a tachycardic rate, will order 48 hour holter and echo pt did have an echo in 2019 with borderline cardiomegaly with low ef of 55%. Will repeat. Pt states she had an issue in the hospital where they put an EKG on  Her. Abdomen: soft, non-tender, non-distended, normal bowel sounds, no masses or organomegaly and left lower quad incision noted with 5 sutures in place, wound edges well approximated at right end of wound, left end of wound with removal of scab formation, no foul odor noted the area is soft to touch and not warm. Extremities: no cyanosis, no clubbing and no edema    Assessment/Plan:  1. Hospital discharge follow-up    - [place] DISCHARGE MEDS RECONCILED W/ CURRENT OUTPATIENT MED LIST    2. Psychophysiological insomnia    - traZODone (DESYREL) 100 MG tablet; Take 1 tablet by mouth nightly  Dispense: 30 tablet; Refill: 6    3. Intestinal bypass or anastomosis status    - [place] DISCHARGE MEDS RECONCILED W/ CURRENT OUTPATIENT MED LIST    4. Tachycardia  rule out heart arrhythmia vs anxiety  - Holter Monitor 48 Hour; Future  - ECHO Complete 2D W Doppler W Color; Future    5. Low left ventricular ejection fraction  Re evaluate  - ECHO Complete 2D W Doppler W Color; Future    6.  Alteration in skin integrity related to surgical incision  Monitor  Continue wit to cleanse with betadine  Do not submerge in water  If develops redness or firm to touch or warm contact office  Pt in agreement with plan         Medical Decision Making: high complexity

## 2021-08-25 ENCOUNTER — TELEPHONE (OUTPATIENT)
Dept: FAMILY MEDICINE CLINIC | Age: 36
End: 2021-08-25

## 2021-08-25 NOTE — TELEPHONE ENCOUNTER
Holter monitor on 09/08/21 @ 9:30 [facility]. Echo to follow. Arrive at 9:15. Mask, ID, insurance cards.

## 2021-08-27 NOTE — TELEPHONE ENCOUNTER
Called pt and she states she can't do the ECHO that day. Provided pt with central scheduling number and she will call on her own to due to scheduling conflicts.

## 2021-09-10 ENCOUNTER — HOSPITAL ENCOUNTER (OUTPATIENT)
Dept: NON INVASIVE DIAGNOSTICS | Age: 36
Discharge: HOME | End: 2021-09-10
Payer: COMMERCIAL

## 2021-09-10 DIAGNOSIS — R94.30 LOW LEFT VENTRICULAR EJECTION FRACTION: ICD-10-CM

## 2021-09-10 DIAGNOSIS — R00.0 TACHYCARDIA: ICD-10-CM

## 2021-09-10 LAB
LV EF: 60 %
LVEF MODALITY: NORMAL

## 2021-09-10 PROCEDURE — 93306 TTE W/DOPPLER COMPLETE: CPT

## 2021-09-10 PROCEDURE — 93226 XTRNL ECG REC<48 HR SCAN A/R: CPT

## 2021-09-10 PROCEDURE — 93225 XTRNL ECG REC<48 HRS REC: CPT

## 2021-09-10 NOTE — PROCEDURES
The skin was prepped and a 48 hr holter monitor was applied. The patient was instructed on the documentation of symptoms and the purpose of the holter as well as the things to avoid while wearing the holter. The patient was instructed to remove and return the holter on 9/12/21 . The serial number of the holter that was applied is [id number].

## 2021-09-13 ENCOUNTER — TELEPHONE (OUTPATIENT)
Dept: FAMILY MEDICINE CLINIC | Age: 36
End: 2021-09-13

## 2021-09-13 NOTE — TELEPHONE ENCOUNTER
----- Message from CHAPO Miller - CNP sent at 9/13/2021  1:10 PM EDT -----  Let pt know her echo is normal, heart output is in normal range and all valves and chambers functioning well, let me know if any questions

## 2021-09-15 ENCOUNTER — TELEPHONE (OUTPATIENT)
Dept: FAMILY MEDICINE CLINIC | Age: 36
End: 2021-09-15

## 2021-09-15 LAB
ACQUISITION DURATION: NORMAL S
AVERAGE HEART RATE: 94 BPM
HOOKUP DATE: NORMAL
HOOKUP TIME: NORMAL
MAX HEART RATE TIME/DATE: NORMAL
MAX HEART RATE: 135 BPM
MIN HEART RATE TIME/DATE: NORMAL
MIN HEART RATE: 67 BPM
NUMBER OF QRS COMPLEXES: NORMAL
NUMBER OF SUPRAVENTRICULAR COUPLETS: 0
NUMBER OF SUPRAVENTRICULAR ECTOPICS: 3
NUMBER OF SUPRAVENTRICULAR ISOLATED BEATS: 3
NUMBER OF VENTRICULAR BIGEMINAL CYCLES: 0
NUMBER OF VENTRICULAR COUPLETS: 0
NUMBER OF VENTRICULAR ECTOPICS: 12

## 2021-09-15 NOTE — TELEPHONE ENCOUNTER
----- Message from CHAPO Johnson CNP sent at 9/15/2021 11:27 AM EDT -----  Let pt know her holter test is back, her heart rate ranged from 135 beats to 67 beats average heart rate normal at 67 , cardiologist identified NSR, no other heart arrhythmias or sustained fast heart rate, nothing to do at this point with medications unless pt having a lot of heart palpitations. If not would monitor for know. Let me know if symptoms change.

## 2021-11-29 ENCOUNTER — OFFICE VISIT (OUTPATIENT)
Dept: FAMILY MEDICINE CLINIC | Age: 36
End: 2021-11-29
Payer: COMMERCIAL

## 2021-11-29 VITALS
HEART RATE: 80 BPM | BODY MASS INDEX: 27.13 KG/M2 | HEIGHT: 68 IN | DIASTOLIC BLOOD PRESSURE: 86 MMHG | TEMPERATURE: 98.6 F | OXYGEN SATURATION: 98 % | WEIGHT: 179 LBS | RESPIRATION RATE: 12 BRPM | SYSTOLIC BLOOD PRESSURE: 118 MMHG

## 2021-11-29 DIAGNOSIS — Z90.49 S/P COLECTOMY: Primary | ICD-10-CM

## 2021-11-29 DIAGNOSIS — F51.04 PSYCHOPHYSIOLOGICAL INSOMNIA: ICD-10-CM

## 2021-11-29 DIAGNOSIS — M62.89 PELVIC FLOOR DYSFUNCTION: ICD-10-CM

## 2021-11-29 DIAGNOSIS — K59.00 CONSTIPATION, UNSPECIFIED CONSTIPATION TYPE: ICD-10-CM

## 2021-11-29 DIAGNOSIS — Z72.0 TOBACCO ABUSE: ICD-10-CM

## 2021-11-29 PROBLEM — K56.699 STRICTURE OF COLON (HCC): Status: ACTIVE | Noted: 2021-11-29

## 2021-11-29 PROCEDURE — G8484 FLU IMMUNIZE NO ADMIN: HCPCS | Performed by: NURSE PRACTITIONER

## 2021-11-29 PROCEDURE — G8419 CALC BMI OUT NRM PARAM NOF/U: HCPCS | Performed by: NURSE PRACTITIONER

## 2021-11-29 PROCEDURE — 99214 OFFICE O/P EST MOD 30 MIN: CPT | Performed by: NURSE PRACTITIONER

## 2021-11-29 PROCEDURE — 4004F PT TOBACCO SCREEN RCVD TLK: CPT | Performed by: NURSE PRACTITIONER

## 2021-11-29 PROCEDURE — G8427 DOCREV CUR MEDS BY ELIG CLIN: HCPCS | Performed by: NURSE PRACTITIONER

## 2021-11-29 SDOH — ECONOMIC STABILITY: FOOD INSECURITY: WITHIN THE PAST 12 MONTHS, YOU WORRIED THAT YOUR FOOD WOULD RUN OUT BEFORE YOU GOT MONEY TO BUY MORE.: NEVER TRUE

## 2021-11-29 SDOH — ECONOMIC STABILITY: FOOD INSECURITY: WITHIN THE PAST 12 MONTHS, THE FOOD YOU BOUGHT JUST DIDN'T LAST AND YOU DIDN'T HAVE MONEY TO GET MORE.: NEVER TRUE

## 2021-11-29 ASSESSMENT — ENCOUNTER SYMPTOMS
RESPIRATORY NEGATIVE: 1
CONSTIPATION: 1
ABDOMINAL DISTENTION: 0
NAUSEA: 0
ABDOMINAL PAIN: 0
DIARRHEA: 0

## 2021-11-29 ASSESSMENT — SOCIAL DETERMINANTS OF HEALTH (SDOH): HOW HARD IS IT FOR YOU TO PAY FOR THE VERY BASICS LIKE FOOD, HOUSING, MEDICAL CARE, AND HEATING?: NOT HARD AT ALL
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
## 2021-12-09 ENCOUNTER — OFFICE VISIT (OUTPATIENT)
Dept: BEHAVIORAL/MENTAL HEALTH CLINIC | Age: 36
End: 2021-12-09
Payer: COMMERCIAL

## 2021-12-09 DIAGNOSIS — F41.1 GENERALIZED ANXIETY DISORDER: ICD-10-CM

## 2021-12-09 DIAGNOSIS — F32.1 CURRENT MODERATE EPISODE OF MAJOR DEPRESSIVE DISORDER, UNSPECIFIED WHETHER RECURRENT (HCC): Primary | ICD-10-CM

## 2021-12-09 PROCEDURE — 4004F PT TOBACCO SCREEN RCVD TLK: CPT | Performed by: SOCIAL WORKER

## 2021-12-09 PROCEDURE — 90834 PSYTX W PT 45 MINUTES: CPT | Performed by: SOCIAL WORKER

## 2021-12-09 NOTE — PATIENT INSTRUCTIONS
1. Bereavement, Grief & Mourning        Bereavement is the state of having lost a significant other to death. Grief is the personal response to the loss. Mourning is the public expression of that loss. What is Normal Grief? Grief reactions vary depending on who we are, who we lost, our relationship with that person, the circumstances around their passing, and how much their loss affects our day-to-day functioning. Different people may express grief differently and you may even have different grief responses between one loss and another. Reactions to grief and loss include not just emotional symptoms, but also behavioral and physical symptoms. These reactions can often change over time. All are normal for a short period of time. Emotional Behavioral Physical   Shock, denial,                   numbness Crying unexpectedly Exhaustion/Fatigue      Sadness, anxiety, guilt,       fear Sleep changes (increase or decrease) Decreased energy        Anger (at others or        God), Not eating/Weight changes Memory problems        Irritability, frustration Withdrawing from others Stomach and intestinal upset    Restlessness, difficulty concentrating, trouble making decisions Pain and headaches     Symptoms that are not normal and may signal the need to talk to a professional include:  Use of drugs, alcohol, violence, and thoughts of killing oneself. The duration of grief varies from person to person. Current research shows that the average recovery time is 18 to 24 months. Also, grief reactions can be stronger around anniversary or other significant dates, such as the anniversary of the persons death, birthdays, and holidays. The Stages of Grief   Grief therapists often describe stages of grief outlined by the research of Dr. Alva Mriza. These stages do not always go in order. You may move back and forth among some of the stages and may even skip some of them.   These stages are meant as a guide to help you understand your reactions and those of others who are grieving.  - Denial:  Denial (not acknowledging the loss) can help contain the shock of loss. Denial can act as a safety mechanism to block out grief until we are ready to handle it. - Sadness and depression:  Deep, intense grief and mourning appear during this stage. When the full understanding of our loss comes, it can seem overwhelming. During this stage, you may cry often and unexpectedly. You may not want to be around people or to do things that you normally enjoy. During this stage, it is best to remain as active as possible and to seek supportive people who will allow you to say what you need to or to cry when you need to. It is important to allow yourself to work through your full range and experience of emotions. - Anger:  Rage and anger can be intense toward the person who , toward friends and relatives, and even toward God. It is important to have an outlet to release anger through activities such as exercise, hobbies, or through therapy. Guilt, shame, and blame are feelings that need to be addressed, especially if it is toward you. - Acceptance: This stage includes coming to terms with the loss. It does not mean that you have found the answers to your questions or that you stop thinking about the person who is gone. It does signify a reinvestment in life and a willingness to readjust to your new circumstances while carrying the memory of your loved one with you. How to Help Yourself  1. Give yourself time to grieve. It is normal and important to express your grief and to work through the concerns that arise for you at this time. Stuffing your feelings may not be helpful and may delay or prolong your grief. 2. Find supportive people to reach out to during your grief. This is the time when the support of others may be the most helpful.   Dont be afraid to tell them how they can best help, even if it means just listening. It is often very helpful to talk about your loss with people who will allow you to express your emotions. 3. Take care of your health. Often after a loss, we stop doing the things we need to for health care, such as exercising, eating correctly, keeping Dr. appointments, or taking prescribed medications. If you are on a health care regimen, it is important to continue to adhere to your treatment. 4. Postpone major life changes. Give yourself time to adjust to your loss before making plans to change jobs, move or sell your home, remarry, etc.  Grief can sometimes cloud your judgment and ability to make decisions. 5. Consider keeping a journal.  It is often helpful to write or tell the story of your loss and what it means to you as a way to work through your feelings. 6. Participate in activities. Staying active through exercise, enjoyable activities, outings with supportive others, or even starting new hobbies can help us get through tough times while providing opportunities for constructive development and use of energy. 7. Find a way to memorialize your loved one. Planting a tree or garden in the name of your loved one, dedicating a work to their memory, contributing to a maria elena in their name, and other such activities can be helpful. 8. Consider joining grief-support groups or contacting a grief counselor for additional support and help. Remember that depressive symptoms (feeling sad) are a fundamental part of normal bereavement. Staying active and finding support from others can help you to work through the grief process. Myths and facts about grief  MYTH: The pain will go away faster if you ignore it. Fact: Trying to ignore your pain or keep it from surfacing will only make it worse in the long run. For real healing it is necessary to face your grief and actively deal with it. MYTH: Its important to be be strong in the face of loss.    Fact: Feeling sad, frightened, or lonely is a normal reaction to loss. Crying doesnt mean you are weak. You dont need to protect your family or friends by putting on a brave front. Showing your true feelings can help them and you. MYTH: If you dont cry, it means you arent sorry about the loss. Fact: Crying is a normal response to sadness, but its not the only one. Those who dont cry may feel the pain just as deeply as others. They may simply have other ways of showing it. MYTH: Grief should last about a year. Fact: There is no right or wrong time frame for grieving. How long it takes can differ from person to person. Source: [facility]         References  ANJUM Avila, Beth Merino T, AMADOU Adame, Elissa HC, & PANCHO Buck. (2005). Does widowhood affect memory performance of older persons? Psychological Medicine, 35(2), 217-226. Chelo Peña, MANA Monae, & MOSES Jasso. (2005). Health behaviors associated with better quality of life for older bereaved persons. Journal of Palliative Medicine, 8(1), . Jeimy Hector.  (2004). Working with grieving adults. Advances in Psychiatric Treatment, 10, 164-170. Norah Martinez JT, [place], [facility], & Manchesteryuri Jackson. (2004). Life after death: Greif therapy after the suddent traumatic death of a family member. Perspectives in Psychiatric Care, 40(4), 339-439. Ernesto, [facility], , SOBIA Farmer, & HECTOR Uribe.  (2000). Bereavement services in acute care settings. Death Studies, 24, 51-64. Kristel Mckeon, Delbert E G Theodore, [place]  (2000). Psychotherapy of traumatic grief:  A review of evidence for psychotherapeutic treatment. Death Studies, 24, 479-495. JEFF Duenas. (2004). Connections between counseling theories and current theories of grief and mourning.   Journal of Mental Health Counseling, 26(2), 125-145.      2. Depression: Facts, Myths & Tips for Feeling Better    Facts    Depression is very common  Nearly 20% of the [place] population experiences a significant depression during their lifetime. Depression is treatable  Because depression affects so many, and can have such a powerful and negative impact on life, there has been an enormous amount of research conducted on how to reduce symptoms and improve functioning. As a result, we now know there are behaviors YOU can engage in to make yourself feel better. Depression is not a weakness  People suffer from depression for a variety of reasons, biological, environmental and behavioral.  Research indicates that [employer] is NOT one of the reasons people become depressed. Depression is not something you are powerless against  Evidence suggests that you can directly impact the intensity and duration of depression by what you do and by altering the way you think about certain things. The Downward Spiral    Depression often begins as a drop in mood due to an environmental or biological trigger that makes people feel less like being active. Being less active, in turn, often causes people to experience an even lower mood and feel even less like being active, and so the cycle begins. How can I start feeling better? The first and best way to reverse the downward cycle is to get active! Your body produces its own anti-depressants every time you exercise or do something pleasurable. Regular exercise is one of the very best ways to improve your mood. In fact some studies have shown that a solid exercise program is as effective as psychotherapy or anti-depressant medication for some people. *See physical activity section of handout    Force yourself to do something you found pleasurable before depression. This may be different for everyone and it doesnt matter if its gardening, playing bridge, walking, reading a novel, or simply talking to a close friend. What matters is that YOU find the activity pleasurable! Even if you dont feel like doing something pleasurable for yourself, DO IT ANYWAY.  We call this the fake it until you make it principle. Educate yourself! Often people feel powerless against medical conditions because they do not understand what is happening in their body. Just by reading this handout you know more than most people about depression. Knowledge is power when you can apply it, and make yourself feel better. *See recommended reading list at the bottom of this handout\"    Begin to notice unhealthy and unhelpful thoughts! In addition to how we behave, how we think influences our mood directly. Notice recurrent or alarming thoughts that have an impact on your mood. Ask yourself is this type of thinking helping me or hurting me?  if your answer is it's hurting me here are some things you can do: *see disputation of negative thoughts section of handout  - Challenge the negative thought. Is it truly accurate? Wheres the proof? Become your own  and collect the facts.  - Reframe the negative thought. How can I think differently about this problem, situation, or view of myself? Allow yourself to view a situation from more than one angle, how might my spouse, friend, or someone I admire view this same problem?  - Use the best friend scenario. How would you help your best friend if he or she was having these same thoughts? Would you criticize him or her as harshly as you criticize yourself? Remember, YOU know YOU better than anyone else. You likely know what kinds of activities, thoughts and reinforcement you respond to. Doing whats easiest and most doable is the key. Pick 1 or 2 things that are easy and get started feeling better TODAY! * Use the following handout sections to guide you through behavioral and thinking exercises to help you manage your depressive symptoms, improve your functioning and to begin living your life well again. Recommended readings on managing depression    - Feeling Good by Dr. Lizbeth Medina.  Brandt     - [employer] [place] by Yisel Pacheco and [facility] by Dr. Alexx Lazaro by Dr. Angeles Monahan. Sap[place]ky      Disputation:  Challenging Upsetting Thinking    Examine your thoughts for key words:  9. must, need, got to, have to, should (unrealistic standards)  10. never, always, completely, totally, all everything, everyone (predictions /  labeling)  11. awful, terrible, horrible, unbearable, disaster, worst ever (labeling / predictions)  12. jerk, slob, creep, hypocrite, bully, stupid (labels)  Dispute or question the accuracy of the questionable thoughts. 1. Am I upsetting myself unnecessarily? How can I see this another way? 2. Is my thinking working for or against me? How could I view this in a less upsetting way? 3. What am I demanding must happen? What do I want or prefer, rather than need? 4. Am I making something too terrible? Is it really that awful? What would be so terrible about that? 5. Am I labeling a person? What is the action that I dont like? 6. Whats untrue about my thoughts? How can I stick to the facts? Whats the proof for what I am thinking or believing about this? 7. Am I using extreme, black-and-white language? What less extreme words might be more accurate? 8. Am I fortune telling or mind reading in a way that gets me upset or unhappy? What are the odds (percent chance -- e.g., there is a 5% chance. ..) that it will really turn out the way Im thinking or imagining? 9. What are my options in this situation? How would I like to respond? 10. Create more moderate, helpful, or realistic statements to replace the upsetting ones. 11. Have I had any experiences that show that this thought might not be totally true? 12. If my best friend or someone I loved had this thought, what would I tell them? 15. If my best friend or someone I loved knew I was thinking this thought, what would they say to me?   What evidence would they point out to me that would suggest that my thought is not completely true? 14. Are there strengths in me or positives in the situation that I am ignoring? Am I underestimating my ability to cope with unfortunate circumstances? 15. When I am not feeling this way, do I think about this situation any differently? How?  16. Have I been in this type of situation before? What happened? What have I learned from prior experiences that could help me now? 17. Five years from now, if I look back on this situation, will I look at it any differently? Will I focus on any different part of my experience? 25. Am I blaming myself for something over which I do not have complete control? 3. What is deep breathing? Deep breathing involves using your diaphragm muscle to help bring about a state of physiological relaxation. The diaphragm is a large muscle that rests across the bottom of your rib cage. When you inhale, the diaphragm muscle drops, opening up space so air can come in. When watching someone do this it looks like your stomach is filling with air. This type of breathing helps activate the part of your nervous system that controls relaxation. It can lead to decreased heart rate, blood pressure, muscle tension and overall feelings of relaxation. Why be concerned with how I'm breathing?    -To increase your awareness of the role that breathing plays in increased physical tension and contributing to your body's stress response.  -To lower your level of stress-related arousal and tension.  -To give you a method of taking calm, relaxing breaths immediately in a stressful situation to break the cycle of increasing arousal.    What is the best way to use deep breathing exercises?    -Use deep breathing frequently. -Take deep breaths at the first signs of stress, anxiety, physical tension, or other symptoms.  -Schedule time for relaxation. My scheduled time for deep breathing will be _AM, NOON, PM________.     13. Pt will promote effective self care habits daily/weekly-rest, relaxation, stress management, supportive relationships, increased physical outlets, engagement in enjoyable activities.   14. Pt will follow up with JENNI Madera

## 2021-12-09 NOTE — PSYCHOTHERAPY
Behavioral Health Consultation  IBRAHIMA Mendiola-S  12/9/2021  10:29 AM EDT      Time spent with Patient: 38minutes  This is patient's second [facility] appointment. Reason for Consult:    Chief Complaint   Patient presents with    Depression    Anxiety    Other     grief reaction     Referring Provider: No referring provider defined for this encounter. Pt provided informed consent for the behavioral health program. Discussed with patient model of service to include the limits of confidentiality (i.e. abuse reporting, suicide intervention, etc.) and short-term intervention focused approach. Pt indicated understanding. Feedback given to PCP. S:  Pt assessed symptoms of anxiety and depression remain high in frequency and intensity. She acknowledged that the loss of her brother and ongoing areas of stress are impacting her symptoms. Pt reports symptoms of feeling anxious, sad, avoidant of triggers, difficulties sleeping. Pt was guided in exploring areas of behavioral change, development of effective coping strategies, and assessing the management of environmental factors influencing the problem. Pt denied thoughts that she would be better off dead. Pt was advised of indications of depressive symptoms and instructed to monitor if symptoms worsen or interfere with daily functioning, to consider following-up with either your primary care team or a behavioral health provider for possible counseling or medication management. If suicidal thoughts are experienced, call 911. An additional 24/7 resource is the [facility] at [phone] ([phone]). Pt will attend a follow up appointment next week.     O:  MSE:    Appearance    cooperative  Appetite abnormal: poor  Sleep disturbance Yes  Fatigue Yes  Loss of pleasure Yes  Impulsive behavior No  Speech    normal volume  Mood    euthymic   Affect    normal affect  Thought Content    intact  Thought Process    coherent  Associations    logical connections  Insight    Fair  Judgment    Intact  Orientation    oriented to person, place, time, and general circumstances  Memory    recent and remote memory intact  Attention/Concentration    intact  Morbid ideation No  Suicide Assessment    no suicidal ideation    History:    TOBACCO:   reports that she has been smoking cigarettes. She has a 20.00 pack-year smoking history. She has never used smokeless tobacco.  ETOH:   reports previous alcohol use of about 16.0 standard drinks of alcohol per week. Family History:   Family History   Problem Relation Age of Onset    Cancer Maternal Grandmother         ? kind    Cancer Paternal Grandmother         ? kind       A:    Diagnosis:    1. Current moderate episode of major depressive disorder, unspecified whether recurrent ([facility]    2. Generalized anxiety disorder          Diagnosis Date    Anxiety     Cancer of cervix ([facility]     Depression     Hypertension        Plan: Pt will attend a follow up appointment next week to assess symptoms and evaluate effectiveness of coping skills. Pt interventions:  Provided handout on  depression and anxiety, Trained in relaxation strategies and Discussed self-care (sleep, nutrition, rewarding activities, social support, exercise)    Pt Behavioral Change Plan:   1. Pt will read handouts regarding depression, anxiety,  relaxation techniques, and self care. 2. Pt will practice self calming skills 2-3x's daily for 3-5 minutes. 3. Pt will promote effective self care habits daily/weekly-rest, relaxation, stress management, supportive relationships, increased physical outlets, engagement in enjoyable activities.   4. Pt will follow up with JENNI Santana

## 2021-12-17 ENCOUNTER — OFFICE VISIT (OUTPATIENT)
Dept: BEHAVIORAL/MENTAL HEALTH CLINIC | Age: 36
End: 2021-12-17
Payer: COMMERCIAL

## 2021-12-17 DIAGNOSIS — F41.1 GENERALIZED ANXIETY DISORDER: ICD-10-CM

## 2021-12-17 DIAGNOSIS — F32.1 CURRENT MODERATE EPISODE OF MAJOR DEPRESSIVE DISORDER, UNSPECIFIED WHETHER RECURRENT (HCC): Primary | ICD-10-CM

## 2021-12-17 PROCEDURE — 4004F PT TOBACCO SCREEN RCVD TLK: CPT | Performed by: SOCIAL WORKER

## 2021-12-17 PROCEDURE — 90834 PSYTX W PT 45 MINUTES: CPT | Performed by: SOCIAL WORKER

## 2021-12-17 NOTE — PATIENT INSTRUCTIONS
1. Bereavement, Grief & Mourning        Bereavement is the state of having lost a significant other to death. Grief is the personal response to the loss. Mourning is the public expression of that loss. What is Normal Grief? Grief reactions vary depending on who we are, who we lost, our relationship with that person, the circumstances around their passing, and how much their loss affects our day-to-day functioning. Different people may express grief differently and you may even have different grief responses between one loss and another. Reactions to grief and loss include not just emotional symptoms, but also behavioral and physical symptoms. These reactions can often change over time. All are normal for a short period of time. Emotional Behavioral Physical   Shock, denial,                   numbness Crying unexpectedly Exhaustion/Fatigue      Sadness, anxiety, guilt,       fear Sleep changes (increase or decrease) Decreased energy        Anger (at others or        God), Not eating/Weight changes Memory problems        Irritability, frustration Withdrawing from others Stomach and intestinal upset    Restlessness, difficulty concentrating, trouble making decisions Pain and headaches     Symptoms that are not normal and may signal the need to talk to a professional include:  Use of drugs, alcohol, violence, and thoughts of killing oneself. The duration of grief varies from person to person. Current research shows that the average recovery time is 18 to 24 months. Also, grief reactions can be stronger around anniversary or other significant dates, such as the anniversary of the persons death, birthdays, and holidays. The Stages of Grief   Grief therapists often describe stages of grief outlined by the research of Dr. Vadim Coombs. These stages do not always go in order. You may move back and forth among some of the stages and may even skip some of them.   These stages are meant as a guide to help you understand your reactions and those of others who are grieving.  - Denial:  Denial (not acknowledging the loss) can help contain the shock of loss. Denial can act as a safety mechanism to block out grief until we are ready to handle it. - Sadness and depression:  Deep, intense grief and mourning appear during this stage. When the full understanding of our loss comes, it can seem overwhelming. During this stage, you may cry often and unexpectedly. You may not want to be around people or to do things that you normally enjoy. During this stage, it is best to remain as active as possible and to seek supportive people who will allow you to say what you need to or to cry when you need to. It is important to allow yourself to work through your full range and experience of emotions. - Anger:  Rage and anger can be intense toward the person who , toward friends and relatives, and even toward God. It is important to have an outlet to release anger through activities such as exercise, hobbies, or through therapy. Guilt, shame, and blame are feelings that need to be addressed, especially if it is toward you. - Acceptance: This stage includes coming to terms with the loss. It does not mean that you have found the answers to your questions or that you stop thinking about the person who is gone. It does signify a reinvestment in life and a willingness to readjust to your new circumstances while carrying the memory of your loved one with you. How to Help Yourself  1. Give yourself time to grieve. It is normal and important to express your grief and to work through the concerns that arise for you at this time. Stuffing your feelings may not be helpful and may delay or prolong your grief. 2. Find supportive people to reach out to during your grief. This is the time when the support of others may be the most helpful.   Dont be afraid to tell them how they can best help, even if it means just listening. It is often very helpful to talk about your loss with people who will allow you to express your emotions. 3. Take care of your health. Often after a loss, we stop doing the things we need to for health care, such as exercising, eating correctly, keeping Dr. appointments, or taking prescribed medications. If you are on a health care regimen, it is important to continue to adhere to your treatment. 4. Postpone major life changes. Give yourself time to adjust to your loss before making plans to change jobs, move or sell your home, remarry, etc.  Grief can sometimes cloud your judgment and ability to make decisions. 5. Consider keeping a journal.  It is often helpful to write or tell the story of your loss and what it means to you as a way to work through your feelings. 6. Participate in activities. Staying active through exercise, enjoyable activities, outings with supportive others, or even starting new hobbies can help us get through tough times while providing opportunities for constructive development and use of energy. 7. Find a way to memorialize your loved one. Planting a tree or garden in the name of your loved one, dedicating a work to their memory, contributing to a maria elena in their name, and other such activities can be helpful. 8. Consider joining grief-support groups or contacting a grief counselor for additional support and help. Remember that depressive symptoms (feeling sad) are a fundamental part of normal bereavement. Staying active and finding support from others can help you to work through the grief process. Myths and facts about grief  MYTH: The pain will go away faster if you ignore it. Fact: Trying to ignore your pain or keep it from surfacing will only make it worse in the long run. For real healing it is necessary to face your grief and actively deal with it. MYTH: Its important to be be strong in the face of loss.    Fact: Feeling sad, frightened, or lonely is a normal reaction to loss. Crying doesnt mean you are weak. You dont need to protect your family or friends by putting on a brave front. Showing your true feelings can help them and you. MYTH: If you dont cry, it means you arent sorry about the loss. Fact: Crying is a normal response to sadness, but its not the only one. Those who dont cry may feel the pain just as deeply as others. They may simply have other ways of showing it. MYTH: Grief should last about a year. Fact: There is no right or wrong time frame for grieving. How long it takes can differ from person to person. Source: [facility]         References  ANJUM Oseguera, Ava Eckert T, AMADOU Adame, Elissa HC, & PANCHO Buck. (2005). Does widowhood affect memory performance of older persons? Psychological Medicine, 35(2), 217-226. Ekaterina Villavicencio, MANA Monae, & JOHANNA Jasso (2005). Health behaviors associated with better quality of life for older bereaved persons. Journal of Palliative Medicine, 8(1), . Roseanna Miller.  (2004). Working with grieving adults. Advances in Psychiatric Treatment, 10, 164-170. Norah Gamboa JT, [place], [facility], & Tania Whitaker. (2004). Life after death: Greif therapy after the suddent traumatic death of a family member. Perspectives in Psychiatric Care, 40(4), 895-215. Ernesto, [facility], , SOBIA Farmer, & HECTOR Tolliver.  (2000). Bereavement services in acute care settings. Death Studies, 24, 51-64. Cris Ferguson, Luc[place] [place], [place]  (2000). Psychotherapy of traumatic grief:  A review of evidence for psychotherapeutic treatment. Death Studies, 24, 479-495. JEFF Duenas. (2004). Connections between counseling theories and current theories of grief and mourning.   Journal of Mental Health Counseling, 26(2), 125-145.      2. Depression: Facts, Myths & Tips for Feeling Better    Facts    Depression is very common  Nearly 20% of the [place] population experiences a significant depression during their lifetime. Depression is treatable  Because depression affects so many, and can have such a powerful and negative impact on life, there has been an enormous amount of research conducted on how to reduce symptoms and improve functioning. As a result, we now know there are behaviors YOU can engage in to make yourself feel better. Depression is not a weakness  People suffer from depression for a variety of reasons, biological, environmental and behavioral.  Research indicates that [employer] is NOT one of the reasons people become depressed. Depression is not something you are powerless against  Evidence suggests that you can directly impact the intensity and duration of depression by what you do and by altering the way you think about certain things. The Downward Spiral    Depression often begins as a drop in mood due to an environmental or biological trigger that makes people feel less like being active. Being less active, in turn, often causes people to experience an even lower mood and feel even less like being active, and so the cycle begins. How can I start feeling better? The first and best way to reverse the downward cycle is to get active! Your body produces its own anti-depressants every time you exercise or do something pleasurable. Regular exercise is one of the very best ways to improve your mood. In fact some studies have shown that a solid exercise program is as effective as psychotherapy or anti-depressant medication for some people. *See physical activity section of handout    Force yourself to do something you found pleasurable before depression. This may be different for everyone and it doesnt matter if its gardening, playing bridge, walking, reading a novel, or simply talking to a close friend. What matters is that YOU find the activity pleasurable! Even if you dont feel like doing something pleasurable for yourself, DO IT ANYWAY.  We call this the fake it until you make it principle. Educate yourself! Often people feel powerless against medical conditions because they do not understand what is happening in their body. Just by reading this handout you know more than most people about depression. Knowledge is power when you can apply it, and make yourself feel better. *See recommended reading list at the bottom of this handout\"    Begin to notice unhealthy and unhelpful thoughts! In addition to how we behave, how we think influences our mood directly. Notice recurrent or alarming thoughts that have an impact on your mood. Ask yourself is this type of thinking helping me or hurting me?  if your answer is it's hurting me here are some things you can do: *see disputation of negative thoughts section of handout  - Challenge the negative thought. Is it truly accurate? Wheres the proof? Become your own  and collect the facts.  - Reframe the negative thought. How can I think differently about this problem, situation, or view of myself? Allow yourself to view a situation from more than one angle, how might my spouse, friend, or someone I admire view this same problem?  - Use the best friend scenario. How would you help your best friend if he or she was having these same thoughts? Would you criticize him or her as harshly as you criticize yourself? Remember, YOU know YOU better than anyone else. You likely know what kinds of activities, thoughts and reinforcement you respond to. Doing whats easiest and most doable is the key. Pick 1 or 2 things that are easy and get started feeling better TODAY! * Use the following handout sections to guide you through behavioral and thinking exercises to help you manage your depressive symptoms, improve your functioning and to begin living your life well again. Recommended readings on managing depression    - Feeling Good by Dr. Lizbeth Medina.  Brandt     - [employer] [facility] [place] by Malina Sutherland and [facility] [place] by Dr. Franz Pacer by Dr. Thais Yadav. [place]      Disputation:  Challenging Upsetting Thinking    Examine your thoughts for key words:  9. must, need, got to, have to, should (unrealistic standards)  10. never, always, completely, totally, all everything, everyone (predictions /  labeling)  11. awful, terrible, horrible, unbearable, disaster, worst ever (labeling / predictions)  12. jerk, slob, creep, hypocrite, bully, stupid (labels)  Dispute or question the accuracy of the questionable thoughts. 1. Am I upsetting myself unnecessarily? How can I see this another way? 2. Is my thinking working for or against me? How could I view this in a less upsetting way? 3. What am I demanding must happen? What do I want or prefer, rather than need? 4. Am I making something too terrible? Is it really that awful? What would be so terrible about that? 5. Am I labeling a person? What is the action that I dont like? 6. Whats untrue about my thoughts? How can I stick to the facts? Whats the proof for what I am thinking or believing about this? 7. Am I using extreme, black-and-white language? What less extreme words might be more accurate? 8. Am I fortune telling or mind reading in a way that gets me upset or unhappy? What are the odds (percent chance -- e.g., there is a 5% chance. ..) that it will really turn out the way Im thinking or imagining? 9. What are my options in this situation? How would I like to respond? 10. Create more moderate, helpful, or realistic statements to replace the upsetting ones. 11. Have I had any experiences that show that this thought might not be totally true? 12. If my best friend or someone I loved had this thought, what would I tell them? 15. If my best friend or someone I loved knew I was thinking this thought, what would they say to me?   What evidence would they point out to me that would suggest that my thought is not completely true? 14. Are there strengths in me or positives in the situation that I am ignoring? Am I underestimating my ability to cope with unfortunate circumstances? 15. When I am not feeling this way, do I think about this situation any differently? How?  16. Have I been in this type of situation before? What happened? What have I learned from prior experiences that could help me now? 17. Five years from now, if I look back on this situation, will I look at it any differently? Will I focus on any different part of my experience? 25. Am I blaming myself for something over which I do not have complete control? 3. What is deep breathing? Deep breathing involves using your diaphragm muscle to help bring about a state of physiological relaxation. The diaphragm is a large muscle that rests across the bottom of your rib cage. When you inhale, the diaphragm muscle drops, opening up space so air can come in. When watching someone do this it looks like your stomach is filling with air. This type of breathing helps activate the part of your nervous system that controls relaxation. It can lead to decreased heart rate, blood pressure, muscle tension and overall feelings of relaxation. Why be concerned with how I'm breathing?    -To increase your awareness of the role that breathing plays in increased physical tension and contributing to your body's stress response.  -To lower your level of stress-related arousal and tension.  -To give you a method of taking calm, relaxing breaths immediately in a stressful situation to break the cycle of increasing arousal.    What is the best way to use deep breathing exercises?    -Use deep breathing frequently. -Take deep breaths at the first signs of stress, anxiety, physical tension, or other symptoms.  -Schedule time for relaxation. My scheduled time for deep breathing will be _AM, NOON, PM________.     13. Pt will promote effective self care habits daily/weekly-rest, relaxation, stress management, supportive relationships, increased physical outlets, engagement in enjoyable activities. 14. Pt will follow up with referral for:  [address]   www.[employer] [facility]   Www. [employer]   [address], [place], [address] [place] [address]   www. [employer]  [address], [place].[facility]

## 2021-12-17 NOTE — PSYCHOTHERAPY
Behavioral Health Consultation  IBRAHIMA Peraza-S  12/17/2021  10:20 AM EDT      Time spent with Patient: 38 minutes  This is patient's [facility] appointment. Reason for Consult:    Chief Complaint   Patient presents with    Depression    Anxiety     Referring Provider: No referring provider defined for this encounter. Pt provided informed consent for the behavioral health program. Discussed with patient model of service to include the limits of confidentiality (i.e. abuse reporting, suicide intervention, etc.) and short-term intervention focused approach. Pt indicated understanding. Feedback given to PCP. S:  Pt assessed symptoms of anxiety and depression remain unchanged, occurring at high frequency and intensity. She acknowledged that this week was triggered by many changes, with her return to work. Pt was insightful of sensations of anxiety in her body and coping skills that are effective. She was open to discuss reasonable expectations of self and goals. Pt was guided in exploring areas of behavioral change, development of effective coping strategies, and assessing the management of environmental factors influencing the problem. Pt denied thoughts that she would be better off dead. Pt was advised of indications of depressive symptoms and instructed to monitor if symptoms worsen or interfere with daily functioning, to consider following-up with either your primary care team or a behavioral health provider for possible counseling or medication management. If suicidal thoughts are experienced, call 911. An additional 24/7 resource is the [facility] [address] at [phone] ([phone]). Pt will follow up on referral for ongoing mental health services.     O:  MSE:    Appearance    cooperative  Appetite abnormal: poor  Sleep disturbance Yes  Fatigue Yes  Loss of pleasure Yes  Impulsive behavior No  Speech    normal volume  Mood    euthymic   Affect    normal affect  Thought Content intact  Thought Process    coherent  Associations    logical connections  Insight    Fair  Judgment    Intact  Orientation    oriented to person, place, time, and general circumstances  Memory    recent and remote memory intact  Attention/Concentration    intact  Morbid ideation No  Suicide Assessment    no suicidal ideation    History:    TOBACCO:   reports that she has been smoking cigarettes. She has a 20.00 pack-year smoking history. She has never used smokeless tobacco.  ETOH:   reports previous alcohol use of about 16.0 standard drinks of alcohol per week. Family History:   Family History   Problem Relation Age of Onset    Cancer Maternal Grandmother         ? kind    Cancer Paternal Grandmother         ? kind       A:    Diagnosis:    1. Current moderate episode of major depressive disorder, unspecified whether recurrent ([facility]    2. Generalized anxiety disorder          Diagnosis Date    Anxiety     Cancer of cervix ([facility]     Depression     Hypertension        Plan: Pt will follow up on referral for ongoing mental health services. Pt interventions:  Provided handout on  depression and anxiety, Trained in relaxation strategies and Discussed self-care (sleep, nutrition, rewarding activities, social support, exercise)    Pt Behavioral Change Plan:   1. Pt will read handouts regarding depression, anxiety,  relaxation techniques, and self care. 2. Pt will practice self calming skills 2-3x's daily for 3-5 minutes. 3. Pt will promote effective self care habits daily/weekly-rest, relaxation, stress management, supportive relationships, increased physical outlets, engagement in enjoyable activities. 4. Pt will follow up on referral for ongoing mental health services.

## 2022-03-29 ENCOUNTER — HOSPITAL ENCOUNTER (OUTPATIENT)
Dept: PHYSICAL THERAPY | Age: 37
Setting detail: THERAPIES SERIES
Discharge: HOME | End: 2022-03-29
Payer: COMMERCIAL

## 2022-03-29 PROCEDURE — 97167 OT EVAL HIGH COMPLEX 60 MIN: CPT

## 2022-03-29 PROCEDURE — 97530 THERAPEUTIC ACTIVITIES: CPT

## 2022-03-29 PROCEDURE — 97110 THERAPEUTIC EXERCISES: CPT

## 2022-04-14 ENCOUNTER — HOSPITAL ENCOUNTER (OUTPATIENT)
Dept: PHYSICAL THERAPY | Age: 37
Setting detail: THERAPIES SERIES
Discharge: HOME | End: 2022-04-14
Payer: COMMERCIAL

## 2022-04-14 PROCEDURE — 97530 THERAPEUTIC ACTIVITIES: CPT

## 2022-04-14 PROCEDURE — 97140 MANUAL THERAPY 1/> REGIONS: CPT

## 2022-04-14 NOTE — PROGRESS NOTES
[address] [facility]  OCCUPATIONAL THERAPY  OUTPATIENT REHABILITATION - SPECIALIZED THERAPY SERVICES  [] PELVIC HEALTH EVALUATION  [x] DAILY NOTE  [] PROGRESS NOTE [] DISCHARGE NOTE    Date: 2022  Patient Name:  Sofya Enrique  : 1985  MRN: [id number]  CSN: [id number]     Referring Practitioner CHAPO Malave*   Diagnosis Bowel and Pain    Treatment Diagnosis N81.84 - Pelvic Muscle Wasting (Female), R35.1 - Nocturia  N39.46 - Mixed Incontinence, R15.9 - Full Incontinence of Feces  K59.00 - Constipation, Unspecified, R10.2 - Pelvic and Perineal Pain  R10.30 - Lower Abdominal Pain, Unspecified  R94.10 - Unspecified Dyspareunia and R27.8 - Other Lack of Coordination   Date of Evaluation 3/29/22    Additional Pertinent History Psychophysiological insomnia F51.04     Malignant neoplasm of overlapping sites of cervix ([facility] [address] C53.8    Other chest pain R07.89    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[facility] [address] K56.699    S/P colectomy Z90.49    Tobacco abuse Z72.0   Cancer Staging  Malignant neoplasm of overlapping sites of cervix [facility])  Staging form: Cervix Uteri, AJCC 8th Edition  - Clinical stage from 2019: FIGO Stage IIA1 (Enoch Trevizo, cN0, cM0) - Signed by Trae Albrecht MD on 2019          Past Medical History:   Diagnosis Date    Anxiety      Cancer of cervix ([facility] [address]      Depression      Hypertension           Past Surgical History:   Procedure Laterality Date    CERVIX BIOPSY N/A 2019    CHOLECYSTECTOMY   over 5 years ago    COLONOSCOPY   2020    TUBAL LIGATION   2006     Alton Floyd         Functional Outcome Measure Used PFDI-20   Functional Outcome Score PFDI-20: 52 (3/29/22)       Insurance: Primary: Payor: [address] [place] [address]  [address] /  /  / ,   Secondary:    Authorization Information: PRECERTIFICATION REQUIRED: No   Visit # 2, 2/10 for progress note   Visits Allowed: INSURANCE THERAPY BENEFIT: No pre-certification is needed.   PT, OT and ST are allowed 30 visits each per calendar year   Recertification Date: June 27, 2022   Physician Follow-Up: Possibly September    Physician Orders: Eval and treat   History of Present Illness: Pt reports to skilled OT services with c/o pelvic and coccyx pain. Pt reports that she did PT in [facility] that told her that her tailbone is out of alignment. Pt reports that she had radiation that caused her to have pain that started 2 years ago. Pt reports that she had a foot of the colon removed due to the bowel being effected by the radiation that was in May of 2021 and the colostomy was reversed in August 2021. Pt reports that she has incontinence with the bowel and bladder (mostly bowels) due to decreased sensation and will not be able to feel that she is starting a bowel movement before it is too late. Pt reports that she is cancer free and had her last radiation on approximately September of 2019. Pt reports that she has to do a lot of straining to get a bm started and to fully empty. SUBJECTIVE: Pt reports that she is having a lot of pain this date in the low back, hip, and abdomen. Pt reports that she has been feeling really bloated and feels like she is not emptying fully. Pt reports that she is getting sharp pains in the abdomen and tailbone. Pt reports that her pain is about 4/10 this date and struggled to get up out of the chair to walk back to this session. Social/Functional History and Current Status:  Medications and Allergies have been reviewed and are listed on Medical History Questionnaire. Carlos Patricio lives with family in a multiple floor home with ability to complete ADL's on main floor with stairs and no handrail to enter.     Task Previous Current   ADLs  Independent Modified Independent breaks required and some assistance required at times with bathing and dressing due to pain    IADL's Independent Modified Independent breaks required and some assistance required at times with going to the store and with light housework due to pain    Ambulation Independent Modified Independent breaks required and some assistance required at times    Transfers Independent Modified Independent breaks required and some assistance required at times    [facility] [facility] [address]   Driving Active  Active    Work Unemployed  Unemployed     PAIN    Location Abdominal pelvic pain    Description Dull ache    Increased by Activity, prolonged standing or sitting   Decreased by Vernon's on her side, heat, light massage    Maximum Intensity 7/10    Best Intensity 0/10   Todays Rating 0/10   Other/Function      PAIN    Location Low back    Description    Increased by Activity, prolonged standing or sitting   Decreased by Laying on her side, heat, light massage    Maximum Intensity 7/10   Best Intensity 0/10   Todays Rating 0/10   Other/Function      History of Abuse:No history of physical, emotional or sexual abuse reported    SEXUAL /MENSTRUAL HISTORY [x] Deferred secondary to: Information below from evaluation, not tested today. For reference only on this daily note. Number of Pregnancies 4   Number of Vaginal Deliveries 4    Number of Caesarean Deliveries 0       BLADDER ASSESSMENT [x] Deferred secondary to: Information below from evaluation, not tested today. For reference only on this daily note.    Daily Fluid Ingestion: 100 ounces water per day, fruit juice throughout the week, cup of coffee occasionally, occ milk, occ pop   Urination Frequency Times/Day: 4-6 times per day   Times/Night: 2 times    Volume Medium   Urge Sensation Normal  and Severe    SYMPTOM QUESTIONNAIRE   Loses Urine Upon: Sneezing/Coughing and Feeling Cold   Incontinence Volume: Small   Frequency of Leakage: Occasional   Wets the Bed: yes   Burning/Pain with Urination: no   Difficulty Starting a Stream of Urine: no   Incomplete Emptying No   Strain to Empty Bladder: no   Falling Out Feeling: yes Urinate more than 7 times/day: yes   Use a form of Leakage Protection: yes: 2-3 panty liners    Restrict Fluid Intake: no   Stream Strength Average       BOWEL ASSESSMENT [x] Deferred secondary to: Information below from evaluation, not tested today. For reference only on this daily note. Frequency: 3-4 times per day she will have very small amounts with her bowel movements and every 2-4 weeks will have a good full bowel movement that feels relieveing   Most Common Stool Consistency: [place] 2-3   SYMPTOM QUESTIONNAIRE   Strain to have a BM: yes   Include fiber in your diet: yes   Take laxatives/enemas regularly: yes: at times she will use Doculax    Pain with BM: yes   Strong urge to have BM: yes: sensation is off and will have a bowel movement starting and will not realize it    Leak/Stain Feces: yes: few times a week that is the small hard pieces of stool    Diarrhea often: no         SEXUAL ASSESSMENT [x] Deferred secondary to: Information below from evaluation, not tested today. For reference only on this daily note. Sexually Active yes   Pain with [place] (Dyspareunia) yes at times    Painful Penetration Pain with initial penetration and pt reports that her partner feels like he is hitting something   Lubrication Needed no   Pain After [place] yes: pelvic and abdominal pain increases after intercourse          OBSERVATION  [] Deferred secondary to:   Patient Safety Patient offered a chaperone and declined. The pt did verbalize consent for internal vaginal examination following OT education of pt on the purpose of this activity and on the procedure using the model. Pt was educated in the intent of the OT to proceed slowly into the vaginal canal with permission requested of pt at every step of assessment prior to commencement by OT. Pt was also educated in her right to stop assessment, refuse any portion of this assessment, or to refuse assessment at any time without need for reason.   The pt was educated that refusal would not impact her ability to seek care from this therapist in any way or result in therapist prejudice regarding her motivation to get better. Pt verbalized understanding and agreed again to internal examination by OT this date. Skin Condition intact   Urogenital Triangle Bulbocavernosus tender/tight, Ischiocavernosus tender/tight, STPM tender/tight, Levator ani tender/tight and OI tight/tender   Introitus     Perineal Descent     External Clock         PELVIC FLOOR INTERNAL EXAM [] Deferred secondary to:   Exam Vaginal   Sensation Intact   Muscle Localization Poor - pt was able to contract the PFM and relax with vc to inhibit contraction of the abdomen and glutes   Palpation/Tone Hypertonic   Pelvic Floor Strength PERF:Power: 1,Endurance: 1,Reps: 1,Flicks: 1   Relax after Contraction Delayed   Prolapse slight anterior wall prolapse and posterior wall prolapse         PELVIC HEALTH INCONTINENCE TREATMENT   Precautions:    Pain:     X in shaded column indicates activity completed today   Exercise/Intervention Reps/Sec  Notes   Kegel quick 10x  X On hold    Kegel hold 10x 2 sec X On hold   Tummy tuck quick/hold       Pelvic Brace Quick       Pelvic Brace Hold       PFM anatomy  5 min      PFM awareness/relaxation (mirror, towel, hand) 5 min  X Guided relaxation   Reverse Kegel       Lubrication vaginal moisturizer       Manual therapy pt education       Dilator education       Education in manual therapy with dilator.        Diaphragmatic breathing 5 min      Internal exam  30 min  X    Kegel with Ball Squeeze       Kegel with Band       Colonic massage 5 min  X    Visceral mobilization  5 min  X Abdomen    Scar massage 5 min  X Abdomen                  Pelvic Tilt       Pelvic Tilt with arm lift       Pelvic Tilt with leg march       Pelvic Tilt with opposites       Bridge       Pelvic Tilt SLR       Clamshell       Reverse Clamshell                     Standing Kegal       Kegal Mini Squat       Standing Hip 3-way                       PFM anatomy and Physiology       Normal bowel function/promoting good function 15 min  X Belly big/hard, proper push, toilet posture, when to go, walking program, don't delay   PFM relaxation/awareness    Mirror, hand, towel methods   Diet impact on the bowel 5 min   Fiber education: sol and insol choices     Specific Interventions Next Treatment: internal PFM release, bowel and bladder education, pressure control     Activity/Treatment Tolerance:  [x]  Patient tolerated treatment well  []  Patient limited by fatigue  []  Patient limited by pain   []  Patient limited by medical complications  []  Other:     Patient Education:   [x]  HEP/Education Completed: guided relaxation, education handouts   []  No new Education completed  []  Reviewed Prior HEP      [x]  Patient verbalized and/or demonstrated understanding of education provided. []  Patient unable to verbalize and/or demonstrate understanding of education provided. Will continue education. []  Barriers to learning:     Assessment: Pt tolerated session well this date. With completion of the internal exam to assess the pelvic floor through the vagina there was noted muscle tightness bilaterally along the OI, coccygeus, levator ani, STP, and ischiocavernosus that was released utilizing STM and thieles maneuver to decrease pain and muscle tightness. Pt tolerated colonic massage, visceral mobilization, and scar massage along the abdomen well and was educated for at home use to decrease pain and muscle tightness. Pt was educated for bowel education (routine, fiber intake, water intake, PFM relaxation, positioning, probiotic use) for decreased constipation, need to strain, and pelvic floor dysfunction.      Body Structures/Functions/Activity Limitations: PFM weakness with decreased localization and endurance, PFM spasm, Poor PFM control with limited ability to completely relax, Decreased awareness of functional factors that increase pelvic organ strain, Decreased awareness of normal bladder and bowel habits, control, and diet effects on functioning, Abdominal and core weakness and Pain  Prognosis: Good    GOALS:  Patient Goal: to decrease pain and pelvic floor dysfunction     Short Term Goals:  Time Frame: 6 weeks  *  Patient will decrease urine leak frequency and amount by 50% due to increasing PFM strength, endurance and localization. *  Patient to identify normal bladder function and voiding patterns, diet effects on bladder, urge control strategies, and constipation/optimal stool consistency management. * Patient to demonstrate and verbalize good knowledge of safe lifting, pushing, pulling strategies that limit pelvic organ stress. 1. This pt will report abdominal pelvic pain reduced to 5/10 at worst to allow initiation of manual techniques. 2. This pt will demo the ability to completely and instantly relax the PFM in order to decrease pain and increase ease with toileting and with physical intimacy and/or medical examinations to ensure pt's health. 3. The pt will be able to describe normal bowel function, bowel irritants, and bowel anatomy and physiology to promote insight into current condition and to allow the pt to identify causative factors related to current condition. 4.  The pt will be able to delay fecal urge x 5' in order to allow time to get to a bathroom without soiling clothing. 5.  The pt will reduce incidence of fecal leakage by 25 % and amount of material leaked by 25% to increase ease of hygiene. 6. This pt will be able to describe normal bowel function, diet impact on the bowel, and have a good understanding of bowel anatomy and physiology to promote insight into condition. 7. This pt will demo a good understanding of proper toilet posture to decrease PFM tone and to improve visceral alignment for increased ease of defecation and reduced pelvic organ strain.       8. This pt will demonstrate independence with basic HEP designed to increase flexibility of the pelvic girdle and to strengthen the core for maximal symptom reduction. Long Term Goals:   12 weeks   1. This pt will report improved defecation ease and frequency by 75% in order to promote overall pt health. 2. Pt will demo independence in advanced HEP in order to promote retention of gains made in therapy and to reduce the need for further medical intervention. 3.  The pt will be able to delay bowel urgency for 10 minutes with good control in order to allow pt time to seek a public bathroom. 4. This pt will return bowel frequency to normal levels in order to allow attendance of public events, shopping, and visiting friends with increased comfort. 5. This pt will report abdominal pelvic pain decreased to 1-2/10 at worst to increase tolerance to work and home tasks. 6. This pt will demonstrate PFDI-20 score decreased to less than 10 in order to indication functional improvement with therapy. 7. PFM strength WNL to increase visceral support and reduce risk of symptoms reoccurring. * Patient will increase PFM strength to 4/5 with endurance of 10 seconds x 10 reps to increase pelvic organ support and decrease incontinence. * Patient will Increase abdominal strength of 4/5 or greater to allow for increased pelvic organ support and decreased incontinence and pelvic floor dysfunction. PLAN:  Treatment Recommendations: Strengthening, Endurance Training, Neuromuscular Re-education, Manual Therapy - Soft Tissue Mobilization, Manual Therapy - Joint Manipulation, Pain Management, Home Exercise Program, Patient Education, Self-Care Education and Training, Positioning, Integrative Dry Needling and Modalities    []  Plan of care initiated. Plan to see patient 1 time weekly to biweekly for 12 weeks to address the treatment planned outlined above.   [x]  Continue with current plan of care  []  Modify plan of care as follows: []  Hold pending physician visit  []  Discharge    Time In 1355   Time Out 1405   Timed Code Minutes: 70 min   Total Treatment Time: 70 min       Electronically Signed by: Jaime Shona [place], OTR/L [id number]

## 2022-04-28 ENCOUNTER — HOSPITAL ENCOUNTER (OUTPATIENT)
Dept: PHYSICAL THERAPY | Age: 37
Setting detail: THERAPIES SERIES
Discharge: HOME | End: 2022-04-28
Payer: COMMERCIAL

## 2022-04-28 PROCEDURE — 97530 THERAPEUTIC ACTIVITIES: CPT

## 2022-04-28 PROCEDURE — 97140 MANUAL THERAPY 1/> REGIONS: CPT

## 2022-04-28 NOTE — PROGRESS NOTES
[facility]  OCCUPATIONAL THERAPY  OUTPATIENT REHABILITATION - SPECIALIZED THERAPY SERVICES  [] PELVIC HEALTH EVALUATION  [x] DAILY NOTE  [] PROGRESS NOTE [] DISCHARGE NOTE    Date: 2022  Patient Name:  Christie Vega  : 1985  MRN: [id number]  CSN: [id number]     Referring Practitioner CHAPO Price*   Diagnosis Bowel and Pain    Treatment Diagnosis N81.84 - Pelvic Muscle Wasting (Female), R35.1 - Nocturia  N39.46 - Mixed Incontinence, R15.9 - Full Incontinence of Feces  K59.00 - Constipation, Unspecified, R10.2 - Pelvic and Perineal Pain  R10.30 - Lower Abdominal Pain, Unspecified  R94.10 - Unspecified Dyspareunia and R27.8 - Other Lack of Coordination   Date of Evaluation 3/29/22    Additional Pertinent History Psychophysiological insomnia F51.04     Malignant neoplasm of overlapping sites of cervix ([facility] C53.8    Other chest pain R07.89    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    S/P colectomy Z90.49    Tobacco abuse Z72.0   Cancer Staging  Malignant neoplasm of overlapping sites of cervix [facility])  Staging form: Cervix Uteri, AJCC 8th Edition  - Clinical stage from 2019: FIGO Stage IIA1 (Fleurette Daily, cN0, cM0) - Signed by Estevan White MD on 2019          Past Medical History:   Diagnosis Date    Anxiety      Cancer of cervix ([facility]      Depression      Hypertension           Past Surgical History:   Procedure Laterality Date    CERVIX BIOPSY N/A 2019    CHOLECYSTECTOMY   over 5 years ago    COLONOSCOPY   2020    TUBAL LIGATION   2006     Alton Floyd         Functional Outcome Measure Used PFDI-20   Functional Outcome Score PFDI-20: 52 (3/29/22)       Insurance: Primary: Payor: [address] [place] [address]  [address] /  /  / ,   Secondary:    Authorization Information: PRECERTIFICATION REQUIRED: No   Visit # 3, 310 for progress note   Visits Allowed: INSURANCE THERAPY BENEFIT: No pre-certification is needed.   PT, OT and ST are allowed 30 visits each per calendar year   Recertification Date: June 27, 2022   Physician Follow-Up: Possibly September    Physician Orders: Eval and treat   History of Present Illness: Pt reports to skilled OT services with c/o pelvic and coccyx pain. Pt reports that she did PT in [place] that told her that her tailbone is out of alignment. Pt reports that she had radiation that caused her to have pain that started 2 years ago. Pt reports that she had a foot of the colon removed due to the bowel being effected by the radiation that was in May of 2021 and the colostomy was reversed in August 2021. Pt reports that she has incontinence with the bowel and bladder (mostly bowels) due to decreased sensation and will not be able to feel that she is starting a bowel movement before it is too late. Pt reports that she is cancer free and had her last radiation on approximately September of 2019. Pt reports that she has to do a lot of straining to get a bm started and to fully empty. SUBJECTIVE: Pt reports that she is seeming to have an easier time having a bowel movement. Pt reports that she has tried to make sure that she is using some of the techniques. Pt reports that she is having a bm 2 times per day that seems to be a little easier. Pt reports that she is having little to no pain this date. Pt reports that she has been feeling really bloated and feels like she is not emptying fully. Pt reports that she is getting sharp pains in the abdomen and tailbone. Social/Functional History and Current Status:  Medications and Allergies have been reviewed and are listed on Medical History Questionnaire. Millie Current lives with family in a multiple floor home with ability to complete ADL's on main floor with stairs and no handrail to enter.     Task Previous Current   ADLs  Independent Modified Independent breaks required and some assistance required at times with bathing and dressing due to pain    IADL's Independent Modified Independent breaks required and some assistance required at times with going to the store and with light housework due to pain    Ambulation Independent Modified Independent breaks required and some assistance required at times    Transfers Independent Modified Independent breaks required and some assistance required at times    [address]  Active    Work Unemployed  Unemployed     PAIN    Location Abdominal pelvic pain    Description Dull ache    Increased by Activity, prolonged standing or sitting   Decreased by Vernon's on her side, heat, light massage    Maximum Intensity 7/10    Best Intensity 0/10   Todays Rating 0/10   Other/Function      PAIN    Location Low back    Description    Increased by Activity, prolonged standing or sitting   Decreased by Laying on her side, heat, light massage    Maximum Intensity 7/10   Best Intensity 0/10   Todays Rating 0/10   Other/Function      History of Abuse:No history of physical, emotional or sexual abuse reported    SEXUAL /MENSTRUAL HISTORY [x] Deferred secondary to: Information below from evaluation, not tested today. For reference only on this daily note. Number of Pregnancies 4   Number of Vaginal Deliveries 4    Number of Caesarean Deliveries 0       BLADDER ASSESSMENT [x] Deferred secondary to: Information below from evaluation, not tested today. For reference only on this daily note.    Daily Fluid Ingestion: 100 ounces water per day, fruit juice throughout the week, cup of coffee occasionally, occ milk, occ pop   Urination Frequency Times/Day: 4-6 times per day   Times/Night: 2 times    Volume Medium   Urge Sensation Normal  and Severe    SYMPTOM QUESTIONNAIRE   Loses Urine Upon: Sneezing/Coughing and Feeling Cold   Incontinence Volume: Small   Frequency of Leakage: Occasional   Wets the Bed: yes   Burning/Pain with Urination: no   Difficulty Starting a Stream of Urine: no   Incomplete Emptying No   Strain to Empty Bladder: no   Falling Out Feeling: yes   Urinate more than 7 times/day: yes   Use a form of Leakage Protection: yes: 2-3 panty liners    Restrict Fluid Intake: no   Stream Strength Average       BOWEL ASSESSMENT [x] Deferred secondary to: Information below from evaluation, not tested today. For reference only on this daily note. Frequency: 3-4 times per day she will have very small amounts with her bowel movements and every 2-4 weeks will have a good full bowel movement that feels relieveing   Most Common Stool Consistency: [place] 2-3   SYMPTOM QUESTIONNAIRE   Strain to have a BM: yes   Include fiber in your diet: yes   Take laxatives/enemas regularly: yes: at times she will use Doculax    Pain with BM: yes   Strong urge to have BM: yes: sensation is off and will have a bowel movement starting and will not realize it    Leak/Stain Feces: yes: few times a week that is the small hard pieces of stool    Diarrhea often: no         SEXUAL ASSESSMENT [x] Deferred secondary to: Information below from evaluation, not tested today. For reference only on this daily note. Sexually Active yes   Pain with [place] (Dyspareunia) yes at times    Painful Penetration Pain with initial penetration and pt reports that her partner feels like he is hitting something   Lubrication Needed no   Pain After [place] yes: pelvic and abdominal pain increases after intercourse          OBSERVATION  [] Deferred secondary to:   Patient Safety Patient offered a chaperone and declined. The pt did verbalize consent for internal vaginal examination following OT education of pt on the purpose of this activity and on the procedure using the model. Pt was educated in the intent of the OT to proceed slowly into the vaginal canal with permission requested of pt at every step of assessment prior to commencement by OT.   Pt was also educated in her right to stop assessment, refuse any portion of this assessment, or to refuse assessment at any time without need for reason. The pt was educated that refusal would not impact her ability to seek care from this therapist in any way or result in therapist prejudice regarding her motivation to get better. Pt verbalized understanding and agreed again to internal examination by OT this date. Skin Condition intact   Urogenital Triangle Bulbocavernosus tender/tight, Ischiocavernosus tender/tight, STPM tender/tight, Levator ani tender/tight and OI tight/tender   Introitus     Perineal Descent     External Clock         PELVIC FLOOR INTERNAL EXAM [] Deferred secondary to:   Exam Vaginal   Sensation Intact   Muscle Localization Poor - pt was able to contract the PFM and relax with vc to inhibit contraction of the abdomen and glutes   Palpation/Tone Hypertonic   Pelvic Floor Strength PERF:Power: 1,Endurance: 1,Reps: 1,Flicks: 1   Relax after Contraction Delayed   Prolapse slight anterior wall prolapse and posterior wall prolapse         PELVIC HEALTH INCONTINENCE TREATMENT   Precautions:    Pain:     X in shaded column indicates activity completed today   Exercise/Intervention Reps/Sec  Notes   Kegel quick 10x  X On hold    Kegel hold 10x 2 sec X On hold   Tummy tuck quick/hold       Pelvic Brace Quick       Pelvic Brace Hold       PFM anatomy  5 min      PFM awareness/relaxation (mirror, towel, hand) 5 min   Guided relaxation   Reverse Kegel       Lubrication vaginal moisturizer       Manual therapy pt education       Dilator education       Education in manual therapy with dilator.        Diaphragmatic breathing 5 min      Internal exam  35 min  X    Kegel with Ball Squeeze       Kegel with Band       Colonic massage 5 min  X    Visceral mobilization  5 min  X Abdomen    Scar massage 5 min  X Abdomen                  Pelvic Tilt       Pelvic Tilt with arm lift       Pelvic Tilt with leg march       Pelvic Tilt with opposites       Bridge Pelvic Tilt SLR       Clamshell       Reverse Clamshell                     Standing Kegal       Kegal Mini Squat       Standing Hip 3-way                       PFM anatomy and Physiology       Normal bowel function/promoting good function 10 min  X Belly big/hard, proper push, toilet posture, when to go, walking program, don't delay   PFM relaxation/awareness    Mirror, hand, towel methods   Diet impact on the bowel 5 min   Fiber education: sol and insol choices     Specific Interventions Next Treatment: internal PFM release, bowel and bladder education, pressure control     Activity/Treatment Tolerance:  [x]  Patient tolerated treatment well  []  Patient limited by fatigue  []  Patient limited by pain   []  Patient limited by medical complications  []  Other:     Patient Education:   [x]  HEP/Education Completed: guided relaxation, education handouts   []  No new Education completed  []  Reviewed Prior HEP      [x]  Patient verbalized and/or demonstrated understanding of education provided. []  Patient unable to verbalize and/or demonstrate understanding of education provided. Will continue education. []  Barriers to learning:     Assessment: Pt tolerated session well this date. With completion of the internal exam to assess the pelvic floor through the vagina there was noted increased muscle tightness bilaterally along the OI, coccygeus, levator ani, STP, and ischiocavernosus with noted increased pain and pressure along the posterior wall that was released utilizing STM and thieles maneuver to decrease pain and muscle tightness. Pt tolerated colonic massage, visceral mobilization, and scar massage (decreased palpation pressure due to pain last visit) along the abdomen well and was educated for at home use to decrease pain and muscle tightness.  Pt was re educated for importance of use of bowel education (routine, splinting, water intake, PFM relaxation, positioning) for decreased constipation, need to strain, and pelvic floor dysfunction. Body Structures/Functions/Activity Limitations: PFM weakness with decreased localization and endurance, PFM spasm, Poor PFM control with limited ability to completely relax, Decreased awareness of functional factors that increase pelvic organ strain, Decreased awareness of normal bladder and bowel habits, control, and diet effects on functioning, Abdominal and core weakness and Pain  Prognosis: Good    GOALS:  Patient Goal: to decrease pain and pelvic floor dysfunction     Short Term Goals:  Time Frame: 6 weeks  *  Patient will decrease urine leak frequency and amount by 50% due to increasing PFM strength, endurance and localization. *  Patient to identify normal bladder function and voiding patterns, diet effects on bladder, urge control strategies, and constipation/optimal stool consistency management. * Patient to demonstrate and verbalize good knowledge of safe lifting, pushing, pulling strategies that limit pelvic organ stress. 1. This pt will report abdominal pelvic pain reduced to 5/10 at worst to allow initiation of manual techniques. 2. This pt will demo the ability to completely and instantly relax the PFM in order to decrease pain and increase ease with toileting and with physical intimacy and/or medical examinations to ensure pt's health. 3. The pt will be able to describe normal bowel function, bowel irritants, and bowel anatomy and physiology to promote insight into current condition and to allow the pt to identify causative factors related to current condition. 4.  The pt will be able to delay fecal urge x 5' in order to allow time to get to a bathroom without soiling clothing. 5.  The pt will reduce incidence of fecal leakage by 25 % and amount of material leaked by 25% to increase ease of hygiene.     6. This pt will be able to describe normal bowel function, diet impact on the bowel, and have a good understanding of bowel anatomy and physiology to promote insight into condition. 7. This pt will demo a good understanding of proper toilet posture to decrease PFM tone and to improve visceral alignment for increased ease of defecation and reduced pelvic organ strain. 8. This pt will demonstrate independence with basic HEP designed to increase flexibility of the pelvic girdle and to strengthen the core for maximal symptom reduction. Long Term Goals:   12 weeks   1. This pt will report improved defecation ease and frequency by 75% in order to promote overall pt health. 2. Pt will demo independence in advanced HEP in order to promote retention of gains made in therapy and to reduce the need for further medical intervention. 3.  The pt will be able to delay bowel urgency for 10 minutes with good control in order to allow pt time to seek a public bathroom. 4. This pt will return bowel frequency to normal levels in order to allow attendance of public events, shopping, and visiting friends with increased comfort. 5. This pt will report abdominal pelvic pain decreased to 1-2/10 at worst to increase tolerance to work and home tasks. 6. This pt will demonstrate PFDI-20 score decreased to less than 10 in order to indication functional improvement with therapy. 7. PFM strength WNL to increase visceral support and reduce risk of symptoms reoccurring. * Patient will increase PFM strength to 4/5 with endurance of 10 seconds x 10 reps to increase pelvic organ support and decrease incontinence. * Patient will Increase abdominal strength of 4/5 or greater to allow for increased pelvic organ support and decreased incontinence and pelvic floor dysfunction.     PLAN:  Treatment Recommendations: Strengthening, Endurance Training, Neuromuscular Re-education, Manual Therapy - Soft Tissue Mobilization, Manual Therapy - Joint Manipulation, Pain Management, Home Exercise Program, Patient Education, Self-Care Education and Training, Positioning, Integrative Dry Needling and Modalities    []  Plan of care initiated. Plan to see patient 1 time weekly to biweekly for 12 weeks to address the treatment planned outlined above.   [x]  Continue with current plan of care  []  Modify plan of care as follows:    []  Hold pending physician visit  []  Discharge    Time In 1300   Time Out 1400   Timed Code Minutes: 60 min   Total Treatment Time: 60 min       Electronically Signed by: Ezequiel MONZON, OTR/L [id number]

## 2022-05-07 NOTE — PROGRESS NOTES
Behavioral Health Consultation  IBRAHIMA PetersenMikieS  12/17/2021  10:20 AM EDT        Time spent with Patient: 38 minutes  This is patient's third [facility] appointment.     Reason for Consult:        Chief Complaint   Patient presents with    Depression    Anxiety      Referring Provider: No referring provider defined for this encounter.     Pt provided informed consent for the behavioral health program. Discussed with patient model of service to include the limits of confidentiality (i.e. abuse reporting, suicide intervention, etc.) and short-term intervention focused approach. Pt indicated understanding. Feedback given to PCP.     S:  Pt assessed symptoms of anxiety and depression remain unchanged, occurring at high frequency and intensity. She acknowledged that this week was triggered by many changes, with her return to work. Pt was insightful of sensations of anxiety in her body and coping skills that are effective. She was open to discuss reasonable expectations of self and goals. Pt was guided in exploring areas of behavioral change, development of effective coping strategies, and assessing the management of environmental factors influencing the problem. Pt denied thoughts that she would be better off dead. Pt was advised of indications of depressive symptoms and instructed to monitor if symptoms worsen or interfere with daily functioning, to consider following-up with either your primary care team or a behavioral health provider for possible counseling or medication management. If suicidal thoughts are experienced, call 911. An additional 24/7 resource is the [facility] at [phone] ([phone]).  Pt will follow up on referral for ongoing mental health services.     O:  MSE:     Appearance    cooperative  Appetite abnormal: poor  Sleep disturbance Yes  Fatigue Yes  Loss of pleasure Yes  Impulsive behavior No  Speech    normal volume  Mood    euthymic   Affect    normal affect  Thought Content    intact  Thought Process    coherent  Associations    logical connections  Insight    Fair  Judgment    Intact  Orientation    oriented to person, place, time, and general circumstances  Memory    recent and remote memory intact  Attention/Concentration    intact  Morbid ideation No  Suicide Assessment    no suicidal ideation     History:     TOBACCO:   reports that she has been smoking cigarettes. She has a 20.00 pack-year smoking history. She has never used smokeless tobacco.  ETOH:   reports previous alcohol use of about 16.0 standard drinks of alcohol per week. Family History:   Family History         Family History   Problem Relation Age of Onset    Cancer Maternal Grandmother           ? kind    Cancer Paternal Grandmother           ? kind            A:     Diagnosis:     1. Current moderate episode of major depressive disorder, unspecified whether recurrent ([facility] [address]    2. Generalized anxiety disorder       Past Medical History             Diagnosis Date    Anxiety      Cancer of cervix ([facility]      Depression      Hypertension              Plan: Pt will follow up on referral for ongoing mental health services. Pt interventions:  Provided handout on  depression and anxiety, Trained in relaxation strategies and Discussed self-care (sleep, nutrition, rewarding activities, social support, exercise)     Pt Behavioral Change Plan:   1. Pt will read handouts regarding depression, anxiety,  relaxation techniques, and self care. 2. Pt will practice self calming skills 2-3x's daily for 3-5 minutes. 3. Pt will promote effective self care habits daily/weekly-rest, relaxation, stress management, supportive relationships, increased physical outlets, engagement in enjoyable activities. 4. Pt will follow up on referral for ongoing mental health services.

## 2022-05-07 NOTE — PROGRESS NOTES
Behavioral Health Consultation  JENNI Corbin  12/9/2021  10:29 AM EDT        Time spent with Patient: 38minutes  This is patient's second [facility] appointment.     Reason for Consult:         Chief Complaint   Patient presents with    Depression    Anxiety    Other       grief reaction      Referring Provider: No referring provider defined for this encounter.     Pt provided informed consent for the behavioral health program. Discussed with patient model of service to include the limits of confidentiality (i.e. abuse reporting, suicide intervention, etc.) and short-term intervention focused approach. Pt indicated understanding. Feedback given to PCP.     S:  Pt assessed symptoms of anxiety and depression remain high in frequency and intensity. She acknowledged that the loss of her brother and ongoing areas of stress are impacting her symptoms. Pt reports symptoms of feeling anxious, sad, avoidant of triggers, difficulties sleeping. Pt was guided in exploring areas of behavioral change, development of effective coping strategies, and assessing the management of environmental factors influencing the problem. Pt denied thoughts that she would be better off dead. Pt was advised of indications of depressive symptoms and instructed to monitor if symptoms worsen or interfere with daily functioning, to consider following-up with either your primary care team or a behavioral health provider for possible counseling or medication management. If suicidal thoughts are experienced, call 911. An additional 24/7 resource is the [facility] at [phone] ([phone]).  Pt will attend a follow up appointment next week.     O:  MSE:     Appearance    cooperative  Appetite abnormal: poor  Sleep disturbance Yes  Fatigue Yes  Loss of pleasure Yes  Impulsive behavior No  Speech    normal volume  Mood    euthymic   Affect    normal affect  Thought Content    intact  Thought Process coherent  Associations    logical connections  Insight    Fair  Judgment    Intact  Orientation    oriented to person, place, time, and general circumstances  Memory    recent and remote memory intact  Attention/Concentration    intact  Morbid ideation No  Suicide Assessment    no suicidal ideation     History:     TOBACCO:   reports that she has been smoking cigarettes. She has a 20.00 pack-year smoking history. She has never used smokeless tobacco.  ETOH:   reports previous alcohol use of about 16.0 standard drinks of alcohol per week. Family History:   Family History         Family History   Problem Relation Age of Onset    Cancer Maternal Grandmother           ? kind    Cancer Paternal Grandmother           ? kind            A:     Diagnosis:     1. Current moderate episode of major depressive disorder, unspecified whether [facility] ([facility]    2. Generalized anxiety disorder       Past Medical History             Diagnosis Date    Anxiety      Cancer of cervix ([facility]      Depression      Hypertension              Plan: Pt will attend a follow up appointment next week to assess symptoms and evaluate effectiveness of coping skills. Pt interventions:  Provided handout on  depression and anxiety, Trained in relaxation strategies and Discussed self-care (sleep, nutrition, rewarding activities, social support, exercise)     Pt Behavioral Change Plan:   1. Pt will read handouts regarding depression, anxiety,  relaxation techniques, and self care. 2. Pt will practice self calming skills 2-3x's daily for 3-5 minutes. 3. Pt will promote effective self care habits daily/weekly-rest, relaxation, stress management, supportive relationships, increased physical outlets, engagement in enjoyable activities.   4. Pt will follow up with JENNI Santana

## 2022-05-11 ENCOUNTER — HOSPITAL ENCOUNTER (OUTPATIENT)
Dept: PHYSICAL THERAPY | Age: 37
Setting detail: THERAPIES SERIES
Discharge: HOME | End: 2022-05-11
Payer: COMMERCIAL

## 2022-05-11 PROCEDURE — 97140 MANUAL THERAPY 1/> REGIONS: CPT

## 2022-05-11 NOTE — PROGRESS NOTES
[address]  OCCUPATIONAL THERAPY  OUTPATIENT REHABILITATION - SPECIALIZED THERAPY SERVICES  [] PELVIC HEALTH EVALUATION  [x] DAILY NOTE  [] PROGRESS NOTE [] DISCHARGE NOTE    Date: 2022  Patient Name:  French Escudero  : 1985  MRN: [id number]  CSN: [id number]     Referring Practitioner CHAPO Pappas*   Diagnosis Bowel and Pain    Treatment Diagnosis N81.84 - Pelvic Muscle Wasting (Female), R35.1 - Nocturia  N39.46 - Mixed Incontinence, R15.9 - Full Incontinence of Feces  K59.00 - Constipation, Unspecified, R10.2 - Pelvic and Perineal Pain  R10.30 - Lower Abdominal Pain, Unspecified  R94.10 - Unspecified Dyspareunia and R27.8 - Other Lack of Coordination   Date of Evaluation 3/29/22    Additional Pertinent History Psychophysiological insomnia F51.04     Malignant neoplasm of overlapping sites of cervix ([facility] [address] C53.8    Other chest pain R07.89    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[facility] [address] K56.699    S/P colectomy Z90.49    Tobacco abuse Z72.0   Cancer Staging  Malignant neoplasm of overlapping sites of cervix [facility])  Staging form: Cervix Uteri, AJCC 8th Edition  - Clinical stage from 2019: FIGO Stage IIA1 (Beatrice Gomez, cN0, cM0) - Signed by Anjali Brown MD on 2019          Past Medical History:   Diagnosis Date    Anxiety      Cancer of cervix ([facility] [address]      Depression      Hypertension           Past Surgical History:   Procedure Laterality Date    CERVIX BIOPSY N/A 2019    CHOLECYSTECTOMY   over 5 years ago    COLONOSCOPY   2020    TUBAL LIGATION   2006     Alton Floyd         Functional Outcome Measure Used PFDI-20   Functional Outcome Score PFDI-20: 52 (3/29/22)       Insurance: Primary: Payor: [address] [place] [address]  [address] /  /  / ,   Secondary:    Authorization Information: PRECERTIFICATION REQUIRED: No   Visit # 4, 4/10 for progress note   Visits Allowed: INSURANCE THERAPY BENEFIT: No pre-certification is needed.   PT, OT and ST are allowed 30 visits each per calendar year   Recertification Date: June 27, 2022   Physician Follow-Up: Possibly September    Physician Orders: Eval and treat   History of Present Illness: Pt reports to skilled OT services with c/o pelvic and coccyx pain. Pt reports that she did PT in [facility] that told her that her tailbone is out of alignment. Pt reports that she had radiation that caused her to have pain that started 2 years ago. Pt reports that she had a foot of the colon removed due to the bowel being effected by the radiation that was in May of 2021 and the colostomy was reversed in August 2021. Pt reports that she has incontinence with the bowel and bladder (mostly bowels) due to decreased sensation and will not be able to feel that she is starting a bowel movement before it is too late. Pt reports that she is cancer free and had her last radiation on approximately September of 2019. Pt reports that she has to do a lot of straining to get a bm started and to fully empty. SUBJECTIVE: Pt reports that she had 2 days without a bm. Pt reports that she started having more smoothies that help with her bowel movements but can also make them more loose. Pt reports that she is having some LLQ and LUQ abdominal pain this date. Pt reports that Pt reports that she has been feeling really bloated and feels like she is not emptying fully. Pt reports that she has been having some tailbone pain still. Pt reports \"I accidentally scheduled two appointments back to back so I need to leave a little early today. \"    Social/Functional History and Current Status:  Medications and Allergies have been reviewed and are listed on Medical History Questionnaire. Halle James lives with family in a multiple floor home with ability to complete ADL's on main floor with stairs and no handrail to enter.     Task Previous Current   ADLs  Independent Modified Independent breaks required and some assistance required at times with bathing and dressing due to pain    IADL's Independent Modified Independent breaks required and some assistance required at times with going to the store and with light housework due to pain    Ambulation Independent Modified Independent breaks required and some assistance required at times    Transfers Independent Modified Independent breaks required and some assistance required at times    [address]  Active    Work Unemployed  Unemployed     PAIN    Location Abdominal pelvic pain    Description Dull ache    Increased by Activity, prolonged standing or sitting   Decreased by Vernon's on her side, heat, light massage    Maximum Intensity 7/10    Best Intensity 0/10   Todays Rating 0/10   Other/Function      PAIN    Location Low back    Description    Increased by Activity, prolonged standing or sitting   Decreased by Laying on her side, heat, light massage    Maximum Intensity 7/10   Best Intensity 0/10   Todays Rating 0/10   Other/Function      History of Abuse:No history of physical, emotional or sexual abuse reported    SEXUAL /MENSTRUAL HISTORY [x] Deferred secondary to: Information below from evaluation, not tested today. For reference only on this daily note. Number of Pregnancies 4   Number of Vaginal Deliveries 4    Number of Caesarean Deliveries 0       BLADDER ASSESSMENT [x] Deferred secondary to: Information below from evaluation, not tested today. For reference only on this daily note.    Daily Fluid Ingestion: 100 ounces water per day, fruit juice throughout the week, cup of coffee occasionally, occ milk, occ pop   Urination Frequency Times/Day: 4-6 times per day   Times/Night: 2 times    Volume Medium   Urge Sensation Normal  and Severe    SYMPTOM QUESTIONNAIRE   Loses Urine Upon: Sneezing/Coughing and Feeling Cold   Incontinence Volume: Small   Frequency of Leakage: Occasional   Wets the Bed: yes   Burning/Pain with Urination: no   Difficulty Starting a Stream of Urine: no   Incomplete Emptying No   Strain to Empty Bladder: no   Falling Out Feeling: yes   Urinate more than 7 times/day: yes   Use a form of Leakage Protection: yes: 2-3 panty liners    Restrict Fluid Intake: no   Stream Strength Average       BOWEL ASSESSMENT [x] Deferred secondary to: Information below from evaluation, not tested today. For reference only on this daily note. Frequency: 3-4 times per day she will have very small amounts with her bowel movements and every 2-4 weeks will have a good full bowel movement that feels relieveing   Most Common Stool Consistency: [place] 2-3   SYMPTOM QUESTIONNAIRE   Strain to have a BM: yes   Include fiber in your diet: yes   Take laxatives/enemas regularly: yes: at times she will use Doculax    Pain with BM: yes   Strong urge to have BM: yes: sensation is off and will have a bowel movement starting and will not realize it    Leak/Stain Feces: yes: few times a week that is the small hard pieces of stool    Diarrhea often: no         SEXUAL ASSESSMENT [x] Deferred secondary to: Information below from evaluation, not tested today. For reference only on this daily note. Sexually Active yes   Pain with [place] (Dyspareunia) yes at times    Painful Penetration Pain with initial penetration and pt reports that her partner feels like he is hitting something   Lubrication Needed no   Pain After [place] yes: pelvic and abdominal pain increases after intercourse          OBSERVATION  [] Deferred secondary to:   Patient Safety Patient offered a chaperone and declined. The pt did verbalize consent for internal vaginal examination following OT education of pt on the purpose of this activity and on the procedure using the model. Pt was educated in the intent of the OT to proceed slowly into the vaginal canal with permission requested of pt at every step of assessment prior to commencement by OT.   Pt was also educated in her right to stop assessment, refuse any portion of this assessment, or to refuse assessment at any time without need for reason. The pt was educated that refusal would not impact her ability to seek care from this therapist in any way or result in therapist prejudice regarding her motivation to get better. Pt verbalized understanding and agreed again to internal examination by OT this date. Skin Condition intact   Urogenital Triangle Bulbocavernosus tender/tight, Ischiocavernosus tender/tight, STPM tender/tight, Levator ani tender/tight and OI tight/tender   Introitus     Perineal Descent     External Clock         PELVIC FLOOR INTERNAL EXAM [] Deferred secondary to:   Exam Vaginal   Sensation Intact   Muscle Localization Poor - pt was able to contract the PFM and relax with vc to inhibit contraction of the abdomen and glutes   Palpation/Tone Hypertonic   Pelvic Floor Strength PERF:Power: 1,Endurance: 1,Reps: 1,Flicks: 1   Relax after Contraction Delayed   Prolapse slight anterior wall prolapse and posterior wall prolapse         PELVIC HEALTH INCONTINENCE TREATMENT   Precautions:    Pain:     X in shaded column indicates activity completed today   Exercise/Intervention Reps/Sec  Notes   Kegel quick 10x   On hold    Kegel hold 10x 2 sec  On hold   Tummy tuck quick/hold       Pelvic Brace Quick       Pelvic Brace Hold       PFM anatomy  5 min      PFM awareness/relaxation (mirror, towel, hand) 5 min   Guided relaxation   Reverse Kegel       Lubrication vaginal moisturizer       Manual therapy pt education       Dilator education       Education in manual therapy with dilator.        Diaphragmatic breathing 5 min      Internal exam  30 min  X    Kegel with Ball Squeeze       Kegel with Band       Colonic massage 5 min  X    Visceral mobilization  5 min  X Abdomen    Scar massage 5 min  X Abdomen                  Pelvic Tilt       Pelvic Tilt with arm lift       Pelvic Tilt with leg march Pelvic Tilt with opposites       Bridge       Pelvic Tilt SLR       Clamshell       Reverse Clamshell                     Standing Kegal       Kegal Mini Squat       Standing Hip 3-way                       PFM anatomy and Physiology       Normal bowel function/promoting good function 10 min   Belly big/hard, proper push, toilet posture, when to go, walking program, don't delay   PFM relaxation/awareness    Mirror, hand, towel methods   Diet impact on the bowel 5 min   Fiber education: sol and insol choices     Specific Interventions Next Treatment: internal PFM release, bowel and bladder education, pressure control     Activity/Treatment Tolerance:  [x]  Patient tolerated treatment well  []  Patient limited by fatigue  []  Patient limited by pain   []  Patient limited by medical complications  []  Other:     Patient Education:   [x]  HEP/Education Completed: guided relaxation, education handouts   []  No new Education completed  []  Reviewed Prior HEP      [x]  Patient verbalized and/or demonstrated understanding of education provided. []  Patient unable to verbalize and/or demonstrate understanding of education provided. Will continue education. []  Barriers to learning:     Assessment: Pt tolerated session well this date. With completion of the internal exam to assess the pelvic floor through the vagina there was noted increased muscle tightness bilaterally along the OI, coccygeus, levator ani, STP, and ischiocavernosus with noted increased pain and pressure along the posterior wall that was released utilizing STM and thieles maneuver to decrease pain and muscle tightness. Pt tolerated colonic massage, visceral mobilization, and scar massage (pt tolerated increased pressure this date) along the abdomen well and was educated for at home use to decrease pain and muscle tightness. Pt reports decreased pressure, soreness, and bloating upon leaving therapy this date.       Body Structures/Functions/Activity Limitations: PFM weakness with decreased localization and endurance, PFM spasm, Poor PFM control with limited ability to completely relax, Decreased awareness of functional factors that increase pelvic organ strain, Decreased awareness of normal bladder and bowel habits, control, and diet effects on functioning, Abdominal and core weakness and Pain  Prognosis: Good    GOALS:  Patient Goal: to decrease pain and pelvic floor dysfunction     Short Term Goals:  Time Frame: 6 weeks  *  Patient will decrease urine leak frequency and amount by 50% due to increasing PFM strength, endurance and localization. *  Patient to identify normal bladder function and voiding patterns, diet effects on bladder, urge control strategies, and constipation/optimal stool consistency management. * Patient to demonstrate and verbalize good knowledge of safe lifting, pushing, pulling strategies that limit pelvic organ stress. 1. This pt will report abdominal pelvic pain reduced to 5/10 at worst to allow initiation of manual techniques. 2. This pt will demo the ability to completely and instantly relax the PFM in order to decrease pain and increase ease with toileting and with physical intimacy and/or medical examinations to ensure pt's health. 3. The pt will be able to describe normal bowel function, bowel irritants, and bowel anatomy and physiology to promote insight into current condition and to allow the pt to identify causative factors related to current condition. 4.  The pt will be able to delay fecal urge x 5' in order to allow time to get to a bathroom without soiling clothing. 5.  The pt will reduce incidence of fecal leakage by 25 % and amount of material leaked by 25% to increase ease of hygiene. 6. This pt will be able to describe normal bowel function, diet impact on the bowel, and have a good understanding of bowel anatomy and physiology to promote insight into condition.   7. This pt will demo a good understanding of proper toilet posture to decrease PFM tone and to improve visceral alignment for increased ease of defecation and reduced pelvic organ strain. 8. This pt will demonstrate independence with basic HEP designed to increase flexibility of the pelvic girdle and to strengthen the core for maximal symptom reduction. Long Term Goals:   12 weeks   1. This pt will report improved defecation ease and frequency by 75% in order to promote overall pt health. 2. Pt will demo independence in advanced HEP in order to promote retention of gains made in therapy and to reduce the need for further medical intervention. 3.  The pt will be able to delay bowel urgency for 10 minutes with good control in order to allow pt time to seek a public bathroom. 4. This pt will return bowel frequency to normal levels in order to allow attendance of public events, shopping, and visiting friends with increased comfort. 5. This pt will report abdominal pelvic pain decreased to 1-2/10 at worst to increase tolerance to work and home tasks. 6. This pt will demonstrate PFDI-20 score decreased to less than 10 in order to indication functional improvement with therapy. 7. PFM strength WNL to increase visceral support and reduce risk of symptoms reoccurring. * Patient will increase PFM strength to 4/5 with endurance of 10 seconds x 10 reps to increase pelvic organ support and decrease incontinence. * Patient will Increase abdominal strength of 4/5 or greater to allow for increased pelvic organ support and decreased incontinence and pelvic floor dysfunction. PLAN:  Treatment Recommendations: Strengthening, Endurance Training, Neuromuscular Re-education, Manual Therapy - Soft Tissue Mobilization, Manual Therapy - Joint Manipulation, Pain Management, Home Exercise Program, Patient Education, Self-Care Education and Training, Positioning, Integrative Dry Needling and Modalities    []  Plan of care initiated.   Plan to see patient 1 time weekly to biweekly for 12 weeks to address the treatment planned outlined above.   [x]  Continue with current plan of care  []  Modify plan of care as follows:    []  Hold pending physician visit  []  Discharge    Time In 1300   Time Out 1345   Timed Code Minutes: 45 min   Total Treatment Time: 45 min       Electronically Signed by: Romulo Mares, SHIR/L [id number]

## 2022-05-17 ENCOUNTER — HOSPITAL ENCOUNTER (OUTPATIENT)
Dept: PHYSICAL THERAPY | Age: 37
Setting detail: THERAPIES SERIES
Discharge: HOME | End: 2022-05-17
Payer: COMMERCIAL

## 2022-05-17 PROCEDURE — 97140 MANUAL THERAPY 1/> REGIONS: CPT

## 2022-05-17 NOTE — PROGRESS NOTES
[facility]  OCCUPATIONAL THERAPY  OUTPATIENT REHABILITATION - SPECIALIZED THERAPY SERVICES  [] PELVIC HEALTH EVALUATION  [x] DAILY NOTE  [] PROGRESS NOTE [] DISCHARGE NOTE    Date: 2022  Patient Name:  Nori Malik  : 1985  MRN: [id number]  CSN: [id number]     Referring Practitioner CHAPO Wolfe*   Diagnosis Bowel and Pain    Treatment Diagnosis N81.84 - Pelvic Muscle Wasting (Female), R35.1 - Nocturia  N39.46 - Mixed Incontinence, R15.9 - Full Incontinence of Feces  K59.00 - Constipation, Unspecified, R10.2 - Pelvic and Perineal Pain  R10.30 - Lower Abdominal Pain, Unspecified  R94.10 - Unspecified Dyspareunia and R27.8 - Other Lack of Coordination   Date of Evaluation 3/29/22    Additional Pertinent History Psychophysiological insomnia F51.04     Malignant neoplasm of overlapping sites of cervix ([facility] [address] C53.8    Other chest pain R07.89    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[facility] [address] K56.699    S/P colectomy Z90.49    Tobacco abuse Z72.0   Cancer Staging  Malignant neoplasm of overlapping sites of cervix [facility])  Staging form: Cervix Uteri, AJCC 8th Edition  - Clinical stage from 2019: FIGO Stage IIA1 (Alberto Ha, cN0, cM0) - Signed by John Mathew MD on 2019          Past Medical History:   Diagnosis Date    Anxiety      Cancer of cervix ([facility] [address]      Depression      Hypertension           Past Surgical History:   Procedure Laterality Date    CERVIX BIOPSY N/A 2019    CHOLECYSTECTOMY   over 5 years ago    COLONOSCOPY   2020    TUBAL LIGATION        Alton Floyd         Functional Outcome Measure Used PFDI-20   Functional Outcome Score PFDI-20: 52 (3/29/22)       Insurance: Primary: Payor: [address] [place] [address]  [address] /  /  / ,   Secondary:    Authorization Information: PRECERTIFICATION REQUIRED: No   Visit # 5, 5/10 for progress note   Visits Allowed: INSURANCE THERAPY BENEFIT: No pre-certification is needed.   PT, OT and ST are allowed 30 visits each per calendar year   Recertification Date: June 27, 2022   Physician Follow-Up: Possibly September    Physician Orders: Eval and treat   History of Present Illness: Pt reports to skilled OT services with c/o pelvic and coccyx pain. Pt reports that she did PT in [facility] that told her that her tailbone is out of alignment. Pt reports that she had radiation that caused her to have pain that started 2 years ago. Pt reports that she had a foot of the colon removed due to the bowel being effected by the radiation that was in May of 2021 and the colostomy was reversed in August 2021. Pt reports that she has incontinence with the bowel and bladder (mostly bowels) due to decreased sensation and will not be able to feel that she is starting a bowel movement before it is too late. Pt reports that she is cancer free and had her last radiation on approximately September of 2019. Pt reports that she has to do a lot of straining to get a bm started and to fully empty. SUBJECTIVE: Pt reports that she is having some low back and LLQ pain in the abdomen that is a little better after having a bm (3/10 now and was a 6/10 before the bm). Pt reports that the pain seems to get sharper with inhaling and was tender to the touch. Pt reports that she left last visit and had a bm and felt a little better. Pt reports that she is having more regular bms that are easier to fully empty at times. Pt reports that she has been having some tailbone pain the past few nights. Social/Functional History and Current Status:  Medications and Allergies have been reviewed and are listed on Medical History Questionnaire. Beatriz Larsen lives with family in a multiple floor home with ability to complete ADL's on main floor with stairs and no handrail to enter.     Task Previous Current   ADLs  Independent Modified Independent breaks required and some assistance required at times with bathing and dressing due to pain    IADL's Independent Modified Independent breaks required and some assistance required at times with going to the store and with light housework due to pain    Ambulation Independent Modified Independent breaks required and some assistance required at times    Transfers Independent Modified Independent breaks required and some assistance required at times    [address]  Active    Work Unemployed  Unemployed     PAIN    Location Abdominal pelvic pain    Description Dull ache    Increased by Activity, prolonged standing or sitting   Decreased by Vernon's on her side, heat, light massage    Maximum Intensity 7/10    Best Intensity 0/10   Todays Rating 0/10   Other/Function      PAIN    Location Low back    Description    Increased by Activity, prolonged standing or sitting   Decreased by Laying on her side, heat, light massage    Maximum Intensity 7/10   Best Intensity 0/10   Todays Rating 3/10   Other/Function      History of Abuse:No history of physical, emotional or sexual abuse reported    SEXUAL /MENSTRUAL HISTORY [x] Deferred secondary to: Information below from evaluation, not tested today. For reference only on this daily note. Number of Pregnancies 4   Number of Vaginal Deliveries 4    Number of Caesarean Deliveries 0       BLADDER ASSESSMENT [x] Deferred secondary to: Information below from evaluation, not tested today. For reference only on this daily note.    Daily Fluid Ingestion: 100 ounces water per day, fruit juice throughout the week, cup of coffee occasionally, occ milk, occ pop   Urination Frequency Times/Day: 4-6 times per day   Times/Night: 2 times    Volume Medium   Urge Sensation Normal  and Severe    SYMPTOM QUESTIONNAIRE   Loses Urine Upon: Sneezing/Coughing and Feeling Cold   Incontinence Volume: Small   Frequency of Leakage: Occasional   Wets the Bed: yes   Burning/Pain with Urination: no   Difficulty Starting a Stream of Urine: no   Incomplete Emptying No   Strain to Empty Bladder: no   Falling Out Feeling: yes   Urinate more than 7 times/day: yes   Use a form of Leakage Protection: yes: 2-3 panty liners    Restrict Fluid Intake: no   Stream Strength Average       BOWEL ASSESSMENT [x] Deferred secondary to: Information below from evaluation, not tested today. For reference only on this daily note. Frequency: 3-4 times per day she will have very small amounts with her bowel movements and every 2-4 weeks will have a good full bowel movement that feels relieveing   Most Common Stool Consistency: Forrest 2-3   SYMPTOM QUESTIONNAIRE   Strain to have a BM: yes   Include fiber in your diet: yes   Take laxatives/enemas regularly: yes: at times she will use Doculax    Pain with BM: yes   Strong urge to have BM: yes: sensation is off and will have a bowel movement starting and will not realize it    Leak/Stain Feces: yes: few times a week that is the small hard pieces of stool    Diarrhea often: no         SEXUAL ASSESSMENT [x] Deferred secondary to: Information below from evaluation, not tested today. For reference only on this daily note. Sexually Active yes   Pain with [place] (Dyspareunia) yes at times    Painful Penetration Pain with initial penetration and pt reports that her partner feels like he is hitting something   Lubrication Needed no   Pain After [place] yes: pelvic and abdominal pain increases after intercourse          OBSERVATION  [] Deferred secondary to:   Patient Safety Patient offered a chaperone and declined. The pt did verbalize consent for internal vaginal examination following OT education of pt on the purpose of this activity and on the procedure using the model. Pt was educated in the intent of the OT to proceed slowly into the vaginal canal with permission requested of pt at every step of assessment prior to commencement by OT.   Pt was also educated in her right to stop assessment, refuse any portion of this assessment, or to refuse assessment at any time without need for reason. The pt was educated that refusal would not impact her ability to seek care from this therapist in any way or result in therapist prejudice regarding her motivation to get better. Pt verbalized understanding and agreed again to internal examination by OT this date. Skin Condition intact   Urogenital Triangle Bulbocavernosus tender/tight, Ischiocavernosus tender/tight, STPM tender/tight, Levator ani tender/tight and OI tight/tender   Introitus     Perineal Descent     External Clock         PELVIC FLOOR INTERNAL EXAM [] Deferred secondary to:   Exam Vaginal   Sensation Intact   Muscle Localization Poor - pt was able to contract the PFM and relax with vc to inhibit contraction of the abdomen and glutes   Palpation/Tone Hypertonic   Pelvic Floor Strength PERF:Power: 1,Endurance: 1,Reps: 1,Flicks: 1   Relax after Contraction Delayed   Prolapse slight anterior wall prolapse and posterior wall prolapse         PELVIC HEALTH INCONTINENCE TREATMENT   Precautions:    Pain:     X in shaded column indicates activity completed today   Exercise/Intervention Reps/Sec  Notes   Kegel quick 10x   On hold    Kegel hold 10x 2 sec  On hold   Tummy tuck quick/hold       Pelvic Brace Quick       Pelvic Brace Hold       PFM anatomy  5 min      PFM awareness/relaxation (mirror, towel, hand) 5 min   Guided relaxation   Reverse Kegel       Lubrication vaginal moisturizer       Manual therapy pt education       Dilator education       Education in manual therapy with dilator.        Diaphragmatic breathing 5 min      Internal exam  35 min  X    Kegel with Ball Squeeze       Kegel with Band       Colonic massage 5 min  X    Visceral mobilization  5 min  X Abdomen    Scar massage 5 min  X Abdomen    IDN  5 min  X           Pelvic Tilt       Pelvic Tilt with arm lift       Pelvic Tilt with leg march       Pelvic Tilt with opposites       Bridge       Pelvic Tilt SLR       Clamshell       Reverse Clamshell                     Standing Kegal       Kegal Mini Squat       Standing Hip 3-way                       PFM anatomy and Physiology       Normal bowel function/promoting good function 10 min   Belly big/hard, proper push, toilet posture, when to go, walking program, don't delay   PFM relaxation/awareness    Mirror, hand, towel methods   Diet impact on the bowel 5 min   Fiber education: sol and insol choices     Specific Interventions Next Treatment: internal PFM release, bowel and bladder education, pressure control     Activity/Treatment Tolerance:  [x]  Patient tolerated treatment well  []  Patient limited by fatigue  []  Patient limited by pain   []  Patient limited by medical complications  []  Other:     Patient Education:   [x]  HEP/Education Completed: guided relaxation, education handouts   []  No new Education completed  []  Reviewed Prior HEP      [x]  Patient verbalized and/or demonstrated understanding of education provided. []  Patient unable to verbalize and/or demonstrate understanding of education provided. Will continue education. []  Barriers to learning:     Assessment: Pt tolerated session well this date. With completion of the internal exam to assess the pelvic floor through the vagina there was noted increased muscle tightness bilaterally along the OI, coccygeus, levator ani, STP, and ischiocavernosus L>R with noted increased tightness, tenderness, and pressure along the posterior wall that was released utilizing STM and thieles maneuver to decrease pain and muscle tightness. Pt tolerated colonic massage, visceral mobilization, and scar massage (desensitization) along the abdomen well and was educated for at home use to decrease pain and muscle tightness. Pt was educated throughout the session for use of relaxation and diaphragmatic breathing to decrease muscle tightness and tone.  Pt tolerated IDN techniques along the paraspinal segments of L4-S2 well for decreased pain and muscle tightness. Pt reports decreased pain upon leaving therapy this date. Body Structures/Functions/Activity Limitations: PFM weakness with decreased localization and endurance, PFM spasm, Poor PFM control with limited ability to completely relax, Decreased awareness of functional factors that increase pelvic organ strain, Decreased awareness of normal bladder and bowel habits, control, and diet effects on functioning, Abdominal and core weakness and Pain  Prognosis: Good    GOALS:  Patient Goal: to decrease pain and pelvic floor dysfunction     Short Term Goals:  Time Frame: 6 weeks  *  Patient will decrease urine leak frequency and amount by 50% due to increasing PFM strength, endurance and localization. *  Patient to identify normal bladder function and voiding patterns, diet effects on bladder, urge control strategies, and constipation/optimal stool consistency management. * Patient to demonstrate and verbalize good knowledge of safe lifting, pushing, pulling strategies that limit pelvic organ stress. 1. This pt will report abdominal pelvic pain reduced to 5/10 at worst to allow initiation of manual techniques. 2. This pt will demo the ability to completely and instantly relax the PFM in order to decrease pain and increase ease with toileting and with physical intimacy and/or medical examinations to ensure pt's health. 3. The pt will be able to describe normal bowel function, bowel irritants, and bowel anatomy and physiology to promote insight into current condition and to allow the pt to identify causative factors related to current condition. 4.  The pt will be able to delay fecal urge x 5' in order to allow time to get to a bathroom without soiling clothing. 5.  The pt will reduce incidence of fecal leakage by 25 % and amount of material leaked by 25% to increase ease of hygiene.     6. This pt will be able to describe normal bowel function, diet impact on the bowel, and have a good understanding of bowel anatomy and physiology to promote insight into condition. 7. This pt will demo a good understanding of proper toilet posture to decrease PFM tone and to improve visceral alignment for increased ease of defecation and reduced pelvic organ strain. 8. This pt will demonstrate independence with basic HEP designed to increase flexibility of the pelvic girdle and to strengthen the core for maximal symptom reduction. Long Term Goals:   12 weeks   1. This pt will report improved defecation ease and frequency by 75% in order to promote overall pt health. 2. Pt will demo independence in advanced HEP in order to promote retention of gains made in therapy and to reduce the need for further medical intervention. 3.  The pt will be able to delay bowel urgency for 10 minutes with good control in order to allow pt time to seek a public bathroom. 4. This pt will return bowel frequency to normal levels in order to allow attendance of public events, shopping, and visiting friends with increased comfort. 5. This pt will report abdominal pelvic pain decreased to 1-2/10 at worst to increase tolerance to work and home tasks. 6. This pt will demonstrate PFDI-20 score decreased to less than 10 in order to indication functional improvement with therapy. 7. PFM strength WNL to increase visceral support and reduce risk of symptoms reoccurring. * Patient will increase PFM strength to 4/5 with endurance of 10 seconds x 10 reps to increase pelvic organ support and decrease incontinence. * Patient will Increase abdominal strength of 4/5 or greater to allow for increased pelvic organ support and decreased incontinence and pelvic floor dysfunction.     PLAN:  Treatment Recommendations: Strengthening, Endurance Training, Neuromuscular Re-education, Manual Therapy - Soft Tissue Mobilization, Manual Therapy - Joint Manipulation, Pain Management, Home Exercise Program, Patient Education, Self-Care Education and Training, Positioning, Integrative Dry Needling and Modalities    []  Plan of care initiated. Plan to see patient 1 time weekly to biweekly for 12 weeks to address the treatment planned outlined above.   [x]  Continue with current plan of care  []  Modify plan of care as follows:    []  Hold pending physician visit  []  Discharge    Time In 1500   Time Out 1555   Timed Code Minutes: 55 min   Total Treatment Time: 55 min       Electronically Signed by: Rishi WOLFE OTR/L [id number]

## 2022-06-07 ENCOUNTER — HOSPITAL ENCOUNTER (OUTPATIENT)
Dept: PHYSICAL THERAPY | Age: 37
Setting detail: THERAPIES SERIES
Discharge: HOME | End: 2022-06-07
Payer: COMMERCIAL

## 2022-06-07 PROCEDURE — 97530 THERAPEUTIC ACTIVITIES: CPT

## 2022-06-07 PROCEDURE — 97140 MANUAL THERAPY 1/> REGIONS: CPT

## 2022-06-07 NOTE — PROGRESS NOTES
2/11/2021 -   JANETH:  - RUR: N/A; RRAT: 4  - Disposition is for discharge to own home with transport via family    - Patient to have Chest CT today, 2/11  - Heparin and Metoprolol continue    CRM: Carlos Alexander, MPH, [address] [place] [address]; Z: [phone] [id number] [facility]  OCCUPATIONAL THERAPY  OUTPATIENT REHABILITATION - SPECIALIZED THERAPY SERVICES  [] PELVIC HEALTH EVALUATION  [x] DAILY NOTE  [] PROGRESS NOTE [] DISCHARGE NOTE    Date: 2022  Patient Name:  Ke August  : 1985  MRN: [id number]  CSN: [id number]     Referring Practitioner CHAPO Kenney*   Diagnosis Bowel and Pain    Treatment Diagnosis N81.84 - Pelvic Muscle Wasting (Female), R35.1 - Nocturia  N39.46 - Mixed Incontinence, R15.9 - Full Incontinence of Feces  K59.00 - Constipation, Unspecified, R10.2 - Pelvic and Perineal Pain  R10.30 - Lower Abdominal Pain, Unspecified  R94.10 - Unspecified Dyspareunia and R27.8 - Other Lack of Coordination   Date of Evaluation 3/29/22    Additional Pertinent History Psychophysiological insomnia F51.04     Malignant neoplasm of overlapping sites of cervix ([facility] C53.8    Other chest pain R07.89    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    S/P colectomy Z90.49    Tobacco abuse Z72.0   Cancer Staging  Malignant neoplasm of overlapping sites of cervix [facility])  Staging form: Cervix Uteri, AJCC 8th Edition  - Clinical stage from 2019: FIGO Stage IIA1 (Shalini Louise, cN0, cM0) - Signed by Kayleen Almonte MD on 2019          Past Medical History:   Diagnosis Date    Anxiety      Cancer of cervix ([facility]      Depression      Hypertension           Past Surgical History:   Procedure Laterality Date    CERVIX BIOPSY N/A 2019    CHOLECYSTECTOMY   over 5 years ago    COLONOSCOPY   2020    TUBAL LIGATION        Alton Floyd         Functional Outcome Measure Used PFDI-20   Functional Outcome Score PFDI-20: 52 (3/29/22)       Insurance: Primary: Payor: [address] [place] [address]  [phone] /  /  / ,   Secondary:    Authorization Information: PRECERTIFICATION REQUIRED: No   Visit # 6, 6/10 for progress note   Visits Allowed: INSURANCE THERAPY BENEFIT: No pre-certification is needed.   PT, OT and ST are allowed 30 visits each per calendar year   Recertification Date: June 27, 2022   Physician Follow-Up: Possibly September    Physician Orders: Eval and treat   History of Present Illness: Pt reports to skilled OT services with c/o pelvic and coccyx pain. Pt reports that she did PT in [facility] that told her that her tailbone is out of alignment. Pt reports that she had radiation that caused her to have pain that started 2 years ago. Pt reports that she had a foot of the colon removed due to the bowel being effected by the radiation that was in May of 2021 and the colostomy was reversed in August 2021. Pt reports that she has incontinence with the bowel and bladder (mostly bowels) due to decreased sensation and will not be able to feel that she is starting a bowel movement before it is too late. Pt reports that she is cancer free and had her last radiation on approximately September of 2019. Pt reports that she has to do a lot of straining to get a bm started and to fully empty. SUBJECTIVE: Pt reports that she is feeling really nauseous today. Pt reports that she had an episode of fecal incontinence that happened while she was washing dishes. Pt reports that she has not had a bm for 3 days until this morning but does not feel emptied. Pt reports that \"the pain hasn't been horrible. \" Pt reports that a few weeks ago she had some intense pain and pressure in the vaginal canal. Pt reports that she had some low back pain this morning but has subsided since she had a small bm. Pt reports that she has some LLQ pain of 2/10. Pt reports that she has been having some tailbone pain but less overall. Social/Functional History and Current Status:  Medications and Allergies have been reviewed and are listed on Medical History Questionnaire. Valerie Robles lives with family in a multiple floor home with ability to complete ADL's on main floor with stairs and no handrail to enter.     Task Previous Current   ADLs  Independent Incontinence Volume: Small   Frequency of Leakage: Occasional   Wets the Bed: yes   Burning/Pain with Urination: no   Difficulty Starting a Stream of Urine: no   Incomplete Emptying No   Strain to Empty Bladder: no   Falling Out Feeling: yes   Urinate more than 7 times/day: yes   Use a form of Leakage Protection: yes: 2-3 panty liners    Restrict Fluid Intake: no   Stream Strength Average       BOWEL ASSESSMENT [x] Deferred secondary to: Information below from evaluation, not tested today. For reference only on this daily note. Frequency: 3-4 times per day she will have very small amounts with her bowel movements and every 2-4 weeks will have a good full bowel movement that feels relieveing   Most Common Stool Consistency: Pierce 2-3   SYMPTOM QUESTIONNAIRE   Strain to have a BM: yes   Include fiber in your diet: yes   Take laxatives/enemas regularly: yes: at times she will use Doculax    Pain with BM: yes   Strong urge to have BM: yes: sensation is off and will have a bowel movement starting and will not realize it    Leak/Stain Feces: yes: few times a week that is the small hard pieces of stool    Diarrhea often: no         SEXUAL ASSESSMENT [x] Deferred secondary to: Information below from evaluation, not tested today. For reference only on this daily note. Sexually Active yes   Pain with [place] (Dyspareunia) yes at times    Painful Penetration Pain with initial penetration and pt reports that her partner feels like he is hitting something   Lubrication Needed no   Pain After [place] yes: pelvic and abdominal pain increases after intercourse          OBSERVATION  [] Deferred secondary to:   Patient Safety Patient offered a chaperone and declined. The pt did verbalize consent for internal vaginal examination following OT education of pt on the purpose of this activity and on the procedure using the model.   Pt was educated in the intent of the OT to proceed slowly into the vaginal canal with permission requested of pt at every step of assessment prior to commencement by OT. Pt was also educated in her right to stop assessment, refuse any portion of this assessment, or to refuse assessment at any time without need for reason. The pt was educated that refusal would not impact her ability to seek care from this therapist in any way or result in therapist prejudice regarding her motivation to get better. Pt verbalized understanding and agreed again to internal examination by OT this date. Skin Condition intact   Urogenital Triangle Bulbocavernosus tender/tight, Ischiocavernosus tender/tight, STPM tender/tight, Levator ani tender/tight and OI tight/tender   Introitus     Perineal Descent     External Clock         PELVIC FLOOR INTERNAL EXAM [] Deferred secondary to:   Exam Vaginal   Sensation Intact   Muscle Localization Poor - pt was able to contract the PFM and relax with vc to inhibit contraction of the abdomen and glutes   Palpation/Tone Hypertonic   Pelvic Floor Strength PERF:Power: 1,Endurance: 1,Reps: 1,Flicks: 1   Relax after Contraction Delayed   Prolapse slight anterior wall prolapse and posterior wall prolapse         PELVIC HEALTH INCONTINENCE TREATMENT   Precautions:    Pain:     X in shaded column indicates activity completed today   Exercise/Intervention Reps/Sec  Notes   Kegel quick 10x  X    Kegel hold 10x 2 sec  On hold   Tummy tuck quick/hold       Pelvic Brace Quick       Pelvic Brace Hold       PFM anatomy  5 min      PFM awareness/relaxation (mirror, towel, hand) 5 min   Guided relaxation   Reverse Kegel       Lubrication vaginal moisturizer       Manual therapy pt education       Dilator education       Education in manual therapy with dilator.        Diaphragmatic breathing 5 min      Internal exam  38 min  X    Kegel with Ball Squeeze       Kegel with Band       Colonic massage 5 min      Visceral mobilization  5 min   Abdomen    Scar massage 5 min   Abdomen    IDN  5 min Pelvic Tilt       Pelvic Tilt with arm lift       Pelvic Tilt with leg march       Pelvic Tilt with opposites       Bridge       Pelvic Tilt SLR       Clamshell       Reverse Clamshell                     Standing Kegal       Kegal Mini Squat       Standing Hip 3-way                       PFM anatomy and Physiology       Normal bowel function/promoting good function 10 min  X Belly big/hard, proper push, toilet posture, when to go, walking program, don't delay   PFM relaxation/awareness    Mirror, hand, towel methods   Diet impact on the bowel 5 min  X Fiber education: sol and insol choices; food log     Specific Interventions Next Treatment: internal PFM release, bowel and bladder education, pressure control     Activity/Treatment Tolerance:  [x]  Patient tolerated treatment well  []  Patient limited by fatigue  []  Patient limited by pain   []  Patient limited by medical complications  []  Other:     Patient Education:   [x]  HEP/Education Completed: guided relaxation, education handouts   []  No new Education completed  []  Reviewed Prior HEP      [x]  Patient verbalized and/or demonstrated understanding of education provided. []  Patient unable to verbalize and/or demonstrate understanding of education provided. Will continue education. []  Barriers to learning:     Assessment: Pt tolerated session well this date. With completion of the internal exam to assess the pelvic floor through the vagina there was noted increased muscle tightness (increased by 10% compared to last session most likely due to increased stress) bilaterally along the OI, coccygeus, levator ani, STP, and ischiocavernosus with noted increased tightness, tenderness, and pressure along the posterior wall that was released utilizing STM and thieles maneuver to decrease pain and muscle tightness. Pt was re educated throughout the session for use of relaxation and diaphragmatic breathing to decrease muscle tightness and tone.  Pt was educated for proper use of a squatty potty, positioning, full relaxation, and fiber intake to decrease the need to strain and to decrease pain. Body Structures/Functions/Activity Limitations: PFM weakness with decreased localization and endurance, PFM spasm, Poor PFM control with limited ability to completely relax, Decreased awareness of functional factors that increase pelvic organ strain, Decreased awareness of normal bladder and bowel habits, control, and diet effects on functioning, Abdominal and core weakness and Pain  Prognosis: Good    GOALS:  Patient Goal: to decrease pain and pelvic floor dysfunction     Short Term Goals:  Time Frame: 6 weeks  *  Patient will decrease urine leak frequency and amount by 50% due to increasing PFM strength, endurance and localization. *  Patient to identify normal bladder function and voiding patterns, diet effects on bladder, urge control strategies, and constipation/optimal stool consistency management. * Patient to demonstrate and verbalize good knowledge of safe lifting, pushing, pulling strategies that limit pelvic organ stress. 1. This pt will report abdominal pelvic pain reduced to 5/10 at worst to allow initiation of manual techniques. 2. This pt will demo the ability to completely and instantly relax the PFM in order to decrease pain and increase ease with toileting and with physical intimacy and/or medical examinations to ensure pt's health. 3. The pt will be able to describe normal bowel function, bowel irritants, and bowel anatomy and physiology to promote insight into current condition and to allow the pt to identify causative factors related to current condition. 4.  The pt will be able to delay fecal urge x 5' in order to allow time to get to a bathroom without soiling clothing. 5.  The pt will reduce incidence of fecal leakage by 25 % and amount of material leaked by 25% to increase ease of hygiene.     6. This pt will be able to describe normal bowel function, diet impact on the bowel, and have a good understanding of bowel anatomy and physiology to promote insight into condition. 7. This pt will demo a good understanding of proper toilet posture to decrease PFM tone and to improve visceral alignment for increased ease of defecation and reduced pelvic organ strain. 8. This pt will demonstrate independence with basic HEP designed to increase flexibility of the pelvic girdle and to strengthen the core for maximal symptom reduction. Long Term Goals:   12 weeks   1. This pt will report improved defecation ease and frequency by 75% in order to promote overall pt health. 2. Pt will demo independence in advanced HEP in order to promote retention of gains made in therapy and to reduce the need for further medical intervention. 3.  The pt will be able to delay bowel urgency for 10 minutes with good control in order to allow pt time to seek a public bathroom. 4. This pt will return bowel frequency to normal levels in order to allow attendance of public events, shopping, and visiting friends with increased comfort. 5. This pt will report abdominal pelvic pain decreased to 1-2/10 at worst to increase tolerance to work and home tasks. 6. This pt will demonstrate PFDI-20 score decreased to less than 10 in order to indication functional improvement with therapy. 7. PFM strength WNL to increase visceral support and reduce risk of symptoms reoccurring. * Patient will increase PFM strength to 4/5 with endurance of 10 seconds x 10 reps to increase pelvic organ support and decrease incontinence. * Patient will Increase abdominal strength of 4/5 or greater to allow for increased pelvic organ support and decreased incontinence and pelvic floor dysfunction.     PLAN:  Treatment Recommendations: Strengthening, Endurance Training, Neuromuscular Re-education, Manual Therapy - Soft Tissue Mobilization, Manual Therapy - Joint Manipulation, Pain Management, Home Exercise Program, Patient Education, Self-Care Education and Training, Positioning, Integrative Dry Needling and Modalities    []  Plan of care initiated. Plan to see patient 1 time weekly to biweekly for 12 weeks to address the treatment planned outlined above.   [x]  Continue with current plan of care  []  Modify plan of care as follows:    []  Hold pending physician visit  []  Discharge    Time In 1500   Time Out 1555   Timed Code Minutes: 55 min   Total Treatment Time: 55 min       Electronically Signed by: Pool Farley [address] [place] [address], OTR/L [id number]

## 2022-06-13 ENCOUNTER — HOSPITAL ENCOUNTER (OUTPATIENT)
Dept: PHYSICAL THERAPY | Age: 37
Setting detail: THERAPIES SERIES
Discharge: HOME | End: 2022-06-13
Payer: COMMERCIAL

## 2022-06-13 PROCEDURE — 97140 MANUAL THERAPY 1/> REGIONS: CPT

## 2022-06-13 PROCEDURE — 97530 THERAPEUTIC ACTIVITIES: CPT

## 2022-06-13 NOTE — PROGRESS NOTES
[address]  OCCUPATIONAL THERAPY  OUTPATIENT REHABILITATION - SPECIALIZED THERAPY SERVICES  [] PELVIC HEALTH EVALUATION  [x] DAILY NOTE  [] PROGRESS NOTE [] DISCHARGE NOTE    Date: 2022  Patient Name:  Angelito Carvajal  : 1985  MRN: [id number]  CSN: [id number]     Referring Practitioner CHAPO Magallon*   Diagnosis Bowel and Pain    Treatment Diagnosis N81.84 - Pelvic Muscle Wasting (Female), R35.1 - Nocturia  N39.46 - Mixed Incontinence, R15.9 - Full Incontinence of Feces  K59.00 - Constipation, Unspecified, R10.2 - Pelvic and Perineal Pain  R10.30 - Lower Abdominal Pain, Unspecified  R94.10 - Unspecified Dyspareunia and R27.8 - Other Lack of Coordination   Date of Evaluation 3/29/22    Additional Pertinent History Psychophysiological insomnia F51.04     Malignant neoplasm of overlapping sites of cervix ([facility] [address] C53.8    Other chest pain R07.89    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[facility] [address] K56.699    S/P colectomy Z90.49    Tobacco abuse Z72.0   Cancer Staging  Malignant neoplasm of overlapping sites of cervix [facility])  Staging form: Cervix Uteri, AJCC 8th Edition  - Clinical stage from 2019: FIGO Stage IIA1 (Philis Pert, cN0, cM0) - Signed by Hortensia Peña MD on 2019          Past Medical History:   Diagnosis Date    Anxiety      Cancer of cervix ([facility] [address]      Depression      Hypertension           Past Surgical History:   Procedure Laterality Date    CERVIX BIOPSY N/A 2019    CHOLECYSTECTOMY   over 5 years ago    COLONOSCOPY   2020    TUBAL LIGATION   2006     Alton Floyd         Functional Outcome Measure Used PFDI-20   Functional Outcome Score PFDI-20: 52 (3/29/22)       Insurance: Primary: Payor: [address] [place] [address]  [address] /  /  / ,   Secondary:    Authorization Information: PRECERTIFICATION REQUIRED: No   Visit # 7, 7/10 for progress note   Visits Allowed: INSURANCE THERAPY BENEFIT: No pre-certification is needed.   PT, OT and ST are allowed 30 visits each per calendar year   Recertification Date: June 27, 2022   Physician Follow-Up: Possibly September    Physician Orders: Eval and treat   History of Present Illness: Pt reports to skilled OT services with c/o pelvic and coccyx pain. Pt reports that she did PT in [place] that told her that her tailbone is out of alignment. Pt reports that she had radiation that caused her to have pain that started 2 years ago. Pt reports that she had a foot of the colon removed due to the bowel being effected by the radiation that was in May of 2021 and the colostomy was reversed in August 2021. Pt reports that she has incontinence with the bowel and bladder (mostly bowels) due to decreased sensation and will not be able to feel that she is starting a bowel movement before it is too late. Pt reports that she is cancer free and had her last radiation on approximately September of 2019. Pt reports that she has to do a lot of straining to get a bm started and to fully empty. SUBJECTIVE: Pt reports that she is having bowel movements the past few days and is having up to 5 per day with small bowel movements each time. Pt reports that she kept track of her food and beverages for a week. Pt reports that she is feeling better today. Pt reports that she has been having L low back and hip pain that has gone to her feet. Pt reports no episodes of fecal incontinence since last visit. Pt reports no pain this date. Social/Functional History and Current Status:  Medications and Allergies have been reviewed and are listed on Medical History Questionnaire. Angelito Carvajal lives with family in a multiple floor home with ability to complete ADL's on main floor with stairs and no handrail to enter.     Task Previous Current   ADLs  Independent Modified Independent breaks required and some assistance required at times with bathing and dressing due to pain    IADL's Independent Modified Independent breaks required and some assistance required at times with going to the store and with light housework due to pain    Ambulation Independent Modified Independent breaks required and some assistance required at times    Transfers Independent Modified Independent breaks required and some assistance required at times    [address]  Active    Work Unemployed  Unemployed     PAIN    Location Abdominal pelvic pain    Description Dull ache    Increased by Activity, prolonged standing or sitting   Decreased by Vernon's on her side, heat, light massage    Maximum Intensity 7/10    Best Intensity 0/10   Todays Rating 0/10   Other/Function      PAIN    Location Low back    Description    Increased by Activity, prolonged standing or sitting   Decreased by Laying on her side, heat, light massage    Maximum Intensity 7/10   Best Intensity 0/10   Todays Rating 3/10   Other/Function      History of Abuse:No history of physical, emotional or sexual abuse reported    SEXUAL /MENSTRUAL HISTORY [x] Deferred secondary to: Information below from evaluation, not tested today. For reference only on this daily note. Number of Pregnancies 4   Number of Vaginal Deliveries 4    Number of Caesarean Deliveries 0       BLADDER ASSESSMENT [x] Deferred secondary to: Information below from evaluation, not tested today. For reference only on this daily note.    Daily Fluid Ingestion: 100 ounces water per day, fruit juice throughout the week, cup of coffee occasionally, occ milk, occ pop   Urination Frequency Times/Day: 4-6 times per day   Times/Night: 2 times    Volume Medium   Urge Sensation Normal  and Severe    SYMPTOM QUESTIONNAIRE   Loses Urine Upon: Sneezing/Coughing and Feeling Cold   Incontinence Volume: Small   Frequency of Leakage: Occasional   Wets the Bed: yes   Burning/Pain with Urination: no   Difficulty Starting a Stream of Urine: no   Incomplete Emptying No Strain to Empty Bladder: no   Falling Out Feeling: yes   Urinate more than 7 times/day: yes   Use a form of Leakage Protection: yes: 2-3 panty liners    Restrict Fluid Intake: no   Stream Strength Average       BOWEL ASSESSMENT [x] Deferred secondary to: Information below from evaluation, not tested today. For reference only on this daily note. Frequency: 3-4 times per day she will have very small amounts with her bowel movements and every 2-4 weeks will have a good full bowel movement that feels relieveing   Most Common Stool Consistency: [place] 2-3   SYMPTOM QUESTIONNAIRE   Strain to have a BM: yes   Include fiber in your diet: yes   Take laxatives/enemas regularly: yes: at times she will use Doculax    Pain with BM: yes   Strong urge to have BM: yes: sensation is off and will have a bowel movement starting and will not realize it    Leak/Stain Feces: yes: few times a week that is the small hard pieces of stool    Diarrhea often: no         SEXUAL ASSESSMENT [x] Deferred secondary to: Information below from evaluation, not tested today. For reference only on this daily note. Sexually Active yes   Pain with Ramona (Dyspareunia) yes at times    Painful Penetration Pain with initial penetration and pt reports that her partner feels like he is hitting something   Lubrication Needed no   Pain After Ramona yes: pelvic and abdominal pain increases after intercourse          OBSERVATION  [] Deferred secondary to:   Patient Safety Patient offered a chaperone and declined. The pt did verbalize consent for internal vaginal examination following OT education of pt on the purpose of this activity and on the procedure using the model. Pt was educated in the intent of the OT to proceed slowly into the vaginal canal with permission requested of pt at every step of assessment prior to commencement by OT.   Pt was also educated in her right to stop assessment, refuse any portion of this assessment, or to refuse assessment at any time without need for reason. The pt was educated that refusal would not impact her ability to seek care from this therapist in any way or result in therapist prejudice regarding her motivation to get better. Pt verbalized understanding and agreed again to internal examination by OT this date. Skin Condition intact   Urogenital Triangle Bulbocavernosus tender/tight, Ischiocavernosus tender/tight, STPM tender/tight, Levator ani tender/tight and OI tight/tender   Introitus     Perineal Descent     External Clock         PELVIC FLOOR INTERNAL EXAM [] Deferred secondary to:   Exam Vaginal   Sensation Intact   Muscle Localization Poor - pt was able to contract the PFM and relax with vc to inhibit contraction of the abdomen and glutes   Palpation/Tone Hypertonic   Pelvic Floor Strength PERF:Power: 1,Endurance: 1,Reps: 1,Flicks: 1   Relax after Contraction Delayed   Prolapse slight anterior wall prolapse and posterior wall prolapse         PELVIC HEALTH INCONTINENCE TREATMENT   Precautions:    Pain:     X in shaded column indicates activity completed today   Exercise/Intervention Reps/Sec  Notes   Kegel quick 10x  X 3x/day    Kegel hold 10x 2 sec  On hold   Tummy tuck quick/hold       Pelvic Brace Quick       Pelvic Brace Hold       PFM anatomy  5 min      PFM awareness/relaxation (mirror, towel, hand) 5 min   Guided relaxation   Reverse Kegel       Lubrication vaginal moisturizer       Manual therapy pt education 10 min  X Use of vibrator    Dilator education       Education in manual therapy with dilator.        Diaphragmatic breathing 5 min      Internal exam  20 min  X    Kegel with Ball Squeeze       Kegel with Band       Colonic massage 5 min  X    Visceral mobilization  5 min  X Abdomen    Scar massage 5 min  X Abdomen    IDN  5 min  X           Pelvic Tilt       Pelvic Tilt with arm lift       Pelvic Tilt with leg march       Pelvic Tilt with opposites       Bridge       Pelvic Tilt SLR Clamshell       Reverse Clamshell                     Standing Kegal       Kegal Mini Squat       Standing Hip 3-way                       PFM anatomy and Physiology       Normal bowel function/promoting good function 10 min   Belly big/hard, proper push, toilet posture, when to go, walking program, don't delay   PFM relaxation/awareness    Mirror, hand, towel methods   Diet impact on the bowel 5 min   Fiber education: sol and insol choices; food log     Specific Interventions Next Treatment: internal PFM release, bowel and bladder education, pressure control     Activity/Treatment Tolerance:  [x]  Patient tolerated treatment well  []  Patient limited by fatigue  []  Patient limited by pain   []  Patient limited by medical complications  []  Other:     Patient Education:   [x]  HEP/Education Completed: guided relaxation, education handouts   []  No new Education completed  []  Reviewed Prior HEP      [x]  Patient verbalized and/or demonstrated understanding of education provided. []  Patient unable to verbalize and/or demonstrate understanding of education provided. Will continue education. []  Barriers to learning:     Assessment: Pt tolerated session well this date. With completion of the internal exam to assess the pelvic floor through the vagina there was noted increased muscle tightness bilaterally along the OI, coccygeus, levator ani, STP, and ischiocavernosus with noted increased tightness, tenderness, and pressure along the posterior wall that was released utilizing STM and thieles maneuver to decrease pain and muscle tightness. Pt was educated for and demonstrated for use of a vibrator at home for decreased muscle tightness and pain all to decrease PFM dysfunction. Pt tolerated IDN techiques along the paraspinal segments of L4-S2, bilateral gluts, and piriformis well for decreased pain and inflammation.  Pt tolerated colonic massage, visceral mobilization, STM, and scar massage along the abdomen for decreased pain and pelvic floor dysfunction. Body Structures/Functions/Activity Limitations: PFM weakness with decreased localization and endurance, PFM spasm, Poor PFM control with limited ability to completely relax, Decreased awareness of functional factors that increase pelvic organ strain, Decreased awareness of normal bladder and bowel habits, control, and diet effects on functioning, Abdominal and core weakness and Pain  Prognosis: Good    GOALS:  Patient Goal: to decrease pain and pelvic floor dysfunction     Short Term Goals:  Time Frame: 6 weeks  *  Patient will decrease urine leak frequency and amount by 50% due to increasing PFM strength, endurance and localization. *  Patient to identify normal bladder function and voiding patterns, diet effects on bladder, urge control strategies, and constipation/optimal stool consistency management. * Patient to demonstrate and verbalize good knowledge of safe lifting, pushing, pulling strategies that limit pelvic organ stress. 1. This pt will report abdominal pelvic pain reduced to 5/10 at worst to allow initiation of manual techniques. 2. This pt will demo the ability to completely and instantly relax the PFM in order to decrease pain and increase ease with toileting and with physical intimacy and/or medical examinations to ensure pt's health. 3. The pt will be able to describe normal bowel function, bowel irritants, and bowel anatomy and physiology to promote insight into current condition and to allow the pt to identify causative factors related to current condition. 4.  The pt will be able to delay fecal urge x 5' in order to allow time to get to a bathroom without soiling clothing. 5.  The pt will reduce incidence of fecal leakage by 25 % and amount of material leaked by 25% to increase ease of hygiene.     6. This pt will be able to describe normal bowel function, diet impact on the bowel, and have a good understanding of bowel anatomy and physiology to promote insight into condition. 7. This pt will demo a good understanding of proper toilet posture to decrease PFM tone and to improve visceral alignment for increased ease of defecation and reduced pelvic organ strain. 8. This pt will demonstrate independence with basic HEP designed to increase flexibility of the pelvic girdle and to strengthen the core for maximal symptom reduction. Long Term Goals:   12 weeks   1. This pt will report improved defecation ease and frequency by 75% in order to promote overall pt health. 2. Pt will demo independence in advanced HEP in order to promote retention of gains made in therapy and to reduce the need for further medical intervention. 3.  The pt will be able to delay bowel urgency for 10 minutes with good control in order to allow pt time to seek a public bathroom. 4. This pt will return bowel frequency to normal levels in order to allow attendance of public events, shopping, and visiting friends with increased comfort. 5. This pt will report abdominal pelvic pain decreased to 1-2/10 at worst to increase tolerance to work and home tasks. 6. This pt will demonstrate PFDI-20 score decreased to less than 10 in order to indication functional improvement with therapy. 7. PFM strength WNL to increase visceral support and reduce risk of symptoms reoccurring. * Patient will increase PFM strength to 4/5 with endurance of 10 seconds x 10 reps to increase pelvic organ support and decrease incontinence. * Patient will Increase abdominal strength of 4/5 or greater to allow for increased pelvic organ support and decreased incontinence and pelvic floor dysfunction.     PLAN:  Treatment Recommendations: Strengthening, Endurance Training, Neuromuscular Re-education, Manual Therapy - Soft Tissue Mobilization, Manual Therapy - Joint Manipulation, Pain Management, Home Exercise Program, Patient Education, Self-Care Education and Training, Positioning, Integrative Dry Needling and Modalities    []  Plan of care initiated. Plan to see patient 1 time weekly to biweekly for 12 weeks to address the treatment planned outlined above.   [x]  Continue with current plan of care  []  Modify plan of care as follows:    []  Hold pending physician visit  []  Discharge    Time In 1200   Time Out 1300   Timed Code Minutes: 60 min   Total Treatment Time: 60 min       Electronically Signed by: Leodan MONZON OTR/KATHERINE [id number]

## 2022-06-23 ENCOUNTER — APPOINTMENT (OUTPATIENT)
Dept: PHYSICAL THERAPY | Age: 37
End: 2022-06-23
Payer: COMMERCIAL

## 2022-06-30 ENCOUNTER — HOSPITAL ENCOUNTER (OUTPATIENT)
Dept: PHYSICAL THERAPY | Age: 37
Setting detail: THERAPIES SERIES
Discharge: HOME | End: 2022-06-30
Payer: COMMERCIAL

## 2022-06-30 PROCEDURE — 97140 MANUAL THERAPY 1/> REGIONS: CPT

## 2022-06-30 PROCEDURE — 97530 THERAPEUTIC ACTIVITIES: CPT

## 2022-07-14 ENCOUNTER — APPOINTMENT (OUTPATIENT)
Dept: PHYSICAL THERAPY | Age: 37
End: 2022-07-14
Payer: COMMERCIAL

## 2022-07-19 ENCOUNTER — HOSPITAL ENCOUNTER (OUTPATIENT)
Dept: PHYSICAL THERAPY | Age: 37
Setting detail: THERAPIES SERIES
Discharge: HOME | End: 2022-07-19
Payer: COMMERCIAL

## 2022-07-19 PROCEDURE — 97530 THERAPEUTIC ACTIVITIES: CPT

## 2022-07-19 PROCEDURE — 97140 MANUAL THERAPY 1/> REGIONS: CPT

## 2022-07-19 PROCEDURE — 97110 THERAPEUTIC EXERCISES: CPT

## 2022-07-19 NOTE — PROGRESS NOTES
[facility]  OCCUPATIONAL THERAPY  OUTPATIENT REHABILITATION - SPECIALIZED THERAPY SERVICES  [] PELVIC HEALTH EVALUATION  [x] DAILY NOTE  [] PROGRESS NOTE [] DISCHARGE NOTE    Date: 2022  Patient Name:  Jessica Dougherty  : 1985  MRN: [id number]  CSN: [id number]     Referring Practitioner CHAPO Nguyen*   Diagnosis Bowel and Pain    Treatment Diagnosis N81.84 - Pelvic Muscle Wasting (Female), R35.1 - Nocturia  N39.46 - Mixed Incontinence, R15.9 - Full Incontinence of Feces  K59.00 - Constipation, Unspecified, R10.2 - Pelvic and Perineal Pain  R10.30 - Lower Abdominal Pain, Unspecified  R94.10 - Unspecified Dyspareunia and R27.8 - Other Lack of Coordination   Date of Evaluation 3/29/22    Additional Pertinent History Psychophysiological insomnia F51.04     Malignant neoplasm of overlapping sites of cervix (HCC) C53.8    Other chest pain R07.89    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    S/P colectomy Z90.49    Tobacco abuse Z72.0   Cancer Staging  Malignant neoplasm of overlapping sites of cervix [facility])  Staging form: Cervix Uteri, AJCC 8th Edition  - Clinical stage from 2019: FIGO Stage IIA1 (Adelia Rodgers, cN0, cM0) - Signed by Geoff Carter MD on 2019          Past Medical History:   Diagnosis Date    Anxiety      Cancer of cervix ([facility] [address]      Depression      Hypertension           Past Surgical History:   Procedure Laterality Date    CERVIX BIOPSY N/A 2019    CHOLECYSTECTOMY   over 5 years ago    COLONOSCOPY   2020    TUBAL LIGATION   2006     [place] Floyd         Functional Outcome Measure Used PFDI-20   Functional Outcome Score PFDI-20: 52 (3/29/22)       Insurance: Primary: Payor: [address] [place] [address]  [address] /  /  / ,   Secondary:    Authorization Information: PRECERTIFICATION REQUIRED: No   Visit # 9, 9/10 for progress note   Visits Allowed: INSURANCE THERAPY BENEFIT: No pre-certification is needed.   PT, OT and ST are allowed 30 visits each stairs and no handrail to enter. Task Previous Current   ADLs  Independent Modified Independent breaks required and some assistance required at times with bathing and dressing due to pain    IADL's Independent Modified Independent breaks required and some assistance required at times with going to the store and with light housework due to pain    Ambulation Independent Modified Independent breaks required and some assistance required at times    Transfers Independent Modified Independent breaks required and some assistance required at times    [address]  Active    Work Unemployed  Unemployed     PAIN    Location Abdominal pelvic pain    Description Dull ache    Increased by Activity, prolonged standing or sitting   Decreased by Vernon's on her side, heat, light massage    Maximum Intensity 7/10    Best Intensity 0/10   Todays Rating 0/10   Other/Function      PAIN    Location Low back    Description    Increased by Activity, prolonged standing or sitting   Decreased by Laying on her side, heat, light massage    Maximum Intensity 7/10   Best Intensity 0/10   Todays Rating 0/10   Other/Function      History of Abuse:No history of physical, emotional or sexual abuse reported    SEXUAL /MENSTRUAL HISTORY [x] Deferred secondary to: Information below from evaluation, not tested today. For reference only on this daily note. Number of Pregnancies 4   Number of Vaginal Deliveries 4    Number of Caesarean Deliveries 0       BLADDER ASSESSMENT [x] Deferred secondary to: Information below from evaluation, not tested today. For reference only on this daily note.    Daily Fluid Ingestion: 100 ounces water per day, fruit juice throughout the week, cup of coffee occasionally, occ milk, occ pop   Urination Frequency Times/Day: 4-6 times per day   Times/Night: 2 times    Volume Medium   Urge Sensation Normal  and Severe SYMPTOM QUESTIONNAIRE   Loses Urine Upon: Sneezing/Coughing and Feeling Cold   Incontinence Volume: Small   Frequency of Leakage: Occasional   Wets the Bed: yes   Burning/Pain with Urination: no   Difficulty Starting a Stream of Urine: no   Incomplete Emptying No   Strain to Empty Bladder: no   Falling Out Feeling: yes   Urinate more than 7 times/day: yes   Use a form of Leakage Protection: yes: 2-3 panty liners    Restrict Fluid Intake: no   Stream Strength Average       BOWEL ASSESSMENT [x] Deferred secondary to: Information below from evaluation, not tested today. For reference only on this daily note. Frequency: 3-4 times per day she will have very small amounts with her bowel movements and every 2-4 weeks will have a good full bowel movement that feels relieveing   Most Common Stool Consistency: [place] 2-3   SYMPTOM QUESTIONNAIRE   Strain to have a BM: yes   Include fiber in your diet: yes   Take laxatives/enemas regularly: yes: at times she will use Doculax    Pain with BM: yes   Strong urge to have BM: yes: sensation is off and will have a bowel movement starting and will not realize it    Leak/Stain Feces: yes: few times a week that is the small hard pieces of stool    Diarrhea often: no         SEXUAL ASSESSMENT [x] Deferred secondary to: Information below from evaluation, not tested today. For reference only on this daily note. Sexually Active yes   Pain with Osterdock (Dyspareunia) yes at times    Painful Penetration Pain with initial penetration and pt reports that her partner feels like he is hitting something   Lubrication Needed no   Pain After Osterdock yes: pelvic and abdominal pain increases after intercourse          OBSERVATION  [] Deferred secondary to:   Patient Safety Patient offered a chaperone and declined. The pt did verbalize consent for internal vaginal examination following OT education of pt on the purpose of this activity and on the procedure using the model.   Pt was educated in the intent of the OT to proceed slowly into the vaginal canal with permission requested of pt at every step of assessment prior to commencement by OT. Pt was also educated in her right to stop assessment, refuse any portion of this assessment, or to refuse assessment at any time without need for reason. The pt was educated that refusal would not impact her ability to seek care from this therapist in any way or result in therapist prejudice regarding her motivation to get better. Pt verbalized understanding and agreed again to internal examination by OT this date. Skin Condition intact   Urogenital Triangle Bulbocavernosus tender/tight, Ischiocavernosus tender/tight, STPM tender/tight, Levator ani tender/tight and OI tight/tender   Introitus     Perineal Descent     External Clock         PELVIC FLOOR INTERNAL EXAM [] Deferred secondary to:   Exam Vaginal   Sensation Intact   Muscle Localization Poor - pt was able to contract the PFM and relax with vc to inhibit contraction of the abdomen and glutes   Palpation/Tone Hypertonic   Pelvic Floor Strength PERF:Power: 1,Endurance: 1,Reps: 1,Flicks: 1   Relax after Contraction Delayed   Prolapse slight anterior wall prolapse and posterior wall prolapse         PELVIC HEALTH INCONTINENCE TREATMENT   Precautions:    Pain:     X in shaded column indicates activity completed today   Exercise/Intervention Reps/Sec  Notes   Kegel quick 10x  X 3x/day or with things that cause leakage    Kegel hold 10x 2 sec  On hold   Tummy tuck quick/hold       Pelvic Brace Quick       Pelvic Brace Hold       PFM anatomy  5 min      PFM awareness/relaxation (mirror, towel, hand) 5 min   Guided relaxation   Reverse Kegel       Lubrication vaginal moisturizer       Manual therapy pt education 5 min  X Use of vibrator    Dilator education       Education in manual therapy with dilator.        Diaphragmatic breathing 5 min      Internal exam  25 min  X    Kegel with [place] Kegel with Band       Colonic massage 5 min  X    Visceral mobilization  5 min  X Abdomen    Scar massage 5 min  X Abdomen    IDN  5 min   Pain reported for 1 weeks after    Diaphragm release 5 min  X Diaphragmatic breathing    Pelvic Tilt       Pelvic Tilt with arm lift       Pelvic Tilt with leg march       Pelvic Tilt with opposites       Bridge       Pelvic Tilt SLR       Clamshell       Reverse Clamshell       ITB stretch at wall  30 sec 2x X    Standing ITB stretch 30 sec 2x X    Supine Glut stretch 30 sec 2x X    Piriformis stretch 30 sec 2x X    Standing Kegal       Kegal Mini Squat       Standing Hip 3-way                       PFM anatomy and Physiology       Normal bowel function/promoting good function 10 min  X Belly big/hard, proper push, toilet posture, when to go, walking program, don't delay   PFM relaxation/awareness 5 min   Mirror, hand, towel methods   Diet impact on the bowel 5 min   Fiber education: sol and insol choices; food log     Specific Interventions Next Treatment: internal PFM release, bowel and bladder education, pressure control     Activity/Treatment Tolerance:  [x]  Patient tolerated treatment well  []  Patient limited by fatigue  []  Patient limited by pain   []  Patient limited by medical complications  []  Other:     Patient Education:   [x]  HEP/Education Completed: guided relaxation, education handouts   []  No new Education completed  []  Reviewed Prior HEP      [x]  Patient verbalized and/or demonstrated understanding of education provided. []  Patient unable to verbalize and/or demonstrate understanding of education provided. Will continue education. []  Barriers to learning:     Assessment: Pt tolerated session well this date.  With completion of the internal exam to assess the pelvic floor through the vagina there was noted increased muscle tightness (increased by 10% on the R compared to last visit) bilaterally R>L along the OI, coccygeus, levator ani, STP, and ischiocavernosus with noted increased tightness, tenderness, and pressure along the posterior wall that was released utilizing STM and thieles maneuver to decrease pain and muscle tightness. Pt was educated for importance of compliance with HEP and at home manual work for decreased pain and pelvic floor dysfunction. Pt was re educated for use of bowel education, habits, and routines to decrease constipation and to help her fully empty her bowels. Pt tolerated STM along the R hip, glut, and ITB well with use of active ice for decreased pain and muscle tightness. Pt tolerated abdominal STM , visceral mobilization, colonic massage, and scar massage well for decreased pain, constipation, and pelvic floor dysfunction. Pt was re educated for use of a vibrator at home for decreased muscle tightness and pain all to decrease PFM dysfunction. Pt reports decreased muscle tightness upon leaving therapy this date. Body Structures/Functions/Activity Limitations: PFM weakness with decreased localization and endurance, PFM spasm, Poor PFM control with limited ability to completely relax, Decreased awareness of functional factors that increase pelvic organ strain, Decreased awareness of normal bladder and bowel habits, control, and diet effects on functioning, Abdominal and core weakness and Pain  Prognosis: Good    GOALS:  Patient Goal: to decrease pain and pelvic floor dysfunction     Short Term Goals:  Time Frame: 6 weeks  *  Patient will decrease urine leak frequency and amount by 50% due to increasing PFM strength, endurance and localization. *  Patient to identify normal bladder function and voiding patterns, diet effects on bladder, urge control strategies, and constipation/optimal stool consistency management. * Patient to demonstrate and verbalize good knowledge of safe lifting, pushing, pulling strategies that limit pelvic organ stress.   This pt will report abdominal pelvic pain reduced to 5/10 at worst to allow initiation of manual techniques. This pt will demo the ability to completely and instantly relax the PFM in order to decrease pain and increase ease with toileting and with physical intimacy and/or medical examinations to ensure pt's health. The pt will be able to describe normal bowel function, bowel irritants, and bowel anatomy and physiology to promote insight into current condition and to allow the pt to identify causative factors related to current condition. 4.  The pt will be able to delay fecal urge x 5' in order to allow time to get to a bathroom without soiling clothing. 5.  The pt will reduce incidence of fecal leakage by 25 % and amount of material leaked by 25% to increase ease of hygiene. 6. This pt will be able to describe normal bowel function, diet impact on the bowel, and have a good understanding of bowel anatomy and physiology to promote insight into condition. 7. This pt will demo a good understanding of proper toilet posture to decrease PFM tone and to improve visceral alignment for increased ease of defecation and reduced pelvic organ strain. 8. This pt will demonstrate independence with basic HEP designed to increase flexibility of the pelvic girdle and to strengthen the core for maximal symptom reduction. Long Term Goals:   12 weeks   This pt will report improved defecation ease and frequency by 75% in order to promote overall pt health. Pt will demo independence in advanced HEP in order to promote retention of gains made in therapy and to reduce the need for further medical intervention. The pt will be able to delay bowel urgency for 10 minutes with good control in order to allow pt time to seek a public bathroom. This pt will return bowel frequency to normal levels in order to allow attendance of public events, shopping, and visiting friends with increased comfort.     This pt will report abdominal pelvic pain decreased to 1-2/10 at worst to increase tolerance to work and home tasks. This pt will demonstrate PFDI-20 score decreased to less than 10 in order to indication functional improvement with therapy. PFM strength WNL to increase visceral support and reduce risk of symptoms reoccurring. * Patient will increase PFM strength to 4/5 with endurance of 10 seconds x 10 reps to increase pelvic organ support and decrease incontinence. * Patient will Increase abdominal strength of 4/5 or greater to allow for increased pelvic organ support and decreased incontinence and pelvic floor dysfunction. PLAN:  Treatment Recommendations: Strengthening, Endurance Training, Neuromuscular Re-education, Manual Therapy - Soft Tissue Mobilization, Manual Therapy - Joint Manipulation, Pain Management, Home Exercise Program, Patient Education, Self-Care Education and Training, Positioning, Integrative Dry Needling and Modalities    []  Plan of care initiated. Plan to see patient 1 time weekly to biweekly for 12 weeks to address the treatment planned outlined above.   [x]  Continue with current plan of care  []  Modify plan of care as follows:    []  Hold pending physician visit  []  Discharge    Time In 1000   Time Out 1108   Timed Code Minutes: 68 min   Total Treatment Time: 68 min       Electronically Signed by: Cece MONZON, OTR/L [id number]

## 2022-07-28 ENCOUNTER — OFFICE VISIT (OUTPATIENT)
Dept: FAMILY MEDICINE CLINIC | Age: 37
End: 2022-07-28
Payer: COMMERCIAL

## 2022-07-28 ENCOUNTER — NURSE ONLY (OUTPATIENT)
Dept: LAB | Age: 37
End: 2022-07-28

## 2022-07-28 ENCOUNTER — APPOINTMENT (OUTPATIENT)
Dept: PHYSICAL THERAPY | Age: 37
End: 2022-07-28
Payer: COMMERCIAL

## 2022-07-28 VITALS
HEIGHT: 68 IN | WEIGHT: 218.8 LBS | BODY MASS INDEX: 33.16 KG/M2 | DIASTOLIC BLOOD PRESSURE: 82 MMHG | HEART RATE: 75 BPM | OXYGEN SATURATION: 97 % | SYSTOLIC BLOOD PRESSURE: 128 MMHG | RESPIRATION RATE: 15 BRPM | TEMPERATURE: 98.2 F

## 2022-07-28 DIAGNOSIS — Z13.31 POSITIVE DEPRESSION SCREENING: ICD-10-CM

## 2022-07-28 DIAGNOSIS — R31.29 OTHER MICROSCOPIC HEMATURIA: ICD-10-CM

## 2022-07-28 DIAGNOSIS — R35.0 URINARY FREQUENCY: Primary | ICD-10-CM

## 2022-07-28 DIAGNOSIS — R82.998 LEUKOCYTES IN URINE: ICD-10-CM

## 2022-07-28 DIAGNOSIS — L73.2 SUPPURATIVE HIDRADENITIS: ICD-10-CM

## 2022-07-28 DIAGNOSIS — C53.8 MALIGNANT NEOPLASM OF OVERLAPPING SITES OF CERVIX UTERI (HCC): ICD-10-CM

## 2022-07-28 LAB
BILIRUBIN URINE: ABNORMAL
BLOOD URINE, POC: ABNORMAL
CHARACTER, URINE: CLEAR
COLOR, URINE: ABNORMAL
GLUCOSE URINE: NEGATIVE MG/DL
KETONES, URINE: NEGATIVE
LEUKOCYTE CLUMPS, URINE: ABNORMAL
NITRITE, URINE: NEGATIVE
PH, URINE: 5.5 (ref 5–9)
PROTEIN, URINE: 30 MG/DL
SPECIFIC GRAVITY, URINE: >= 1.03 (ref 1–1.03)
UROBILINOGEN, URINE: 0.2 EU/DL (ref 0–1)

## 2022-07-28 PROCEDURE — 99214 OFFICE O/P EST MOD 30 MIN: CPT | Performed by: NURSE PRACTITIONER

## 2022-07-28 RX ORDER — DOXEPIN HYDROCHLORIDE 25 MG/1
25 CAPSULE ORAL
COMMUNITY

## 2022-07-28 RX ORDER — DOXYCYCLINE HYCLATE 100 MG
100 TABLET ORAL 2 TIMES DAILY
Qty: 20 TABLET | Refills: 0 | Status: SHIPPED | OUTPATIENT
Start: 2022-07-28 | End: 2022-08-07

## 2022-07-28 RX ORDER — ARIPIPRAZOLE 10 MG/1
TABLET ORAL
COMMUNITY
Start: 2022-07-21

## 2022-07-28 RX ORDER — NITROFURANTOIN 25; 75 MG/1; MG/1
100 CAPSULE ORAL 2 TIMES DAILY
Qty: 20 CAPSULE | Refills: 0 | Status: SHIPPED | OUTPATIENT
Start: 2022-07-28 | End: 2022-08-07

## 2022-07-28 ASSESSMENT — PATIENT HEALTH QUESTIONNAIRE - PHQ9
4. FEELING TIRED OR HAVING LITTLE ENERGY: 3
9. THOUGHTS THAT YOU WOULD BE BETTER OFF DEAD, OR OF HURTING YOURSELF: 0
SUM OF ALL RESPONSES TO PHQ QUESTIONS 1-9: 11
SUM OF ALL RESPONSES TO PHQ9 QUESTIONS 1 & 2: 2
1. LITTLE INTEREST OR PLEASURE IN DOING THINGS: 1
10. IF YOU CHECKED OFF ANY PROBLEMS, HOW DIFFICULT HAVE THESE PROBLEMS MADE IT FOR YOU TO DO YOUR WORK, TAKE CARE OF THINGS AT HOME, OR GET ALONG WITH OTHER PEOPLE: 0
7. TROUBLE CONCENTRATING ON THINGS, SUCH AS READING THE NEWSPAPER OR WATCHING TELEVISION: 1
8. MOVING OR SPEAKING SO SLOWLY THAT OTHER PEOPLE COULD HAVE NOTICED. OR THE OPPOSITE, BEING SO FIGETY OR RESTLESS THAT YOU HAVE BEEN MOVING AROUND A LOT MORE THAN USUAL: 0
3. TROUBLE FALLING OR STAYING ASLEEP: 3
6. FEELING BAD ABOUT YOURSELF - OR THAT YOU ARE A FAILURE OR HAVE LET YOURSELF OR YOUR FAMILY DOWN: 2
5. POOR APPETITE OR OVEREATING: 0
2. FEELING DOWN, DEPRESSED OR HOPELESS: 1

## 2022-07-28 NOTE — PROGRESS NOTES
SUBJECTIVE:  Sheryle Ken is a 43 y.o. female for   Chief Complaint   Patient presents with    Urinary Frequency     Urinary pressure and frequency for about 3 weeks        HPI:    Symptoms present for 5 days. Symptoms are unchanged since they initially started. Dysuria? Yes  Hematuria? Yes  Increased urinary frequency? Yes  Abdominal discomfort? Yes  CVA pain? No  Hx of UTIs? Yes pt states she started having symptoms last week with bright red blood, the blood is gone now but feels as thought she has a UTI NO N/V/D or fever or chills. She has also started getting boils under both arms and in her left groin, they come and go are painful some go away without treatment, she does cleanse with hibiclens daily     Patient Active Problem List   Diagnosis    Psychophysiological insomnia    Malignant neoplasm of overlapping sites of cervix [facility])    Other chest pain    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[facility] [address]    S/P colectomy    Tobacco abuse           OBJECTIVE:  /82   Pulse 75   Temp 98.2 °F (36.8 °C)   Resp 15   Ht 5' 8\" (1.727 m)   Wt 218 lb 12.8 oz (99.2 kg)   LMP 08/14/2019   SpO2 97%   BMI 33.27 kg/m²   She appears well; non-toxic and in no apparent distress. Physical Examination: Chest - clear to auscultation, no wheezes, rales or rhonchi, symmetric air entry  Abdomen - soft, nontender, nondistended, no masses or organomegaly, no CVA tenderness  Extremities - peripheral pulses normal, no pedal edema, no clubbing or cyanosis    Right axilla with 3 red painful tender raised lesions, left groin with 1 red painful firm lesion, not warm   Psych -  Affect appropriate. Thought process is normal without evidence of depression or psychosis. Good insight and appropriae interaction. Cognition and memory appear to be intact. ASSESSMENT & PLAN  Amos Michaud was seen today for urinary frequency.     Diagnoses and all orders for this visit:    Urinary frequency  -     POCT Urinalysis No Micro (Auto)- trace leukocytes hematuria    Positive depression screening  Working with psychiatry   Malignant neoplasm of overlapping sites of cervix uteri [facility])    Other microscopic hematuria  -     Culture, Urine  -     POCT Urinalysis No Micro (Auto)  Return In 3 weeks for recheck, if urine still present will order urine for cytology and CT abdomen and pelvis to r/o bladder lesion, pt with history of cervical cancer. Treat with macrobid   Suppurative hidradenitis  Doxycycline  Warm compresses   Leukocytes in urine  -     Culture, Urine    Other orders  -     POCT Urinalysis No Micro (Auto)  -     nitrofurantoin, macrocrystal-monohydrate, (MACROBID) 100 MG capsule; Take 1 capsule by mouth in the morning and 1 capsule before bedtime. Do all this for 10 days. -     doxycycline hyclate (VIBRA-TABS) 100 MG tablet; Take 1 tablet by mouth in the morning and 1 tablet before bedtime. Do all this for 10 days. Return in about 3 weeks (around 8/18/2022) for office visit for repeat urine .      -Start above treatments  -Urine culture was sent today  -Patient advised to contact our office immediately if symptoms worsen or persist  -Patient counseled on conservative care including fluids, rest and OTC meds      All patient questions answered. Patient voiced understanding.

## 2022-07-30 LAB
ORGANISM: ABNORMAL
URINE CULTURE, ROUTINE: ABNORMAL

## 2022-08-01 ENCOUNTER — TELEPHONE (OUTPATIENT)
Dept: FAMILY MEDICINE CLINIC | Age: 37
End: 2022-08-01

## 2022-08-01 NOTE — TELEPHONE ENCOUNTER
Let her know the symptoms are not due to a UTI, it is possible it is from her pelvic surgery, I recommends se f/u with her gynecologist as her bladder may be falling and need to be lifted up.  Let me know if she needs anything from me Thanks

## 2022-08-01 NOTE — TELEPHONE ENCOUNTER
Patient states that her urination frequency is more often, and the pressure is getting worse. Denies any pain with urination. She is concerned with the pressure and the frequency.  Denies any fever

## 2022-08-11 ENCOUNTER — APPOINTMENT (OUTPATIENT)
Dept: PHYSICAL THERAPY | Age: 37
End: 2022-08-11
Payer: COMMERCIAL

## 2022-08-15 ENCOUNTER — HOSPITAL ENCOUNTER (OUTPATIENT)
Dept: PHYSICAL THERAPY | Age: 37
Setting detail: THERAPIES SERIES
End: 2022-08-15
Payer: COMMERCIAL

## 2022-08-22 ENCOUNTER — OFFICE VISIT (OUTPATIENT)
Dept: FAMILY MEDICINE CLINIC | Age: 37
End: 2022-08-22
Payer: COMMERCIAL

## 2022-08-22 VITALS
TEMPERATURE: 98.7 F | DIASTOLIC BLOOD PRESSURE: 78 MMHG | OXYGEN SATURATION: 98 % | SYSTOLIC BLOOD PRESSURE: 130 MMHG | HEART RATE: 77 BPM | RESPIRATION RATE: 16 BRPM | WEIGHT: 224.4 LBS | BODY MASS INDEX: 34.01 KG/M2 | HEIGHT: 68 IN

## 2022-08-22 DIAGNOSIS — C53.8 MALIGNANT NEOPLASM OF OVERLAPPING SITES OF CERVIX (HCC): ICD-10-CM

## 2022-08-22 DIAGNOSIS — R31.1 BENIGN ESSENTIAL MICROSCOPIC HEMATURIA: Primary | ICD-10-CM

## 2022-08-22 DIAGNOSIS — R30.0 DYSURIA: ICD-10-CM

## 2022-08-22 DIAGNOSIS — R82.998 LEUKOCYTES IN URINE: ICD-10-CM

## 2022-08-22 LAB
BILIRUBIN URINE: NEGATIVE
BLOOD URINE, POC: ABNORMAL
CHARACTER, URINE: CLEAR
COLOR, URINE: YELLOW
GLUCOSE URINE: NEGATIVE MG/DL
KETONES, URINE: NEGATIVE
LEUKOCYTE CLUMPS, URINE: ABNORMAL
NITRITE, URINE: NEGATIVE
PH, URINE: 5.5 (ref 5–9)
PROTEIN, URINE: 30 MG/DL
SPECIFIC GRAVITY, URINE: 1.02 (ref 1–1.03)
UROBILINOGEN, URINE: 0.2 EU/DL (ref 0–1)

## 2022-08-22 PROCEDURE — G8417 CALC BMI ABV UP PARAM F/U: HCPCS | Performed by: NURSE PRACTITIONER

## 2022-08-22 PROCEDURE — 99214 OFFICE O/P EST MOD 30 MIN: CPT | Performed by: NURSE PRACTITIONER

## 2022-08-22 PROCEDURE — G8427 DOCREV CUR MEDS BY ELIG CLIN: HCPCS | Performed by: NURSE PRACTITIONER

## 2022-08-22 PROCEDURE — 4004F PT TOBACCO SCREEN RCVD TLK: CPT | Performed by: NURSE PRACTITIONER

## 2022-08-22 RX ORDER — PHENAZOPYRIDINE HYDROCHLORIDE 100 MG/1
100 TABLET, FILM COATED ORAL 3 TIMES DAILY PRN
Qty: 6 TABLET | Refills: 0 | Status: SHIPPED | OUTPATIENT
Start: 2022-08-22 | End: 2022-08-24

## 2022-08-22 RX ORDER — SULFAMETHOXAZOLE AND TRIMETHOPRIM 800; 160 MG/1; MG/1
1 TABLET ORAL 2 TIMES DAILY
Qty: 20 TABLET | Refills: 0 | Status: SHIPPED | OUTPATIENT
Start: 2022-08-22 | End: 2022-09-01

## 2022-08-22 ASSESSMENT — PATIENT HEALTH QUESTIONNAIRE - PHQ9
SUM OF ALL RESPONSES TO PHQ QUESTIONS 1-9: 0
SUM OF ALL RESPONSES TO PHQ QUESTIONS 1-9: 0
SUM OF ALL RESPONSES TO PHQ9 QUESTIONS 1 & 2: 0
SUM OF ALL RESPONSES TO PHQ QUESTIONS 1-9: 0
2. FEELING DOWN, DEPRESSED OR HOPELESS: 0
1. LITTLE INTEREST OR PLEASURE IN DOING THINGS: 0
SUM OF ALL RESPONSES TO PHQ QUESTIONS 1-9: 0

## 2022-08-22 NOTE — PROGRESS NOTES
SUBJECTIVE:  Julien Tran is a 43 y.o. female for   Chief Complaint   Patient presents with    Urinary Frequency     Urinary frequency worse, minimal blood with wiping ( bright red) no pain       HPI:    Symptoms present for 3 days. Symptoms are unchanged since they initially started. Dysuria? Yes  Hematuria? Yes  Increased urinary frequency? Yes  Abdominal discomfort? Yes  CVA pain? No  Hx of UTIs? Yes    Pt mendoza had symptoms of dysuria and suprapubic pressure for several months off and on, she did have radiation therapy in the past for cervical cancer. She continues to have hematuria, advised she f/u with gynecology for possible IC symptoms vs prolapsed bladder, also advised she contact Oncology at [facility] for further w/u such at [address] [employer] of abdomen and pelvis if needed. Patient Active Problem List   Diagnosis    Psychophysiological insomnia    Malignant neoplasm of overlapping sites of cervix [facility])    Other chest pain    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[facility] [address]    S/P colectomy    Tobacco abuse           OBJECTIVE:  /78   Pulse 77   Temp 98.7 °F (37.1 °C)   Resp 16   Ht 5' 8\" (1.727 m)   Wt 224 lb 6.4 oz (101.8 kg)   LMP 08/14/2019   SpO2 98%   BMI 34.12 kg/m²   She appears well; non-toxic and in no apparent distress. Physical Examination: Chest - clear to auscultation, no wheezes, rales or rhonchi, symmetric air entry  Abdomen - soft, suprapubic pressure with palpation, nondistended, no masses or organomegaly, no CVA tenderness  Extremities - peripheral pulses normal, no pedal edema, no clubbing or cyanosis  Psych -  Affect appropriate. Thought process is normal without evidence of depression or psychosis. Good insight and appropriae interaction. Cognition and memory appear to be intact. ASSESSMENT & PLAN  Dung Osorio was seen today for urinary frequency.     Diagnoses and all orders for this visit:    Benign essential microscopic hematuria  -     Culture, Urine  Bactirm ds  Pyridium  Pt in agreement with plan   Could be bladder tumor vs radiation irritation of bladder vs IC  Advise further w/u with Oncology and gynecology   Leukocytes in urine  -     Culture, Urine    Dysuria  -     phenazopyridine (PYRIDIUM) 100 MG tablet; Take 1 tablet by mouth 3 times daily as needed for Pain  -     Culture, Urine    Malignant neoplasm of overlapping sites of cervix  Could be bladder tumor, advise f/u with Oncology for further w/u     Other orders  -     POCT Urinalysis No Micro (Auto)+ hematuria and leukocytes   -     sulfamethoxazole-trimethoprim (BACTRIM DS;SEPTRA DS) 800-160 MG per tablet; Take 1 tablet by mouth 2 times daily for 10 days      No follow-ups on file.      -Start above treatments  -Urine culture was sent today  -Patient advised to contact our office immediately if symptoms worsen or persist  -Patient counseled on conservative care including fluids, rest and OTC meds      All patient questions answered. Patient voiced understanding.

## 2022-08-24 ENCOUNTER — TELEPHONE (OUTPATIENT)
Dept: FAMILY MEDICINE CLINIC | Age: 37
End: 2022-08-24

## 2022-08-24 LAB
ORGANISM: ABNORMAL
URINE CULTURE, ROUTINE: ABNORMAL

## 2022-08-24 NOTE — TELEPHONE ENCOUNTER
----- Message from Renell Osler, APRN - CNP sent at 8/24/2022  9:21 AM EDT -----  Let pt know her urine did not grow any specific bacteria, I recommend she f/u with gynecology at [facility] for her symptoms.

## 2022-08-30 ENCOUNTER — HOSPITAL ENCOUNTER (OUTPATIENT)
Dept: PHYSICAL THERAPY | Age: 37
Setting detail: THERAPIES SERIES
Discharge: HOME | End: 2022-08-30
Payer: COMMERCIAL

## 2022-08-30 PROCEDURE — 97530 THERAPEUTIC ACTIVITIES: CPT

## 2022-08-30 PROCEDURE — 97140 MANUAL THERAPY 1/> REGIONS: CPT

## 2022-08-30 PROCEDURE — 97110 THERAPEUTIC EXERCISES: CPT

## 2022-08-30 NOTE — PROGRESS NOTES
** PLEASE SIGN, DATE AND TIME CERTIFICATION BELOW AND RETURN TO [facility] OUTPATIENT REHABILITATION (FAX #: [phone]). ATTEST/CO-SIGN IF ACCESSING VIA INXtract. THANK YOU.**    I certify that I have examined the patient below and determined that Physical Medicine and Rehabilitation service is necessary and that I approve the established plan of care for up to 90 days or as specifically noted.   Attestation, signature or co-signature of physician indicates approval of certification requirements.    ________________________ ____________ __________  Physician Signature   Date   Time   [address] [facility]  OUTPATIENT REHABILITATION - SPECIALIZED THERAPY SERVICES  [] PELVIC HEALTH EVALUATION  [] DAILY NOTE  [x] PROGRESS NOTE [] DISCHARGE NOTE    Date: 2022  Patient Name:  Alejandro Roberts  : 1985  MRN: [id number]  CSN: [id number]     Referring Practitioner CHAPO Keane*   Diagnosis Bowel and Pain    Treatment Diagnosis N81.84 - Pelvic Muscle Wasting (Female), R35.1 - Nocturia  N39.46 - Mixed Incontinence, R15.9 - Full Incontinence of Feces  K59.00 - Constipation, Unspecified, R10.2 - Pelvic and Perineal Pain  R10.30 - Lower Abdominal Pain, Unspecified  R94.10 - Unspecified Dyspareunia and R27.8 - Other Lack of Coordination   Date of Evaluation 3/29/22    Additional Pertinent History Psychophysiological insomnia F51.04     Malignant neoplasm of overlapping sites of cervix (HCC) C53.8    Other chest pain R07.89    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[facility] [address] K56.699    S/P colectomy Z90.49    Tobacco abuse Z72.0   Cancer Staging  Malignant neoplasm of overlapping sites of cervix [facility])  Staging form: Cervix Uteri, AJCC 8th Edition  - Clinical stage from 2019: FIGO Stage IIA1 (Maeve Sanchez, cN0, cM0) - Signed by Anthony Hernandez MD on 2019          Past Medical History:   Diagnosis Date    Anxiety      [facility]      Depression      Hypertension Past Surgical History:   Procedure Laterality Date    CERVIX BIOPSY N/A 6/7/2019    CHOLECYSTECTOMY   over 5 years ago    COLONOSCOPY   09/29/2020    TUBAL LIGATION   2006     [place]         Functional Outcome Measure Used PFDI-20   Functional Outcome Score PFDI-20: 52 (3/29/22); PFDI-20: 45 (8/30/22)       Insurance: Primary: Payor: [address] [place] [address] [address] /  /  / ,   Secondary:    Authorization Information: PRECERTIFICATION REQUIRED: No   Visit # 10, 10/10 for progress note   Visits Allowed: INSURANCE THERAPY BENEFIT: No pre-certification is needed. PT, OT and ST are allowed 30 visits each per calendar year   Recertification Date: June 27, 2022   Physician Follow-Up: Possibly September    Physician Orders: Eval and treat   History of Present Illness: Pt reports to skilled OT services with c/o pelvic and coccyx pain. Pt reports that she did PT in [place] that told her that her tailbone is out of alignment. Pt reports that she had radiation that caused her to have pain that started 2 years ago. Pt reports that she had a foot of the colon removed due to the bowel being effected by the radiation that was in May of 2021 and the colostomy was reversed in August 2021. Pt reports that she has incontinence with the bowel and bladder (mostly bowels) due to decreased sensation and will not be able to feel that she is starting a bowel movement before it is too late. Pt reports that she is cancer free and had her last radiation on approximately September of 2019. Pt reports that she has to do a lot of straining to get a bm started and to fully empty. SUBJECTIVE: Pt reports that she is having feeling of severe UTI symptoms but the tests are always negative. Pt reports that she has had some small amounts of blood in her urine and is going to be seeing her oncologist in the next few weeks.  Pt reports that the bladder feels like it is falling down and giving her a lot of pain, pressure, and low back and tailbone pain. Pt reports that she is having a lot of frequency and urgency (urinating every 30-45 minutes). Pt reports that she has had a lot of leakage as well. Pt reports that she noticed that after she felt the \"pop\" in the abdomen was when she felt the prolapse get worse and had the blood in the urine. Pt reports that her bowels have been okay within the last 2 weeks that has less straining and will go every other day. Pt reports that she has not been doing her exercises like she should. Social/Functional History and Current Status:  Medications and Allergies have been reviewed and are listed on Medical History Questionnaire. Davonte Ac lives with family in a multiple floor home with ability to complete ADL's on main floor with stairs and no handrail to enter.     Task Previous Current   ADLs  Independent Modified Independent breaks required and some assistance required at times with bathing and dressing due to pain    IADL's Independent Modified Independent breaks required and some assistance required at times with going to the store and with light housework due to pain    Ambulation Independent Modified Independent breaks required and some assistance required at times    Transfers Independent Modified Independent breaks required and some assistance required at times    Recreation Independent Independent   Community Integration Independent Independent   Driving Active  Active    Work Unemployed  Unemployed     PAIN    Location Abdominal pelvic pain    Description Dull ache    Increased by Activity, prolonged standing or sitting   Decreased by Vernon's on her side, heat, light massage    Maximum Intensity 7/10    Best Intensity 0/10   Todays Rating 0/10   Other/Function      PAIN    Location Low back    Description    Increased by Activity, prolonged standing or sitting   Decreased by Laying on her side, heat, light massage    Maximum Intensity 7/10   Best Intensity 0/10   Todays Rating 0/10   Other/Function      History of Abuse:No history of physical, emotional or sexual abuse reported    SEXUAL /MENSTRUAL HISTORY [x] Deferred secondary to: Information below from evaluation, not tested today. For reference only on this daily note. Number of Pregnancies 4   Number of Vaginal Deliveries 4    Number of Caesarean Deliveries 0       BLADDER ASSESSMENT [] Deferred secondary to:   Daily Fluid Ingestion: 100 ounces water per day, fruit juice throughout the week, cup of coffee occasionally, occ milk, occ pop   Urination Frequency Times/Day: ever 30-45 minutes  Times/Night: 2 times    Volume Medium   Urge Sensation Normal  and Severe    SYMPTOM QUESTIONNAIRE   Loses Urine Upon: Sneezing/Coughing and Feeling Cold increased leakage    Incontinence Volume: Small to medium   Frequency of Leakage: Frequent   Wets the Bed: yes   Burning/Pain with Urination: yes   Difficulty Starting a Stream of Urine: no   Incomplete Emptying Yes   Strain to Empty Bladder: no   Falling Out Feeling: yes   Urinate more than 7 times/day: yes   Use a form of Leakage Protection: yes: 2-3 panty liners    Restrict Fluid Intake: no   Stream Strength Average       BOWEL ASSESSMENT [] Deferred secondary to:   Frequency: Every other day    Most Common Stool Consistency: [place] 2-3   SYMPTOM QUESTIONNAIRE   Strain to have a BM: yes   Include fiber in your diet: yes   Take laxatives/enemas regularly: yes: at times she will use Doculax    Pain with BM: yes   Strong urge to have BM: yes: sensation is off and will have a bowel movement starting and will not realize it    Leak/Stain Feces: yes: few times a week that is the small hard pieces of stool    Diarrhea often: no         SEXUAL ASSESSMENT [x] Deferred secondary to: Information below from evaluation, not tested today. For reference only on this daily note.    Sexually Active yes   Pain with [place] (Dyspareunia) yes at times    Painful Penetration Pain with initial penetration and pt reports that her partner feels like he is hitting something   Lubrication Needed no   Pain After [place] yes: pelvic and abdominal pain increases after intercourse          OBSERVATION  [] Deferred secondary to:   Patient Safety Patient offered a chaperone and declined. The pt did verbalize consent for internal vaginal examination following OT education of pt on the purpose of this activity and on the procedure using the model. Pt was educated in the intent of the OT to proceed slowly into the vaginal canal with permission requested of pt at every step of assessment prior to commencement by OT. Pt was also educated in her right to stop assessment, refuse any portion of this assessment, or to refuse assessment at any time without need for reason. The pt was educated that refusal would not impact her ability to seek care from this therapist in any way or result in therapist prejudice regarding her motivation to get better. Pt verbalized understanding and agreed again to internal examination by OT this date.      Skin Condition intact   Urogenital [place]angle Bulbocavernosus tender/tight, Ischiocavernosus tender/tight, STPM tender/tight, Levator ani tender/tight and OI tight/tender   Introitus     Perineal Descent     External Clock         PELVIC FLOOR INTERNAL EXAM [] Deferred secondary to:   Exam Vaginal   Sensation Intact   Muscle Localization Poor - pt was able to contract the PFM and relax with vc to inhibit contraction of the abdomen and glutes   Palpation/Tone Hypertonic   Pelvic Floor Strength PERF:Power: 1,Endurance: 1,Reps: 1,Flicks: 1   Relax after Contraction Delayed   Prolapse slight anterior wall prolapse and posterior wall prolapse         PELVIC HEALTH INCONTINENCE TREATMENT   Precautions:    Pain:     X in shaded column indicates activity completed today   Exercise/Intervention Reps/Sec  Notes   Kegel quick 10x  X 3x/day    Kegel hold 10x 3 sec X    Tummy tuck quick/hold 10x  X    Pelvic Brace Quick 10x  X    Pelvic Brace Hold 10x 3 sec X Functional use 5 min   Urge control  10 min  X    Diet impact on the bladder  5 min  X                  PFM anatomy  5 min      PFM awareness/relaxation (mirror, towel, hand) 5 min   Guided relaxation   Reverse Kegel       Lubrication vaginal moisturizer       Manual therapy pt education 5 min   Use of vibrator    Dilator education       Education in manual therapy with dilator.        Diaphragmatic breathing 5 min      Internal exam  15 min  X    Kegel with Ball Squeeze       Kegel with Band       Colonic massage 5 min      Visceral mobilization  5 min   Abdomen    Scar massage 5 min   Abdomen    IDN  5 min   Pain reported for 1 week after    Diaphragm release 5 min   Diaphragmatic breathing    Pelvic Tilt       Pelvic Tilt with arm lift       Pelvic Tilt with leg march       Pelvic Tilt with opposites       Bridge       Pelvic Tilt SLR       Clamshell       Reverse Clamshell       ITB stretch at wall  30 sec 2x     Standing ITB stretch 30 sec 2x     Supine Glut stretch 30 sec 2x     Piriformis stretch 30 sec 2x     Standing Kegal       Kegal Mini Squat       Standing Hip 3-way                       PFM anatomy and Physiology       Normal bowel function/promoting good function 10 min  X Belly big/hard, proper push, toilet posture, when to go, walking program, don't delay   PFM relaxation/awareness 5 min   Mirror, hand, towel methods   Diet impact on the bowel 5 min   Fiber education: sol and insol choices; food log     Specific Interventions Next Treatment: internal PFM release, bowel and bladder education, pressure control     Activity/Treatment Tolerance:  [x]  Patient tolerated treatment well  []  Patient limited by fatigue  []  Patient limited by pain   []  Patient limited by medical complications  []  Other:     Patient Education:   [x]  HEP/Education Completed: guided relaxation, education handouts   []  No new Education completed  []  Reviewed Prior HEP      [x]  Patient verbalized and/or demonstrated understanding of education provided. []  Patient unable to verbalize and/or demonstrate understanding of education provided. Will continue education. []  Barriers to learning:     Assessment: Pt tolerated session well this date. With completion of the internal exam to assess the pelvic floor through the vagina there was noted increased muscle tightness bilaterally along the OI, coccygeus, levator ani, STP, and ischiocavernosus with noted increased tightness, tenderness, and pressure along the posterior wall with noted slight anterior and posterior wall prolapse and the exam was ended due to pain and discomfort. Pt was educated for importance of compliance with HEP and at home manual work for decreased pain and pelvic floor dysfunction. Pt was re educated for use of bowel education, habits, and routines to decrease constipation and to help her fully empty her bowels. Pt was re educated for use of a vibrator at home as tolerated for decreased muscle tightness and pain all to decrease PFM dysfunction. Pt was educated for increased use of her HEP for strengthening to decrease leakage and PFM dysfunction. Recommend continued skilled OT services at this time to continue to address current goals and to decrease pain and PFM dysfunction.      Body Structures/Functions/Activity Limitations: PFM weakness with decreased localization and endurance, PFM spasm, Poor PFM control with limited ability to completely relax, Decreased awareness of functional factors that increase pelvic organ strain, Decreased awareness of normal bladder and bowel habits, control, and diet effects on functioning, Abdominal and core weakness and Pain  Prognosis: Good    GOALS:  Patient Goal: to decrease pain and pelvic floor dysfunction     Short Term Goals:  Time Frame: 6 weeks  *  Patient will decrease urine leak frequency and amount by 50% due to increasing PFM strength, endurance and localization. GOAL NOT MET  *  Patient to identify normal bladder function and voiding patterns, diet effects on bladder, urge control strategies, and constipation/optimal stool consistency management. GOAL MET  * Patient to demonstrate and verbalize good knowledge of safe lifting, pushing, pulling strategies that limit pelvic organ stress. GOAL NOT MET  This pt will report abdominal pelvic pain reduced to 5/10 at worst to allow initiation of manual techniques. GOAL NOT MET  This pt will demo the ability to completely and instantly relax the PFM in order to decrease pain and increase ease with toileting and with physical intimacy and/or medical examinations to ensure pt's health. GOAL MET   The pt will be able to describe normal bowel function, bowel irritants, and bowel anatomy and physiology to promote insight into current condition and to allow the pt to identify causative factors related to current condition. GOAL MET  4. The pt will be able to delay fecal urge x 5' in order to allow time to get to a bathroom without soiling clothing. GOAL NOT MET  5. The pt will reduce incidence of fecal leakage by 25 % and amount of material leaked by 25% to increase ease of hygiene. GOAL MET   6. This pt will be able to describe normal bowel function, diet impact on the bowel, and have a good understanding of bowel anatomy and physiology to promote insight into condition. GOAL MET  7. This pt will demo a good understanding of proper toilet posture to decrease PFM tone and to improve visceral alignment for increased ease of defecation and reduced pelvic organ strain. GOAL MET  8. This pt will demonstrate independence with basic HEP designed to increase flexibility of the pelvic girdle and to strengthen the core for maximal symptom reduction. GOAL MET      Long Term Goals:   12 weeks   This pt will report improved defecation ease and frequency by 75% in order to promote overall pt health.   GOAL NOT MET  Pt will demo independence in advanced HEP in order to promote retention of gains made in therapy and to reduce the need for further medical intervention. GOAL PARTIALLY MET   The pt will be able to delay bowel urgency for 10 minutes with good control in order to allow pt time to seek a public bathroom. GOAL NOT MET  This pt will return bowel frequency to normal levels in order to allow attendance of public events, shopping, and visiting friends with increased comfort. GOAL MET  This pt will report abdominal pelvic pain decreased to 1-2/10 at worst to increase tolerance to work and home tasks. GOAL NOT MET  This pt will demonstrate PFDI-20 score decreased to less than 10 in order to indication functional improvement with therapy. GOAL NOT MET  PFM strength WNL to increase visceral support and reduce risk of symptoms reoccurring. GOAL NOT MET  * Patient will increase PFM strength to 4/5 with endurance of 10 seconds x 10 reps to increase pelvic organ support and decrease incontinence. GOAL NOT MET  * Patient will Increase abdominal strength of 4/5 or greater to allow for increased pelvic organ support and decreased incontinence and pelvic floor dysfunction. GOAL NOT MET    PLAN:  Treatment Recommendations: Strengthening, Endurance Training, Neuromuscular Re-education, Manual Therapy - Soft Tissue Mobilization, Manual Therapy - Joint Manipulation, Pain Management, Home Exercise Program, Patient Education, Self-Care Education and Training, Positioning, Integrative Dry Needling and Modalities    []  Plan of care initiated. Plan to see patient 1 time weekly to biweekly for 12 weeks to address the treatment planned outlined above.   [x]  Continue with current plan of care  []  Modify plan of care as follows:    []  Hold pending physician visit  []  Discharge    Time In 1105   Time Out 1205   Timed Code Minutes: 60 min   Total Treatment Time: 60 min       Electronically Signed by: Guerda MONZON OTR/KATHERINE [id number]

## 2022-09-15 ENCOUNTER — HOSPITAL ENCOUNTER (OUTPATIENT)
Dept: PHYSICAL THERAPY | Age: 37
Setting detail: THERAPIES SERIES
Discharge: HOME | End: 2022-09-15
Payer: COMMERCIAL

## 2022-09-15 PROCEDURE — 97530 THERAPEUTIC ACTIVITIES: CPT

## 2022-09-15 PROCEDURE — 97140 MANUAL THERAPY 1/> REGIONS: CPT

## 2022-09-15 NOTE — PROGRESS NOTES
[address] [facility]  OCCUPATIONAL THERAPY  OUTPATIENT REHABILITATION - SPECIALIZED THERAPY SERVICES  [] PELVIC HEALTH EVALUATION  [x] DAILY NOTE  [] PROGRESS NOTE [] DISCHARGE NOTE    Date: 9/15/2022  Patient Name:  Anastasiya Reyna  : 1985  MRN: [id number]  CSN: [id number]     Referring Practitioner Rubin Goldmann, APRN - C*   Diagnosis Bowel and Pain    Treatment Diagnosis N81.84 - Pelvic Muscle Wasting (Female), R35.1 - Nocturia  N39.46 - Mixed Incontinence, R15.9 - Full Incontinence of Feces  K59.00 - Constipation, Unspecified, R10.2 - Pelvic and Perineal Pain  R10.30 - Lower Abdominal Pain, Unspecified  R94.10 - Unspecified Dyspareunia and R27.8 - Other Lack of Coordination   Date of Evaluation 3/29/22    Additional Pertinent History Psychophysiological insomnia F51.04     Malignant neoplasm of overlapping sites of cervix (HCC) C53.8    Other chest pain R07.89    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)  Staging form: Cervix Uteri, AJCC 8th Edition  - Clinical stage from 2019: FIGO Stage IIA1 (Dorise Ours, cN0, cM0) - Signed by Ally Rey MD on 2019          Past Medical History:   Diagnosis Date    Anxiety      Cancer of cervix ([facility] [address]      Depression      Hypertension           Past Surgical History:   Procedure Laterality Date    CERVIX BIOPSY N/A 2019    CHOLECYSTECTOMY   over 5 years ago    COLONOSCOPY   2020    TUBAL LIGATION   2006     [place]         Functional Outcome Measure Used PFDI-20   Functional Outcome Score PFDI-20: 52 (3/29/22); PFDI-20: 45 (22)       Insurance: Primary: Payor: [address] [place] [address]  [address] /  /  / ,   Secondary:    Authorization Information: PRECERTIFICATION REQUIRED: No   Visit # 11, 1/10 for progress note   Visits Allowed: INSURANCE THERAPY BENEFIT: No pre-certification is needed.   PT, OT and ST are allowed 30 visits each per calendar year   Recertification Date: June 27, 2022   Physician Follow-Up: Possibly September    Physician Orders: Eval and treat   History of Present Illness: Pt reports to skilled OT services with c/o pelvic and coccyx pain. Pt reports that she did PT in [address] that told her that her tailbone is out of alignment. Pt reports that she had radiation that caused her to have pain that started 2 years ago. Pt reports that she had a foot of the colon removed due to the bowel being effected by the radiation that was in May of 2021 and the colostomy was reversed in August 2021. Pt reports that she has incontinence with the bowel and bladder (mostly bowels) due to decreased sensation and will not be able to feel that she is starting a bowel movement before it is too late. Pt reports that she is cancer free and had her last radiation on approximately September of 2019. Pt reports that she has to do a lot of straining to get a bm started and to fully empty. SUBJECTIVE: Pt reports that she has had some pain this week and felt like it had to do with needing to have a bm. Pt reports that she does not feel like she is emptying her bowels. Pt reports that she is still having UTI type symptoms with negative test results. Pt reports that she is to see her OBGYN and oncologist this month or next. Pt reports that the bladder still feels like it is falling down and giving her a lot of pain, pressure, and low back and tailbone pain. Pt reports that she is having a lot of frequency and urgency (urinating every 45-60 minutes). Pt reports that she has leakage 2 times per day. Pt reports that she has not been doing her exercises like she should but more than last session. Social/Functional History and Current Status:  Medications and Allergies have been reviewed and are listed on Medical History Questionnaire.     Nieves Hart lives with family in a multiple floor home with ability to complete ADL's on main floor with stairs and no handrail to enter. Task Previous Current   ADLs  Independent Modified Independent breaks required and some assistance required at times with bathing and dressing due to pain    IADL's Independent Modified Independent breaks required and some assistance required at times with going to the store and with light housework due to pain    Ambulation Independent Modified Independent breaks required and some assistance required at times    Transfers Independent Modified Independent breaks required and some assistance required at times    [address]  Active    Work Unemployed  Unemployed     PAIN    Location Abdominal pelvic pain    Description Dull ache    Increased by Activity, prolonged standing or sitting   Decreased by Vernon's on her side, heat, light massage    Maximum Intensity 7/10    Best Intensity 0/10   Todays Rating 0/10   Other/Function      PAIN    Location Low back    Description    Increased by Activity, prolonged standing or sitting   Decreased by Laying on her side, heat, light massage    Maximum Intensity 7/10   Best Intensity 0/10   Todays Rating 0/10   Other/Function      History of Abuse:No history of physical, emotional or sexual abuse reported    SEXUAL /MENSTRUAL HISTORY [x] Deferred secondary to: Information below from evaluation, not tested today. For reference only on this daily note. Number of Pregnancies 4   Number of Vaginal Deliveries 4    Number of Caesarean Deliveries 0       BLADDER ASSESSMENT [x] Deferred secondary to: Information below from evaluation, not tested today. For reference only on this daily note.     Daily Fluid Ingestion: 100 ounces water per day, fruit juice throughout the week, cup of coffee occasionally, occ milk, occ pop   Urination Frequency Times/Day: ever 30-45 minutes  Times/Night: 2 times    Volume Medium   Urge Sensation Normal  and Severe    SYMPTOM QUESTIONNAIRE   Loses Urine Upon: Sneezing/Coughing and Feeling Cold increased leakage    Incontinence Volume: Small to medium   Frequency of Leakage: Frequent   Wets the Bed: yes   Burning/Pain with Urination: yes   Difficulty Starting a Stream of Urine: no   Incomplete Emptying Yes   Strain to Empty Bladder: no   Falling Out Feeling: yes   Urinate more than 7 times/day: yes   Use a form of Leakage Protection: yes: 2-3 panty liners    Restrict Fluid Intake: no   Stream Strength Average       BOWEL ASSESSMENT [x] Deferred secondary to: Information below from evaluation, not tested today. For reference only on this daily note. Frequency: Every other day    Most Common Stool Consistency: Pope 2-3   SYMPTOM QUESTIONNAIRE   Strain to have a BM: yes   Include fiber in your diet: yes   Take laxatives/enemas regularly: yes: at times she will use Doculax    Pain with BM: yes   Strong urge to have BM: yes: sensation is off and will have a bowel movement starting and will not realize it    Leak/Stain Feces: yes: few times a week that is the small hard pieces of stool    Diarrhea often: no         SEXUAL ASSESSMENT [x] Deferred secondary to: Information below from evaluation, not tested today. For reference only on this daily note. Sexually Active yes   Pain with [place] (Dyspareunia) yes at times    Painful Penetration Pain with initial penetration and pt reports that her partner feels like he is hitting something   Lubrication Needed no   Pain After [place] yes: pelvic and abdominal pain increases after intercourse          OBSERVATION  [] Deferred secondary to:   Patient Safety Patient offered a chaperone and declined. The pt did verbalize consent for internal vaginal examination following OT education of pt on the purpose of this activity and on the procedure using the model.   Pt was educated in the intent of the OT to proceed slowly into the vaginal canal with permission requested of pt at every step of assessment prior to commencement by OT. Pt was also educated in her right to stop assessment, refuse any portion of this assessment, or to refuse assessment at any time without need for reason. The pt was educated that refusal would not impact her ability to seek care from this therapist in any way or result in therapist prejudice regarding her motivation to get better. Pt verbalized understanding and agreed again to internal examination by OT this date. Skin Condition intact   Urogenital Triangle Bulbocavernosus tender/tight, Ischiocavernosus tender/tight, STPM tender/tight, Levator ani tender/tight and OI tight/tender   Introitus     Perineal Descent     External Clock         PELVIC FLOOR INTERNAL EXAM [] Deferred secondary to:   Exam Vaginal   Sensation Intact   Muscle Localization Poor - pt was able to contract the PFM and relax with vc to inhibit contraction of the abdomen and glutes   Palpation/Tone Hypertonic   Pelvic Floor Strength PERF:Power: 1,Endurance: 1,Reps: 1,Flicks: 1   Relax after Contraction Delayed   Prolapse slight anterior wall prolapse and posterior wall prolapse         PELVIC HEALTH INCONTINENCE TREATMENT   Precautions:    Pain:     X in shaded column indicates activity completed today   Exercise/Intervention Reps/Sec  Notes   Kegel quick 10x  X 3x/day    Kegel hold 10x 3 sec X    Tummy tuck quick/hold 10x  X    Pelvic Brace Quick 10x  X    Pelvic Brace Hold 10x 3 sec X Functional use 5 min   Urge control  10 min  X    Diet impact on the bladder  5 min  X                  PFM anatomy  5 min      PFM awareness/relaxation (mirror, towel, hand) 5 min   Guided relaxation   Reverse Kegel       Lubrication vaginal moisturizer       Manual therapy pt education 5 min   Use of vibrator    Dilator education       Education in manual therapy with dilator.        Diaphragmatic breathing 5 min      Internal exam  30 min  X    Kegel with Ball Squeeze       Kegel with Band       Colonic massage 5 min      Visceral mobilization  5 min   Abdomen    Scar massage 5 min   Abdomen    IDN  5 min   Pain reported for 1 week after    Diaphragm release 5 min   Diaphragmatic breathing    Pelvic Tilt       Pelvic Tilt with arm lift       Pelvic Tilt with leg march       Pelvic Tilt with opposites       Bridge       Pelvic Tilt SLR       Clamshell       Reverse Clamshell       ITB stretch at wall  30 sec 2x     Standing ITB stretch 30 sec 2x     Supine Glut stretch 30 sec 2x     Piriformis stretch 30 sec 2x     Standing Kegal       Kegal Mini Squat       Standing Hip 3-way                       PFM anatomy and Physiology       Normal bowel function/promoting good function 10 min  X Belly big/hard, proper push, toilet posture, when to go, walking program, don't delay   PFM relaxation/awareness 5 min   Mirror, hand, towel methods   Diet impact on the bowel 5 min   Fiber education: sol and insol choices; food log     Specific Interventions Next Treatment: internal PFM release, bowel and bladder education, pressure control     Activity/Treatment Tolerance:  [x]  Patient tolerated treatment well  []  Patient limited by fatigue  []  Patient limited by pain   []  Patient limited by medical complications  []  Other:     Patient Education:   [x]  HEP/Education Completed: guided relaxation, education handouts   []  No new Education completed  []  Reviewed Prior HEP      [x]  Patient verbalized and/or demonstrated understanding of education provided. []  Patient unable to verbalize and/or demonstrate understanding of education provided. Will continue education. []  Barriers to learning:     Assessment: Pt tolerated session well this date.  With completion of the internal exam to assess the pelvic floor through the vagina there was noted increased muscle tightness bilaterally R>L along the OI, coccygeus, levator ani, STP, and ischiocavernosus with noted increased tightness, tenderness, and pressure along the posterior wall with noted slight anterior and posterior wall prolapse. Pt was educated for importance of compliance with HEP and at home manual work for decreased pain and pelvic floor dysfunction. Pt was re educated due to non compliance for use of bowel education, habits, and routines to decrease constipation and to help her fully empty her bowels. Pt was re educated for importance of use of a vibrator at home as tolerated for decreased muscle tightness and pain all to decrease PFM dysfunction. Body Structures/Functions/Activity Limitations: PFM weakness with decreased localization and endurance, PFM spasm, Poor PFM control with limited ability to completely relax, Decreased awareness of functional factors that increase pelvic organ strain, Decreased awareness of normal bladder and bowel habits, control, and diet effects on functioning, Abdominal and core weakness and Pain  Prognosis: Good    GOALS:  Patient Goal: to decrease pain and pelvic floor dysfunction     Short Term Goals:  Time Frame: 6 weeks  *  Patient will decrease urine leak frequency and amount by 50% due to increasing PFM strength, endurance and localization. GOAL NOT MET  *  Patient to identify normal bladder function and voiding patterns, diet effects on bladder, urge control strategies, and constipation/optimal stool consistency management. GOAL MET  * Patient to demonstrate and verbalize good knowledge of safe lifting, pushing, pulling strategies that limit pelvic organ stress. GOAL NOT MET  This pt will report abdominal pelvic pain reduced to 5/10 at worst to allow initiation of manual techniques. GOAL NOT MET  This pt will demo the ability to completely and instantly relax the PFM in order to decrease pain and increase ease with toileting and with physical intimacy and/or medical examinations to ensure pt's health.   GOAL MET   The pt will be able to describe normal bowel function, bowel irritants, and bowel anatomy and physiology to promote insight into current condition and to allow the pt to identify causative factors related to current condition. GOAL MET  4. The pt will be able to delay fecal urge x 5' in order to allow time to get to a bathroom without soiling clothing. GOAL NOT MET  5. The pt will reduce incidence of fecal leakage by 25 % and amount of material leaked by 25% to increase ease of hygiene. GOAL MET   6. This pt will be able to describe normal bowel function, diet impact on the bowel, and have a good understanding of bowel anatomy and physiology to promote insight into condition. GOAL MET  7. This pt will demo a good understanding of proper toilet posture to decrease PFM tone and to improve visceral alignment for increased ease of defecation and reduced pelvic organ strain. GOAL MET  8. This pt will demonstrate independence with basic HEP designed to increase flexibility of the pelvic girdle and to strengthen the core for maximal symptom reduction. GOAL MET      Long Term Goals:   12 weeks   This pt will report improved defecation ease and frequency by 75% in order to promote overall pt health. GOAL NOT MET  Pt will demo independence in advanced HEP in order to promote retention of gains made in therapy and to reduce the need for further medical intervention. GOAL PARTIALLY MET   The pt will be able to delay bowel urgency for 10 minutes with good control in order to allow pt time to seek a public bathroom. GOAL NOT MET  This pt will return bowel frequency to normal levels in order to allow attendance of public events, shopping, and visiting friends with increased comfort. GOAL MET  This pt will report abdominal pelvic pain decreased to 1-2/10 at worst to increase tolerance to work and home tasks. GOAL NOT MET  This pt will demonstrate PFDI-20 score decreased to less than 10 in order to indication functional improvement with therapy.   GOAL NOT MET  PFM strength WNL to increase visceral support and reduce risk of symptoms reoccurring. GOAL NOT MET  * Patient will increase PFM strength to 4/5 with endurance of 10 seconds x 10 reps to increase pelvic organ support and decrease incontinence. GOAL NOT MET  * Patient will Increase abdominal strength of 4/5 or greater to allow for increased pelvic organ support and decreased incontinence and pelvic floor dysfunction. GOAL NOT MET    PLAN:  Treatment Recommendations: Strengthening, Endurance Training, Neuromuscular Re-education, Manual Therapy - Soft Tissue Mobilization, Manual Therapy - Joint Manipulation, Pain Management, Home Exercise Program, Patient Education, Self-Care Education and Training, Positioning, Integrative Dry Needling and Modalities    []  Plan of care initiated. Plan to see patient 1 time weekly to biweekly for 12 weeks to address the treatment planned outlined above.   [x]  Continue with current plan of care  []  Modify plan of care as follows:    []  Hold pending physician visit  []  Discharge    Time In 1100   Time Out 1200   Timed Code Minutes: 60 min   Total Treatment Time: 60 min       Electronically Signed by: Olga MONZON OTR/KATHERINE [id number]

## 2022-09-28 ENCOUNTER — HOSPITAL ENCOUNTER (OUTPATIENT)
Dept: PHYSICAL THERAPY | Age: 37
Setting detail: THERAPIES SERIES
Discharge: HOME | End: 2022-09-28
Payer: COMMERCIAL

## 2022-09-28 PROCEDURE — 97140 MANUAL THERAPY 1/> REGIONS: CPT

## 2022-09-28 PROCEDURE — 97110 THERAPEUTIC EXERCISES: CPT

## 2022-09-28 PROCEDURE — 97530 THERAPEUTIC ACTIVITIES: CPT

## 2022-09-28 NOTE — PROGRESS NOTES
[address] [facility]  OCCUPATIONAL THERAPY  OUTPATIENT REHABILITATION - SPECIALIZED THERAPY SERVICES  [] PELVIC HEALTH EVALUATION  [x] DAILY NOTE  [] PROGRESS NOTE [] DISCHARGE NOTE    Date: 2022  Patient Name:  Avila Randolph  : 1985  MRN: [id number]  CSN: [id number]     Referring Practitioner CHAPO Beltran*   Diagnosis Bowel and Pain    Treatment Diagnosis N81.84 - Pelvic Muscle Wasting (Female), R35.1 - Nocturia  N39.46 - Mixed Incontinence, R15.9 - Full Incontinence of Feces  K59.00 - Constipation, Unspecified, R10.2 - Pelvic and Perineal Pain  R10.30 - Lower Abdominal Pain, Unspecified  R94.10 - Unspecified Dyspareunia and R27.8 - Other Lack of Coordination   Date of Evaluation 3/29/22    Additional Pertinent History Psychophysiological insomnia F51.04     Malignant neoplasm of overlapping sites of cervix (HCC) C53.8    Other chest pain R07.89    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[facility] [place] [address] K56.699    S/P colectomy Z90.49    Tobacco abuse Z72.0   Cancer Staging  Malignant neoplasm of overlapping sites of cervix [facility])  Staging form: Cervix Uteri, AJCC 8th Edition  - Clinical stage from 2019: FIGO Stage IIA1 (Omari Maier, cN0, cM0) - Signed by Cindy Goss MD on 2019          Past Medical History:   Diagnosis Date    Anxiety      Cancer of cervix ([facility] [place] [address]      Depression      Hypertension           Past Surgical History:   Procedure Laterality Date    CERVIX BIOPSY N/A 2019    CHOLECYSTECTOMY   over 5 years ago    COLONOSCOPY   2020    TUBAL LIGATION   2006     [place] Floyd         Functional Outcome Measure Used PFDI-20   Functional Outcome Score PFDI-20: 52 (3/29/22); PFDI-20: 45 (22)       Insurance: Primary: Payor: [address] [place] [address]  [address] /  /  / ,   Secondary:    Authorization Information: PRECERTIFICATION REQUIRED: No   Visit # 12, 2/10 for progress note   Visits Allowed: INSURANCE THERAPY BENEFIT: No pre-certification is needed.   PT, OT and ST are lives with family in a multiple floor home with ability to complete ADL's on main floor with stairs and no handrail to enter. Task Previous Current   ADLs  Independent Modified Independent breaks required and some assistance required at times with bathing and dressing due to pain    IADL's Independent Modified Independent breaks required and some assistance required at times with going to the store and with light housework due to pain    Ambulation Independent Modified Independent breaks required and some assistance required at times    Transfers Independent Modified Independent breaks required and some assistance required at times    [address]  Active    Work Unemployed  Unemployed     PAIN    Location Abdominal pelvic pain    Description Dull ache    Increased by Activity, prolonged standing or sitting   Decreased by Vernon's on her side, heat, light massage    Maximum Intensity 7/10    Best Intensity 0/10   Todays Rating 0/10   Other/Function      PAIN    Location Low back    Description    Increased by Activity, prolonged standing or sitting   Decreased by Laying on her side, heat, light massage    Maximum Intensity 7/10   Best Intensity 0/10   Todays Rating 0/10   Other/Function      History of Abuse:No history of physical, emotional or sexual abuse reported    SEXUAL /MENSTRUAL HISTORY [x] Deferred secondary to: Information below from evaluation, not tested today. For reference only on this daily note. Number of Pregnancies 4   Number of Vaginal Deliveries 4    Number of Caesarean Deliveries 0       BLADDER ASSESSMENT [x] Deferred secondary to: Information below from evaluation, not tested today. For reference only on this daily note.     Daily Fluid Ingestion: 100 ounces water per day, fruit juice throughout the week, cup of coffee occasionally, occ milk, occ pop   Urination Frequency Times/Day: ever 30-45 minutes  Times/Night: 2 times    Volume Medium   Urge Sensation Normal  and Severe    SYMPTOM QUESTIONNAIRE   Loses Urine Upon: Sneezing/Coughing and Feeling Cold increased leakage    Incontinence Volume: Small to medium   Frequency of Leakage: Frequent   Wets the Bed: yes   Burning/Pain with Urination: yes   Difficulty Starting a Stream of Urine: no   Incomplete Emptying Yes   Strain to Empty Bladder: no   Falling Out Feeling: yes   Urinate more than 7 times/day: yes   Use a form of Leakage Protection: yes: 2-3 panty liners    Restrict Fluid Intake: no   Stream Strength Average       BOWEL ASSESSMENT [x] Deferred secondary to: Information below from evaluation, not tested today. For reference only on this daily note. Frequency: Every other day    Most Common Stool Consistency: [place] 2-3   SYMPTOM QUESTIONNAIRE   Strain to have a BM: yes   Include fiber in your diet: yes   Take laxatives/enemas regularly: yes: at times she will use Doculax    Pain with BM: yes   Strong urge to have BM: yes: sensation is off and will have a bowel movement starting and will not realize it    Leak/Stain Feces: yes: few times a week that is the small hard pieces of stool    Diarrhea often: no         SEXUAL ASSESSMENT [x] Deferred secondary to: Information below from evaluation, not tested today. For reference only on this daily note. Sexually Active yes   Pain with [place] (Dyspareunia) yes at times    Painful Penetration Pain with initial penetration and pt reports that her partner feels like he is hitting something   Lubrication Needed no   Pain After [place] yes: pelvic and abdominal pain increases after intercourse          OBSERVATION  [x] Deferred secondary to: Information below from evaluation, not tested today. For reference only on this daily note. Patient Safety Patient offered a chaperone and declined.  The pt did verbalize consent for internal vaginal examination following OT education of pt on the purpose of this activity and on the procedure using the model. Pt was educated in the intent of the OT to proceed slowly into the vaginal canal with permission requested of pt at every step of assessment prior to commencement by OT. Pt was also educated in her right to stop assessment, refuse any portion of this assessment, or to refuse assessment at any time without need for reason. The pt was educated that refusal would not impact her ability to seek care from this therapist in any way or result in therapist prejudice regarding her motivation to get better. Pt verbalized understanding and agreed again to internal examination by OT this date. Skin Condition intact   Urogenital Triangle Bulbocavernosus tender/tight, Ischiocavernosus tender/tight, STPM tender/tight, Levator ani tender/tight and OI tight/tender   Introitus     Perineal Descent     External Clock         PELVIC FLOOR INTERNAL EXAM [x] Deferred secondary to: Information below from evaluation, not tested today. For reference only on this daily note.     Exam Vaginal   Sensation Intact   Muscle Localization Poor - pt was able to contract the PFM and relax with vc to inhibit contraction of the abdomen and glutes   Palpation/Tone Hypertonic   Pelvic Floor Strength PERF:Power: 1,Endurance: 1,Reps: 1,Flicks: 1   Relax after Contraction Delayed   Prolapse slight anterior wall prolapse and posterior wall prolapse         PELVIC HEALTH INCONTINENCE TREATMENT   Precautions:    Pain:     X in shaded column indicates activity completed today   Exercise/Intervention Reps/Sec  Notes   Kegel quick 10x   On hold    Kegel hold 10x 3 sec     Tummy tuck quick/hold 10x      Pelvic Brace Quick 10x      Pelvic Brace Hold 10x 3 sec X Functional use 5 min   Urge control  10 min  X    Diet impact on the bladder  5 min  X                  PFM anatomy  5 min      PFM awareness/relaxation (mirror, towel, hand) 5 min   Guided relaxation   Reverse Kegel       Lubrication vaginal moisturizer       Manual therapy pt education 5 min  X Use of vibrator    Dilator education       Education in manual therapy with dilator. Diaphragmatic breathing 5 min  X    Internal exam  30 min  X    Kegel with Ball Squeeze       Kegel with Band       Colonic massage 15 min  X    Visceral mobilization  5 min  X Abdomen    Scar massage 5 min   Abdomen    IDN  5 min   Pain reported for 1 week after    Diaphragm release 5 min   Diaphragmatic breathing    Pelvic Tilt       Pelvic Tilt with arm lift       Pelvic Tilt with leg march       Pelvic Tilt with opposites       Bridge       Pelvic Tilt SLR       Clamshell       Reverse Clamshell       ITB stretch at wall  30 sec 2x     Standing ITB stretch 30 sec 2x     Supine Glut stretch 30 sec 2x     Piriformis stretch 30 sec 2x     Standing Kegal       Kegal Mini Squat       Standing Hip 3-way                       PFM anatomy and Physiology       Normal bowel function/promoting good function 10 min  X Belly big/hard, proper push, toilet posture, when to go, walking program, don't delay   PFM relaxation/awareness 5 min   Mirror, hand, towel methods   Diet impact on the bowel 5 min   Fiber education: sol and insol choices; food log     Specific Interventions Next Treatment: internal PFM release, bowel and bladder education, pressure control     Activity/Treatment Tolerance:  [x]  Patient tolerated treatment well  []  Patient limited by fatigue  []  Patient limited by pain   []  Patient limited by medical complications  []  Other:     Patient Education:   [x]  HEP/Education Completed: guided relaxation, education handouts   []  No new Education completed  []  Reviewed Prior HEP      [x]  Patient verbalized and/or demonstrated understanding of education provided. []  Patient unable to verbalize and/or demonstrate understanding of education provided. Will continue education. []  Barriers to learning:     Assessment: Pt tolerated session well this date.  Pt tolerated colonic massage of the abdomen well for decreased constipation and PFM dysfunction (pt reports having gas post treatment). Pt was re educated for importance of compliance with HEP and at home manual work for decreased pain and pelvic floor dysfunction. Pt was re educated due to non compliance for use of bowel education, splinting, habits, and routines to decrease constipation and to help her fully empty her bowels. Pt was re educated for importance of use of a vibrator at home as tolerated for decreased muscle tightness and pain all to decrease PFM dysfunction. Body Structures/Functions/Activity Limitations: PFM weakness with decreased localization and endurance, PFM spasm, Poor PFM control with limited ability to completely relax, Decreased awareness of functional factors that increase pelvic organ strain, Decreased awareness of normal bladder and bowel habits, control, and diet effects on functioning, Abdominal and core weakness and Pain  Prognosis: Good    GOALS:  Patient Goal: to decrease pain and pelvic floor dysfunction     Short Term Goals:  Time Frame: 6 weeks  *  Patient will decrease urine leak frequency and amount by 50% due to increasing PFM strength, endurance and localization. GOAL NOT MET  *  Patient to identify normal bladder function and voiding patterns, diet effects on bladder, urge control strategies, and constipation/optimal stool consistency management. GOAL MET  * Patient to demonstrate and verbalize good knowledge of safe lifting, pushing, pulling strategies that limit pelvic organ stress. GOAL NOT MET  This pt will report abdominal pelvic pain reduced to 5/10 at worst to allow initiation of manual techniques. GOAL NOT MET  This pt will demo the ability to completely and instantly relax the PFM in order to decrease pain and increase ease with toileting and with physical intimacy and/or medical examinations to ensure pt's health.   GOAL MET   The pt will be able to describe normal bowel function, bowel irritants, and bowel anatomy and physiology to promote insight into current condition and to allow the pt to identify causative factors related to current condition. GOAL MET  4. The pt will be able to delay fecal urge x 5' in order to allow time to get to a bathroom without soiling clothing. GOAL NOT MET  5. The pt will reduce incidence of fecal leakage by 25 % and amount of material leaked by 25% to increase ease of hygiene. GOAL MET   6. This pt will be able to describe normal bowel function, diet impact on the bowel, and have a good understanding of bowel anatomy and physiology to promote insight into condition. GOAL MET  7. This pt will demo a good understanding of proper toilet posture to decrease PFM tone and to improve visceral alignment for increased ease of defecation and reduced pelvic organ strain. GOAL MET  8. This pt will demonstrate independence with basic HEP designed to increase flexibility of the pelvic girdle and to strengthen the core for maximal symptom reduction. GOAL MET      Long Term Goals:   12 weeks   This pt will report improved defecation ease and frequency by 75% in order to promote overall pt health. GOAL NOT MET  Pt will demo independence in advanced HEP in order to promote retention of gains made in therapy and to reduce the need for further medical intervention. GOAL PARTIALLY MET   The pt will be able to delay bowel urgency for 10 minutes with good control in order to allow pt time to seek a public bathroom. GOAL NOT MET  This pt will return bowel frequency to normal levels in order to allow attendance of public events, shopping, and visiting friends with increased comfort. GOAL MET  This pt will report abdominal pelvic pain decreased to 1-2/10 at worst to increase tolerance to work and home tasks. GOAL NOT MET  This pt will demonstrate PFDI-20 score decreased to less than 10 in order to indication functional improvement with therapy.   GOAL NOT MET  PFM strength WNL to increase visceral support and reduce risk of symptoms reoccurring. GOAL NOT MET  * Patient will increase PFM strength to 4/5 with endurance of 10 seconds x 10 reps to increase pelvic organ support and decrease incontinence. GOAL NOT MET  * Patient will Increase abdominal strength of 4/5 or greater to allow for increased pelvic organ support and decreased incontinence and pelvic floor dysfunction. GOAL NOT MET    PLAN:  Treatment Recommendations: Strengthening, Endurance Training, Neuromuscular Re-education, Manual Therapy - Soft Tissue Mobilization, Manual Therapy - Joint Manipulation, Pain Management, Home Exercise Program, Patient Education, Self-Care Education and Training, Positioning, Integrative Dry Needling and Modalities    []  Plan of care initiated. Plan to see patient 1 time weekly to biweekly for 12 weeks to address the treatment planned outlined above.   [x]  Continue with current plan of care  []  Modify plan of care as follows:    []  Hold pending physician visit  []  Discharge    Time In 1100   Time Out 1200   Timed Code Minutes: 60 min   Total Treatment Time: 60 min       Electronically Signed by: Page MONZON OTR/KATHERINE [id number]

## 2022-11-08 ENCOUNTER — HOSPITAL ENCOUNTER (OUTPATIENT)
Dept: PHYSICAL THERAPY | Age: 37
Setting detail: THERAPIES SERIES
Discharge: HOME | End: 2022-11-08
Payer: COMMERCIAL

## 2022-11-08 PROCEDURE — 97140 MANUAL THERAPY 1/> REGIONS: CPT

## 2022-11-08 PROCEDURE — 97530 THERAPEUTIC ACTIVITIES: CPT

## 2022-11-08 NOTE — PROGRESS NOTES
[facility]  OCCUPATIONAL THERAPY  OUTPATIENT REHABILITATION - SPECIALIZED THERAPY SERVICES  [] PELVIC HEALTH EVALUATION  [x] DAILY NOTE  [] PROGRESS NOTE [] DISCHARGE NOTE    Date: 2022  Patient Name:  Angelic Murcia  : 1985  MRN: [id number]  CSN: [id number]     Referring Practitioner CHAPO Elias*   Diagnosis Bowel and Pain    Treatment Diagnosis N81.84 - Pelvic Muscle Wasting (Female), R35.1 - Nocturia  N39.46 - Mixed Incontinence, R15.9 - Full Incontinence of Feces  K59.00 - Constipation, Unspecified, R10.2 - Pelvic and Perineal Pain  R10.30 - Lower Abdominal Pain, Unspecified  R94.10 - Unspecified Dyspareunia and R27.8 - Other Lack of Coordination   Date of Evaluation 3/29/22    Additional Pertinent History Psychophysiological insomnia F51.04     Malignant neoplasm of overlapping sites of cervix (HCC) C53.8    Other chest pain R07.89    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[facility] [address] 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.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    S/P colectomy Z90.49    Tobacco abuse Z72.0   Cancer Staging  Malignant neoplasm of overlapping sites of cervix [facility])  Staging form: Cervix Uteri, AJCC 8th Edition  - Clinical stage from 2019: FIGO Stage IIA1 (Katharine Hole, cN0, cM0) - Signed by Neli Villa MD on 2019          Past Medical History:   Diagnosis Date    Anxiety      Cancer of cervix ([facility] [address]      Depression      Hypertension           Past Surgical History:   Procedure Laterality Date    CERVIX BIOPSY N/A 2019    CHOLECYSTECTOMY   over 5 years ago    COLONOSCOPY   2020    TUBAL LIGATION   2006     Alton Floyd         Functional Outcome Measure Used PFDI-20   Functional Outcome Score PFDI-20: 52 (3/29/22); PFDI-20: 45 (22)       Insurance: Primary: Payor: [address] [place] [address]  [address] /  /  / ,   Secondary:    Authorization Information: PRECERTIFICATION REQUIRED: No   Visit # 13, 3/10 for progress note   Visits Allowed: INSURANCE THERAPY BENEFIT: No pre-certification is needed.   PT, OT and ST are allowed 30 visits each per calendar year   Recertification Date: November 28, 2022   Physician Follow-Up: Possibly September    Physician Orders: Eval and treat   History of Present Illness: Pt reports to skilled OT services with c/o pelvic and coccyx pain. Pt reports that she did PT in [place] that told her that her tailbone is out of alignment. Pt reports that she had radiation that caused her to have pain that started 2 years ago. Pt reports that she had a foot of the colon removed due to the bowel being effected by the radiation that was in May of 2021 and the colostomy was reversed in August 2021. Pt reports that she has incontinence with the bowel and bladder (mostly bowels) due to decreased sensation and will not be able to feel that she is starting a bowel movement before it is too late. Pt reports that she is cancer free and had her last radiation on approximately September of 2019. Pt reports that she has to do a lot of straining to get a bm started and to fully empty. SUBJECTIVE: Pt arrived to this therapy session late. Pt reports that she got Covid and is still dealing with the side effects. Pt reports that she has a follow up with urology in [place] to do a cystoscopy and a CAT scan on November 22 to assess her bladder. Pt reports that she saw the oncologist and is checking for bladder cancer with the CAT scan or if it is radiation damage. Pt reports that she has stopped the estrogen cream due to discomfort. Pt reports that her bladder medication seems to be helping with less urgency, frequency, and leakage. Pt reports that her bm have been \"decent. \" Pt reports that there were only 2 times that she can remember that she is having to strain with a lot of pressure. Pt reports that her tailbone will hurt at times and is worse when she has to have a bm and with prolonged sitting. Pt reports no pain this date.     Social/Functional History and Current Status:  Medications and Allergies have been reviewed and are listed on Medical History Questionnaire. Shawanda Kramer lives with family in a multiple floor home with ability to complete ADL's on main floor with stairs and no handrail to enter. Task Previous Current   ADLs  Independent Modified Independent breaks required and some assistance required at times with bathing and dressing due to pain    IADL's Independent Modified Independent breaks required and some assistance required at times with going to the store and with light housework due to pain    Ambulation Independent Modified Independent breaks required and some assistance required at times    Transfers Independent Modified Independent breaks required and some assistance required at times    [address]  Active    Work Unemployed  Unemployed     PAIN    Location Abdominal pelvic pain    Description Dull ache    Increased by Activity, prolonged standing or sitting   Decreased by Vernon's on her side, heat, light massage    Maximum Intensity 7/10    Best Intensity 0/10   Todays Rating 0/10   Other/Function      PAIN    Location Low back    Description    Increased by Activity, prolonged standing or sitting   Decreased by Laying on her side, heat, light massage    Maximum Intensity 7/10   Best Intensity 0/10   Todays Rating 0/10   Other/Function      History of Abuse:No history of physical, emotional or sexual abuse reported    SEXUAL /MENSTRUAL HISTORY [x] Deferred secondary to: Information below from evaluation, not tested today. For reference only on this daily note. Number of Pregnancies 4   Number of Vaginal Deliveries 4    Number of Caesarean Deliveries 0       BLADDER ASSESSMENT [x] Deferred secondary to: Information below from evaluation, not tested today. For reference only on this daily note.     Daily Fluid Ingestion: 100 ounces water per day, fruit juice throughout the week, cup of coffee occasionally, occ milk, occ pop   Urination Frequency Times/Day: ever 30-45 minutes  Times/Night: 2 times    Volume Medium   Urge Sensation Normal  and Severe    SYMPTOM QUESTIONNAIRE   Loses Urine Upon: Sneezing/Coughing and Feeling Cold increased leakage    Incontinence Volume: Small to medium   Frequency of Leakage: Frequent   Wets the Bed: yes   Burning/Pain with Urination: yes   Difficulty Starting a Stream of Urine: no   Incomplete Emptying Yes   Strain to Empty Bladder: no   Falling Out Feeling: yes   Urinate more than 7 times/day: yes   Use a form of Leakage Protection: yes: 2-3 panty liners    Restrict Fluid Intake: no   Stream Strength Average       BOWEL ASSESSMENT [x] Deferred secondary to: Information below from evaluation, not tested today. For reference only on this daily note. Frequency: Every other day    Most Common Stool Consistency: [place] 2-3   SYMPTOM QUESTIONNAIRE   Strain to have a BM: yes   Include fiber in your diet: yes   Take laxatives/enemas regularly: yes: at times she will use Doculax    Pain with BM: yes   Strong urge to have BM: yes: sensation is off and will have a bowel movement starting and will not realize it    Leak/Stain Feces: yes: few times a week that is the small hard pieces of stool    Diarrhea often: no         SEXUAL ASSESSMENT [x] Deferred secondary to: Information below from evaluation, not tested today. For reference only on this daily note. Sexually Active yes   Pain with [place] (Dyspareunia) yes at times    Painful Penetration Pain with initial penetration and pt reports that her partner feels like he is hitting something   Lubrication Needed no   Pain After [place] yes: pelvic and abdominal pain increases after intercourse          OBSERVATION  [x] Deferred secondary to: Information below from evaluation, not tested today. For reference only on this daily note. Patient Safety Patient offered a chaperone and declined.  The pt did verbalize consent for internal vaginal examination following OT education of pt on the purpose of this activity and on the procedure using the model. Pt was educated in the intent of the OT to proceed slowly into the vaginal canal with permission requested of pt at every step of assessment prior to commencement by OT. Pt was also educated in her right to stop assessment, refuse any portion of this assessment, or to refuse assessment at any time without need for reason. The pt was educated that refusal would not impact her ability to seek care from this therapist in any way or result in therapist prejudice regarding her motivation to get better. Pt verbalized understanding and agreed again to internal examination by OT this date. Skin Condition intact   Urogenital Triangle Bulbocavernosus tender/tight, Ischiocavernosus tender/tight, STPM tender/tight, Levator ani tender/tight and OI tight/tender   Introitus     Perineal Descent     External Clock         PELVIC FLOOR INTERNAL EXAM [x] Deferred secondary to: Information below from evaluation, not tested today. For reference only on this daily note.     Exam Vaginal   Sensation Intact   Muscle Localization Poor - pt was able to contract the PFM and relax with vc to inhibit contraction of the abdomen and glutes   Palpation/Tone Hypertonic   Pelvic Floor Strength PERF:Power: 1,Endurance: 1,Reps: 1,Flicks: 1   Relax after Contraction Delayed   Prolapse slight anterior wall prolapse and posterior wall prolapse         PELVIC HEALTH INCONTINENCE TREATMENT   Precautions:    Pain:     X in shaded column indicates activity completed today   Exercise/Intervention Reps/Sec  Notes   Kegel quick 10x   On hold    Kegel hold 10x 3 sec     Tummy tuck quick/hold 10x      Pelvic Brace Quick 10x      Pelvic Brace Hold 10x 3 sec X Functional use 5 min   Urge control  10 min  X    Diet impact on the bladder  5 min  X                  PFM anatomy  5 min      PFM awareness/relaxation (mirror, towel, hand) 5 min   Guided relaxation   Reverse Kegel       Lubrication vaginal moisturizer       Manual therapy pt education 5 min  X Use of vibrator    Dilator education       Education in manual therapy with dilator. Diaphragmatic breathing 5 min      Internal exam  30 min  X    Kegel with Ball Squeeze       Kegel with Band       Colonic massage 15 min      Visceral mobilization  5 min   Abdomen    Scar massage 5 min   Abdomen    IDN  5 min   Pain reported for 1 week after    Diaphragm release 5 min   Diaphragmatic breathing    Pelvic Tilt       Pelvic Tilt with arm lift       Pelvic Tilt with leg march       Pelvic Tilt with opposites       Bridge       Pelvic Tilt SLR       Clamshell       Reverse Clamshell       ITB stretch at wall  30 sec 2x     Standing ITB stretch 30 sec 2x     Supine Glut stretch 30 sec 2x     Piriformis stretch 30 sec 2x     Standing Kegal       Kegal Mini Squat       Standing Hip 3-way                       PFM anatomy and Physiology       Normal bowel function/promoting good function 10 min   Belly big/hard, proper push, toilet posture, when to go, walking program, don't delay   PFM relaxation/awareness 5 min   Mirror, hand, towel methods   Diet impact on the bowel 5 min   Fiber education: sol and insol choices; food log     Specific Interventions Next Treatment: internal PFM release, bowel and bladder education, pressure control     Activity/Treatment Tolerance:  [x]  Patient tolerated treatment well  []  Patient limited by fatigue  []  Patient limited by pain   []  Patient limited by medical complications  []  Other:     Patient Education:   [x]  HEP/Education Completed: guided relaxation, education handouts   []  No new Education completed  []  Reviewed Prior HEP      [x]  Patient verbalized and/or demonstrated understanding of education provided. []  Patient unable to verbalize and/or demonstrate understanding of education provided. Will continue education.   [] Barriers to learning:     Assessment: Pt tolerated session well this date. Pt was re educated for importance of compliance with HEP and at home manual work for decreased pain and pelvic floor dysfunction. Pt was re educated for importance of use of a vibrator at home as tolerated for decreased muscle tightness and pain all to decrease PFM dysfunction. With completion of the internal exam to assess the pelvic floor through the vagina there was noted tightness and tenderness bilaterally R>L along the OI, piriformis, levator ani, coccygeus, and STP for decreased pain and PFM dysfunction. Pt reports muscle relaxation post treatment. Body Structures/Functions/Activity Limitations: PFM weakness with decreased localization and endurance, PFM spasm, Poor PFM control with limited ability to completely relax, Decreased awareness of functional factors that increase pelvic organ strain, Decreased awareness of normal bladder and bowel habits, control, and diet effects on functioning, Abdominal and core weakness and Pain  Prognosis: Good    GOALS:  Patient Goal: to decrease pain and pelvic floor dysfunction     Short Term Goals:  Time Frame: 6 weeks  *  Patient will decrease urine leak frequency and amount by 50% due to increasing PFM strength, endurance and localization. GOAL NOT MET  *  Patient to identify normal bladder function and voiding patterns, diet effects on bladder, urge control strategies, and constipation/optimal stool consistency management. GOAL MET  * Patient to demonstrate and verbalize good knowledge of safe lifting, pushing, pulling strategies that limit pelvic organ stress. GOAL NOT MET  This pt will report abdominal pelvic pain reduced to 5/10 at worst to allow initiation of manual techniques.   GOAL NOT MET  This pt will demo the ability to completely and instantly relax the PFM in order to decrease pain and increase ease with toileting and with physical intimacy and/or medical examinations to ensure pt's health. GOAL MET   The pt will be able to describe normal bowel function, bowel irritants, and bowel anatomy and physiology to promote insight into current condition and to allow the pt to identify causative factors related to current condition. GOAL MET  4. The pt will be able to delay fecal urge x 5' in order to allow time to get to a bathroom without soiling clothing. GOAL NOT MET  5. The pt will reduce incidence of fecal leakage by 25 % and amount of material leaked by 25% to increase ease of hygiene. GOAL MET   6. This pt will be able to describe normal bowel function, diet impact on the bowel, and have a good understanding of bowel anatomy and physiology to promote insight into condition. GOAL MET  7. This pt will demo a good understanding of proper toilet posture to decrease PFM tone and to improve visceral alignment for increased ease of defecation and reduced pelvic organ strain. GOAL MET  8. This pt will demonstrate independence with basic HEP designed to increase flexibility of the pelvic girdle and to strengthen the core for maximal symptom reduction. GOAL MET      Long Term Goals:   12 weeks   This pt will report improved defecation ease and frequency by 75% in order to promote overall pt health. GOAL NOT MET  Pt will demo independence in advanced HEP in order to promote retention of gains made in therapy and to reduce the need for further medical intervention. GOAL PARTIALLY MET   The pt will be able to delay bowel urgency for 10 minutes with good control in order to allow pt time to seek a public bathroom. GOAL NOT MET  This pt will return bowel frequency to normal levels in order to allow attendance of public events, shopping, and visiting friends with increased comfort. GOAL MET  This pt will report abdominal pelvic pain decreased to 1-2/10 at worst to increase tolerance to work and home tasks.  GOAL NOT MET  This pt will demonstrate PFDI-20 score decreased to less than 10 in order to indication functional improvement with therapy. GOAL NOT MET  PFM strength WNL to increase visceral support and reduce risk of symptoms reoccurring. GOAL NOT MET  * Patient will increase PFM strength to 4/5 with endurance of 10 seconds x 10 reps to increase pelvic organ support and decrease incontinence. GOAL NOT MET  * Patient will Increase abdominal strength of 4/5 or greater to allow for increased pelvic organ support and decreased incontinence and pelvic floor dysfunction. GOAL NOT MET    PLAN:  Treatment Recommendations: Strengthening, Endurance Training, Neuromuscular Re-education, Manual Therapy - Soft Tissue Mobilization, Manual Therapy - Joint Manipulation, Pain Management, Home Exercise Program, Patient Education, Self-Care Education and Training, Positioning, Integrative Dry Needling and Modalities    []  Plan of care initiated. Plan to see patient 1 time weekly to biweekly for 12 weeks to address the treatment planned outlined above.   [x]  Continue with current plan of care  []  Modify plan of care as follows:    []  Hold pending physician visit  []  Discharge    Time In 1015   Time Out 1100   Timed Code Minutes: 45 min   Total Treatment Time: 45 min       Electronically Signed by: Óscar WOLFE OTR/L [id number]

## 2022-11-21 ENCOUNTER — APPOINTMENT (OUTPATIENT)
Dept: PHYSICAL THERAPY | Age: 37
End: 2022-11-21
Payer: COMMERCIAL

## 2022-11-21 ENCOUNTER — HOSPITAL ENCOUNTER (OUTPATIENT)
Dept: PHYSICAL THERAPY | Age: 37
Setting detail: THERAPIES SERIES
Discharge: HOME | End: 2022-11-21
Payer: COMMERCIAL

## 2022-11-21 PROCEDURE — 97140 MANUAL THERAPY 1/> REGIONS: CPT

## 2022-11-21 PROCEDURE — 97530 THERAPEUTIC ACTIVITIES: CPT

## 2022-11-21 NOTE — PROGRESS NOTES
[address] [facility]  OCCUPATIONAL THERAPY  OUTPATIENT REHABILITATION - SPECIALIZED THERAPY SERVICES  [] PELVIC HEALTH EVALUATION  [x] DAILY NOTE  [] PROGRESS NOTE [] DISCHARGE NOTE    Date: 2022  Patient Name:  Ximena Tai  : 1985  MRN: [id number]  CSN: [id number]     Referring Practitioner CHAPO Cox*   Diagnosis Bowel and Pain    Treatment Diagnosis N81.84 - Pelvic Muscle Wasting (Female), R35.1 - Nocturia  N39.46 - Mixed Incontinence, R15.9 - Full Incontinence of Feces  K59.00 - Constipation, Unspecified, R10.2 - Pelvic and Perineal Pain  R10.30 - Lower Abdominal Pain, Unspecified  R94.10 - Unspecified Dyspareunia and R27.8 - Other Lack of Coordination   Date of Evaluation 3/29/22    Additional Pertinent History Psychophysiological insomnia F51.04     Malignant neoplasm of overlapping sites of cervix (HCC) C53.8    Other chest pain R07.89    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[facility] [address] 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.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    S/P colectomy Z90.49    Tobacco abuse Z72.0   Cancer Staging  Malignant neoplasm of overlapping sites of cervix [facility])  Staging form: Cervix Uteri, AJCC 8th Edition  - Clinical stage from 2019: FIGO Stage IIA1 (Bryan Elena, cN0, cM0) - Signed by Joanie Agosto MD on 2019          Past Medical History:   Diagnosis Date    Anxiety      Cancer of cervix ([facility] [address]      Depression      Hypertension           Past Surgical History:   Procedure Laterality Date    CERVIX BIOPSY N/A 2019    CHOLECYSTECTOMY   over 5 years ago    COLONOSCOPY   2020    TUBAL LIGATION   2006     [place] Floyd         Functional Outcome Measure Used PFDI-20   Functional Outcome Score PFDI-20: 52 (3/29/22); PFDI-20: 45 (22)       Insurance: Primary: Payor: [address] [place] [address]  [address] /  /  / ,   Secondary:    Authorization Information: PRECERTIFICATION REQUIRED: No   Visit # 14, 4/10 for progress note   Visits Allowed: INSURANCE THERAPY BENEFIT: No pre-certification is needed.   PT, OT and ST are allowed 30 visits each per calendar year   Recertification Date: November 28, 2022   Physician Follow-Up: Possibly September    Physician Orders: Eval and treat   History of Present Illness: Pt reports to skilled OT services with c/o pelvic and coccyx pain. Pt reports that she did PT in [facility] that told her that her tailbone is out of alignment. Pt reports that she had radiation that caused her to have pain that started 2 years ago. Pt reports that she had a foot of the colon removed due to the bowel being effected by the radiation that was in May of 2021 and the colostomy was reversed in August 2021. Pt reports that she has incontinence with the bowel and bladder (mostly bowels) due to decreased sensation and will not be able to feel that she is starting a bowel movement before it is too late. Pt reports that she is cancer free and had her last radiation on approximately September of 2019. Pt reports that she has to do a lot of straining to get a bm started and to fully empty. SUBJECTIVE: Pt reports that she feels like she has been doing well overall. Pt report that she had some urinary incontinence this morning with bending and lifting that has not happened in a while. Pt reports that she has a follow up with urology in [facility] to do a cystoscopy and a CAT scan on November 22 to assess her bladder. Pt reports that she has been having a harder time having a bm and has been struggling to fully empty. Pt reports no pain this date. Pt reports that she has had less tailbone pain. Social/Functional History and Current Status:  Medications and Allergies have been reviewed and are listed on Medical History Questionnaire. Jennifer Phelps lives with family in a multiple floor home with ability to complete ADL's on main floor with stairs and no handrail to enter.     Task Previous Current   ADLs  Independent Modified Independent breaks required and some assistance required at times with bathing and dressing due to pain    IADL's Independent Modified Independent breaks required and some assistance required at times with going to the store and with light housework due to pain    Ambulation Independent Modified Independent breaks required and some assistance required at times    Transfers Independent Modified Independent breaks required and some assistance required at times    [email or web address]  Active    Work Unemployed  Unemployed     PAIN    Location Abdominal pelvic pain    Description Dull ache    Increased by Activity, prolonged standing or sitting   Decreased by Manjit Felicity on her side, heat, light massage    Maximum Intensity 7/10    Best Intensity 0/10   Todays Rating 0/10   Other/Function      PAIN    Location Low back    Description    Increased by Activity, prolonged standing or sitting   Decreased by Laying on her side, heat, light massage    Maximum Intensity 7/10   Best Intensity 0/10   Todays Rating 0/10   Other/Function      History of Abuse:No history of physical, emotional or sexual abuse reported    SEXUAL /MENSTRUAL HISTORY [x] Deferred secondary to: Information below from evaluation, not tested today. For reference only on this daily note. Number of Pregnancies 4   Number of Vaginal Deliveries 4    Number of Caesarean Deliveries 0       BLADDER ASSESSMENT [x] Deferred secondary to: Information below from evaluation, not tested today. For reference only on this daily note.     Daily Fluid Ingestion: 100 ounces water per day, fruit juice throughout the week, cup of coffee occasionally, occ milk, occ pop   Urination Frequency Times/Day: ever 30-45 minutes  Times/Night: 2 times    Volume Medium   Urge Sensation Normal  and Severe    SYMPTOM QUESTIONNAIRE   Loses Urine Upon: Sneezing/Coughing and Feeling Cold increased leakage    Incontinence Volume: Small to medium   Frequency of Leakage: Frequent   Wets the Bed: yes   Burning/Pain with Urination: yes   Difficulty Starting a Stream of Urine: no   Incomplete Emptying Yes   Strain to Empty Bladder: no   Falling Out Feeling: yes   Urinate more than 7 times/day: yes   Use a form of Leakage Protection: yes: 2-3 panty liners    Restrict Fluid Intake: no   Stream Strength Average       BOWEL ASSESSMENT [x] Deferred secondary to: Information below from evaluation, not tested today. For reference only on this daily note. Frequency: Every other day    Most Common Stool Consistency: [place] 2-3   SYMPTOM QUESTIONNAIRE   Strain to have a BM: yes   Include fiber in your diet: yes   Take laxatives/enemas regularly: yes: at times she will use Doculax    Pain with BM: yes   Strong urge to have BM: yes: sensation is off and will have a bowel movement starting and will not realize it    Leak/Stain Feces: yes: few times a week that is the small hard pieces of stool    Diarrhea often: no         SEXUAL ASSESSMENT [x] Deferred secondary to: Information below from evaluation, not tested today. For reference only on this daily note. Sexually Active yes   Pain with Rison (Dyspareunia) yes at times    Painful Penetration Pain with initial penetration and pt reports that her partner feels like he is hitting something   Lubrication Needed no   Pain After Rison yes: pelvic and abdominal pain increases after intercourse          OBSERVATION  [x] Deferred secondary to: Information below from evaluation, not tested today. For reference only on this daily note. Patient Safety Patient offered a chaperone and declined. The pt did verbalize consent for internal vaginal examination following OT education of pt on the purpose of this activity and on the procedure using the model. Pt was educated in the intent of the OT to proceed slowly into the vaginal canal with permission requested of pt at every step of assessment prior to commencement by OT.   Pt was also educated in her right to stop massage 15 min  X    Visceral mobilization  5 min   Abdomen    Scar massage 5 min  X Abdomen    IDN  5 min   Pain reported for 1 week after    Diaphragm release 5 min   Diaphragmatic breathing    Pelvic Tilt       Pelvic Tilt with arm lift       Pelvic Tilt with leg march       Pelvic Tilt with opposites       Bridge       Pelvic Tilt SLR       Clamshell       Reverse Clamshell       ITB stretch at wall  30 sec 2x     Standing ITB stretch 30 sec 2x     Supine Glut stretch 30 sec 2x     Piriformis stretch 30 sec 2x     Standing Kegal       Kegal Mini Squat       Standing Hip 3-way                       PFM anatomy and Physiology       Normal bowel function/promoting good function 10 min   Belly big/hard, proper push, toilet posture, when to go, walking program, don't delay   PFM relaxation/awareness 5 min   Mirror, hand, towel methods   Diet impact on the bowel 5 min   Fiber education: sol and insol choices; food log     Specific Interventions Next Treatment: internal PFM release, bowel and bladder education, pressure control     Activity/Treatment Tolerance:  [x]  Patient tolerated treatment well  []  Patient limited by fatigue  []  Patient limited by pain   []  Patient limited by medical complications  []  Other:     Patient Education:   [x]  HEP/Education Completed: guided relaxation, education handouts   []  No new Education completed  []  Reviewed Prior HEP      [x]  Patient verbalized and/or demonstrated understanding of education provided. []  Patient unable to verbalize and/or demonstrate understanding of education provided. Will continue education. []  Barriers to learning:     Assessment: Pt tolerated session well this date. Pt was educated for use of the functional pelvic brace and for pressure control to help decrease risk of leakage and worsening PFM dysfunction. Pt was re educated for importance of compliance with HEP and at home manual work for decreased pain and pelvic floor dysfunction.   With completion of the internal exam to assess the pelvic floor through the vagina there was noted tightness and tenderness bilaterally R>L (decreased by 5% compared to last session) along the OI, piriformis, levator ani, coccygeus, and STP for decreased pain and PFM dysfunction. Pt reports muscle relaxation and no pain post treatment. Body Structures/Functions/Activity Limitations: PFM weakness with decreased localization and endurance, PFM spasm, Poor PFM control with limited ability to completely relax, Decreased awareness of functional factors that increase pelvic organ strain, Decreased awareness of normal bladder and bowel habits, control, and diet effects on functioning, Abdominal and core weakness and Pain  Prognosis: Good    GOALS:  Patient Goal: to decrease pain and pelvic floor dysfunction     Short Term Goals:  Time Frame: 6 weeks  *  Patient will decrease urine leak frequency and amount by 50% due to increasing PFM strength, endurance and localization. GOAL NOT MET  *  Patient to identify normal bladder function and voiding patterns, diet effects on bladder, urge control strategies, and constipation/optimal stool consistency management. GOAL MET  * Patient to demonstrate and verbalize good knowledge of safe lifting, pushing, pulling strategies that limit pelvic organ stress. GOAL NOT MET  This pt will report abdominal pelvic pain reduced to 5/10 at worst to allow initiation of manual techniques. GOAL NOT MET  This pt will demo the ability to completely and instantly relax the PFM in order to decrease pain and increase ease with toileting and with physical intimacy and/or medical examinations to ensure pt's health. GOAL MET   The pt will be able to describe normal bowel function, bowel irritants, and bowel anatomy and physiology to promote insight into current condition and to allow the pt to identify causative factors related to current condition. GOAL MET  4.   The pt will be able to delay fecal urge x 5' in order to allow time to get to a bathroom without soiling clothing. GOAL NOT MET  5. The pt will reduce incidence of fecal leakage by 25 % and amount of material leaked by 25% to increase ease of hygiene. GOAL MET   6. This pt will be able to describe normal bowel function, diet impact on the bowel, and have a good understanding of bowel anatomy and physiology to promote insight into condition. GOAL MET  7. This pt will demo a good understanding of proper toilet posture to decrease PFM tone and to improve visceral alignment for increased ease of defecation and reduced pelvic organ strain. GOAL MET  8. This pt will demonstrate independence with basic HEP designed to increase flexibility of the pelvic girdle and to strengthen the core for maximal symptom reduction. GOAL MET      Long Term Goals:   12 weeks   This pt will report improved defecation ease and frequency by 75% in order to promote overall pt health. GOAL NOT MET  Pt will demo independence in advanced HEP in order to promote retention of gains made in therapy and to reduce the need for further medical intervention. GOAL PARTIALLY MET   The pt will be able to delay bowel urgency for 10 minutes with good control in order to allow pt time to seek a public bathroom. GOAL NOT MET  This pt will return bowel frequency to normal levels in order to allow attendance of public events, shopping, and visiting friends with increased comfort. GOAL MET  This pt will report abdominal pelvic pain decreased to 1-2/10 at worst to increase tolerance to work and home tasks. GOAL NOT MET  This pt will demonstrate PFDI-20 score decreased to less than 10 in order to indication functional improvement with therapy. GOAL NOT MET  PFM strength WNL to increase visceral support and reduce risk of symptoms reoccurring. GOAL NOT MET  * Patient will increase PFM strength to 4/5 with endurance of 10 seconds x 10 reps to increase pelvic organ support and decrease incontinence. GOAL NOT MET  * Patient will Increase abdominal strength of 4/5 or greater to allow for increased pelvic organ support and decreased incontinence and pelvic floor dysfunction. GOAL NOT MET    PLAN:  Treatment Recommendations: Strengthening, Endurance Training, Neuromuscular Re-education, Manual Therapy - Soft Tissue Mobilization, Manual Therapy - Joint Manipulation, Pain Management, Home Exercise Program, Patient Education, Self-Care Education and Training, Positioning, Integrative Dry Needling and Modalities    []  Plan of care initiated. Plan to see patient 1 time weekly to biweekly for 12 weeks to address the treatment planned outlined above.   [x]  Continue with current plan of care  []  Modify plan of care as follows:    []  Hold pending physician visit  []  Discharge    Time In 1100   Time Out 1155   Timed Code Minutes: 55 min   Total Treatment Time: 55 min       Electronically Signed by: Quique Verduzco [address] [facility] Center[facility]/L [id number]

## 2022-11-30 DIAGNOSIS — F51.04 PSYCHOPHYSIOLOGICAL INSOMNIA: ICD-10-CM

## 2022-11-30 RX ORDER — TRAZODONE HYDROCHLORIDE 100 MG/1
100 TABLET ORAL NIGHTLY
Qty: 90 TABLET | Refills: 3 | Status: SHIPPED | OUTPATIENT
Start: 2022-11-30

## 2022-12-05 ENCOUNTER — APPOINTMENT (OUTPATIENT)
Dept: PHYSICAL THERAPY | Age: 37
End: 2022-12-05
Payer: COMMERCIAL

## 2022-12-19 ENCOUNTER — HOSPITAL ENCOUNTER (OUTPATIENT)
Dept: PHYSICAL THERAPY | Age: 37
Setting detail: THERAPIES SERIES
Discharge: HOME | End: 2022-12-19
Payer: COMMERCIAL

## 2022-12-19 PROCEDURE — 97140 MANUAL THERAPY 1/> REGIONS: CPT

## 2022-12-19 NOTE — PROGRESS NOTES
** PLEASE SIGN, DATE AND TIME CERTIFICATION BELOW AND RETURN TO [facility] OUTPATIENT REHABILITATION (FAX #: [phone]). ATTEST/CO-SIGN IF ACCESSING VIA [employer]. THANK YOU.**    I certify that I have examined the patient below and determined that Physical Medicine and Rehabilitation service is necessary and that I approve the established plan of care for up to 90 days or as specifically noted.   Attestation, signature or co-signature of physician indicates approval of certification requirements.    ________________________ ____________ __________  Physician Signature   Date   Time   [address] [facility]  OUTPATIENT REHABILITATION - SPECIALIZED THERAPY SERVICES  [] PELVIC HEALTH EVALUATION  [] DAILY NOTE  [x] PROGRESS NOTE [] DISCHARGE NOTE    Date: 2022  Patient Name:  Selam Herrera  : 1985  MRN: [id number]  CSN: [id number]     Referring Practitioner CHAPO Ramirez*   Diagnosis Bowel and Pain    Treatment Diagnosis N81.84 - Pelvic Muscle Wasting (Female), R35.1 - Nocturia  N39.46 - Mixed Incontinence, R15.9 - Full Incontinence of Feces  K59.00 - Constipation, Unspecified, R10.2 - Pelvic and Perineal Pain  R10.30 - Lower Abdominal Pain, Unspecified  R94.10 - Unspecified Dyspareunia and R27.8 - Other Lack of Coordination   Date of Evaluation 3/29/22    Additional Pertinent History Psychophysiological insomnia F51.04     Malignant neoplasm of overlapping sites of cervix (HCC) C53.8    Other chest pain R07.89    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)  Staging form: Cervix Uteri, AJCC 8th Edition  - Clinical stage from 2019: FIGO Stage IIA1 (Joaquin Quevedo, cN0, cM0) - Signed by Jo Santo MD on 2019          Past Medical History:   Diagnosis Date    Anxiety      Cancer of cervix ([facility] [address]      Depression      Hypertension Past Surgical History:   Procedure Laterality Date    CERVIX BIOPSY N/A 6/7/2019    CHOLECYSTECTOMY   over 5 years ago    COLONOSCOPY   09/29/2020    TUBAL LIGATION   2006     [place]         Functional Outcome Measure Used PFDI-20   Functional Outcome Score PFDI-20: 52 (3/29/22); PFDI-20: 45 (8/30/22); PFDI-20: 38 (12-19/22)       Insurance: Primary: Payor: [address] [place] [address]  [address] /  /  / ,   Secondary:    Authorization Information: PRECERTIFICATION REQUIRED: No   Visit # 15, 5/10 for progress note   Visits Allowed: INSURANCE THERAPY BENEFIT: No pre-certification is needed. PT, OT and ST are allowed 30 visits each per calendar year   Recertification Date: March 2023   Physician Follow-Up: Possibly September    Physician Orders: Roane Ramer and treat   History of Present Illness: Pt reports to skilled OT services with c/o pelvic and coccyx pain. Pt reports that she did PT in [place] that told her that her tailbone is out of alignment. Pt reports that she had radiation that caused her to have pain that started 2 years ago. Pt reports that she had a foot of the colon removed due to the bowel being effected by the radiation that was in May of 2021 and the colostomy was reversed in August 2021. Pt reports that she has incontinence with the bowel and bladder (mostly bowels) due to decreased sensation and will not be able to feel that she is starting a bowel movement before it is too late. Pt reports that she is cancer free and had her last radiation on approximately September of 2019. Pt reports that she has to do a lot of straining to get a bm started and to fully empty. SUBJECTIVE: Pt reports that she has been having a lot of low back, hip, and into the \"buttcheek,\" and tailbone pain this week that she is not sure if it is due to being more active. Pt reports that her bowels have been slower. Pt reports that she has a lot of pain and pressure in the low back and pelvis of a 2/10.  Pt reports that after she had the cystoscopy and the CAT scan showed no issues aside from inflammation and blood vessels from having radiation that they may try an ablation to stop from having the bleeding and pain. Pt reports that she has had some tailbone pain. Pt reports that she has not been compliant with her HEP and at home manual work. Social/Functional History and Current Status:  Medications and Allergies have been reviewed and are listed on Medical History Questionnaire. Aguialr Medina lives with family in a multiple floor home with ability to complete ADL's on main floor with stairs and no handrail to enter. Task Previous Current   ADLs  Independent Modified Independent breaks required and some assistance required at times with bathing and dressing due to pain    IADL's Independent Modified Independent breaks required and some assistance required at times with going to the store and with light housework due to pain    Ambulation Independent Modified Independent breaks required and some assistance required at times    Transfers Independent Modified Independent breaks required and some assistance required at times    [address]  Active    Work Unemployed  Unemployed     PAIN    Location Abdominal pelvic pain    Description Dull ache    Increased by Activity, prolonged standing or sitting   Decreased by Vernon's on her side, heat, light massage    Maximum Intensity 7/10    Best Intensity 0/10   Todays Rating 0/10   Other/Function      PAIN    Location Low back    Description    Increased by Activity, prolonged standing or sitting   Decreased by Laying on her side, heat, light massage    Maximum Intensity 7/10   Best Intensity 0/10   Todays Rating 0/10   Other/Function      History of Abuse:No history of physical, emotional or sexual abuse reported    SEXUAL /MENSTRUAL HISTORY [x] Deferred secondary to:  Information below from evaluation, not tested today. For reference only on this daily note. Number of Pregnancies 4   Number of Vaginal Deliveries 4    Number of Caesarean Deliveries 0       BLADDER ASSESSMENT [x] Deferred secondary to: Information below from evaluation, not tested today. For reference only on this daily note. Daily Fluid Ingestion: 100 ounces water per day, fruit juice throughout the week, cup of coffee occasionally, occ milk, occ pop   Urination Frequency Times/Day: ever 30-45 minutes  Times/Night: 2 times    Volume Medium   Urge Sensation Normal  and Severe    SYMPTOM QUESTIONNAIRE   Loses Urine Upon: Sneezing/Coughing and Feeling Cold increased leakage    Incontinence Volume: Small to medium   Frequency of Leakage: Frequent   Wets the Bed: yes   Burning/Pain with Urination: yes   Difficulty Starting a Stream of Urine: no   Incomplete Emptying Yes   Strain to Empty Bladder: no   Falling Out Feeling: yes   Urinate more than 7 times/day: yes   Use a form of Leakage Protection: yes: 2-3 panty liners    Restrict Fluid Intake: no   Stream Strength Average       BOWEL ASSESSMENT [x] Deferred secondary to: Information below from evaluation, not tested today. For reference only on this daily note. Frequency: Every other day    Most Common Stool Consistency: [place] 2-3   SYMPTOM QUESTIONNAIRE   Strain to have a BM: yes   Include fiber in your diet: yes   Take laxatives/enemas regularly: yes: at times she will use Doculax    Pain with BM: yes   Strong urge to have BM: yes: sensation is off and will have a bowel movement starting and will not realize it    Leak/Stain Feces: yes: few times a week that is the small hard pieces of stool    Diarrhea often: no         SEXUAL ASSESSMENT [x] Deferred secondary to: Information below from evaluation, not tested today. For reference only on this daily note.    Sexually Active yes   Pain with Pettit (Dyspareunia) yes at times    Painful Penetration Pain with initial penetration and pt reports that her partner feels like he is hitting something   Lubrication Needed no   Pain After [place] yes: pelvic and abdominal pain increases after intercourse          OBSERVATION  [] Deferred secondary to:   Patient Safety Patient offered a chaperone and declined. The pt did verbalize consent for internal vaginal examination following OT education of pt on the purpose of this activity and on the procedure using the model. Pt was educated in the intent of the OT to proceed slowly into the vaginal canal with permission requested of pt at every step of assessment prior to commencement by OT. Pt was also educated in her right to stop assessment, refuse any portion of this assessment, or to refuse assessment at any time without need for reason. The pt was educated that refusal would not impact her ability to seek care from this therapist in any way or result in therapist prejudice regarding her motivation to get better. Pt verbalized understanding and agreed again to internal examination by OT this date.      Skin Condition intact   Urogenital Triangle Bulbocavernosus tender/tight, Ischiocavernosus tender/tight, STPM tender/tight, Levator ani tender/tight and OI tight/tender   Introitus     Perineal Descent     External Clock         PELVIC FLOOR INTERNAL EXAM [] Deferred secondary to:   Exam Vaginal   Sensation Intact   Muscle Localization Poor - pt was able to contract the PFM and relax with vc to inhibit contraction of the abdomen and glutes   Palpation/Tone Hypertonic   Pelvic Floor Strength PERF:Power: 1,Endurance: 1,Reps: 1,Flicks: 1   Relax after Contraction Delayed   Prolapse slight anterior wall prolapse and posterior wall prolapse         PELVIC HEALTH INCONTINENCE TREATMENT   Precautions:    Pain:     X in shaded column indicates activity completed today   Exercise/Intervention Reps/Sec  Notes   Kechari quick 10x   On hold    Kegel hold 10x 3 sec     Andrae adams quick/hold 10x      Pelvic Brace Quick 10x      Pelvic Brace Hold 10x 3 sec  Functional use 5 min   Urge control  5 min      Diet impact on the bladder  5 min      External STM  10 min  X Low back, bilateral piriformis, gluts, and hips    Cupping  8 min  X    PFM anatomy  5 min      PFM awareness/relaxation (mirror, towel, hand) 5 min   Guided relaxation   Reverse Kegel       Lubrication vaginal moisturizer       Manual therapy pt education 5 min   Use of vibrator    Dilator education       Education in manual therapy with dilator.        Diaphragmatic breathing 5 min      Internal exam  30 min  X    Kegel with Ball Squeeze       Kegel with Band       Colonic massage 10 min  X    Visceral mobilization  5 min   Abdomen    Scar massage 5 min   Abdomen    IDN  5 min   Pain reported for 1 week after    Diaphragm release 5 min   Diaphragmatic breathing    Pelvic Tilt       Pelvic Tilt with arm lift       Pelvic Tilt with leg march       Pelvic Tilt with opposites       Bridge       Pelvic Tilt SLR       Clamshell       Reverse Clamshell       ITB stretch at wall  30 sec 2x     Standing ITB stretch 30 sec 2x     Supine Glut stretch 30 sec 2x     Piriformis stretch 30 sec 2x     Standing Kegal       Kegal Mini Squat       Standing Hip 3-way                       PFM anatomy and Physiology       Normal bowel function/promoting good function 10 min   Belly big/hard, proper push, toilet posture, when to go, walking program, don't delay   PFM relaxation/awareness 5 min   Mirror, hand, towel methods   Diet impact on the bowel 5 min   Fiber education: sol and insol choices; food log     Specific Interventions Next Treatment: internal PFM release, bowel and bladder education, pressure control     Activity/Treatment Tolerance:  [x]  Patient tolerated treatment well  []  Patient limited by fatigue  []  Patient limited by pain   []  Patient limited by medical complications  []  Other:     Patient Education:   [x]  HEP/Education Completed: guided relaxation, education handouts   []  No new Education completed  []  Reviewed Prior HEP      [x]  Patient verbalized and/or demonstrated understanding of education provided. []  Patient unable to verbalize and/or demonstrate understanding of education provided. Will continue education. []  Barriers to learning:     Assessment: Pt tolerated session well this date. Pt was re educated for importance of compliance with HEP and at home manual work for decreased pain and pelvic floor dysfunction. With completion of the internal exam to assess the pelvic floor through the vagina there was noted tightness and tenderness bilaterally along the OI, piriformis, levator ani, coccygeus, and STP for decreased pain and PFM dysfunction. Pt tolerated STM with use of active ice and cupping along the low back, bilateral piriformis, gluts, and hip for decreased pain and PFM dysfunction. Pt tolerated colonic massage along the abdomen with noted tightness and tenderness especially along the RLQ that was released for decreased pain and PFM dysfunction. Pt reports muscle relaxation and less pressure post treatment. Recommend continued skilled OT services at this time to continue to address goals and to decrease PFM dysfunction. Body Structures/Functions/Activity Limitations: PFM weakness with decreased localization and endurance, PFM spasm, Poor PFM control with limited ability to completely relax, Decreased awareness of functional factors that increase pelvic organ strain, Decreased awareness of normal bladder and bowel habits, control, and diet effects on functioning, Abdominal and core weakness and Pain  Prognosis: Good    GOALS:  Patient Goal: to decrease pain and pelvic floor dysfunction     Short Term Goals:  Time Frame: 6 weeks  *  Patient will decrease urine leak frequency and amount by 50% due to increasing PFM strength, endurance and localization.  GOAL MET  *  Patient to identify normal bladder function and voiding patterns, diet effects on bladder, urge control strategies, and constipation/optimal stool consistency management. GOAL MET  * Patient to demonstrate and verbalize good knowledge of safe lifting, pushing, pulling strategies that limit pelvic organ stress. GOAL MET  This pt will report abdominal pelvic pain reduced to 5/10 at worst to allow initiation of manual techniques. GOAL NOT MET  This pt will demo the ability to completely and instantly relax the PFM in order to decrease pain and increase ease with toileting and with physical intimacy and/or medical examinations to ensure pt's health. GOAL MET   The pt will be able to describe normal bowel function, bowel irritants, and bowel anatomy and physiology to promote insight into current condition and to allow the pt to identify causative factors related to current condition. GOAL MET  4. The pt will be able to delay fecal urge x 5' in order to allow time to get to a bathroom without soiling clothing. GOAL MET  5. The pt will reduce incidence of fecal leakage by 25 % and amount of material leaked by 25% to increase ease of hygiene. GOAL MET   6. This pt will be able to describe normal bowel function, diet impact on the bowel, and have a good understanding of bowel anatomy and physiology to promote insight into condition. GOAL MET  7. This pt will demo a good understanding of proper toilet posture to decrease PFM tone and to improve visceral alignment for increased ease of defecation and reduced pelvic organ strain. GOAL MET  8. This pt will demonstrate independence with basic HEP designed to increase flexibility of the pelvic girdle and to strengthen the core for maximal symptom reduction. GOAL MET      Long Term Goals:   12 weeks   This pt will report improved defecation ease and frequency by 75% in order to promote overall pt health.   GOAL NOT MET  Pt will demo independence in advanced HEP in order to promote retention of gains made in therapy and to reduce the need for further medical intervention. GOAL PARTIALLY MET   The pt will be able to delay bowel urgency for 10 minutes with good control in order to allow pt time to seek a public bathroom. GOAL NOT MET  This pt will return bowel frequency to normal levels in order to allow attendance of public events, shopping, and visiting friends with increased comfort. GOAL MET  This pt will report abdominal pelvic pain decreased to 1-2/10 at worst to increase tolerance to work and home tasks. GOAL NOT MET  This pt will demonstrate PFDI-20 score decreased to less than 10 in order to indication functional improvement with therapy. GOAL NOT MET  PFM strength WNL to increase visceral support and reduce risk of symptoms reoccurring. GOAL NOT MET  * Patient will increase PFM strength to 4/5 with endurance of 10 seconds x 10 reps to increase pelvic organ support and decrease incontinence. GOAL NOT MET  * Patient will Increase abdominal strength of 4/5 or greater to allow for increased pelvic organ support and decreased incontinence and pelvic floor dysfunction. GOAL NOT MET    PLAN:  Treatment Recommendations: Strengthening, Endurance Training, Neuromuscular Re-education, Manual Therapy - Soft Tissue Mobilization, Manual Therapy - Joint Manipulation, Pain Management, Home Exercise Program, Patient Education, Self-Care Education and Training, Positioning, Integrative Dry Needling and Modalities    []  Plan of care initiated. Plan to see patient 1 time weekly to biweekly for 12 weeks to address the treatment planned outlined above.   [x]  Continue with current plan of care  []  Modify plan of care as follows:    []  Hold pending physician visit  []  Discharge    Time In 1115   Time Out 1210   Timed Code Minutes: 55 min   Total Treatment Time: 55 min       Electronically Signed by: Radha Amado [address] [place] [address], SHIR/L [id number]

## 2023-01-10 ENCOUNTER — HOSPITAL ENCOUNTER (OUTPATIENT)
Dept: PHYSICAL THERAPY | Age: 38
Setting detail: THERAPIES SERIES
Discharge: HOME | End: 2023-01-10
Payer: COMMERCIAL

## 2023-01-10 PROCEDURE — 97110 THERAPEUTIC EXERCISES: CPT

## 2023-01-10 PROCEDURE — 97530 THERAPEUTIC ACTIVITIES: CPT

## 2023-01-10 NOTE — DISCHARGE SUMMARY
[address]  OCCUPATIONAL THERAPY  OUTPATIENT REHABILITATION - SPECIALIZED THERAPY SERVICES  [] PELVIC HEALTH EVALUATION  [] DAILY NOTE  [] PROGRESS NOTE [x] DISCHARGE NOTE    Date: 1/10/2023  Patient Name:  Kianna Parra  : 1985  MRN: [id number]  CSN: [id number]     Referring Practitioner CHAPO Babin*   Diagnosis Bowel and Pain    Treatment Diagnosis N81.84 - Pelvic Muscle Wasting (Female), R35.1 - Nocturia  N39.46 - Mixed Incontinence, R15.9 - Full Incontinence of Feces  K59.00 - Constipation, Unspecified, R10.2 - Pelvic and Perineal Pain  R10.30 - Lower Abdominal Pain, Unspecified  R94.10 - Unspecified Dyspareunia and R27.8 - Other Lack of Coordination   Date of Evaluation 3/29/22    Additional Pertinent History Psychophysiological insomnia F51.04     Malignant neoplasm of overlapping sites of cervix (HCC) C53.8    Other chest pain R07.89    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[facility] [address] 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.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    S/P colectomy Z90.49    Tobacco abuse Z72.0   Cancer Staging  Malignant neoplasm of overlapping sites of cervix [facility])  Staging form: Cervix Uteri, AJCC 8th Edition  - Clinical stage from 2019: FIGO Stage IIA1 (Alycia Rubio, cN0, cM0) - Signed by Odalys Metcalf MD on 2019          Past Medical History:   Diagnosis Date    Anxiety      Cancer of cervix ([facility] [address]      Depression      Hypertension           Past Surgical History:   Procedure Laterality Date    CERVIX BIOPSY N/A 2019    CHOLECYSTECTOMY   over 5 years ago    COLONOSCOPY   2020    TUBAL LIGATION        [place]         Functional Outcome Measure Used PFDI-20   Functional Outcome Score PFDI-20: 52 (3/29/22); PFDI-20: 45 (22); PFDI-20: 38 (); PFDI-20: 28 (1/10/22)       Insurance: Primary: Payor: [address] [place] [address]  [address] /  /  / ,   Secondary:    Authorization Information: PRECERTIFICATION REQUIRED: No   Visit # 16, 6/10 for progress note   Visits Allowed: INSURANCE THERAPY BENEFIT: No pre-certification is needed. PT, OT and ST are allowed 30 visits each per calendar year   Recertification Date: March 2023   Physician Follow-Up: Possibly September    Physician Orders: Vanessa Duran and treat   History of Present Illness: Pt reports to skilled OT services with c/o pelvic and coccyx pain. Pt reports that she did PT in [place] that told her that her tailbone is out of alignment. Pt reports that she had radiation that caused her to have pain that started 2 years ago. Pt reports that she had a foot of the colon removed due to the bowel being effected by the radiation that was in May of 2021 and the colostomy was reversed in August 2021. Pt reports that she has incontinence with the bowel and bladder (mostly bowels) due to decreased sensation and will not be able to feel that she is starting a bowel movement before it is too late. Pt reports that she is cancer free and had her last radiation on approximately September of 2019. Pt reports that she has to do a lot of straining to get a bm started and to fully empty. SUBJECTIVE: Pt reports that she has been very nauseous the last 3 weeks and she has a f/u with gastro. Pt reports that she has still had tailbone pain that she tried to use her vibrator 3x that she did not noticed any relief with pain. Pt reports that her bowels have been \"up and down, with a day or two of diarrhea and then have constipation. \" Pt reports that she has a lot of pain and pressure in the low back and pelvis of a 1-2/10. Social/Functional History and Current Status:  Medications and Allergies have been reviewed and are listed on Medical History Questionnaire. Jennifer Phelps lives with family in a multiple floor home with ability to complete ADL's on main floor with stairs and no handrail to enter.     Task Previous Current   ADLs  Independent Modified Independent breaks required and some assistance required at times with bathing and dressing due to pain    IADL's Independent Modified Independent breaks required and some assistance required at times with going to the store and with light housework due to pain    Ambulation Independent Modified Independent breaks required and some assistance required at times    Transfers Independent Modified Independent breaks required and some assistance required at times    Recreation Independent Independent   Community Integration Independent Independent   Driving Active  Active    Work Unemployed  Unemployed     PAIN    Location Abdominal pelvic pain    Description Dull ache    Increased by Activity, prolonged standing or sitting   Decreased by Laying on her side, heat, light massage    Maximum Intensity 7/10    Best Intensity 0/10   Todays Rating 0/10   Other/Function      PAIN    Location Low back    Description    Increased by Activity, prolonged standing or sitting   Decreased by Laying on her side, heat, light massage    Maximum Intensity 7/10   Best Intensity 0/10   Todays Rating 0/10   Other/Function      History of Abuse:No history of physical, emotional or sexual abuse reported    SEXUAL /MENSTRUAL HISTORY [x] Deferred secondary to: Information below from evaluation, not tested today.  For reference only on this daily note.   Number of Pregnancies 4   Number of Vaginal Deliveries 4    Number of Caesarean Deliveries 0       BLADDER ASSESSMENT [] Deferred secondary to:   Daily Fluid Ingestion: 100 ounces water per day, fruit juice throughout the week, cup of coffee occasionally, occ milk, occ pop   Urination Frequency Times/Day: every 2 hours   Times/Night: 2 times    Volume Medium   Urge Sensation Normal  and Severe    SYMPTOM QUESTIONNAIRE   Loses Urine Upon: Sneezing/Coughing and Feeling Cold     Incontinence Volume: Small    Frequency of Leakage: Occasional   Wets the Bed: No    Burning/Pain with Urination: yes   Difficulty Starting a Stream of Urine: no   Incomplete Emptying No   Strain to Empty Bladder: no  Falling Out Feeling: yes   Urinate more than 7 times/day: yes   Use a form of Leakage Protection: yes: 2 panty liners    Restrict Fluid Intake: no   Stream Strength Average       BOWEL ASSESSMENT [] Deferred secondary to:   Frequency: Every other day; has been loose recently and will pass a little stool daily    Most Common Stool Consistency: Fletcher 2-3   SYMPTOM QUESTIONNAIRE   Strain to have a BM: Yes; at times but less overall    Include fiber in your diet: yes   Take laxatives/enemas regularly: No    Pain with BM: yes   Strong urge to have BM: yes: sensation is off and will have a bowel movement starting and will not realize it    Leak/Stain Feces: yes: few times a week that is the small hard pieces of stool    Diarrhea often: no         SEXUAL ASSESSMENT [] Deferred secondary to:   Sexually Active yes   Pain with [place] (Dyspareunia) yes at times    Painful Penetration Pain with initial penetration   Lubrication Needed no   Pain After [place] yes: pelvic and abdominal pain increases after intercourse          OBSERVATION  [x] Deferred secondary to: Information below from evaluation, not tested today. For reference only on this daily note. Patient Safety Patient offered a chaperone and declined. The pt did verbalize consent for internal vaginal examination following OT education of pt on the purpose of this activity and on the procedure using the model. Pt was educated in the intent of the OT to proceed slowly into the vaginal canal with permission requested of pt at every step of assessment prior to commencement by OT. Pt was also educated in her right to stop assessment, refuse any portion of this assessment, or to refuse assessment at any time without need for reason. The pt was educated that refusal would not impact her ability to seek care from this therapist in any way or result in therapist prejudice regarding her motivation to get better.   Pt verbalized understanding and agreed again to internal examination by OT this date. Skin Condition intact   Urogenital Triangle Bulbocavernosus tender/tight, Ischiocavernosus tender/tight, STPM tender/tight, Levator ani tender/tight and OI tight/tender   Introitus     Perineal Descent     External Clock         PELVIC FLOOR INTERNAL EXAM [x] Deferred secondary to: Information below from evaluation, not tested today. For reference only on this daily note. Exam Vaginal   Sensation Intact   Muscle Localization Poor - pt was able to contract the PFM and relax with vc to inhibit contraction of the abdomen and glutes   Palpation/Tone Hypertonic   Pelvic Floor Strength PERF:Power: 1,Endurance: 1,Reps: 1,Flicks: 1   Relax after Contraction Delayed   Prolapse slight anterior wall prolapse and posterior wall prolapse         PELVIC HEALTH INCONTINENCE TREATMENT   Precautions:    Pain:     X in shaded column indicates activity completed today   Exercise/Intervention Reps/Sec  Notes   Kegel quick 10x  X As needed 3x/day    Kegel hold 10x 3 sec     Tummy tuck quick/hold 10x      Pelvic Brace Quick 10x      Pelvic Brace Hold 10x 3 sec X Functional use 10 min   Urge control  5 min      Diet impact on the bladder  5 min      External STM  10 min   Low back, bilateral piriformis, gluts, and hips    Cupping  8 min      PFM anatomy  5 min      PFM awareness/relaxation (mirror, towel, hand) 5 min   Guided relaxation   Reverse Kegel       Lubrication vaginal moisturizer       Manual therapy pt education 5 min  X Use of vibrator    Dilator education       Education in manual therapy with dilator.        Diaphragmatic breathing 5 min      Internal exam  30 min      Kegel with Ball Squeeze       Kegel with Band       Colonic massage 10 min      Visceral mobilization  5 min   Abdomen    Scar massage 5 min  X Abdomen education 5 min   IDN  5 min   Pain reported for 1 week after    Diaphragm release 5 min   Diaphragmatic breathing    Pelvic Tilt       Pelvic Tilt with arm lift Pelvic Tilt with leg march       Pelvic Tilt with opposites       Bridge       Pelvic Tilt SLR       Clamshell       Reverse Clamshell       ITB stretch at wall  30 sec 2x     Standing ITB stretch 30 sec 2x     Supine Glut stretch 30 sec 2x     Piriformis stretch 30 sec 2x     Standing Kegal       Kegal Mini Squat       Standing Hip 3-way       Deep squat  30 sec 2x X             PFM anatomy and Physiology       Normal bowel function/promoting good function 10 min  X Belly big/hard, proper push, toilet posture, when to go, walking program, don't delay   PFM relaxation/awareness 5 min   Mirror, hand, towel methods   Diet impact on the bowel 5 min  X Fiber education: sol and insol choices; food log     Specific Interventions Next Treatment: internal PFM release, bowel and bladder education, pressure control     Activity/Treatment Tolerance:  [x]  Patient tolerated treatment well  []  Patient limited by fatigue  []  Patient limited by pain   []  Patient limited by medical complications  []  Other:     Patient Education:   [x]  HEP/Education Completed: guided relaxation, education handouts   []  No new Education completed  []  Reviewed Prior HEP      [x]  Patient verbalized and/or demonstrated understanding of education provided. []  Patient unable to verbalize and/or demonstrate understanding of education provided. Will continue education. []  Barriers to learning:     Assessment: Pt tolerated session well this date. Pt was educated for use of the functional pelvic brace, for full PFM relaxation, and re educated for proper technique with her bowel education and instructions all to help with decreasing her pain and PFM dysfunction. Pt was re educated for importance of compliance with HEP and at home manual work for decreased pain and pelvic floor dysfunction.  Recommend discharge from skilled OT services at this time due to meeting max therapy potential.     Body Structures/Functions/Activity Limitations: PFM weakness with decreased localization and endurance, PFM spasm, Poor PFM control with limited ability to completely relax, Decreased awareness of functional factors that increase pelvic organ strain, Decreased awareness of normal bladder and bowel habits, control, and diet effects on functioning, Abdominal and core weakness and Pain  Prognosis: Good    GOALS:  Patient Goal: to decrease pain and pelvic floor dysfunction     Short Term Goals:  Time Frame: 6 weeks  *  Patient will decrease urine leak frequency and amount by 50% due to increasing PFM strength, endurance and localization. GOAL MET  *  Patient to identify normal bladder function and voiding patterns, diet effects on bladder, urge control strategies, and constipation/optimal stool consistency management. GOAL MET  * Patient to demonstrate and verbalize good knowledge of safe lifting, pushing, pulling strategies that limit pelvic organ stress. GOAL MET  This pt will report abdominal pelvic pain reduced to 5/10 at worst to allow initiation of manual techniques. GOAL NOT MET  This pt will demo the ability to completely and instantly relax the PFM in order to decrease pain and increase ease with toileting and with physical intimacy and/or medical examinations to ensure pt's health. GOAL MET   The pt will be able to describe normal bowel function, bowel irritants, and bowel anatomy and physiology to promote insight into current condition and to allow the pt to identify causative factors related to current condition. GOAL MET  4. The pt will be able to delay fecal urge x 5' in order to allow time to get to a bathroom without soiling clothing. GOAL MET  5. The pt will reduce incidence of fecal leakage by 25 % and amount of material leaked by 25% to increase ease of hygiene. GOAL MET   6.  This pt will be able to describe normal bowel function, diet impact on the bowel, and have a good understanding of bowel anatomy and physiology to promote insight into condition. GOAL MET  7. This pt will demo a good understanding of proper toilet posture to decrease PFM tone and to improve visceral alignment for increased ease of defecation and reduced pelvic organ strain. GOAL MET  8. This pt will demonstrate independence with basic HEP designed to increase flexibility of the pelvic girdle and to strengthen the core for maximal symptom reduction. GOAL MET      Long Term Goals:   12 weeks   This pt will report improved defecation ease and frequency by 75% in order to promote overall pt health. GOAL MET  Pt will demo independence in advanced HEP in order to promote retention of gains made in therapy and to reduce the need for further medical intervention. GOAL MET   The pt will be able to delay bowel urgency for 10 minutes with good control in order to allow pt time to seek a public bathroom. GOAL NOT MET (pt reports it depends on the day)   This pt will return bowel frequency to normal levels in order to allow attendance of public events, shopping, and visiting friends with increased comfort. GOAL MET  This pt will report abdominal pelvic pain decreased to 1-2/10 at worst to increase tolerance to work and home tasks. GOAL NOT MET  This pt will demonstrate PFDI-20 score decreased to less than 10 in order to indication functional improvement with therapy. GOAL NOT MET  PFM strength WNL to increase visceral support and reduce risk of symptoms reoccurring. GOAL NOT MET  * Patient will increase PFM strength to 4/5 with endurance of 10 seconds x 10 reps to increase pelvic organ support and decrease incontinence. GOAL NOT MET  * Patient will Increase abdominal strength of 4/5 or greater to allow for increased pelvic organ support and decreased incontinence and pelvic floor dysfunction.  GOAL NOT MET    PLAN:  Treatment Recommendations: Strengthening, Endurance Training, Neuromuscular Re-education, Manual Therapy - Soft Tissue Mobilization, Manual Therapy - Joint Manipulation, Pain Management, Home Exercise Program, Patient Education, Self-Care Education and Training, Positioning, Integrative Dry Needling and Modalities    []  Plan of care initiated. Plan to see patient 1 time weekly to biweekly for 12 weeks to address the treatment planned outlined above.   []  Continue with current plan of care  []  Modify plan of care as follows:    []  Hold pending physician visit  [x]  Discharge    Time In 1000   Time Out 1055   Timed Code Minutes: 55 min   Total Treatment Time: 55 min       Electronically Signed by: Yessy Lujan [address] [place] [address], OTR/L [id number]

## 2023-01-31 ENCOUNTER — APPOINTMENT (OUTPATIENT)
Dept: PHYSICAL THERAPY | Age: 38
End: 2023-01-31
Payer: COMMERCIAL

## 2023-03-22 ENCOUNTER — NURSE ONLY (OUTPATIENT)
Dept: LAB | Age: 38
End: 2023-03-22

## 2023-03-31 LAB — CYTOLOGY THIN PREP PAP: NORMAL

## 2023-04-03 ENCOUNTER — HOSPITAL ENCOUNTER (OUTPATIENT)
Dept: WOMENS IMAGING | Age: 38
Discharge: HOME | End: 2023-04-03
Payer: MEDICARE

## 2023-04-03 DIAGNOSIS — Z12.31 VISIT FOR SCREENING MAMMOGRAM: ICD-10-CM

## 2023-04-03 PROCEDURE — 77063 BREAST TOMOSYNTHESIS BI: CPT

## 2023-05-11 ENCOUNTER — OFFICE VISIT (OUTPATIENT)
Dept: FAMILY MEDICINE CLINIC | Age: 38
End: 2023-05-11
Payer: MEDICARE

## 2023-05-11 VITALS
WEIGHT: 219.2 LBS | OXYGEN SATURATION: 94 % | TEMPERATURE: 97.9 F | RESPIRATION RATE: 16 BRPM | SYSTOLIC BLOOD PRESSURE: 132 MMHG | HEART RATE: 79 BPM | BODY MASS INDEX: 33.22 KG/M2 | DIASTOLIC BLOOD PRESSURE: 78 MMHG | HEIGHT: 68 IN

## 2023-05-11 DIAGNOSIS — F41.9 ANXIETY: ICD-10-CM

## 2023-05-11 DIAGNOSIS — F51.04 PSYCHOPHYSIOLOGICAL INSOMNIA: ICD-10-CM

## 2023-05-11 DIAGNOSIS — C53.8 MALIGNANT NEOPLASM OF OVERLAPPING SITES OF CERVIX (HCC): ICD-10-CM

## 2023-05-11 DIAGNOSIS — N95.1 HOT FLASHES DUE TO MENOPAUSE: ICD-10-CM

## 2023-05-11 DIAGNOSIS — F32.1 CURRENT MODERATE EPISODE OF MAJOR DEPRESSIVE DISORDER, UNSPECIFIED WHETHER RECURRENT (HCC): Primary | ICD-10-CM

## 2023-05-11 PROCEDURE — 99214 OFFICE O/P EST MOD 30 MIN: CPT | Performed by: NURSE PRACTITIONER

## 2023-05-11 PROCEDURE — 4004F PT TOBACCO SCREEN RCVD TLK: CPT | Performed by: NURSE PRACTITIONER

## 2023-05-11 PROCEDURE — G8417 CALC BMI ABV UP PARAM F/U: HCPCS | Performed by: NURSE PRACTITIONER

## 2023-05-11 PROCEDURE — G8427 DOCREV CUR MEDS BY ELIG CLIN: HCPCS | Performed by: NURSE PRACTITIONER

## 2023-05-11 RX ORDER — TRAZODONE HYDROCHLORIDE 50 MG/1
50 TABLET ORAL NIGHTLY PRN
Qty: 30 TABLET | Refills: 5 | Status: SHIPPED | OUTPATIENT
Start: 2023-05-11

## 2023-05-11 RX ORDER — SOLIFENACIN SUCCINATE 5 MG/1
5 TABLET, FILM COATED ORAL DAILY
COMMUNITY
Start: 2023-04-03

## 2023-05-11 RX ORDER — PANTOPRAZOLE SODIUM 40 MG/1
40 TABLET, DELAYED RELEASE ORAL DAILY
COMMUNITY
Start: 2023-02-08

## 2023-05-11 RX ORDER — HYDROXYZINE PAMOATE 25 MG/1
25 CAPSULE ORAL 3 TIMES DAILY PRN
Qty: 30 CAPSULE | Refills: 1 | Status: SHIPPED | OUTPATIENT
Start: 2023-05-11 | End: 2023-06-10

## 2023-05-11 SDOH — ECONOMIC STABILITY: FOOD INSECURITY: WITHIN THE PAST 12 MONTHS, THE FOOD YOU BOUGHT JUST DIDN'T LAST AND YOU DIDN'T HAVE MONEY TO GET MORE.: NEVER TRUE

## 2023-05-11 SDOH — ECONOMIC STABILITY: FOOD INSECURITY: WITHIN THE PAST 12 MONTHS, YOU WORRIED THAT YOUR FOOD WOULD RUN OUT BEFORE YOU GOT MONEY TO BUY MORE.: NEVER TRUE

## 2023-05-11 SDOH — ECONOMIC STABILITY: HOUSING INSECURITY
IN THE LAST 12 MONTHS, WAS THERE A TIME WHEN YOU DID NOT HAVE A STEADY PLACE TO SLEEP OR SLEPT IN A SHELTER (INCLUDING NOW)?: NO

## 2023-05-11 SDOH — ECONOMIC STABILITY: INCOME INSECURITY: HOW HARD IS IT FOR YOU TO PAY FOR THE VERY BASICS LIKE FOOD, HOUSING, MEDICAL CARE, AND HEATING?: NOT HARD AT ALL

## 2023-05-11 ASSESSMENT — PATIENT HEALTH QUESTIONNAIRE - PHQ9
SUM OF ALL RESPONSES TO PHQ QUESTIONS 1-9: 13
SUM OF ALL RESPONSES TO PHQ9 QUESTIONS 1 & 2: 3
10. IF YOU CHECKED OFF ANY PROBLEMS, HOW DIFFICULT HAVE THESE PROBLEMS MADE IT FOR YOU TO DO YOUR WORK, TAKE CARE OF THINGS AT HOME, OR GET ALONG WITH OTHER PEOPLE: 1
5. POOR APPETITE OR OVEREATING: 1
SUM OF ALL RESPONSES TO PHQ QUESTIONS 1-9: 13
SUM OF ALL RESPONSES TO PHQ QUESTIONS 1-9: 13
8. MOVING OR SPEAKING SO SLOWLY THAT OTHER PEOPLE COULD HAVE NOTICED. OR THE OPPOSITE, BEING SO FIGETY OR RESTLESS THAT YOU HAVE BEEN MOVING AROUND A LOT MORE THAN USUAL: 1
1. LITTLE INTEREST OR PLEASURE IN DOING THINGS: 1
2. FEELING DOWN, DEPRESSED OR HOPELESS: 2
7. TROUBLE CONCENTRATING ON THINGS, SUCH AS READING THE NEWSPAPER OR WATCHING TELEVISION: 2
3. TROUBLE FALLING OR STAYING ASLEEP: 2
4. FEELING TIRED OR HAVING LITTLE ENERGY: 3
9. THOUGHTS THAT YOU WOULD BE BETTER OFF DEAD, OR OF HURTING YOURSELF: 0
6. FEELING BAD ABOUT YOURSELF - OR THAT YOU ARE A FAILURE OR HAVE LET YOURSELF OR YOUR FAMILY DOWN: 1
SUM OF ALL RESPONSES TO PHQ QUESTIONS 1-9: 13

## 2023-05-11 ASSESSMENT — ENCOUNTER SYMPTOMS
ABDOMINAL DISTENTION: 0
CONSTIPATION: 0
RESPIRATORY NEGATIVE: 1
DIARRHEA: 0
VOMITING: 0
NAUSEA: 0
BLOOD IN STOOL: 0

## 2023-05-11 NOTE — PROGRESS NOTES
[facility]  [address] [place] [address]  Dept: [phone]  Dept Fax: [phone]  Loc: [phone]    Kelli Pleitez is a 42 y.o. femalewho presents today for her medical conditions/complaints as noted below. Isak Galeana c/o of Medication Refill (Wants to discuss getting back on Trazodone )      :     Pt here to discuss a few concerns. Pt with a history of cervical cancer being treated by [facility], pt had a hysterectomy with radiation treatment with complication, post radiation of colon wall irritation causing abdominal pain. It is better managed at this time. Pt follow with oncology yearly and has scans yearly. Pt used to be on trazodone at night for sleep but stopped going to psychiatry, would like to restart it. States she has some concerns with anxiety, does not like being seen in large crowds, will have sweating when anxious and is also having hot flashes at night. Denies Hi or SI, some days does not have a lot of energy, hard to get motivated, does suffer form some residual pain due to her past radiation. Not having panic attacks, has trouble falling an staying asleep       Current Outpatient Medications   Medication Sig Dispense Refill    solifenacin (VESICARE) 5 MG tablet Take 1 tablet by mouth daily      pantoprazole (PROTONIX) 40 MG tablet Take 1 tablet by mouth daily      methylcellulose 500 MG TABS Take 2 tabs twice daily.       traZODone (DESYREL) 50 MG tablet Take 1 tablet by mouth nightly as needed for Sleep 30 tablet 5    hydrOXYzine pamoate (VISTARIL) 25 MG capsule Take 1 capsule by mouth 3 times daily as needed for Anxiety 30 capsule 1    traZODone (DESYREL) 100 MG tablet take 1 tablet by mouth nightly (Patient not taking: Reported on 5/11/2023) 90 tablet 3    ARIPiprazole (ABILIFY) 10 MG tablet take 1 tablet by mouth every morning (Patient not taking: Reported on 5/11/2023)      Acetaminophen

## 2023-05-11 NOTE — ASSESSMENT & PLAN NOTE
Uncontrolled, medication adherence emphasized, lifestyle modifications recommended, and will initiate new medications

## 2023-05-18 ENCOUNTER — NURSE ONLY (OUTPATIENT)
Dept: LAB | Age: 38
End: 2023-05-18

## 2023-05-18 DIAGNOSIS — F41.9 ANXIETY: ICD-10-CM

## 2023-05-18 DIAGNOSIS — F32.1 CURRENT MODERATE EPISODE OF MAJOR DEPRESSIVE DISORDER, UNSPECIFIED WHETHER RECURRENT (HCC): ICD-10-CM

## 2023-05-18 DIAGNOSIS — C53.8 MALIGNANT NEOPLASM OF OVERLAPPING SITES OF CERVIX (HCC): ICD-10-CM

## 2023-05-18 LAB
ALBUMIN SERPL BCG-MCNC: 4.4 G/DL (ref 3.5–5.1)
ALP SERPL-CCNC: 116 U/L (ref 38–126)
ALT SERPL W/O P-5'-P-CCNC: 11 U/L (ref 11–66)
ANION GAP SERPL CALC-SCNC: 11 MEQ/L (ref 8–16)
AST SERPL-CCNC: 12 U/L (ref 5–40)
BASOPHILS ABSOLUTE: 0 THOU/MM3 (ref 0–0.1)
BASOPHILS NFR BLD AUTO: 0.5 %
BILIRUB SERPL-MCNC: 0.4 MG/DL (ref 0.3–1.2)
BUN SERPL-MCNC: 14 MG/DL (ref 7–22)
CALCIUM SERPL-MCNC: 9.3 MG/DL (ref 8.5–10.5)
CHLORIDE SERPL-SCNC: 105 MEQ/L (ref 98–111)
CO2 SERPL-SCNC: 24 MEQ/L (ref 23–33)
CREAT SERPL-MCNC: 0.8 MG/DL (ref 0.4–1.2)
DEPRECATED RDW RBC AUTO: 47.5 FL (ref 35–45)
EOSINOPHIL NFR BLD AUTO: 1.4 %
EOSINOPHILS ABSOLUTE: 0.1 THOU/MM3 (ref 0–0.4)
ERYTHROCYTE [DISTWIDTH] IN BLOOD BY AUTOMATED COUNT: 13.2 % (ref 11.5–14.5)
GFR SERPL CREATININE-BSD FRML MDRD: > 60 ML/MIN/1.73M2
GLUCOSE SERPL-MCNC: 98 MG/DL (ref 70–108)
HCT VFR BLD AUTO: 42.6 % (ref 37–47)
HGB BLD-MCNC: 13.6 GM/DL (ref 12–16)
IMM GRANULOCYTES # BLD AUTO: 0.01 THOU/MM3 (ref 0–0.07)
IMM GRANULOCYTES NFR BLD AUTO: 0.2 %
LYMPHOCYTES ABSOLUTE: 1.9 THOU/MM3 (ref 1–4.8)
LYMPHOCYTES NFR BLD AUTO: 42.1 %
MCH RBC QN AUTO: 30.7 PG (ref 26–33)
MCHC RBC AUTO-ENTMCNC: 31.9 GM/DL (ref 32.2–35.5)
MCV RBC AUTO: 96.2 FL (ref 81–99)
MONOCYTES ABSOLUTE: 0.4 THOU/MM3 (ref 0.4–1.3)
MONOCYTES NFR BLD AUTO: 10 %
NEUTROPHILS NFR BLD AUTO: 45.8 %
NRBC BLD AUTO-RTO: 0 /100 WBC
PLATELET # BLD AUTO: 244 THOU/MM3 (ref 130–400)
PMV BLD AUTO: 11.6 FL (ref 9.4–12.4)
POTASSIUM SERPL-SCNC: 4.3 MEQ/L (ref 3.5–5.2)
PROT SERPL-MCNC: 7.6 G/DL (ref 6.1–8)
RBC # BLD AUTO: 4.43 MILL/MM3 (ref 4.2–5.4)
SEGMENTED NEUTROPHILS ABSOLUTE COUNT: 2 THOU/MM3 (ref 1.8–7.7)
SODIUM SERPL-SCNC: 140 MEQ/L (ref 135–145)
TSH SERPL DL<=0.005 MIU/L-ACNC: 0.62 UIU/ML (ref 0.4–4.2)
WBC # BLD AUTO: 4.4 THOU/MM3 (ref 4.8–10.8)

## 2023-05-19 ENCOUNTER — TELEPHONE (OUTPATIENT)
Dept: FAMILY MEDICINE CLINIC | Age: 38
End: 2023-05-19

## 2023-05-19 NOTE — TELEPHONE ENCOUNTER
----- Message from CHAPO Frazier CNP sent at 5/18/2023  5:07 PM EDT -----  Let pt know labs are stable continue current  meds

## 2023-07-23 DIAGNOSIS — N95.1 HOT FLASHES DUE TO MENOPAUSE: ICD-10-CM

## 2023-07-23 DIAGNOSIS — F41.9 ANXIETY: ICD-10-CM

## 2023-07-24 RX ORDER — HYDROXYZINE PAMOATE 25 MG/1
CAPSULE ORAL
Qty: 60 CAPSULE | Refills: 3 | Status: SHIPPED | OUTPATIENT
Start: 2023-07-24

## 2023-07-24 NOTE — TELEPHONE ENCOUNTER
Recent Visits  Date Type Provider Dept   06/13/23 Office Visit Jonelle Germain APRN - CNP [facility]   05/11/23 Office Visit Jonelle GermainCHAPO CNP [facility]   08/22/22 Office Visit Jonelle GermainCHAPO - CNP [facility]   07/28/22 Office Visit Jonelle GermainCHAPO CNP [facility]   Showing recent visits within past 540 days with a meds authorizing provider and meeting all other requirements  Future Appointments  No visits were found meeting these conditions. Showing future appointments within next 150 days with a meds authorizing provider and meeting all other requirements   No future appointments.

## 2023-08-30 ENCOUNTER — OFFICE VISIT (OUTPATIENT)
Dept: FAMILY MEDICINE CLINIC | Age: 38
End: 2023-08-30
Payer: MEDICARE

## 2023-08-30 VITALS
BODY MASS INDEX: 32.95 KG/M2 | DIASTOLIC BLOOD PRESSURE: 78 MMHG | HEART RATE: 69 BPM | TEMPERATURE: 98.1 F | HEIGHT: 68 IN | WEIGHT: 217.4 LBS | OXYGEN SATURATION: 91 % | SYSTOLIC BLOOD PRESSURE: 122 MMHG | RESPIRATION RATE: 16 BRPM

## 2023-08-30 DIAGNOSIS — M54.16 LUMBAR BACK PAIN WITH RADICULOPATHY AFFECTING LEFT LOWER EXTREMITY: Primary | ICD-10-CM

## 2023-08-30 DIAGNOSIS — L73.2 SUPPURATIVE HIDRADENITIS: ICD-10-CM

## 2023-08-30 DIAGNOSIS — R20.2 PARESTHESIA OF LEFT LEG: ICD-10-CM

## 2023-08-30 DIAGNOSIS — R29.898 TRANSIENT LEFT LEG WEAKNESS: ICD-10-CM

## 2023-08-30 PROCEDURE — G8417 CALC BMI ABV UP PARAM F/U: HCPCS | Performed by: NURSE PRACTITIONER

## 2023-08-30 PROCEDURE — 99214 OFFICE O/P EST MOD 30 MIN: CPT | Performed by: NURSE PRACTITIONER

## 2023-08-30 PROCEDURE — 4004F PT TOBACCO SCREEN RCVD TLK: CPT | Performed by: NURSE PRACTITIONER

## 2023-08-30 PROCEDURE — G8427 DOCREV CUR MEDS BY ELIG CLIN: HCPCS | Performed by: NURSE PRACTITIONER

## 2023-08-30 RX ORDER — CLINDAMYCIN PHOSPHATE 10 UG/ML
LOTION TOPICAL
Qty: 60 G | Refills: 2 | Status: SHIPPED | OUTPATIENT
Start: 2023-08-30 | End: 2023-09-29

## 2023-08-30 RX ORDER — PREDNISONE 20 MG/1
TABLET ORAL
Qty: 18 TABLET | Refills: 0 | Status: SHIPPED | OUTPATIENT
Start: 2023-08-30

## 2023-08-30 RX ORDER — CYCLOBENZAPRINE HCL 5 MG
5 TABLET ORAL 2 TIMES DAILY PRN
Qty: 10 TABLET | Refills: 0 | Status: SHIPPED | OUTPATIENT
Start: 2023-08-30 | End: 2023-09-09

## 2023-08-31 ENCOUNTER — HOSPITAL ENCOUNTER (OUTPATIENT)
Dept: GENERAL RADIOLOGY | Age: 38
Discharge: HOME | End: 2023-08-31
Payer: MEDICARE

## 2023-08-31 ENCOUNTER — TELEPHONE (OUTPATIENT)
Dept: FAMILY MEDICINE CLINIC | Age: 38
End: 2023-08-31

## 2023-08-31 ENCOUNTER — HOSPITAL ENCOUNTER (OUTPATIENT)
Age: 38
Discharge: HOME | End: 2023-08-31
Payer: MEDICARE

## 2023-08-31 DIAGNOSIS — M54.16 LUMBAR BACK PAIN WITH RADICULOPATHY AFFECTING LEFT LOWER EXTREMITY: ICD-10-CM

## 2023-08-31 PROCEDURE — 72110 X-RAY EXAM L-2 SPINE 4/>VWS: CPT

## 2023-08-31 NOTE — TELEPHONE ENCOUNTER
----- Message from CHAPO Virgen - CNP sent at 8/31/2023  3:28 PM EDT -----  Let pt know her lumbar x-ray is negative for a fracture, she does have some mild arthritis of her lower lumbar levels but should not be causing the kind of pain she has. We will know more when the MRI is back, continue with the plan discussed in the office.

## 2023-09-19 ENCOUNTER — TELEPHONE (OUTPATIENT)
Dept: FAMILY MEDICINE CLINIC | Age: 38
End: 2023-09-19

## 2023-09-19 ENCOUNTER — HOSPITAL ENCOUNTER (OUTPATIENT)
Dept: MRI IMAGING | Age: 38
Discharge: HOME | End: 2023-09-19
Payer: MEDICARE

## 2023-09-19 DIAGNOSIS — M54.50 LUMBAR PAIN: Primary | ICD-10-CM

## 2023-09-19 DIAGNOSIS — M54.16 LUMBAR BACK PAIN WITH RADICULOPATHY AFFECTING LEFT LOWER EXTREMITY: ICD-10-CM

## 2023-09-19 DIAGNOSIS — M48.061 FORAMINAL STENOSIS OF LUMBAR REGION: ICD-10-CM

## 2023-09-19 DIAGNOSIS — R29.898 TRANSIENT LEFT LEG WEAKNESS: ICD-10-CM

## 2023-09-19 DIAGNOSIS — R20.2 PARESTHESIA OF LEFT LEG: ICD-10-CM

## 2023-09-19 PROCEDURE — 72148 MRI LUMBAR SPINE W/O DYE: CPT

## 2023-09-19 NOTE — TELEPHONE ENCOUNTER
PM referral place, please let her know his office will reach out to her to schedule.  Continue with ibuprofen 400 mg TID in the meantime

## 2023-11-09 RX ORDER — TRAZODONE HYDROCHLORIDE 50 MG/1
50 TABLET ORAL
Qty: 90 TABLET | Refills: 4 | Status: SHIPPED | OUTPATIENT
Start: 2023-11-09

## 2023-11-09 NOTE — TELEPHONE ENCOUNTER
Recent Visits  Date Type Provider Dept   08/30/23 Office Visit JulienneHero, APRN - CNP [facility]   06/13/23 Office Visit Julienne Hero, APRN - CNP [facility]   05/11/23 Office Visit Julienne Hero, APRN - CNP [facility]   08/22/22 Office Visit Julienne Hero, APRN - CNP [facility]   07/28/22 Office Visit Julienne Hero, APRN - CNP [facility]   Showing recent visits within past 540 days with a meds authorizing provider and meeting all other requirements  Future Appointments  No visits were found meeting these conditions.  Showing future appointments within next 150 days with a meds authorizing provider and meeting all other requirements

## 2024-02-08 ENCOUNTER — PATIENT MESSAGE (OUTPATIENT)
Dept: FAMILY MEDICINE CLINIC | Age: 39
End: 2024-02-08

## 2024-02-08 DIAGNOSIS — F51.04 PSYCHOPHYSIOLOGICAL INSOMNIA: ICD-10-CM

## 2024-02-08 RX ORDER — TRAZODONE HYDROCHLORIDE 100 MG/1
100 TABLET ORAL NIGHTLY
Qty: 90 TABLET | Refills: 1 | Status: SHIPPED | OUTPATIENT
Start: 2024-02-08

## 2024-02-08 NOTE — TELEPHONE ENCOUNTER
From: Aliyah Toth  To: Hero Robins  Sent: 2/8/2024 9:52 AM EST  Subject: Trazadone    Claudia Monahan,    I was wondering if I could have a prescription for trazadone. I've noticed lately that I've taken a whole 50mg plus half of another. For this I'm now short on meds. Thank you    Aliyah Toth

## 2024-02-08 NOTE — TELEPHONE ENCOUNTER
Recent Visits  Date Type Provider Dept   08/30/23 Office Visit JulienneHero, APRN - CNP [facility]   06/13/23 Office Visit JulienneHero, APRN - CNP [facility]   05/11/23 Office Visit JulienneHero, APRN - CNP [facility]   08/22/22 Office Visit JulienneHero, APRN - CNP [facility]   Showing recent visits within past 540 days with a meds authorizing provider and meeting all other requirements  Future Appointments  No visits were found meeting these conditions.  Showing future appointments within next 150 days with a meds authorizing provider and meeting all other requirements

## 2024-03-22 ENCOUNTER — HOSPITAL ENCOUNTER (OUTPATIENT)
Age: 39
Discharge: HOME | End: 2024-03-22
Payer: MEDICARE

## 2024-03-22 ENCOUNTER — OFFICE VISIT (OUTPATIENT)
Dept: FAMILY MEDICINE CLINIC | Age: 39
End: 2024-03-22

## 2024-03-22 ENCOUNTER — HOSPITAL ENCOUNTER (OUTPATIENT)
Dept: GENERAL RADIOLOGY | Age: 39
Discharge: HOME | End: 2024-03-22
Payer: MEDICARE

## 2024-03-22 VITALS
WEIGHT: 222.2 LBS | DIASTOLIC BLOOD PRESSURE: 78 MMHG | RESPIRATION RATE: 16 BRPM | HEART RATE: 88 BPM | OXYGEN SATURATION: 95 % | SYSTOLIC BLOOD PRESSURE: 128 MMHG | TEMPERATURE: 98.1 F | BODY MASS INDEX: 33.68 KG/M2 | HEIGHT: 68 IN

## 2024-03-22 DIAGNOSIS — G89.29 OTHER CHRONIC PAIN: ICD-10-CM

## 2024-03-22 DIAGNOSIS — R76.11 POSITIVE SKIN TEST FOR TUBERCULOSIS: ICD-10-CM

## 2024-03-22 DIAGNOSIS — Z02.89 ENCOUNTER FOR PHYSICAL EXAMINATION RELATED TO EMPLOYMENT: Primary | ICD-10-CM

## 2024-03-22 DIAGNOSIS — F32.1 CURRENT MODERATE EPISODE OF MAJOR DEPRESSIVE DISORDER, UNSPECIFIED WHETHER RECURRENT (HCC): ICD-10-CM

## 2024-03-22 DIAGNOSIS — M48.061 SPINAL STENOSIS OF LUMBAR REGION WITHOUT NEUROGENIC CLAUDICATION: ICD-10-CM

## 2024-03-22 DIAGNOSIS — N39.3 STRESS INCONTINENCE: ICD-10-CM

## 2024-03-22 PROBLEM — C53.8 MALIGNANT NEOPLASM OF OVERLAPPING SITES OF CERVIX (HCC): Status: RESOLVED | Noted: 2019-07-31 | Resolved: 2024-03-22

## 2024-03-22 PROCEDURE — 86480 TB TEST CELL IMMUN MEASURE: CPT

## 2024-03-22 PROCEDURE — 71046 X-RAY EXAM CHEST 2 VIEWS: CPT

## 2024-03-22 PROCEDURE — MISCWORK WORK PHYSICAL: Performed by: NURSE PRACTITIONER

## 2024-03-22 RX ORDER — SOLIFENACIN SUCCINATE 5 MG/1
5 TABLET, FILM COATED ORAL DAILY
Qty: 90 TABLET | Refills: 4 | Status: SHIPPED | OUTPATIENT
Start: 2024-03-22

## 2024-03-22 RX ORDER — SOLIFENACIN SUCCINATE 5 MG/1
10 TABLET, FILM COATED ORAL DAILY
COMMUNITY
End: 2024-03-22 | Stop reason: SDUPTHER

## 2024-03-22 RX ORDER — VENLAFAXINE HYDROCHLORIDE 75 MG/1
75 CAPSULE, EXTENDED RELEASE ORAL DAILY
Qty: 90 CAPSULE | Refills: 4 | Status: SHIPPED | OUTPATIENT
Start: 2024-03-22

## 2024-03-22 RX ORDER — VENLAFAXINE HYDROCHLORIDE 75 MG/1
75 CAPSULE, EXTENDED RELEASE ORAL DAILY
COMMUNITY
Start: 2024-02-26 | End: 2024-03-22 | Stop reason: SDUPTHER

## 2024-03-22 ASSESSMENT — PATIENT HEALTH QUESTIONNAIRE - PHQ9
6. FEELING BAD ABOUT YOURSELF - OR THAT YOU ARE A FAILURE OR HAVE LET YOURSELF OR YOUR FAMILY DOWN: NOT AT ALL
5. POOR APPETITE OR OVEREATING: SEVERAL DAYS
SUM OF ALL RESPONSES TO PHQ9 QUESTIONS 1 & 2: 2
SUM OF ALL RESPONSES TO PHQ QUESTIONS 1-9: 6
1. LITTLE INTEREST OR PLEASURE IN DOING THINGS: SEVERAL DAYS
4. FEELING TIRED OR HAVING LITTLE ENERGY: SEVERAL DAYS
SUM OF ALL RESPONSES TO PHQ QUESTIONS 1-9: 6
8. MOVING OR SPEAKING SO SLOWLY THAT OTHER PEOPLE COULD HAVE NOTICED. OR THE OPPOSITE, BEING SO FIGETY OR RESTLESS THAT YOU HAVE BEEN MOVING AROUND A LOT MORE THAN USUAL: NOT AT ALL
9. THOUGHTS THAT YOU WOULD BE BETTER OFF DEAD, OR OF HURTING YOURSELF: NOT AT ALL
7. TROUBLE CONCENTRATING ON THINGS, SUCH AS READING THE NEWSPAPER OR WATCHING TELEVISION: SEVERAL DAYS
8. MOVING OR SPEAKING SO SLOWLY THAT OTHER PEOPLE COULD HAVE NOTICED. OR THE OPPOSITE - BEING SO FIDGETY OR RESTLESS THAT YOU HAVE BEEN MOVING AROUND A LOT MORE THAN USUAL: NOT AT ALL
5. POOR APPETITE OR OVEREATING: SEVERAL DAYS
SUM OF ALL RESPONSES TO PHQ QUESTIONS 1-9: 6
6. FEELING BAD ABOUT YOURSELF - OR THAT YOU ARE A FAILURE OR HAVE LET YOURSELF OR YOUR FAMILY DOWN: NOT AT ALL
10. IF YOU CHECKED OFF ANY PROBLEMS, HOW DIFFICULT HAVE THESE PROBLEMS MADE IT FOR YOU TO DO YOUR WORK, TAKE CARE OF THINGS AT HOME, OR GET ALONG WITH OTHER PEOPLE: SOMEWHAT DIFFICULT
10. IF YOU CHECKED OFF ANY PROBLEMS, HOW DIFFICULT HAVE THESE PROBLEMS MADE IT FOR YOU TO DO YOUR WORK, TAKE CARE OF THINGS AT HOME, OR GET ALONG WITH OTHER PEOPLE: SOMEWHAT DIFFICULT
SUM OF ALL RESPONSES TO PHQ QUESTIONS 1-9: 6
3. TROUBLE FALLING OR STAYING ASLEEP: SEVERAL DAYS
7. TROUBLE CONCENTRATING ON THINGS, SUCH AS READING THE NEWSPAPER OR WATCHING TELEVISION: SEVERAL DAYS
2. FEELING DOWN, DEPRESSED OR HOPELESS: SEVERAL DAYS
9. THOUGHTS THAT YOU WOULD BE BETTER OFF DEAD, OR OF HURTING YOURSELF: NOT AT ALL
1. LITTLE INTEREST OR PLEASURE IN DOING THINGS: SEVERAL DAYS
2. FEELING DOWN, DEPRESSED OR HOPELESS: SEVERAL DAYS
4. FEELING TIRED OR HAVING LITTLE ENERGY: SEVERAL DAYS
3. TROUBLE FALLING OR STAYING ASLEEP: SEVERAL DAYS
SUM OF ALL RESPONSES TO PHQ QUESTIONS 1-9: 6

## 2024-03-22 ASSESSMENT — ENCOUNTER SYMPTOMS: RESPIRATORY NEGATIVE: 1

## 2024-03-22 NOTE — PATIENT INSTRUCTIONS
problems.   Take care of your mental health. Try to stay connected with friends, family, and community, and find ways to manage stress.     If you're feeling depressed or hopeless, talk to someone. A counselor can help. If you don't have a counselor, talk to your doctor.   Talk to your doctor if you think you may have a problem with alcohol or drug use. This includes prescription medicines, marijuana, and other drugs.     Avoid tobacco and nicotine: Don't smoke, vape, or chew. If you need help quitting, talk to your doctor.   Practice safer sex. Getting tested, using condoms or dental dams, and limiting sex partners can help prevent STIs.     Use birth control if it's important to you to prevent pregnancy. Talk with your doctor about your choices and what might be best for you.   Prevent problems where you can. Protect your skin from too much sun, wash your hands, brush your teeth twice a day, and wear a seat belt in the car.   Where can you learn more?  Go to https://www.[employer].net/patientEd and enter P072 to learn more about \"Well Visit, Ages 18 to 65: Care Instructions.\"  Current as of: August 6, 2023               Content Version: 14.0  © 0971-3251 [employer].   Care instructions adapted under license by [employer]. If you have questions about a medical condition or this instruction, always ask your healthcare professional. [employer] disclaims any warranty or liability for your use of this information.      Heart-Healthy Diet   Sodium, Fat, and Cholesterol Controlled Diet       What Is a Heart Healthy Diet?   A heart-healthy diet is one that limits sodium , certain types of fat , and cholesterol . This type of diet is recommended for:   People with any form of cardiovascular disease (eg, coronary heart disease , peripheral vascular disease , previous heart attack , previous stroke )   People with risk factors for cardiovascular disease, such as high blood pressure , high

## 2024-03-22 NOTE — PROGRESS NOTES
Refill approved as requested. AC) 10 % external wash Apply topically 2 times daily. Yes Hero Robins, APRN - CNP         Past Medical History:   Diagnosis Date    Anxiety     Cancer of cervix (HCC)     Depression     History of therapeutic radiation     cervical cancer, internal and external radiation    Hypertension        Past Surgical History:   Procedure Laterality Date    CERVIX BIOPSY N/A 2019    COLD KNIFE CONE BX CERVIX performed by Alton Floyd MD at [email or web address] OR    CHOLECYSTECTOMY  over 5 years ago    ? dr    COLONOSCOPY  2020    INSERTION / REMOVAL / REPLACEMENT VENOUS ACCESS CATHETER N/A 2019    SINGLE LUMEN SMARTPORT INSERTION performed by Radha Andres MD at [facility] [[email or web address]] OR    OTHER SURGICAL HISTORY  10/10/2019    port removal-in office Dr Andres    TUBAL LIGATION  2006    Alton Floyd         Family History   Problem Relation Age of Onset    Cancer Maternal Grandmother         ? kind    Cancer Paternal Grandmother         ? kind       Social History     Tobacco Use    Smoking status: Former     Current packs/day: 0.00     Average packs/day: 1 pack/day for 24.0 years (24.0 ttl pk-yrs)     Types: Cigarettes     Quit date: 2024     Years since quittin.1    Smokeless tobacco: Never   Vaping Use    Vaping Use: Former    Substances: Nicotine, THC    Devices: Pre-filled or refillable cartridge   Substance Use Topics    Alcohol use: Not Currently     Alcohol/week: 16.0 standard drinks of alcohol     Types: 16 Shots of liquor per week     Comment: 4 shots daily 4 times a week    Drug use: Yes     Types: Marijuana (Weed)     Comment: Occasion        Objective     Vital Signs  /78   Pulse 88   Temp 98.1 °F (36.7 °C)   Resp 16   Ht 1.727 m (5' 8\")   Wt 100.8 kg (222 lb 3.2 oz)   SpO2 95%   BMI 33.79 kg/m²   Wt Readings from Last 3 Encounters:   24 100.8 kg (222 lb 3.2 oz)   23 98.6 kg (217 lb 6.4 oz)   23 100.7 kg (222 lb)       Waist Circumference  There were no vitals filed for this

## 2024-03-25 ENCOUNTER — TELEPHONE (OUTPATIENT)
Dept: FAMILY MEDICINE CLINIC | Age: 39
End: 2024-03-25

## 2024-03-25 NOTE — TELEPHONE ENCOUNTER
----- Message from CHAPO Krishnamurthy CNP sent at 3/22/2024  2:11 PM EDT -----  Let pt know chest x-ray negative for any TB, will get back with her when the quanterferon test is back.

## 2024-03-27 ENCOUNTER — TELEPHONE (OUTPATIENT)
Dept: FAMILY MEDICINE CLINIC | Age: 39
End: 2024-03-27

## 2024-03-27 DIAGNOSIS — R76.12 POSITIVE QUANTIFERON-TB GOLD TEST: Primary | ICD-10-CM

## 2024-03-27 LAB
GAMMA INTERFERON BACKGROUND BLD IA-ACNC: 0.02 IU/ML
M TB IFN-G BLD-IMP: POSITIVE
M TB IFN-G CD4+ BCKGRND COR BLD-ACNC: 2.5 IU/ML (ref 0–0.34)
M TB IFN-G CD4+CD8+ BCKGRND COR BLD-ACNC: 2 IU/ML (ref 0–0.34)
MITOGEN IGNF BCKGRD COR BLD-ACNC: 8.48 IU/ML

## 2024-03-27 NOTE — TELEPHONE ENCOUNTER
Unable to leave message.   Got message \"we're sorry but all circuits are busy at this time. Please try your call again later\"

## 2024-03-27 NOTE — TELEPHONE ENCOUNTER
----- Message from CHAPO Krishnamurthy - CNP sent at 3/27/2024  9:13 AM EDT -----  Let pt know her chest x-ray was negative for TB but her quanterferon test came back positive, which is a blood test to identify TB, there have been false positive results that can occur form this test, but to be sure I would recommend  we refer her to an ID specialist and see if they deem appropriate to treat her for the TB.  Let me know if she has any questions.  If agreeable I will place the referral .

## 2024-04-11 ENCOUNTER — TELEPHONE (OUTPATIENT)
Dept: FAMILY MEDICINE CLINIC | Age: 39
End: 2024-04-11

## 2024-04-11 NOTE — TELEPHONE ENCOUNTER
I placed a referral to ID for this patient in March, it does not look like she ha been scheduled yet. Please contact her or the ID office and see if we can get her scheduled. Thanks

## 2024-04-24 ENCOUNTER — TELEPHONE (OUTPATIENT)
Dept: FAMILY MEDICINE CLINIC | Age: 39
End: 2024-04-24

## 2024-04-24 DIAGNOSIS — R76.12 POSITIVE QUANTIFERON-TB GOLD TEST: Primary | ICD-10-CM

## 2024-04-24 NOTE — TELEPHONE ENCOUNTER
's office returned phone call stating he does not take medicaid as primary or secondary.     A new referral is needing to be faxed to [facility]   PH: [phone]  FX: [phone]    Please advise

## 2024-04-24 NOTE — TELEPHONE ENCOUNTER
Pt called stating when she contacted ID, Dr. Hamilton's , she was told her information had been faxed to a different location so she was unable to schedule an appointment.    I called ID and had to leave a message asking them to call the office to verify if we have the correct fax #.

## 2024-04-25 NOTE — TELEPHONE ENCOUNTER
Pt requesting a new referral and notes to Dr Jalloh since she hasn't heard from [facility] ID clinic    Fax# [phone]    She has already talked to the nurse at Dr Jalloh's office

## 2024-05-01 ENCOUNTER — TELEPHONE (OUTPATIENT)
Dept: INFECTIOUS DISEASES | Age: 39
End: 2024-05-01

## 2024-05-08 ENCOUNTER — TELEPHONE (OUTPATIENT)
Dept: INFECTIOUS DISEASES | Age: 39
End: 2024-05-08

## 2024-05-08 NOTE — TELEPHONE ENCOUNTER
Aliyah returned call, stated that she already had a appointment with Dr. Jalloh due to not hearing from Infectious Disease.  I informed her I did call her on May 1 with no answer or  voice mail.  She stated she wanted in before May 1 and I told her that we wouldn't have been able to get her in that soon.  Patient voiced appreciation.

## 2024-06-21 NOTE — TELEPHONE ENCOUNTER
We can do another round of steroids, has she heard back yet from the MRI? If not please check on this thanks Patient Specific Counseling (Will Not Stick From Patient To Patient): - Discussed that the wart(s) appear resolved today.\\n- However, due to the viral nature of warts, they can and sometimes do recur.\\n- Recommended self checks every few weeks for the next 2-3 months, and return to clinic should any signs of recurrence be seen. Detail Level: Simple Topical Sulfur Applications Pregnancy And Lactation Text: This medication is Pregnancy Category C and has an unknown safety profile during pregnancy. It is unknown if this topical medication is excreted in breast milk. Spironolactone Counseling: Patient advised regarding risks of diarrhea, abdominal pain, hyperkalemia, birth defects (for female patients), liver toxicity and renal toxicity. The patient may need blood work to monitor liver and kidney function and potassium levels while on therapy. The patient verbalized understanding of the proper use and possible adverse effects of spironolactone.  All of the patient's questions and concerns were addressed. Patient Specific Counseling (Will Not Stick From Patient To Patient): - Discussed Aklief prescription was sent (via [facility]). \\n- [facility] phone number was given to patient to call and get Rx. Patient will call to clinic for issues receiving the Rx from our end. Azithromycin Pregnancy And Lactation Text: This medication is considered safe during pregnancy and is also secreted in breast milk. Topical Clindamycin Counseling: Patient counseled that this medication may cause skin irritation or allergic reactions.  In the event of skin irritation, the patient was advised to reduce the amount of the drug applied or use it less frequently.   The patient verbalized understanding of the proper use and possible adverse effects of clindamycin.  All of the patient's questions and concerns were addressed. Topical Clindamycin Pregnancy And Lactation Text: This medication is Pregnancy Category B and is considered safe during pregnancy. It is unknown if it is excreted in breast milk. Benzoyl Peroxide Pregnancy And Lactation Text: This medication is Pregnancy Category C. It is unknown if benzoyl peroxide is excreted in breast milk. Winlevi Pregnancy And Lactation Text: This medication is considered safe during pregnancy and breastfeeding. Spironolactone Pregnancy And Lactation Text: This medication can cause feminization of the male fetus and should be avoided during pregnancy. The active metabolite is also found in breast milk. Erythromycin Counseling:  I discussed with the patient the risks of erythromycin including but not limited to GI upset, allergic reaction, drug rash, diarrhea, increase in liver enzymes, and yeast infections. Birth Control Pills Pregnancy And Lactation Text: This medication should be avoided if pregnant and for the first 30 days post-partum. Tazorac Pregnancy And Lactation Text: This medication is not safe during pregnancy. It is unknown if this medication is excreted in breast milk. Sarecycline Pregnancy And Lactation Text: This medication is Pregnancy Category D and not consider safe during pregnancy. It is also excreted in breast milk. Doxycycline Counseling:  Patient counseled regarding possible photosensitivity and increased risk for sunburn.  Patient instructed to avoid sunlight, if possible.  When exposed to sunlight, patients should wear protective clothing, sunglasses, and sunscreen.  The patient was instructed to call the office immediately if the following severe adverse effects occur:  hearing changes, easy bruising/bleeding, severe headache, or vision changes.  The patient verbalized understanding of the proper use and possible adverse effects of doxycycline.  All of the patient's questions and concerns were addressed. Aklief counseling:  Patient advised to apply a pea-sized amount only at bedtime and wait 30 minutes after washing their face before applying.  If too drying, patient may add a non-comedogenic moisturizer.  The most commonly reported side effects including irritation, redness, scaling, dryness, stinging, burning, itching, and increased risk of sunburn.  The patient verbalized understanding of the proper use and possible adverse effects of retinoids.  All of the patient's questions and concerns were addressed. Bactrim Pregnancy And Lactation Text: This medication is Pregnancy Category D and is known to cause fetal risk.  It is also excreted in breast milk. Benzoyl Peroxide Counseling: Patient counseled that medicine may cause skin irritation and bleach clothing.  In the event of skin irritation, the patient was advised to reduce the amount of the drug applied or use it less frequently.   The patient verbalized understanding of the proper use and possible adverse effects of benzoyl peroxide.  All of the patient's questions and concerns were addressed. High Dose Vitamin A Counseling: Side effects reviewed, pt to contact office should one occur. Detail Level: Zone Azithromycin Counseling:  I discussed with the patient the risks of azithromycin including but not limited to GI upset, allergic reaction, drug rash, diarrhea, and yeast infections. Topical Retinoid counseling:  Patient advised to apply a pea-sized amount only at bedtime and wait 30 minutes after washing their face before applying.  If too drying, patient may add a non-comedogenic moisturizer. The patient verbalized understanding of the proper use and possible adverse effects of retinoids.  All of the patient's questions and concerns were addressed. Use Enhanced Medication Counseling?: No Winlevi Counseling:  I discussed with the patient the risks of topical clascoterone including but not limited to erythema, scaling, itching, and stinging. Patient voiced their understanding. Sarecycline Counseling: Patient advised regarding possible photosensitivity and discoloration of the teeth, skin, lips, tongue and gums.  Patient instructed to avoid sunlight, if possible.  When exposed to sunlight, patients should wear protective clothing, sunglasses, and sunscreen.  The patient was instructed to call the office immediately if the following severe adverse effects occur:  hearing changes, easy bruising/bleeding, severe headache, or vision changes.  The patient verbalized understanding of the proper use and possible adverse effects of sarecycline.  All of the patient's questions and concerns were addressed. Aklief Pregnancy And Lactation Text: It is unknown if this medication is safe to use during pregnancy.  It is unknown if this medication is excreted in breast milk.  Breastfeeding women should use the topical cream on the smallest area of the skin for the shortest time needed while breastfeeding.  Do not apply to nipple and areola. Azelaic Acid Pregnancy And Lactation Text: This medication is considered safe during pregnancy and breast feeding. Erythromycin Pregnancy And Lactation Text: This medication is Pregnancy Category B and is considered safe during pregnancy. It is also excreted in breast milk. Azelaic Acid Counseling: Patient counseled that medicine may cause skin irritation and to avoid applying near the eyes.  In the event of skin irritation, the patient was advised to reduce the amount of the drug applied or use it less frequently.   The patient verbalized understanding of the proper use and possible adverse effects of azelaic acid.  All of the patient's questions and concerns were addressed. Isotretinoin Pregnancy And Lactation Text: This medication is Pregnancy Category X and is considered extremely dangerous during pregnancy. It is unknown if it is excreted in breast milk. Bactrim Counseling:  I discussed with the patient the risks of sulfa antibiotics including but not limited to GI upset, allergic reaction, drug rash, diarrhea, dizziness, photosensitivity, and yeast infections.  Rarely, more serious reactions can occur including but not limited to aplastic anemia, agranulocytosis, methemoglobinemia, blood dyscrasias, liver or kidney failure, lung infiltrates or desquamative/blistering drug rashes. Topical Retinoid Pregnancy And Lactation Text: This medication is Pregnancy Category C. It is unknown if this medication is excreted in breast milk. Isotretinoin Counseling: Patient should get monthly blood tests, not donate blood, not drive at night if vision affected, not share medication, and not undergo elective surgery for 6 months after tx completed. Side effects reviewed, pt to contact office should one occur. Tetracycline Counseling: Patient counseled regarding possible photosensitivity and increased risk for sunburn.  Patient instructed to avoid sunlight, if possible.  When exposed to sunlight, patients should wear protective clothing, sunglasses, and sunscreen.  The patient was instructed to call the office immediately if the following severe adverse effects occur:  hearing changes, easy bruising/bleeding, severe headache, or vision changes.  The patient verbalized understanding of the proper use and possible adverse effects of tetracycline.  All of the patient's questions and concerns were addressed. Patient understands to avoid pregnancy while on therapy due to potential birth defects. High Dose Vitamin A Pregnancy And Lactation Text: High dose vitamin A therapy is contraindicated during pregnancy and breast feeding. Tazorac Counseling:  Patient advised that medication is irritating and drying.  Patient may need to apply sparingly and wash off after an hour before eventually leaving it on overnight.  The patient verbalized understanding of the proper use and possible adverse effects of tazorac.  All of the patient's questions and concerns were addressed. Dapsone Counseling: I discussed with the patient the risks of dapsone including but not limited to hemolytic anemia, agranulocytosis, rashes, methemoglobinemia, kidney failure, peripheral neuropathy, headaches, GI upset, and liver toxicity.  Patients who start dapsone require monitoring including baseline LFTs and weekly CBCs for the first month, then every month thereafter.  The patient verbalized understanding of the proper use and possible adverse effects of dapsone.  All of the patient's questions and concerns were addressed. Minocycline Counseling: Patient advised regarding possible photosensitivity and discoloration of the teeth, skin, lips, tongue and gums.  Patient instructed to avoid sunlight, if possible.  When exposed to sunlight, patients should wear protective clothing, sunglasses, and sunscreen.  The patient was instructed to call the office immediately if the following severe adverse effects occur:  hearing changes, easy bruising/bleeding, severe headache, or vision changes.  The patient verbalized understanding of the proper use and possible adverse effects of minocycline.  All of the patient's questions and concerns were addressed. Doxycycline Pregnancy And Lactation Text: This medication is Pregnancy Category D and not consider safe during pregnancy. It is also excreted in breast milk but is considered safe for shorter treatment courses. Dapsone Pregnancy And Lactation Text: This medication is Pregnancy Category C and is not considered safe during pregnancy or breast feeding. Topical Sulfur Applications Counseling: Topical Sulfur Counseling: Patient counseled that this medication may cause skin irritation or allergic reactions.  In the event of skin irritation, the patient was advised to reduce the amount of the drug applied or use it less frequently.   The patient verbalized understanding of the proper use and possible adverse effects of topical sulfur application.  All of the patient's questions and concerns were addressed. Birth Control Pills Counseling: Birth Control Pill Counseling: I discussed with the patient the potential side effects of OCPs including but not limited to increased risk of stroke, heart attack, thrombophlebitis, deep venous thrombosis, hepatic adenomas, breast changes, GI upset, headaches, and depression.  The patient verbalized understanding of the proper use and possible adverse effects of OCPs. All of the patient's questions and concerns were addressed.

## 2024-06-25 ENCOUNTER — PATIENT MESSAGE (OUTPATIENT)
Dept: FAMILY MEDICINE CLINIC | Age: 39
End: 2024-06-25

## 2024-06-25 DIAGNOSIS — F51.04 PSYCHOPHYSIOLOGICAL INSOMNIA: ICD-10-CM

## 2024-06-25 RX ORDER — TRAZODONE HYDROCHLORIDE 100 MG/1
100 TABLET ORAL NIGHTLY
Qty: 90 TABLET | Refills: 1 | Status: SHIPPED | OUTPATIENT
Start: 2024-06-25

## 2024-06-25 NOTE — TELEPHONE ENCOUNTER
From: Aliyah Toth  To: Hero Robins  Sent: 6/25/2024 10:07 AM EDT  Subject: Medication refill    Good morning Hero and team. I'm not sure if I need a appointment or not. I'm out of a prescription, trazadone that needs refilled. Thank you have a great day

## 2024-06-25 NOTE — TELEPHONE ENCOUNTER
Recent Visits  Date Type Provider Dept   03/22/24 Office Visit Hero Robins APRN - CNP [facility]   08/30/23 Office Visit Hero Robins APRN - CNP [facility]   06/13/23 Office Visit Hero Robins APRN - CNP [facility]   05/11/23 Office Visit Hero Robins APRN - CNP [facility]   Showing recent visits within past 540 days with a meds authorizing provider and meeting all other requirements  Future Appointments  Date Type Provider Dept   09/25/24 Appointment Hero Robins APRN - CNP [facility]   Showing future appointments within next 150 days with a meds authorizing provider and meeting all other requirements

## 2024-09-25 ENCOUNTER — OFFICE VISIT (OUTPATIENT)
Dept: FAMILY MEDICINE CLINIC | Age: 39
End: 2024-09-25
Payer: MEDICARE

## 2024-09-25 VITALS
BODY MASS INDEX: 33.77 KG/M2 | HEART RATE: 88 BPM | HEIGHT: 68 IN | RESPIRATION RATE: 16 BRPM | TEMPERATURE: 97.7 F | WEIGHT: 222.8 LBS | SYSTOLIC BLOOD PRESSURE: 132 MMHG | DIASTOLIC BLOOD PRESSURE: 74 MMHG | OXYGEN SATURATION: 98 %

## 2024-09-25 DIAGNOSIS — Z13.220 SCREENING FOR HYPERLIPIDEMIA: ICD-10-CM

## 2024-09-25 DIAGNOSIS — N39.3 STRESS INCONTINENCE: ICD-10-CM

## 2024-09-25 DIAGNOSIS — F32.1 CURRENT MODERATE EPISODE OF MAJOR DEPRESSIVE DISORDER, UNSPECIFIED WHETHER RECURRENT (HCC): ICD-10-CM

## 2024-09-25 DIAGNOSIS — Z72.0 TOBACCO ABUSE: ICD-10-CM

## 2024-09-25 DIAGNOSIS — K56.699 STRICTURE OF COLON (HCC): ICD-10-CM

## 2024-09-25 DIAGNOSIS — R74.01 TRANSAMINITIS: ICD-10-CM

## 2024-09-25 DIAGNOSIS — L73.2 SUPPURATIVE HIDRADENITIS: ICD-10-CM

## 2024-09-25 DIAGNOSIS — Z00.00 INITIAL MEDICARE ANNUAL WELLNESS VISIT: Primary | ICD-10-CM

## 2024-09-25 DIAGNOSIS — Z12.31 ENCOUNTER FOR SCREENING MAMMOGRAM FOR MALIGNANT NEOPLASM OF BREAST: ICD-10-CM

## 2024-09-25 DIAGNOSIS — Z13.29 SCREENING FOR THYROID DISORDER: ICD-10-CM

## 2024-09-25 DIAGNOSIS — F51.04 PSYCHOPHYSIOLOGICAL INSOMNIA: ICD-10-CM

## 2024-09-25 PROBLEM — R07.89 OTHER CHEST PAIN: Status: RESOLVED | Noted: 2019-08-12 | Resolved: 2024-09-25

## 2024-09-25 PROCEDURE — G8417 CALC BMI ABV UP PARAM F/U: HCPCS | Performed by: NURSE PRACTITIONER

## 2024-09-25 PROCEDURE — 99214 OFFICE O/P EST MOD 30 MIN: CPT | Performed by: NURSE PRACTITIONER

## 2024-09-25 PROCEDURE — G0438 PPPS, INITIAL VISIT: HCPCS | Performed by: NURSE PRACTITIONER

## 2024-09-25 PROCEDURE — G8427 DOCREV CUR MEDS BY ELIG CLIN: HCPCS | Performed by: NURSE PRACTITIONER

## 2024-09-25 PROCEDURE — 1036F TOBACCO NON-USER: CPT | Performed by: NURSE PRACTITIONER

## 2024-09-25 RX ORDER — PYRIDOXINE HCL (VITAMIN B6) 50 MG
50 TABLET ORAL DAILY
COMMUNITY
Start: 2024-08-19

## 2024-09-25 RX ORDER — TRAZODONE HYDROCHLORIDE 100 MG/1
100 TABLET ORAL NIGHTLY
Qty: 90 TABLET | Refills: 1 | Status: SHIPPED | OUTPATIENT
Start: 2024-09-25

## 2024-09-25 RX ORDER — BENZOYL PEROXIDE 10 G/100G
SUSPENSION TOPICAL
Qty: 227 G | Refills: 4 | Status: SHIPPED | OUTPATIENT
Start: 2024-09-25

## 2024-09-25 RX ORDER — ISONIAZID 300 MG/1
300 TABLET ORAL DAILY
COMMUNITY

## 2024-09-25 SDOH — ECONOMIC STABILITY: FOOD INSECURITY: WITHIN THE PAST 12 MONTHS, THE FOOD YOU BOUGHT JUST DIDN'T LAST AND YOU DIDN'T HAVE MONEY TO GET MORE.: NEVER TRUE

## 2024-09-25 SDOH — ECONOMIC STABILITY: FOOD INSECURITY: WITHIN THE PAST 12 MONTHS, YOU WORRIED THAT YOUR FOOD WOULD RUN OUT BEFORE YOU GOT MONEY TO BUY MORE.: NEVER TRUE

## 2024-09-25 SDOH — ECONOMIC STABILITY: INCOME INSECURITY: HOW HARD IS IT FOR YOU TO PAY FOR THE VERY BASICS LIKE FOOD, HOUSING, MEDICAL CARE, AND HEATING?: NOT HARD AT ALL

## 2024-09-25 ASSESSMENT — PATIENT HEALTH QUESTIONNAIRE - PHQ9
4. FEELING TIRED OR HAVING LITTLE ENERGY: SEVERAL DAYS
SUM OF ALL RESPONSES TO PHQ9 QUESTIONS 1 & 2: 0
7. TROUBLE CONCENTRATING ON THINGS, SUCH AS READING THE NEWSPAPER OR WATCHING TELEVISION: SEVERAL DAYS
10. IF YOU CHECKED OFF ANY PROBLEMS, HOW DIFFICULT HAVE THESE PROBLEMS MADE IT FOR YOU TO DO YOUR WORK, TAKE CARE OF THINGS AT HOME, OR GET ALONG WITH OTHER PEOPLE: SOMEWHAT DIFFICULT
6. FEELING BAD ABOUT YOURSELF - OR THAT YOU ARE A FAILURE OR HAVE LET YOURSELF OR YOUR FAMILY DOWN: NOT AT ALL
SUM OF ALL RESPONSES TO PHQ QUESTIONS 1-9: 5
8. MOVING OR SPEAKING SO SLOWLY THAT OTHER PEOPLE COULD HAVE NOTICED. OR THE OPPOSITE, BEING SO FIGETY OR RESTLESS THAT YOU HAVE BEEN MOVING AROUND A LOT MORE THAN USUAL: SEVERAL DAYS
9. THOUGHTS THAT YOU WOULD BE BETTER OFF DEAD, OR OF HURTING YOURSELF: NOT AT ALL
SUM OF ALL RESPONSES TO PHQ QUESTIONS 1-9: 5
3. TROUBLE FALLING OR STAYING ASLEEP: SEVERAL DAYS
1. LITTLE INTEREST OR PLEASURE IN DOING THINGS: NOT AT ALL
SUM OF ALL RESPONSES TO PHQ QUESTIONS 1-9: 5
5. POOR APPETITE OR OVEREATING: SEVERAL DAYS
SUM OF ALL RESPONSES TO PHQ QUESTIONS 1-9: 5
2. FEELING DOWN, DEPRESSED OR HOPELESS: NOT AT ALL

## 2024-09-25 ASSESSMENT — LIFESTYLE VARIABLES
HOW MANY STANDARD DRINKS CONTAINING ALCOHOL DO YOU HAVE ON A TYPICAL DAY: PATIENT DOES NOT DRINK
HOW OFTEN DO YOU HAVE A DRINK CONTAINING ALCOHOL: NEVER

## 2024-10-02 ENCOUNTER — LAB (OUTPATIENT)
Dept: LAB | Age: 39
End: 2024-10-02

## 2024-10-02 ENCOUNTER — HOSPITAL ENCOUNTER (OUTPATIENT)
Dept: WOMENS IMAGING | Age: 39
Discharge: HOME | End: 2024-10-02
Payer: MEDICARE

## 2024-10-02 DIAGNOSIS — Z13.220 SCREENING FOR HYPERLIPIDEMIA: ICD-10-CM

## 2024-10-02 DIAGNOSIS — Z12.31 ENCOUNTER FOR SCREENING MAMMOGRAM FOR MALIGNANT NEOPLASM OF BREAST: ICD-10-CM

## 2024-10-02 DIAGNOSIS — R74.01 TRANSAMINITIS: ICD-10-CM

## 2024-10-02 DIAGNOSIS — Z13.29 SCREENING FOR THYROID DISORDER: ICD-10-CM

## 2024-10-02 LAB
CHOLEST SERPL-MCNC: 244 MG/DL (ref 100–199)
HDLC SERPL-MCNC: 87 MG/DL
LDLC SERPL CALC-MCNC: 143 MG/DL
TRIGL SERPL-MCNC: 72 MG/DL (ref 0–199)
TSH SERPL DL<=0.005 MIU/L-ACNC: 0.76 UIU/ML (ref 0.4–4.2)

## 2024-10-02 PROCEDURE — 77063 BREAST TOMOSYNTHESIS BI: CPT

## 2024-10-03 ENCOUNTER — TELEPHONE (OUTPATIENT)
Dept: FAMILY MEDICINE CLINIC | Age: 39
End: 2024-10-03

## 2024-10-03 DIAGNOSIS — R92.30 INCONCLUSIVE MAMMOGRAPHY DUE TO DENSE BREASTS: Primary | ICD-10-CM

## 2024-10-03 DIAGNOSIS — R92.2 INCONCLUSIVE MAMMOGRAPHY DUE TO DENSE BREASTS: Primary | ICD-10-CM

## 2024-10-03 NOTE — TELEPHONE ENCOUNTER
----- Message from CHAPO Krishnamurthy - CNP sent at 10/2/2024  5:26 PM EDT -----  Let pt know her cholesterol has risen from 192 to 244, but hr 10 year risk of a Coronary event is low, I don't recommend a statin at this time but do recommend daily aerobic activity 45 minutes a day along with low cholesterol diet will recheck lipid panel in 1 year   The 10-year ASCVD risk score (Yahir RONQUILLO, et al., 2019) is: 0.3%    Values used to calculate the score:      Age: 38 years      Sex: Female      Is Non- : Yes      Diabetic: No      Tobacco smoker: No      Systolic Blood Pressure: 132 mmHg      Is BP treated: No      HDL Cholesterol: 87 mg/dL      Total Cholesterol: 244 mg/dL

## 2024-10-03 NOTE — TELEPHONE ENCOUNTER
----- Message from CHAPO Krishnamurthy CNP sent at 10/3/2024 12:30 PM EDT -----  Let pt know mammogram normal but Given the finding of dense breast tissue, this patient is a candidate for  automated whole breast screening ultrasound (ABUS) to aid in breast cancer  Detection    If agreeable will order .

## 2024-10-04 NOTE — TELEPHONE ENCOUNTER
Called patient and informed of both messages ( mammogram results and lab results )  Patient verbalized understanding.   No questions or concerns at this time.      Patient is willing to complete ABUS, please order. Thank you.

## 2024-11-20 ENCOUNTER — LAB (OUTPATIENT)
Dept: LAB | Age: 39
End: 2024-11-20

## 2024-11-20 LAB
ALBUMIN SERPL BCG-MCNC: 4.1 G/DL (ref 3.5–5.1)
ALP SERPL-CCNC: 269 U/L (ref 38–126)
ALT SERPL W/O P-5'-P-CCNC: 120 U/L (ref 11–66)
AST SERPL-CCNC: 70 U/L (ref 5–40)
BILIRUB CONJ SERPL-MCNC: < 0.1 MG/DL (ref 0.1–13.8)
BILIRUB SERPL-MCNC: 0.3 MG/DL (ref 0.3–1.2)
PROT SERPL-MCNC: 7.1 G/DL (ref 6.1–8)

## 2025-01-30 ENCOUNTER — TRANSCRIBE ORDERS (OUTPATIENT)
Dept: ADMINISTRATIVE | Age: 40
End: 2025-01-30

## 2025-01-30 DIAGNOSIS — R26.89 BALANCE PROBLEM: Primary | ICD-10-CM

## 2025-03-26 ENCOUNTER — OFFICE VISIT (OUTPATIENT)
Dept: FAMILY MEDICINE CLINIC | Age: 40
End: 2025-03-26
Payer: MEDICARE

## 2025-03-26 VITALS
RESPIRATION RATE: 18 BRPM | TEMPERATURE: 97.9 F | WEIGHT: 221.4 LBS | DIASTOLIC BLOOD PRESSURE: 76 MMHG | BODY MASS INDEX: 33.56 KG/M2 | OXYGEN SATURATION: 96 % | SYSTOLIC BLOOD PRESSURE: 124 MMHG | HEART RATE: 76 BPM | HEIGHT: 68 IN

## 2025-03-26 DIAGNOSIS — L73.2 HYDRADENITIS: ICD-10-CM

## 2025-03-26 DIAGNOSIS — N39.3 STRESS INCONTINENCE: ICD-10-CM

## 2025-03-26 DIAGNOSIS — Z72.0 TOBACCO ABUSE: ICD-10-CM

## 2025-03-26 DIAGNOSIS — K56.699 STRICTURE OF COLON (HCC): ICD-10-CM

## 2025-03-26 DIAGNOSIS — F51.04 PSYCHOPHYSIOLOGICAL INSOMNIA: Primary | ICD-10-CM

## 2025-03-26 DIAGNOSIS — R74.01 TRANSAMINITIS: ICD-10-CM

## 2025-03-26 DIAGNOSIS — N39.42 URINARY INCONTINENCE WITHOUT SENSORY AWARENESS: ICD-10-CM

## 2025-03-26 DIAGNOSIS — Z22.7 TB LUNG, LATENT: ICD-10-CM

## 2025-03-26 DIAGNOSIS — F32.1 CURRENT MODERATE EPISODE OF MAJOR DEPRESSIVE DISORDER, UNSPECIFIED WHETHER RECURRENT (HCC): ICD-10-CM

## 2025-03-26 PROCEDURE — G8417 CALC BMI ABV UP PARAM F/U: HCPCS | Performed by: NURSE PRACTITIONER

## 2025-03-26 PROCEDURE — 99214 OFFICE O/P EST MOD 30 MIN: CPT | Performed by: NURSE PRACTITIONER

## 2025-03-26 PROCEDURE — G8427 DOCREV CUR MEDS BY ELIG CLIN: HCPCS | Performed by: NURSE PRACTITIONER

## 2025-03-26 PROCEDURE — 1036F TOBACCO NON-USER: CPT | Performed by: NURSE PRACTITIONER

## 2025-03-26 RX ORDER — VENLAFAXINE HYDROCHLORIDE 75 MG/1
75 CAPSULE, EXTENDED RELEASE ORAL DAILY
Qty: 90 CAPSULE | Refills: 4 | Status: SHIPPED | OUTPATIENT
Start: 2025-03-26

## 2025-03-26 RX ORDER — SOLIFENACIN SUCCINATE 5 MG/1
5 TABLET, FILM COATED ORAL DAILY
Qty: 90 TABLET | Refills: 4 | Status: SHIPPED | OUTPATIENT
Start: 2025-03-26

## 2025-03-26 RX ORDER — DOXYCYCLINE HYCLATE 100 MG
100 TABLET ORAL 2 TIMES DAILY
Qty: 20 TABLET | Refills: 0 | Status: SHIPPED | OUTPATIENT
Start: 2025-03-26 | End: 2025-04-05

## 2025-03-26 RX ORDER — TRAZODONE HYDROCHLORIDE 100 MG/1
100 TABLET ORAL NIGHTLY
Qty: 90 TABLET | Refills: 4 | Status: SHIPPED | OUTPATIENT
Start: 2025-03-26

## 2025-03-26 SDOH — ECONOMIC STABILITY: FOOD INSECURITY: WITHIN THE PAST 12 MONTHS, YOU WORRIED THAT YOUR FOOD WOULD RUN OUT BEFORE YOU GOT MONEY TO BUY MORE.: NEVER TRUE

## 2025-03-26 SDOH — ECONOMIC STABILITY: FOOD INSECURITY: WITHIN THE PAST 12 MONTHS, THE FOOD YOU BOUGHT JUST DIDN'T LAST AND YOU DIDN'T HAVE MONEY TO GET MORE.: NEVER TRUE

## 2025-03-26 ASSESSMENT — PATIENT HEALTH QUESTIONNAIRE - PHQ9
5. POOR APPETITE OR OVEREATING: NOT AT ALL
4. FEELING TIRED OR HAVING LITTLE ENERGY: NEARLY EVERY DAY
SUM OF ALL RESPONSES TO PHQ QUESTIONS 1-9: 4
7. TROUBLE CONCENTRATING ON THINGS, SUCH AS READING THE NEWSPAPER OR WATCHING TELEVISION: SEVERAL DAYS
2. FEELING DOWN, DEPRESSED OR HOPELESS: NOT AT ALL
9. THOUGHTS THAT YOU WOULD BE BETTER OFF DEAD, OR OF HURTING YOURSELF: NOT AT ALL
SUM OF ALL RESPONSES TO PHQ QUESTIONS 1-9: 4
SUM OF ALL RESPONSES TO PHQ QUESTIONS 1-9: 4
8. MOVING OR SPEAKING SO SLOWLY THAT OTHER PEOPLE COULD HAVE NOTICED. OR THE OPPOSITE, BEING SO FIGETY OR RESTLESS THAT YOU HAVE BEEN MOVING AROUND A LOT MORE THAN USUAL: NOT AT ALL
1. LITTLE INTEREST OR PLEASURE IN DOING THINGS: NOT AT ALL
6. FEELING BAD ABOUT YOURSELF - OR THAT YOU ARE A FAILURE OR HAVE LET YOURSELF OR YOUR FAMILY DOWN: NOT AT ALL
3. TROUBLE FALLING OR STAYING ASLEEP: NOT AT ALL
10. IF YOU CHECKED OFF ANY PROBLEMS, HOW DIFFICULT HAVE THESE PROBLEMS MADE IT FOR YOU TO DO YOUR WORK, TAKE CARE OF THINGS AT HOME, OR GET ALONG WITH OTHER PEOPLE: NOT DIFFICULT AT ALL
SUM OF ALL RESPONSES TO PHQ QUESTIONS 1-9: 4

## 2025-03-26 ASSESSMENT — ENCOUNTER SYMPTOMS
BACK PAIN: 1
GASTROINTESTINAL NEGATIVE: 1
RESPIRATORY NEGATIVE: 1
COLOR CHANGE: 1

## 2025-03-26 NOTE — PROGRESS NOTES
rate and regular rhythm.      Pulses: Normal pulses.      Heart sounds: Normal heart sounds. No murmur heard.  Pulmonary:      Effort: Pulmonary effort is normal. No respiratory distress.      Breath sounds: Normal breath sounds. No wheezing.   Abdominal:      General: Abdomen is flat. Bowel sounds are normal. There is no distension.      Palpations: Abdomen is soft.      Tenderness: There is no abdominal tenderness.   Lymphadenopathy:      Cervical: No cervical adenopathy.   Skin:     General: Skin is warm and dry.      Capillary Refill: Capillary refill takes less than 2 seconds.      Comments: Left axilla with a 3 mm raised area of TTP, firm  no fluctuance    Neurological:      General: No focal deficit present.      Mental Status: She is alert and oriented to person, place, and time.   Psychiatric:         Mood and Affect: Mood normal.         Behavior: Behavior normal.         Thought Content: Thought content normal.         Judgment: Judgment normal.          /Plan:     Assessment & Plan     1. Psychophysiological insomnia  At goal continue current meds  e (DESYREL) 100 MG tablet; Take 1 tablet by mouth nightly  Dispense: 90 tablet; Refill: 4    2. Hydradenitis  Warm compresses declines daily suppressive therapy   - CBC with Auto Differential; Future  - doxycycline hyclate (VIBRA-TABS) 100 MG tablet; Take 1 tablet by mouth 2 times daily for 10 days  Dispense: 20 tablet; Refill: 0    3. Current moderate episode of major depressive disorder, unspecified whether recurrent (HCC)  At goal continue  - venlafaxine (EFFEXOR XR) 75 MG extended release capsule; Take 1 capsule by mouth daily  Dispense: 90 capsule; Refill: 4    4. 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HCC)    Prevent constipation  Stool softeners  High fiber diet  Keep well hydrated   5. Tobacco abuse  Declines cessation   6. Transaminitis  Unclear etiology  No overuse of tylenol   No alcohol abuse  No jaundice or fatigue  R/o hepatitis vs INH tX  May need to order u/s of

## 2025-04-02 ENCOUNTER — LAB (OUTPATIENT)
Dept: LAB | Age: 40
End: 2025-04-02

## 2025-04-02 ENCOUNTER — RESULTS FOLLOW-UP (OUTPATIENT)
Dept: FAMILY MEDICINE CLINIC | Age: 40
End: 2025-04-02

## 2025-04-02 ENCOUNTER — TELEPHONE (OUTPATIENT)
Dept: FAMILY MEDICINE CLINIC | Age: 40
End: 2025-04-02

## 2025-04-02 DIAGNOSIS — L73.2 HYDRADENITIS: ICD-10-CM

## 2025-04-02 DIAGNOSIS — R74.01 TRANSAMINITIS: ICD-10-CM

## 2025-04-02 LAB
ALBUMIN SERPL BCG-MCNC: 4.1 G/DL (ref 3.4–4.9)
ALP SERPL-CCNC: 198 U/L (ref 35–104)
ALT SERPL W/O P-5'-P-CCNC: 34 U/L (ref 10–35)
ANION GAP SERPL CALC-SCNC: 11 MEQ/L (ref 8–16)
AST SERPL-CCNC: 32 U/L (ref 10–35)
BASOPHILS ABSOLUTE: 0 THOU/MM3 (ref 0–0.1)
BASOPHILS NFR BLD AUTO: 0.9 %
BILIRUB SERPL-MCNC: 0.4 MG/DL (ref 0.3–1.2)
BUN SERPL-MCNC: 15 MG/DL (ref 8–23)
CALCIUM SERPL-MCNC: 9.6 MG/DL (ref 8.6–10)
CHLORIDE SERPL-SCNC: 109 MEQ/L (ref 98–111)
CHOLEST SERPL-MCNC: 244 MG/DL (ref 100–199)
CO2 SERPL-SCNC: 24 MEQ/L (ref 22–29)
CREAT SERPL-MCNC: 0.8 MG/DL (ref 0.5–0.9)
DEPRECATED RDW RBC AUTO: 44 FL (ref 35–45)
EOSINOPHIL NFR BLD AUTO: 1.8 %
EOSINOPHILS ABSOLUTE: 0.1 THOU/MM3 (ref 0–0.4)
ERYTHROCYTE [DISTWIDTH] IN BLOOD BY AUTOMATED COUNT: 12.7 % (ref 11.5–14.5)
GFR SERPL CREATININE-BSD FRML MDRD: > 90 ML/MIN/1.73M2
GLUCOSE SERPL-MCNC: 100 MG/DL (ref 74–109)
HAV IGM SER QL: NONREACTIVE
HBV CORE IGM SERPL QL IA: NONREACTIVE
HBV SURFACE AG SERPL QL IA: NONREACTIVE
HCT VFR BLD AUTO: 39.4 % (ref 37–47)
HCV IGG SERPL QL IA: NONREACTIVE
HDLC SERPL-MCNC: 68 MG/DL
HGB BLD-MCNC: 12.7 GM/DL (ref 12–16)
IMM GRANULOCYTES # BLD AUTO: 0.01 THOU/MM3 (ref 0–0.07)
IMM GRANULOCYTES NFR BLD AUTO: 0.3 %
LDLC SERPL CALC-MCNC: 153 MG/DL
LYMPHOCYTES ABSOLUTE: 1.7 THOU/MM3 (ref 1–4.8)
LYMPHOCYTES NFR BLD AUTO: 52.6 %
MCH RBC QN AUTO: 30.6 PG (ref 26–33)
MCHC RBC AUTO-ENTMCNC: 32.2 GM/DL (ref 32.2–35.5)
MCV RBC AUTO: 94.9 FL (ref 81–99)
MONOCYTES ABSOLUTE: 0.3 THOU/MM3 (ref 0.4–1.3)
MONOCYTES NFR BLD AUTO: 8.4 %
NEUTROPHILS ABSOLUTE: 1.2 THOU/MM3 (ref 1.8–7.7)
NEUTROPHILS NFR BLD AUTO: 36 %
NRBC BLD AUTO-RTO: 0 /100 WBC
PLATELET # BLD AUTO: 214 THOU/MM3 (ref 130–400)
PMV BLD AUTO: 11.7 FL (ref 9.4–12.4)
POTASSIUM SERPL-SCNC: 4 MEQ/L (ref 3.5–5.2)
PROT SERPL-MCNC: 6.9 G/DL (ref 6.4–8.3)
RBC # BLD AUTO: 4.15 MILL/MM3 (ref 4.2–5.4)
SODIUM SERPL-SCNC: 144 MEQ/L (ref 135–145)
TRIGL SERPL-MCNC: 114 MG/DL (ref 0–199)
WBC # BLD AUTO: 3.3 THOU/MM3 (ref 4.8–10.8)

## 2025-04-02 NOTE — TELEPHONE ENCOUNTER
Hero Robins, APRN - CNP  P [facility] Clinical Staff    Let pt know labs are stable. Liver enzymes have decreased back to normal, .continue current meds

## (undated) DEVICE — GOWN,SIRUS,NON REINFRCD,LARGE,SET IN SL: Brand: MEDLINE

## (undated) DEVICE — DECANTER FLD 9IN ST BG FOR ASEP TRNSF OF FLD

## (undated) DEVICE — DRAPE C ARM W36XL30IN RECTANG BND BG AND TAPE

## (undated) DEVICE — INTENDED FOR TISSUE SEPARATION, AND OTHER PROCEDURES THAT REQUIRE A SHARP SURGICAL BLADE TO PUNCTURE OR CUT.: Brand: BARD-PARKER ® CARBON RIB-BACK BLADES

## (undated) DEVICE — Z DISCONTINUED BY MEDLINE USE 2711682 TRAY SKIN PREP DRY W/ PREM GLV

## (undated) DEVICE — LLETZ LOOP ELECTRODE, 20MM WIDE X 12MM DEEP, 11.8 CM SHAFT, WHITE: Brand: MEGADYNE

## (undated) DEVICE — AGENT HEMSTAT W4XL8IN OXIDIZED REGENERATED CELOS ABSRB

## (undated) DEVICE — Z INACTIVE USE 2735373 APPLICATOR FBR LAIN COT WOOD TIP ECONOMICAL

## (undated) DEVICE — SOLUTION SCRB 4OZ 4% CHG H2O AIDED FOR PREOPERATIVE SKIN

## (undated) DEVICE — BREAST HERNIA PACK: Brand: MEDLINE INDUSTRIES, INC.

## (undated) DEVICE — SKIN AFFIX SURG ADHESIVE 72/CS 0.55ML: Brand: MEDLINE

## (undated) DEVICE — GAUZE,SPONGE,8"X4",12PLY,XRAY,STRL,LF: Brand: MEDLINE

## (undated) DEVICE — BASIC SINGLE BASIN BTC-LF: Brand: MEDLINE INDUSTRIES, INC.

## (undated) DEVICE — ELECTROSURGICAL PENCIL BUTTON SWITCH E-Z CLEAN COATED BLADE ELECTRODE 10 FT (3 M) CORD HOLSTER: Brand: MEGADYNE

## (undated) DEVICE — GLOVE SURG SZ 6 THK91MIL LTX FREE SYN POLYISOPRENE ANTI

## (undated) DEVICE — YANKAUER,BULB TIP,W/O VENT,RIGID,STERILE: Brand: MEDLINE

## (undated) DEVICE — PAD,NON-ADHERENT,3X8,STERILE,LF,1/PK: Brand: MEDLINE

## (undated) DEVICE — CATHETER,URETHRAL,REDRUBBER,STRL,16FR: Brand: MEDLINE

## (undated) DEVICE — COVER ARMBRD W13XL28.5IN IMPERV BLU FOR OP RM

## (undated) DEVICE — DRESSING TRNSPAR W5XL4.5IN FLM SHT SEMIPERMEABLE WIND

## (undated) DEVICE — PACK PROC LAP II AURORA

## (undated) DEVICE — GLOVE SURG SZ 65 THK91MIL LTX FREE SYN POLYISOPRENE

## (undated) DEVICE — PATIENT RETURN ELECTRODE, SINGLE-USE, CONTACT QUALITY MONITORING, ADULT, WITH 9FT CORD, FOR PATIENTS WEIGING OVER 33LBS. (15KG): Brand: MEGADYNE

## (undated) DEVICE — DRAPE,UNDERBUTTOCKS,PCH,STERILE: Brand: MEDLINE

## (undated) DEVICE — SOLUTION IV IRRIG WATER 1000ML POUR BRL 2F7114

## (undated) DEVICE — TUBING, SUCTION, 1/4" X 20', STRAIGHT: Brand: MEDLINE INDUSTRIES, INC.

## (undated) DEVICE — GLOVE ORANGE PI 7 1/2   MSG9075

## (undated) DEVICE — BANDAGE ADH W1XL3IN NAT FAB WVN FLX DURABLE N ADH PD SEAL

## (undated) DEVICE — JELLY,LUBE,STERILE,FLIP TOP,TUBE,2-OZ: Brand: MEDLINE

## (undated) DEVICE — ROYAL SILK SURGICAL GOWN, XXL: Brand: CONVERTORS

## (undated) DEVICE — SOLUTION IV 1000ML LAC RINGERS PH 6.5 INJ USP VIAFLX PLAS

## (undated) DEVICE — GLOVE ORANGE PI 8   MSG9080